# Patient Record
Sex: FEMALE | Race: WHITE | NOT HISPANIC OR LATINO | Employment: FULL TIME | URBAN - METROPOLITAN AREA
[De-identification: names, ages, dates, MRNs, and addresses within clinical notes are randomized per-mention and may not be internally consistent; named-entity substitution may affect disease eponyms.]

---

## 2017-01-30 DIAGNOSIS — F98.8 ATTENTION DEFICIT DISORDER OF ADULT: Primary | ICD-10-CM

## 2017-01-30 RX ORDER — DEXTROAMPHETAMINE SACCHARATE, AMPHETAMINE ASPARTATE, DEXTROAMPHETAMINE SULFATE AND AMPHETAMINE SULFATE 5; 5; 5; 5 MG/1; MG/1; MG/1; MG/1
20 TABLET ORAL 2 TIMES DAILY
Qty: 60 TABLET | Refills: 0 | Status: CANCELLED | OUTPATIENT
Start: 2017-01-30

## 2017-01-31 NOTE — TELEPHONE ENCOUNTER
Pt has not been seen for a year and has no showed 2 follow up visits.  I cannot refill without a visit.  Please let her know she needs to see Hemalatha to get her medication refilled.  Anabel Heller

## 2017-01-31 NOTE — TELEPHONE ENCOUNTER
Adderall      Last Written Prescription Date: 12-  Last Fill Quantity: 60,  # refills: 0   Last Office Visit with [facility], [facility] or [facility] prescribing provider:  1-   Had 2 appointments scheduled. (June & July 2016)  No Show'ed on both.

## 2017-02-03 ENCOUNTER — MYC REFILL (OUTPATIENT)
Dept: FAMILY MEDICINE | Facility: CLINIC | Age: 52
End: 2017-02-03

## 2017-02-03 ENCOUNTER — MYC MEDICAL ADVICE (OUTPATIENT)
Dept: FAMILY MEDICINE | Facility: CLINIC | Age: 52
End: 2017-02-03

## 2017-02-03 DIAGNOSIS — I10 ESSENTIAL HYPERTENSION WITH GOAL BLOOD PRESSURE LESS THAN 140/90: ICD-10-CM

## 2017-02-03 DIAGNOSIS — F98.8 ATTENTION DEFICIT DISORDER OF ADULT: ICD-10-CM

## 2017-02-03 RX ORDER — DEXTROAMPHETAMINE SACCHARATE, AMPHETAMINE ASPARTATE, DEXTROAMPHETAMINE SULFATE AND AMPHETAMINE SULFATE 5; 5; 5; 5 MG/1; MG/1; MG/1; MG/1
20 TABLET ORAL 2 TIMES DAILY
Qty: 60 TABLET | Refills: 0 | Status: SHIPPED | OUTPATIENT
Start: 2017-02-03 | End: 2017-02-16

## 2017-02-03 RX ORDER — LISINOPRIL 40 MG/1
40 TABLET ORAL DAILY
Qty: 30 TABLET | Refills: 0 | Status: SHIPPED | OUTPATIENT
Start: 2017-02-03 | End: 2017-02-16

## 2017-02-03 NOTE — TELEPHONE ENCOUNTER
Hemalatha Juarez,  Routing refill request to provider for review/approval because:  Labs not current:  Last BMP 1-25-16  Patient needs to be seen because:  Last office visit 1-25-16.   Please review and advise refills.    Thanks,  Lynne

## 2017-02-03 NOTE — TELEPHONE ENCOUNTER
Scheduled patient for an appointment on Monday 2/6/17 with Hemalatha.  Routing to provider for approval or denial of refills prior to appointment.  Dottie Alex RN

## 2017-02-03 NOTE — TELEPHONE ENCOUNTER
Message from Lilibetht:  Original authorizing provider: HANNA CONTE NP, SULY CNP    Libertad Russell would like a refill of the following medications:  lisinopril (PRINIVIL/ZESTRIL) 40 MG tablet [HANNA CONTE NP, SULY CNP]  amphetamine-dextroamphetamine (ADDERALL) 20 MG per tablet [HANNA CONTE NP, SULY CNP]    Preferred pharmacy: [facility] [address] [facility]    Comment:

## 2017-02-03 NOTE — TELEPHONE ENCOUNTER
Spoke with patient and scheduled her for an appointment Monday 2/6/17 with Hemalatha.  Patient is requesting refills of her Adderall and lisinpril today. Did let patient know that she was advised to be seen prior to refills.   Routing to provider to see if patient can have her refills today or if she needs to wait until her appointment on Monday.  Dottie Alex RN

## 2017-02-06 NOTE — TELEPHONE ENCOUNTER
Review of chart pt Pt NO SHOWED for her sched appt  After picking up refill 2/6/2017  Larry aware of   DAVID Osman  RN/Manohar Payton

## 2017-02-16 ENCOUNTER — OFFICE VISIT (OUTPATIENT)
Dept: FAMILY MEDICINE | Facility: CLINIC | Age: 52
End: 2017-02-16
Payer: COMMERCIAL

## 2017-02-16 VITALS
DIASTOLIC BLOOD PRESSURE: 68 MMHG | TEMPERATURE: 97.6 F | HEART RATE: 80 BPM | BODY MASS INDEX: 20.86 KG/M2 | WEIGHT: 125.2 LBS | HEIGHT: 65 IN | SYSTOLIC BLOOD PRESSURE: 124 MMHG

## 2017-02-16 DIAGNOSIS — I10 ESSENTIAL HYPERTENSION WITH GOAL BLOOD PRESSURE LESS THAN 140/90: ICD-10-CM

## 2017-02-16 DIAGNOSIS — F98.8 ATTENTION DEFICIT DISORDER OF ADULT: ICD-10-CM

## 2017-02-16 DIAGNOSIS — F40.243 FEAR OF FLYING: ICD-10-CM

## 2017-02-16 DIAGNOSIS — Z12.31 ENCOUNTER FOR SCREENING MAMMOGRAM FOR BREAST CANCER: Primary | ICD-10-CM

## 2017-02-16 LAB
ANION GAP SERPL CALCULATED.3IONS-SCNC: 6 MMOL/L (ref 3–14)
BUN SERPL-MCNC: 11 MG/DL (ref 7–30)
CALCIUM SERPL-MCNC: 8.8 MG/DL (ref 8.5–10.1)
CHLORIDE SERPL-SCNC: 101 MMOL/L (ref 94–109)
CO2 SERPL-SCNC: 29 MMOL/L (ref 20–32)
CREAT SERPL-MCNC: 0.63 MG/DL (ref 0.52–1.04)
CREAT UR-MCNC: 98 MG/DL
GFR SERPL CREATININE-BSD FRML MDRD: NORMAL ML/MIN/1.7M2
GLUCOSE SERPL-MCNC: 98 MG/DL (ref 70–99)
MICROALBUMIN UR-MCNC: 10 MG/L
MICROALBUMIN/CREAT UR: 10.76 MG/G CR (ref 0–25)
POTASSIUM SERPL-SCNC: 3.8 MMOL/L (ref 3.4–5.3)
SODIUM SERPL-SCNC: 136 MMOL/L (ref 133–144)

## 2017-02-16 PROCEDURE — 80048 BASIC METABOLIC PNL TOTAL CA: CPT | Performed by: NURSE PRACTITIONER

## 2017-02-16 PROCEDURE — 99214 OFFICE O/P EST MOD 30 MIN: CPT | Performed by: NURSE PRACTITIONER

## 2017-02-16 PROCEDURE — 36415 COLL VENOUS BLD VENIPUNCTURE: CPT | Performed by: NURSE PRACTITIONER

## 2017-02-16 PROCEDURE — 82043 UR ALBUMIN QUANTITATIVE: CPT | Performed by: NURSE PRACTITIONER

## 2017-02-16 RX ORDER — ALPRAZOLAM 0.25 MG
TABLET ORAL
Qty: 4 TABLET | Refills: 0 | Status: SHIPPED | OUTPATIENT
Start: 2017-02-16 | End: 2017-05-19

## 2017-02-16 RX ORDER — DEXTROAMPHETAMINE SACCHARATE, AMPHETAMINE ASPARTATE, DEXTROAMPHETAMINE SULFATE AND AMPHETAMINE SULFATE 5; 5; 5; 5 MG/1; MG/1; MG/1; MG/1
20 TABLET ORAL 2 TIMES DAILY
Qty: 60 TABLET | Refills: 0 | Status: SHIPPED | OUTPATIENT
Start: 2017-02-16 | End: 2017-05-01

## 2017-02-16 RX ORDER — DEXTROAMPHETAMINE SACCHARATE, AMPHETAMINE ASPARTATE, DEXTROAMPHETAMINE SULFATE AND AMPHETAMINE SULFATE 5; 5; 5; 5 MG/1; MG/1; MG/1; MG/1
20 TABLET ORAL 2 TIMES DAILY
Qty: 60 TABLET | Refills: 0 | Status: SHIPPED | OUTPATIENT
Start: 2017-03-17 | End: 2017-05-01

## 2017-02-16 RX ORDER — LISINOPRIL 40 MG/1
40 TABLET ORAL DAILY
Qty: 90 TABLET | Refills: 3 | Status: SHIPPED | OUTPATIENT
Start: 2017-02-16 | End: 2017-06-30

## 2017-02-16 RX ORDER — DEXTROAMPHETAMINE SACCHARATE, AMPHETAMINE ASPARTATE, DEXTROAMPHETAMINE SULFATE AND AMPHETAMINE SULFATE 5; 5; 5; 5 MG/1; MG/1; MG/1; MG/1
20 TABLET ORAL 2 TIMES DAILY
Qty: 60 TABLET | Refills: 0 | Status: SHIPPED | OUTPATIENT
Start: 2017-04-14 | End: 2017-05-01

## 2017-02-16 NOTE — MR AVS SNAPSHOT
After Visit Summary   2/16/2017    Libertad Russell    MRN: [id number]           Patient Information     Date Of Birth          1965        Visit Information        Provider Department      2/16/2017 2:00 PM Hemalatha Juarez APRN [facility]        Today's Diagnoses     Essential hypertension with goal blood pressure less than 140/90        Attention deficit disorder of adult        Fear of flying          Care Instructions    Continue with Adderall     .for the headache make sure that you do stay hydrated,  Stay well hydrated - urine should be clear.         Smoke stopping.   You need to set the goal of smoke stopping.  Write your goal on a piece of paper,   I am GOING to quit smoking.  And then  At least 3 positive reasons why you are going to quit smoking.    Place this on the mirror in your bathroom and read it the first thing in the AM and the last thing before bed.  Have your goal posted in the kitchen or where ever you need.  Now read it, say it to yourself at least 8 times per day for  8 weeks.    After a few weeks your brain will start to believe this and it will force you to make the decision to smoke or not to smoke.   Your brain can't remember both  I am going to smoke and I am NOT going to smoke... You choose which one you want to do.     Now, on the 2nd piece of paper write down your rewards for no smoking for 1,2,3,4 weeks,  2,3,4,5,6,8,10 12 months.  Pick someone to give you your rewards so you are accountable to someone and they can also be your cheerleader, ( not a nagger).      Doing this should give you 80 % chance of obtaining your goal !!!     Good luck,                 Follow-ups after your visit        Who to contact     Normal or non-critical lab and imaging results will be communicated to you by MyChart, letter or phone within 4 business days after the clinic has received the results. If you do not hear from us within 7 days, please contact  "the clinic through [employer] or phone. If you have a critical or abnormal lab result, we will notify you by phone as soon as possible.  Submit refill requests through [employer] or call your pharmacy and they will forward the refill request to us. Please allow 3 business days for your refill to be completed.          If you need to speak with a  for additional information , please call: [phone]           Additional Information About Your Visit        [employer] Information     [employer] gives you secure access to your electronic health record. If you see a primary care provider, you can also send messages to your care team and make appointments. If you have questions, please call your primary care clinic.  If you do not have a primary care provider, please call [phone] and they will assist you.        Care EveryWhere ID     This is your Care EveryWhere ID. This could be used by other organizations to access your [facility] medical records  [id number]        Your Vitals Were     Pulse Temperature Height Last Period Breastfeeding? BMI (Body Mass Index)    80 97.6  F (36.4  C) (Tympanic) 5' 5\" (1.651 m) 01/28/2017 (Within Days) No 20.83 kg/m2       Blood Pressure from Last 3 Encounters:   02/16/17 124/68   07/20/16 140/90   01/25/16 160/90    Weight from Last 3 Encounters:   02/16/17 125 lb 3.2 oz (56.8 kg)   07/20/16 122 lb (55.3 kg)   01/25/16 124 lb 6.4 oz (56.4 kg)              We Performed the Following     Albumin Random Urine Quantitative     Basic metabolic panel  (Ca, Cl, CO2, Creat, Gluc, K, Na, BUN)          Today's Medication Changes          These changes are accurate as of: 2/16/17  2:23 PM.  If you have any questions, ask your nurse or doctor.               Start taking these medicines.        Dose/Directions    * amphetamine-dextroamphetamine 20 MG per tablet   Commonly known as:  ADDERALL   Used for:  Attention deficit disorder of adult   Started by:  Hemalatha Juarez, APRN CNP "        Dose:  20 mg   Take 1 tablet (20 mg) by mouth 2 times daily Needs to be seen before next refill 2/3/17   Quantity:  60 tablet   Refills:  0       * amphetamine-dextroamphetamine 20 MG per tablet   Commonly known as:  ADDERALL   Used for:  Attention deficit disorder of adult   Started by:  Hemalatha Juarez APRN CNP        Dose:  20 mg   Start taking on:  3/17/2017   Take 1 tablet (20 mg) by mouth 2 times daily Needs to be seen before next refill 2/3/17   Quantity:  60 tablet   Refills:  0       * amphetamine-dextroamphetamine 20 MG per tablet   Commonly known as:  ADDERALL   Used for:  Attention deficit disorder of adult   Started by:  Hemalatha Juarez APRN CNP        Dose:  20 mg   Start taking on:  4/14/2017   Take 1 tablet (20 mg) by mouth 2 times daily Needs to be seen before next refill 2/3/17   Quantity:  60 tablet   Refills:  0       * Notice:  This list has 3 medication(s) that are the same as other medications prescribed for you. Read the directions carefully, and ask your doctor or other care provider to review them with you.      These medicines have changed or have updated prescriptions.        Dose/Directions    lisinopril 40 MG tablet   Commonly known as:  PRINIVIL/ZESTRIL   This may have changed:  additional instructions   Used for:  Essential hypertension with goal blood pressure less than 140/90   Changed by:  Hemalatha Juarez APRN CNP        Dose:  40 mg   Take 1 tablet (40 mg) by mouth daily   Quantity:  90 tablet   Refills:  3            Where to get your medicines      These medications were sent to [facility] [address] [facility]  [address] [facility] [address]     Phone:  [phone]     lisinopril 40 MG tablet         Some of these will need a paper prescription and others can be bought over the counter.  Ask your nurse if you have questions.     Bring a paper prescription for each of these medications     ALPRAZolam  0.25 MG tablet    amphetamine-dextroamphetamine 20 MG per tablet    amphetamine-dextroamphetamine 20 MG per tablet    amphetamine-dextroamphetamine 20 MG per tablet                Primary Care Provider Office Phone # Fax #    Hemalatha Ramonacharlotte Juarez APRCHERYL [facility] [phone]       [facility] [address] [place] [address]        Thank you!     Thank you for choosing [facility]  for your care. Our goal is always to provide you with excellent care. Hearing back from our patients is one way we can continue to improve our services. Please take a few minutes to complete the written survey that you may receive in the mail after your visit with us. Thank you!             Your Updated Medication List - Protect others around you: Learn how to safely use, store and throw away your medicines at www.disposemymeds.org.          This list is accurate as of: 2/16/17  2:23 PM.  Always use your most recent med list.                   Brand Name Dispense Instructions for use    ALPRAZolam 0.25 MG tablet    XANAX    4 tablet    Take 1 tablet 1 hour before flying and repeat if needed       * amphetamine-dextroamphetamine 20 MG per tablet    ADDERALL    60 tablet    Take 1 tablet (20 mg) by mouth 2 times daily Needs to be seen before next refill 2/3/17       * amphetamine-dextroamphetamine 20 MG per tablet   Start taking on:  3/17/2017    ADDERALL    60 tablet    Take 1 tablet (20 mg) by mouth 2 times daily Needs to be seen before next refill 2/3/17       * amphetamine-dextroamphetamine 20 MG per tablet   Start taking on:  4/14/2017    ADDERALL    60 tablet    Take 1 tablet (20 mg) by mouth 2 times daily Needs to be seen before next refill 2/3/17       aspirin 81 MG tablet     100 tablet    Take 1 tablet (81 mg) by mouth daily Medication is being filled for 1 time refill only due to:  Patient needs to be seen because it has been more than one year since last visit.       lisinopril 40 MG  tablet    PRINIVIL/ZESTRIL    90 tablet    Take 1 tablet (40 mg) by mouth daily       metroNIDAZOLE 0.75 % topical gel    METROGEL    45 g    Apply topically 2 times daily       * Notice:  This list has 3 medication(s) that are the same as other medications prescribed for you. Read the directions carefully, and ask your doctor or other care provider to review them with you.

## 2017-02-16 NOTE — NURSING NOTE
"Chief Complaint   Patient presents with     Recheck Medication     Adderall     Hypertension       Initial /68 (BP Location: Left arm, Patient Position: Chair, Cuff Size: Adult Regular)  Pulse 80  Temp 97.6  F (36.4  C) (Tympanic)  Ht 5' 5\" (1.651 m)  Wt 125 lb 3.2 oz (56.8 kg)  LMP 01/28/2017 (Within Days)  Breastfeeding? No  BMI 20.83 kg/m2 Estimated body mass index is 20.83 kg/(m^2) as calculated from the following:    Height as of this encounter: 5' 5\" (1.651 m).    Weight as of this encounter: 125 lb 3.2 oz (56.8 kg).  Medication Reconciliation: complete     Celeste Wright CMA (AAMA)      "

## 2017-02-16 NOTE — PROGRESS NOTES
SUBJECTIVE:                                                    Libertad Russell is a 51 year old female who presents to clinic today for the following health issues:      Medication Followup of Adderall    Taking Medication as prescribed: yes    Side Effects:  None    Medication Helping Symptoms:  yes                                                                                                     Some-                                                                        Never   Rarely      times       Often  Very Often  1. How often do you have trouble wrapping up the final details of a project,  once the challenging parts have been done?   x     2. How often do you have difficulty getting things in order when you have to do a task that requires organization?   x     3. How often do you have problems remembering appointments or obligations?   x     4. When you have a task that requires a lot of thought, how often do you avoid or delay getting started?   x     5. How often do you fidget or squirm with your hands or feet when you have to sit down for a long time?  x      6. How often do you feel overly active and compelled to do things, like you were driven by a motor?   x       Part A                                                        7. How often do you make careless mistakes when you have to work on a boring or difficult project?   x     8. How often do you have difficulty keeping your attention when you are doing boring or repetitive work?   x     9. How often do you have difficulty concentrating on what people say to you, even when they are speaking to you directly?  x      10. How often do you misplace or have difficulty finding things at home or at work?  x      11. How often are you distracted by activity or noise around you?  x      12. How often do you leave your seat in meetings or other situations in which you are expected to remain seated?    x      13. How often do you feel restless or  fidgety?     x     14. How often do you have difficulty unwinding and relaxing when you have time to yourself?  x      15. How often do you find yourself talking too much when you are in social situations?  x      16. When you're in a conversation, how often do you find yourself finishing the sentences of the people you are talking to, before they can finish them themselves?  x      17. How often do you have difficulty waiting your turn in situations when turn-taking is required?  x      18. How often do you interrupt others when they are busy?  x        Hypertension Follow-up      Outpatient blood pressures are being checked at work.  Results are under 140/90.    Low Salt Diet: low salt     Amount of exercise or physical activity: 2-3 days/week for an average of 15-30 minutes    Problems taking medications regularly: Headaches possibly from lisinopril     Medication side effects: none  Diet: regular (no restrictions)       Is doing well.       Problem list and histories reviewed & adjusted, as indicated.  Additional history: as documented    Patient Active Problem List   Diagnosis     Tobacco use disorder     Seasonal allergic rhinitis     Attention deficit disorder of adult     CARDIOVASCULAR SCREENING; LDL GOAL LESS THAN 160     Rosacea     Raynaud phenomenon     General medical exam     Essential hypertension with goal blood pressure less than 140/90     Past Surgical History   Procedure Laterality Date     Tubal ligation  2003     Surgical history of -   2000, 1998     breast biopsies     Lymph node biopsy  2001     told to be benign     Breast surgery       Cosmetic surgery       breast implants     Eye surgery  2015     cherry       Social History   Substance Use Topics     Smoking status: Current Every Day Smoker     Packs/day: 1.00     Years: 15.00     Types: Cigarettes     Smokeless tobacco: Never Used     Alcohol use Yes      Comment: 1 drink per week     Family History   Problem Relation Age of Onset      Hypertension Mother      CANCER Mother      lung cancer     Other Cancer Mother      Lung     C.A.D. Father      52 at first MI     Hypertension Other      DIABETES No family hx of      CEREBROVASCULAR DISEASE No family hx of      Breast Cancer No family hx of      Cancer - colorectal No family hx of          Current Outpatient Prescriptions   Medication Sig Dispense Refill     lisinopril (PRINIVIL/ZESTRIL) 40 MG tablet Take 1 tablet (40 mg) by mouth daily Dur for appointment and lab work 30 tablet 0     amphetamine-dextroamphetamine (ADDERALL) 20 MG per tablet Take 1 tablet (20 mg) by mouth 2 times daily Needs to be seen before next refill 2/3/17 60 tablet 0     metroNIDAZOLE (METROGEL) 0.75 % topical gel Apply topically 2 times daily 45 g 0     aspirin 81 MG tablet Take 1 tablet (81 mg) by mouth daily Medication is being filled for 1 time refill only due to:  Patient needs to be seen because it has been more than one year since last visit. 100 tablet 0     ALPRAZolam (XANAX) 0.25 MG tablet Take 1 tablet 1 hour before flying and repeat if needed (Patient not taking: Reported on 2/16/2017) 4 tablet 0     No Known Allergies  BP Readings from Last 3 Encounters:   02/16/17 124/68   07/20/16 140/90   01/25/16 160/90    Wt Readings from Last 3 Encounters:   02/16/17 125 lb 3.2 oz (56.8 kg)   07/20/16 122 lb (55.3 kg)   01/25/16 124 lb 6.4 oz (56.4 kg)                    ROS:  C: NEGATIVE for fever, chills, change in weight  E/M: NEGATIVE for ear, mouth and throat problems  R: NEGATIVE for significant cough or SOB, +  smoker  CV: NEGATIVE for chest pain, palpitations or peripheral edema  GI: NEGATIVE for nausea, abdominal pain, heartburn, or change in bowel habits  PSYCHIATRIC: NEGATIVE for changes in mood or affect, no problems with Adderall.   The medication is keeping her focused,    Is on the computer most of the day and with the medication she is jasen to stay on track     OBJECTIVE:                                     "                /68 (BP Location: Left arm, Patient Position: Chair, Cuff Size: Adult Regular)  Pulse 80  Temp 97.6  F (36.4  C) (Tympanic)  Ht 5' 5\" (1.651 m)  Wt 125 lb 3.2 oz (56.8 kg)  LMP 01/28/2017 (Within Days)  Breastfeeding? No  BMI 20.83 kg/m2  Body mass index is 20.83 kg/(m^2).  GENERAL: healthy, alert and no distress  HENT: ear canals and TM's normal, nose and mouth without ulcers or lesions  NECK: no adenopathy, no asymmetry, masses, or scars and thyroid normal to palpation  RESP: lungs clear to auscultation - no rales, rhonchi or wheezes  CV: regular rate and rhythm, normal S1 S2, no S3 or S4, no murmur, click or rub, no peripheral edema and peripheral pulses strong  ABDOMEN: soft, nontender, no hepatosplenomegaly, no masses and bowel sounds normal  PSYCH: mentation appears normal, affect normal/bright, judgement and insight intact and appearance well groomed.   Self assessment for  ADD is very good.    Diagnostic Test Results:  none      ASSESSMENT/PLAN:                                                      ASSESSMENT/PLAN:      ICD-10-CM    1. Encounter for screening mammogram for breast cancer Z12.31 *MA Screening Digital Bilateral   2. Essential hypertension with goal blood pressure less than 140/90 I10 lisinopril (PRINIVIL/ZESTRIL) 40 MG tablet     Basic metabolic panel  (Ca, Cl, CO2, Creat, Gluc, K, Na, BUN)     Albumin Random Urine Quantitative   3. Attention deficit disorder of adult F98.8 amphetamine-dextroamphetamine (ADDERALL) 20 MG per tablet     amphetamine-dextroamphetamine (ADDERALL) 20 MG per tablet     amphetamine-dextroamphetamine (ADDERALL) 20 MG per tablet   4. 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         Smoke stopping.   You need to set the goal of smoke stopping.  Write your goal on a piece of paper, "   I am GOING to quit smoking.  And then  At least 3 positive reasons why you are going to quit smoking.    Place this on the mirror in your bathroom and read it the first thing in the AM and the last thing before bed.  Have your goal posted in the kitchen or where ever you need.  Now read it, say it to yourself at least 8 times per day for  8 weeks.    After a few weeks your brain will start to believe this and it will force you to make the decision to smoke or not to smoke.   Your brain can't remember both  I am going to smoke and I am NOT going to smoke... You choose which one you want to do.     Now, on the 2nd piece of paper write down your rewards for no smoking for 1,2,3,4 weeks,  2,3,4,5,6,8,10 12 months.  Pick someone to give you your rewards so you are accountable to someone and they can also be your cheerleader, ( not a nagger).      Doing this should give you 80 % chance of obtaining your goal !!!     Good luck,                      Tobacco Cessation:   reports that she has been smoking Cigarettes.  She has a 15.00 pack-year smoking history. She has never used smokeless tobacco.  Tobacco Cessation Action Plan: Phone counseling: Place order for QuitPlan (Tobacco Cessation QIC Referral [id number])      MEDICATIONS:  Continue current medications without change  Regular exercise  See Patient Instructions    HANNA CONTE NP, APRN [facility]

## 2017-02-16 NOTE — PATIENT INSTRUCTIONS
Continue with Adderall     .for the headache make sure that you do stay hydrated,  Stay well hydrated - urine should be clear.         Smoke stopping.   You need to set the goal of smoke stopping.  Write your goal on a piece of paper,   I am GOING to quit smoking.  And then  At least 3 positive reasons why you are going to quit smoking.    Place this on the mirror in your bathroom and read it the first thing in the AM and the last thing before bed.  Have your goal posted in the kitchen or where ever you need.  Now read it, say it to yourself at least 8 times per day for  8 weeks.    After a few weeks your brain will start to believe this and it will force you to make the decision to smoke or not to smoke.   Your brain can't remember both  I am going to smoke and I am NOT going to smoke... You choose which one you want to do.     Now, on the 2nd piece of paper write down your rewards for no smoking for 1,2,3,4 weeks,  2,3,4,5,6,8,10 12 months.  Pick someone to give you your rewards so you are accountable to someone and they can also be your cheerleader, ( not a nagger).      Doing this should give you 80 % chance of obtaining your goal !!!     Good luck,

## 2017-03-09 ENCOUNTER — TELEPHONE (OUTPATIENT)
Dept: FAMILY MEDICINE | Facility: CLINIC | Age: 52
End: 2017-03-09

## 2017-03-09 NOTE — TELEPHONE ENCOUNTER
Panel Management Review      Patient has the following on her problem list:     Hypertension   Last three blood pressure readings:  BP Readings from Last 3 Encounters:   02/16/17 124/68   07/20/16 140/90   01/25/16 160/90     Blood pressure: Passed    HTN Guidelines:  Age 18-59 BP range:  Less than 140/90  Age 60-85 with Diabetes:  Less than 140/90  Age 60-85 without Diabetes:  less than 150/90      Composite cancer screening  Chart review shows that this patient is due/due soon for the following Pap Smear  Summary:    Patient is due/failing the following:   PAP    Action needed:   Patient needs office visit for PAP.    Type of outreach:    Sent [employer] message.    Questions for provider review:    None                                                                                                                                    Oriana Samuel       Chart routed to none .

## 2017-05-01 ENCOUNTER — MYC REFILL (OUTPATIENT)
Dept: FAMILY MEDICINE | Facility: CLINIC | Age: 52
End: 2017-05-01

## 2017-05-01 DIAGNOSIS — F98.8 ATTENTION DEFICIT DISORDER OF ADULT: ICD-10-CM

## 2017-05-01 DIAGNOSIS — I10 ESSENTIAL HYPERTENSION WITH GOAL BLOOD PRESSURE LESS THAN 140/90: ICD-10-CM

## 2017-05-01 NOTE — TELEPHONE ENCOUNTER
Message from Lilibetht:  Original authorizing provider: HANNA CONTE NP, APRN CNP    Libertad Russell would like a refill of the following medications:  lisinopril (PRINIVIL/ZESTRIL) 40 MG tablet [HANNA CONTE NP, APRN CNP]  amphetamine-dextroamphetamine (ADDERALL) 20 MG per tablet [HANNA CONTE NP, APRN CNP]  amphetamine-dextroamphetamine (ADDERALL) 20 MG per tablet [HANNA CONTE NP, APRN CNP]  amphetamine-dextroamphetamine (ADDERALL) 20 MG per tablet [HANNA CONTE NP, APRN CNP]    Preferred pharmacy: [facility] [address]    Comment:

## 2017-05-02 RX ORDER — DEXTROAMPHETAMINE SACCHARATE, AMPHETAMINE ASPARTATE, DEXTROAMPHETAMINE SULFATE AND AMPHETAMINE SULFATE 5; 5; 5; 5 MG/1; MG/1; MG/1; MG/1
20 TABLET ORAL 2 TIMES DAILY
Qty: 60 TABLET | Refills: 0 | Status: SHIPPED | OUTPATIENT
Start: 2017-05-12 | End: 2017-06-05

## 2017-05-02 RX ORDER — DEXTROAMPHETAMINE SACCHARATE, AMPHETAMINE ASPARTATE, DEXTROAMPHETAMINE SULFATE AND AMPHETAMINE SULFATE 5; 5; 5; 5 MG/1; MG/1; MG/1; MG/1
20 TABLET ORAL 2 TIMES DAILY
Qty: 60 TABLET | Refills: 0 | Status: SHIPPED | OUTPATIENT
Start: 2017-06-12 | End: 2017-06-05

## 2017-05-02 RX ORDER — LISINOPRIL 40 MG/1
40 TABLET ORAL DAILY
Qty: 90 TABLET | Refills: 3 | OUTPATIENT
Start: 2017-05-02

## 2017-05-02 NOTE — TELEPHONE ENCOUNTER
Scripts walk over to the [facility].    Fadumo Preston, [facility]

## 2017-05-19 ENCOUNTER — MYC REFILL (OUTPATIENT)
Dept: FAMILY MEDICINE | Facility: CLINIC | Age: 52
End: 2017-05-19

## 2017-05-19 DIAGNOSIS — F98.8 ATTENTION DEFICIT DISORDER OF ADULT: ICD-10-CM

## 2017-05-19 DIAGNOSIS — F40.243 FEAR OF FLYING: ICD-10-CM

## 2017-05-19 RX ORDER — DEXTROAMPHETAMINE SACCHARATE, AMPHETAMINE ASPARTATE, DEXTROAMPHETAMINE SULFATE AND AMPHETAMINE SULFATE 5; 5; 5; 5 MG/1; MG/1; MG/1; MG/1
20 TABLET ORAL 2 TIMES DAILY
Qty: 60 TABLET | Refills: 0 | Status: CANCELLED | OUTPATIENT
Start: 2017-05-19

## 2017-05-22 NOTE — TELEPHONE ENCOUNTER
Message from Lilibetht:  Original authorizing provider: HANNA CONTE NP, APRN CNP    Libertad Russell would like a refill of the following medications:  ALPRAZolam (XANAX) 0.25 MG tablet [HANNA CONTE NP, APRN CNP]  amphetamine-dextroamphetamine (ADDERALL) 20 MG per tablet [HANNA CONTE NP, APRN CNP]  amphetamine-dextroamphetamine (ADDERALL) 20 MG per tablet [HANNA CONTE NP, APRN CNP]    Preferred pharmacy: [facility] [address] [facility]    Comment:

## 2017-05-23 RX ORDER — ALPRAZOLAM 0.25 MG
TABLET ORAL
Qty: 4 TABLET | Refills: 0 | Status: SHIPPED | OUTPATIENT
Start: 2017-05-23 | End: 2019-07-08

## 2017-06-05 ENCOUNTER — TELEPHONE (OUTPATIENT)
Dept: FAMILY MEDICINE | Facility: CLINIC | Age: 52
End: 2017-06-05

## 2017-06-05 DIAGNOSIS — F98.8 ATTENTION DEFICIT DISORDER OF ADULT: ICD-10-CM

## 2017-06-05 RX ORDER — DEXTROAMPHETAMINE SACCHARATE, AMPHETAMINE ASPARTATE, DEXTROAMPHETAMINE SULFATE AND AMPHETAMINE SULFATE 5; 5; 5; 5 MG/1; MG/1; MG/1; MG/1
20 TABLET ORAL 2 TIMES DAILY
Qty: 60 TABLET | Refills: 0 | Status: SHIPPED | OUTPATIENT
Start: 2017-07-05 | End: 2017-06-05

## 2017-06-05 RX ORDER — DEXTROAMPHETAMINE SACCHARATE, AMPHETAMINE ASPARTATE, DEXTROAMPHETAMINE SULFATE AND AMPHETAMINE SULFATE 5; 5; 5; 5 MG/1; MG/1; MG/1; MG/1
20 TABLET ORAL 2 TIMES DAILY
Qty: 60 TABLET | Refills: 0 | Status: SHIPPED | OUTPATIENT
Start: 2017-08-04 | End: 2017-06-05

## 2017-06-05 RX ORDER — DEXTROAMPHETAMINE SACCHARATE, AMPHETAMINE ASPARTATE, DEXTROAMPHETAMINE SULFATE AND AMPHETAMINE SULFATE 5; 5; 5; 5 MG/1; MG/1; MG/1; MG/1
20 TABLET ORAL 2 TIMES DAILY
Qty: 60 TABLET | Refills: 0 | Status: SHIPPED | OUTPATIENT
Start: 2017-06-05 | End: 2017-06-30

## 2017-06-30 ENCOUNTER — MYC REFILL (OUTPATIENT)
Dept: FAMILY MEDICINE | Facility: CLINIC | Age: 52
End: 2017-06-30

## 2017-06-30 DIAGNOSIS — F98.8 ATTENTION DEFICIT DISORDER OF ADULT: ICD-10-CM

## 2017-06-30 DIAGNOSIS — L71.9 ROSACEA: ICD-10-CM

## 2017-06-30 DIAGNOSIS — I10 ESSENTIAL HYPERTENSION WITH GOAL BLOOD PRESSURE LESS THAN 140/90: ICD-10-CM

## 2017-07-03 RX ORDER — DEXTROAMPHETAMINE SACCHARATE, AMPHETAMINE ASPARTATE, DEXTROAMPHETAMINE SULFATE AND AMPHETAMINE SULFATE 5; 5; 5; 5 MG/1; MG/1; MG/1; MG/1
20 TABLET ORAL 2 TIMES DAILY
Qty: 60 TABLET | Refills: 0 | Status: SHIPPED | OUTPATIENT
Start: 2017-07-03 | End: 2017-07-20

## 2017-07-03 RX ORDER — LISINOPRIL 40 MG/1
40 TABLET ORAL DAILY
Qty: 90 TABLET | Refills: 3 | Status: SHIPPED | OUTPATIENT
Start: 2017-07-03 | End: 2017-08-25

## 2017-07-03 RX ORDER — METRONIDAZOLE 7.5 MG/G
GEL TOPICAL 2 TIMES DAILY
Qty: 45 G | Refills: 0 | Status: SHIPPED | OUTPATIENT
Start: 2017-07-03 | End: 2017-07-20

## 2017-07-03 NOTE — TELEPHONE ENCOUNTER
Message from Lilibetht:  Original authorizing provider: HANNA CONTE NP, APRN CNP    Libertad Russell would like a refill of the following medications:  metroNIDAZOLE (METROGEL) 0.75 % topical gel [HANNA CONTE NP, APRN CNP]  lisinopril (PRINIVIL/ZESTRIL) 40 MG tablet [HANNA CONTE NP, APRN CNP]  amphetamine-dextroamphetamine (ADDERALL) 20 MG per tablet [HANNA CONTE NP, APRN CNP]    Preferred pharmacy: [facility] [address] [facility]    Comment:

## 2017-07-03 NOTE — TELEPHONE ENCOUNTER
Health Maintenance Due   Topic Date Due     PAP SCREENING Q3 YR (SYSTEM ASSIGNED)  06/02/2012     Lab Results   Component Value Date    LDL 81 09/29/2014      Lab Results   Component Value Date    CR 0.63 02/16/2017     Lab Results   Component Value Date    POTASSIUM 3.8 02/16/2017     BP Readings from Last 1 Encounters:   02/16/17 124/68     No results found for: A1C   Lab Results   Component Value Date    TSH 1.49 09/24/2012      PHQ-9 SCORE 10/18/2011 9/24/2012 11/25/2013   Total Score 0 6 7     AMANDA-7 SCORE 11/25/2013   Total Score 2

## 2017-07-05 NOTE — TELEPHONE ENCOUNTER
Script for adderall walked over tot [facility].      Fadumo Preston, [place]

## 2017-07-20 ENCOUNTER — MYC REFILL (OUTPATIENT)
Dept: FAMILY MEDICINE | Facility: CLINIC | Age: 52
End: 2017-07-20

## 2017-07-20 DIAGNOSIS — F98.8 ATTENTION DEFICIT DISORDER OF ADULT: ICD-10-CM

## 2017-07-20 DIAGNOSIS — L71.9 ROSACEA: ICD-10-CM

## 2017-07-20 RX ORDER — METRONIDAZOLE 7.5 MG/G
GEL TOPICAL 2 TIMES DAILY
Qty: 45 G | Refills: 0 | Status: SHIPPED | OUTPATIENT
Start: 2017-07-20 | End: 2017-09-19

## 2017-07-20 RX ORDER — DEXTROAMPHETAMINE SACCHARATE, AMPHETAMINE ASPARTATE, DEXTROAMPHETAMINE SULFATE AND AMPHETAMINE SULFATE 5; 5; 5; 5 MG/1; MG/1; MG/1; MG/1
20 TABLET ORAL 2 TIMES DAILY
Qty: 60 TABLET | Refills: 0 | Status: SHIPPED | OUTPATIENT
Start: 2017-08-02 | End: 2017-08-25

## 2017-07-20 NOTE — TELEPHONE ENCOUNTER
Message from MyChart:  Original authorizing provider: Troy Munguia MD    Libertad ANDERSONZoie Yvonne would like a refill of the following medications:  metroNIDAZOLE (METROGEL) 0.75 % topical gel [Troy Munguia MD]  amphetamine-dextroamphetamine (ADDERALL) 20 MG per tablet [Troy Munguia MD]    Preferred pharmacy: [facility] [address] [facility]    Comment:

## 2017-08-25 ENCOUNTER — MYC REFILL (OUTPATIENT)
Dept: FAMILY MEDICINE | Facility: CLINIC | Age: 52
End: 2017-08-25

## 2017-08-25 DIAGNOSIS — F98.8 ATTENTION DEFICIT DISORDER OF ADULT: ICD-10-CM

## 2017-08-25 RX ORDER — DEXTROAMPHETAMINE SACCHARATE, AMPHETAMINE ASPARTATE, DEXTROAMPHETAMINE SULFATE AND AMPHETAMINE SULFATE 5; 5; 5; 5 MG/1; MG/1; MG/1; MG/1
20 TABLET ORAL 2 TIMES DAILY
Qty: 60 TABLET | Refills: 0 | Status: SHIPPED | OUTPATIENT
Start: 2017-08-25 | End: 2017-08-25

## 2017-08-25 RX ORDER — DEXTROAMPHETAMINE SACCHARATE, AMPHETAMINE ASPARTATE, DEXTROAMPHETAMINE SULFATE AND AMPHETAMINE SULFATE 5; 5; 5; 5 MG/1; MG/1; MG/1; MG/1
20 TABLET ORAL 2 TIMES DAILY
Qty: 60 TABLET | Refills: 0 | Status: SHIPPED | OUTPATIENT
Start: 2017-08-31 | End: 2017-09-21

## 2017-08-25 RX ORDER — DEXTROAMPHETAMINE SACCHARATE, AMPHETAMINE ASPARTATE, DEXTROAMPHETAMINE SULFATE AND AMPHETAMINE SULFATE 5; 5; 5; 5 MG/1; MG/1; MG/1; MG/1
20 TABLET ORAL 2 TIMES DAILY
Qty: 60 TABLET | Refills: 0 | Status: SHIPPED | OUTPATIENT
Start: 2017-11-01 | End: 2017-09-21

## 2017-08-25 RX ORDER — DEXTROAMPHETAMINE SACCHARATE, AMPHETAMINE ASPARTATE, DEXTROAMPHETAMINE SULFATE AND AMPHETAMINE SULFATE 5; 5; 5; 5 MG/1; MG/1; MG/1; MG/1
20 TABLET ORAL 2 TIMES DAILY
Qty: 60 TABLET | Refills: 0 | Status: SHIPPED | OUTPATIENT
Start: 2017-10-02 | End: 2017-09-21

## 2017-08-25 NOTE — TELEPHONE ENCOUNTER
Message from Berry White:  Original authorizing provider: HANNA CONTE NP, APRN CNP    Libertad Russell would like a refill of the following medications:  amphetamine-dextroamphetamine (ADDERALL) 20 MG per tablet [HANNA CONTE NP, APRN CNP]    Preferred pharmacy: [facility] [address] [facility]    Comment:      Medication renewals requested in this message routed to other providers:  lisinopril (PRINIVIL/ZESTRIL) 40 MG tablet [Troy Munguia MD]

## 2017-08-27 ENCOUNTER — HEALTH MAINTENANCE LETTER (OUTPATIENT)
Age: 52
End: 2017-08-27

## 2017-09-19 ENCOUNTER — MYC REFILL (OUTPATIENT)
Dept: FAMILY MEDICINE | Facility: CLINIC | Age: 52
End: 2017-09-19

## 2017-09-19 DIAGNOSIS — I10 ESSENTIAL HYPERTENSION WITH GOAL BLOOD PRESSURE LESS THAN 140/90: ICD-10-CM

## 2017-09-19 DIAGNOSIS — F98.8 ATTENTION DEFICIT DISORDER OF ADULT: ICD-10-CM

## 2017-09-19 DIAGNOSIS — L71.9 ROSACEA: ICD-10-CM

## 2017-09-19 RX ORDER — DEXTROAMPHETAMINE SACCHARATE, AMPHETAMINE ASPARTATE, DEXTROAMPHETAMINE SULFATE AND AMPHETAMINE SULFATE 5; 5; 5; 5 MG/1; MG/1; MG/1; MG/1
20 TABLET ORAL 2 TIMES DAILY
Qty: 60 TABLET | Refills: 0 | Status: CANCELLED | OUTPATIENT
Start: 2017-09-19

## 2017-09-19 RX ORDER — LISINOPRIL 40 MG/1
40 TABLET ORAL DAILY
Qty: 90 TABLET | Refills: 1 | Status: CANCELLED | OUTPATIENT
Start: 2017-09-19

## 2017-09-20 RX ORDER — METRONIDAZOLE 7.5 MG/G
GEL TOPICAL 2 TIMES DAILY
Qty: 45 G | Refills: 0 | Status: SHIPPED | OUTPATIENT
Start: 2017-09-20 | End: 2018-05-16

## 2017-09-20 NOTE — TELEPHONE ENCOUNTER
Message from Lilibetht:  Original authorizing provider: HANNA CONTE NP, APRN CNP    Libertad Russell would like a refill of the following medications:  metroNIDAZOLE (METROGEL) 0.75 % topical gel [HANNA CONTE NP, APRN CNP]  lisinopril (PRINIVIL/ZESTRIL) 40 MG tablet [HANNA CONTE NP, APRN CNP]  amphetamine-dextroamphetamine (ADDERALL) 20 MG per tablet [HANNA CONTE NP, APRN CNP]    Preferred pharmacy: [facility] [address] [facility]    Comment:

## 2017-09-21 ENCOUNTER — OFFICE VISIT (OUTPATIENT)
Dept: FAMILY MEDICINE | Facility: CLINIC | Age: 52
End: 2017-09-21
Payer: COMMERCIAL

## 2017-09-21 VITALS
SYSTOLIC BLOOD PRESSURE: 114 MMHG | HEIGHT: 65 IN | DIASTOLIC BLOOD PRESSURE: 62 MMHG | BODY MASS INDEX: 20.83 KG/M2 | TEMPERATURE: 97.4 F | WEIGHT: 125 LBS | HEART RATE: 80 BPM

## 2017-09-21 DIAGNOSIS — I10 ESSENTIAL HYPERTENSION WITH GOAL BLOOD PRESSURE LESS THAN 140/90: ICD-10-CM

## 2017-09-21 DIAGNOSIS — F98.8 ATTENTION DEFICIT DISORDER OF ADULT: Primary | ICD-10-CM

## 2017-09-21 PROCEDURE — 99214 OFFICE O/P EST MOD 30 MIN: CPT | Performed by: NURSE PRACTITIONER

## 2017-09-21 RX ORDER — DEXTROAMPHETAMINE SACCHARATE, AMPHETAMINE ASPARTATE, DEXTROAMPHETAMINE SULFATE AND AMPHETAMINE SULFATE 5; 5; 5; 5 MG/1; MG/1; MG/1; MG/1
20 TABLET ORAL DAILY
Qty: 30 TABLET | Refills: 0 | Status: SHIPPED | OUTPATIENT
Start: 2017-09-21 | End: 2017-12-11

## 2017-09-21 RX ORDER — DEXTROAMPHETAMINE SACCHARATE, AMPHETAMINE ASPARTATE MONOHYDRATE, DEXTROAMPHETAMINE SULFATE AND AMPHETAMINE SULFATE 5; 5; 5; 5 MG/1; MG/1; MG/1; MG/1
20 CAPSULE, EXTENDED RELEASE ORAL DAILY
Qty: 30 CAPSULE | Refills: 0 | Status: SHIPPED | OUTPATIENT
Start: 2017-09-21 | End: 2017-12-11

## 2017-09-21 RX ORDER — DEXTROAMPHETAMINE SACCHARATE, AMPHETAMINE ASPARTATE, DEXTROAMPHETAMINE SULFATE AND AMPHETAMINE SULFATE 5; 5; 5; 5 MG/1; MG/1; MG/1; MG/1
20 TABLET ORAL DAILY
Qty: 30 TABLET | Refills: 0 | Status: SHIPPED | OUTPATIENT
Start: 2017-10-20 | End: 2017-12-11

## 2017-09-21 RX ORDER — DEXTROAMPHETAMINE SACCHARATE, AMPHETAMINE ASPARTATE MONOHYDRATE, DEXTROAMPHETAMINE SULFATE AND AMPHETAMINE SULFATE 5; 5; 5; 5 MG/1; MG/1; MG/1; MG/1
20 CAPSULE, EXTENDED RELEASE ORAL DAILY
Qty: 30 CAPSULE | Refills: 0 | Status: SHIPPED | OUTPATIENT
Start: 2017-11-20 | End: 2017-12-07

## 2017-09-21 RX ORDER — DEXTROAMPHETAMINE SACCHARATE, AMPHETAMINE ASPARTATE MONOHYDRATE, DEXTROAMPHETAMINE SULFATE AND AMPHETAMINE SULFATE 5; 5; 5; 5 MG/1; MG/1; MG/1; MG/1
20 CAPSULE, EXTENDED RELEASE ORAL DAILY
Qty: 30 CAPSULE | Refills: 0 | Status: SHIPPED | OUTPATIENT
Start: 2017-10-20 | End: 2017-12-11

## 2017-09-21 RX ORDER — DEXTROAMPHETAMINE SACCHARATE, AMPHETAMINE ASPARTATE, DEXTROAMPHETAMINE SULFATE AND AMPHETAMINE SULFATE 5; 5; 5; 5 MG/1; MG/1; MG/1; MG/1
20 TABLET ORAL DAILY
Qty: 30 TABLET | Refills: 0 | Status: SHIPPED | OUTPATIENT
Start: 2017-11-20 | End: 2017-12-07

## 2017-09-21 NOTE — MR AVS SNAPSHOT
After Visit Summary   9/21/2017    Libertad Russell    MRN: [id number]           Patient Information     Date Of Birth          1965        Visit Information        Provider Department      9/21/2017 10:20 AM Hemalatha Juarez APRN [facility]        Today's Diagnoses     Essential hypertension with goal blood pressure less than 140/90    -  1    Attention deficit disorder of adult          Care Instructions    While you are working the 5 days of 12 hour shift.  Try taking the extended release in the  AM and then take the immediate release around 1-2 pm .     Once you are working your 8 hours you can then consider going back to the  Twice per day dosing you are now taking or use just the 1 extended pill.     Stay well hydrated - urine should be clear.    You should have to urinate  Every  3 hours or so    You are OVERDUE for pap smear.   Please get it done before the end of the year                Follow-ups after your visit        Who to contact     Normal or non-critical lab and imaging results will be communicated to you by [employer]hart, letter or phone within 4 business days after the clinic has received the results. If you do not hear from us within 7 days, please contact the clinic through [employer] or phone. If you have a critical or abnormal lab result, we will notify you by phone as soon as possible.  Submit refill requests through [employer] or call your pharmacy and they will forward the refill request to us. Please allow 3 business days for your refill to be completed.          If you need to speak with a  for additional information , please call: [phone]           Additional Information About Your Visit        [employer] Information     [employer] gives you secure access to your electronic health record. If you see a primary care provider, you can also send messages to your care team and make appointments. If you have questions, please call your  "primary care clinic.  If you do not have a primary care provider, please call [phone] and they will assist you.        Care EveryWhere ID     This is your Care EveryWhere ID. This could be used by other organizations to access your [facility] medical records  [id number]        Your Vitals Were     Pulse Temperature Height Last Period BMI (Body Mass Index)       80 97.4  F (36.3  C) (Tympanic) 5' 5\" (1.651 m) 09/07/2017 (Exact Date) 20.8 kg/m2        Blood Pressure from Last 3 Encounters:   09/21/17 114/62   02/16/17 124/68   07/20/16 140/90    Weight from Last 3 Encounters:   09/21/17 125 lb (56.7 kg)   02/16/17 125 lb 3.2 oz (56.8 kg)   07/20/16 122 lb (55.3 kg)              Today, you had the following     No orders found for display         Today's Medication Changes          These changes are accurate as of: 9/21/17 10:54 AM.  If you have any questions, ask your nurse or doctor.               These medicines have changed or have updated prescriptions.        Dose/Directions    * amphetamine-dextroamphetamine 20 MG per tablet   Commonly known as:  ADDERALL   This may have changed:    - when to take this  - additional instructions   Used for:  Attention deficit disorder of adult   Changed by:  Hemalatha Juarez APRN CNP        Dose:  20 mg   Take 1 tablet (20 mg) by mouth daily Take in the early afternoon   Quantity:  30 tablet   Refills:  0       * amphetamine-dextroamphetamine 20 MG per 24 hr capsule   Commonly known as:  ADDERALL XR   This may have changed:  You were already taking a medication with the same name, and this prescription was added. Make sure you understand how and when to take each.   Used for:  Attention deficit disorder of adult   Changed by:  Hemalatha Juarez APRN CNP        Dose:  20 mg   Take 1 capsule (20 mg) by mouth daily   Quantity:  30 capsule   Refills:  0       * amphetamine-dextroamphetamine 20 MG per tablet   Commonly known as:  ADDERALL   This may have changed:  "   - when to take this  - additional instructions   Used for:  Attention deficit disorder of adult   Changed by:  Hemalatha Juarez APRN CNP        Dose:  20 mg   Start taking on:  10/20/2017   Take 1 tablet (20 mg) by mouth daily Take in the early afternoon   Quantity:  30 tablet   Refills:  0       * amphetamine-dextroamphetamine 20 MG per 24 hr capsule   Commonly known as:  ADDERALL XR   This may have changed:  You were already taking a medication with the same name, and this prescription was added. Make sure you understand how and when to take each.   Used for:  Attention deficit disorder of adult   Changed by:  Hemalatha Juarez APRN CNP        Dose:  20 mg   Start taking on:  10/20/2017   Take 1 capsule (20 mg) by mouth daily   Quantity:  30 capsule   Refills:  0       * amphetamine-dextroamphetamine 20 MG per tablet   Commonly known as:  ADDERALL   This may have changed:    - when to take this  - additional instructions   Used for:  Attention deficit disorder of adult   Changed by:  Hemalatha Juarez APRN CNP        Dose:  20 mg   Start taking on:  11/20/2017   Take 1 tablet (20 mg) by mouth daily Take in the early afternoon   Quantity:  30 tablet   Refills:  0       * amphetamine-dextroamphetamine 20 MG per 24 hr capsule   Commonly known as:  ADDERALL XR   This may have changed:  You were already taking a medication with the same name, and this prescription was added. Make sure you understand how and when to take each.   Used for:  Attention deficit disorder of adult   Changed by:  Hemalatha Juarez APRN CNP        Dose:  20 mg   Start taking on:  11/20/2017   Take 1 capsule (20 mg) by mouth daily   Quantity:  30 capsule   Refills:  0       * Notice:  This list has 6 medication(s) that are the same as other medications prescribed for you. Read the directions carefully, and ask your doctor or other care provider to review them with you.         Where to get your medicines      Some  of these will need a paper prescription and others can be bought over the counter.  Ask your nurse if you have questions.     Bring a paper prescription for each of these medications     amphetamine-dextroamphetamine 20 MG per 24 hr capsule    amphetamine-dextroamphetamine 20 MG per 24 hr capsule    amphetamine-dextroamphetamine 20 MG per 24 hr capsule    amphetamine-dextroamphetamine 20 MG per tablet    amphetamine-dextroamphetamine 20 MG per tablet    amphetamine-dextroamphetamine 20 MG per tablet                Primary Care Provider Office Phone # Fax #    Hemalathamana Cardosocharlotte Juarez, APRN [facility] [phone] [address] [place] [address]        Equal Access to Services     [facility]: Hadii aad ku hadasho Soomaali, waaxda luqadaha, qaybta kaalmada adeegyada, melanie meza hayjocy michael . So [facility] [phone].    ATENCIÓN: Si habla español, tiene a fontaine disposición servicios gratuitos de asistencia lingüística. [facility] [phone].    We comply with applicable federal civil rights laws and [place] laws. We do not discriminate on the basis of race, color, national origin, age, disability sex, sexual orientation or gender identity.            Thank you!     Thank you for choosing [facility]  for your care. Our goal is always to provide you with excellent care. Hearing back from our patients is one way we can continue to improve our services. Please take a few minutes to complete the written survey that you may receive in the mail after your visit with us. Thank you!             Your Updated Medication List - Protect others around you: Learn how to safely use, store and throw away your medicines at www.disposemymeds.org.          This list is accurate as of: 9/21/17 10:54 AM.  Always use your most recent med list.                   Brand Name Dispense Instructions for use Diagnosis    ALPRAZolam 0.25 MG tablet    XANAX    4 tablet    Take 1 tablet 1 hour before  flying and repeat if needed    Fear of flying       * amphetamine-dextroamphetamine 20 MG per tablet    ADDERALL    30 tablet    Take 1 tablet (20 mg) by mouth daily Take in the early afternoon    Attention deficit disorder of adult       * amphetamine-dextroamphetamine 20 MG per 24 hr capsule    ADDERALL XR    30 capsule    Take 1 capsule (20 mg) by mouth daily    Attention deficit disorder of adult       * amphetamine-dextroamphetamine 20 MG per tablet   Start taking on:  10/20/2017    ADDERALL    30 tablet    Take 1 tablet (20 mg) by mouth daily Take in the early afternoon    Attention deficit disorder of adult       * amphetamine-dextroamphetamine 20 MG per 24 hr capsule   Start taking on:  10/20/2017    ADDERALL XR    30 capsule    Take 1 capsule (20 mg) by mouth daily    Attention deficit disorder of adult       * amphetamine-dextroamphetamine 20 MG per tablet   Start taking on:  11/20/2017    ADDERALL    30 tablet    Take 1 tablet (20 mg) by mouth daily Take in the early afternoon    Attention deficit disorder of adult       * amphetamine-dextroamphetamine 20 MG per 24 hr capsule   Start taking on:  11/20/2017    ADDERALL XR    30 capsule    Take 1 capsule (20 mg) by mouth daily    Attention deficit disorder of adult       aspirin 81 MG tablet     100 tablet    Take 1 tablet (81 mg) by mouth daily Medication is being filled for 1 time refill only due to:  Patient needs to be seen because it has been more than one year since last visit.    Hypertension goal BP (blood pressure) < 140/90       lisinopril 40 MG tablet    PRINIVIL/ZESTRIL    90 tablet    Take 1 tablet (40 mg) by mouth daily    Essential hypertension with goal blood pressure less than 140/90       metroNIDAZOLE 0.75 % topical gel    METROGEL    45 g    Apply topically 2 times daily    Rosacea       * Notice:  This list has 6 medication(s) that are the same as other medications prescribed for you. Read the directions carefully, and ask your doctor  or other care provider to review them with you.

## 2017-09-21 NOTE — PATIENT INSTRUCTIONS
While you are working the 5 days of 12 hour shift.  Try taking the extended release in the  AM and then take the immediate release around 1-2 pm .     Once you are working your 8 hours you can then consider going back to the  Twice per day dosing you are now taking or use just the 1 extended pill.     Stay well hydrated - urine should be clear.    You should have to urinate  Every  3 hours or so    You are OVERDUE for pap smear.   Please get it done before the end of the year

## 2017-09-21 NOTE — NURSING NOTE
"Chief Complaint   Patient presents with     Recheck Medication     Hypertension       Initial /62 (BP Location: Left arm, Patient Position: Sitting, Cuff Size: Adult Regular)  Pulse 80  Temp 97.4  F (36.3  C) (Tympanic)  Ht 5' 5\" (1.651 m)  Wt 125 lb (56.7 kg)  LMP 09/07/2017 (Exact Date)  BMI 20.8 kg/m2 Estimated body mass index is 20.8 kg/(m^2) as calculated from the following:    Height as of this encounter: 5' 5\" (1.651 m).    Weight as of this encounter: 125 lb (56.7 kg).  Medication Reconciliation: complete     SMA Neena      "

## 2017-12-07 DIAGNOSIS — F98.8 ATTENTION DEFICIT DISORDER OF ADULT: ICD-10-CM

## 2017-12-07 NOTE — TELEPHONE ENCOUNTER
adderall XR and IR      Last Written Prescription Date: 11- (both)  Last Fill Quantity: 30 (both)  # refills: 0   Last Office Visit with [facility], [facility] or [facility] prescribing provider: 09-

## 2017-12-11 DIAGNOSIS — F98.8 ATTENTION DEFICIT DISORDER OF ADULT: ICD-10-CM

## 2017-12-11 RX ORDER — DEXTROAMPHETAMINE SACCHARATE, AMPHETAMINE ASPARTATE, DEXTROAMPHETAMINE SULFATE AND AMPHETAMINE SULFATE 5; 5; 5; 5 MG/1; MG/1; MG/1; MG/1
20 TABLET ORAL DAILY
Qty: 30 TABLET | Refills: 0 | Status: SHIPPED | OUTPATIENT
Start: 2018-02-19 | End: 2018-03-22

## 2017-12-11 RX ORDER — DEXTROAMPHETAMINE SACCHARATE, AMPHETAMINE ASPARTATE MONOHYDRATE, DEXTROAMPHETAMINE SULFATE AND AMPHETAMINE SULFATE 5; 5; 5; 5 MG/1; MG/1; MG/1; MG/1
20 CAPSULE, EXTENDED RELEASE ORAL DAILY
Qty: 30 CAPSULE | Refills: 0 | Status: SHIPPED | OUTPATIENT
Start: 2018-01-19 | End: 2018-03-22

## 2017-12-11 RX ORDER — DEXTROAMPHETAMINE SACCHARATE, AMPHETAMINE ASPARTATE, DEXTROAMPHETAMINE SULFATE AND AMPHETAMINE SULFATE 5; 5; 5; 5 MG/1; MG/1; MG/1; MG/1
20 TABLET ORAL DAILY
Qty: 30 TABLET | Refills: 0 | Status: SHIPPED | OUTPATIENT
Start: 2017-12-20 | End: 2018-03-22

## 2017-12-11 RX ORDER — DEXTROAMPHETAMINE SACCHARATE, AMPHETAMINE ASPARTATE MONOHYDRATE, DEXTROAMPHETAMINE SULFATE AND AMPHETAMINE SULFATE 5; 5; 5; 5 MG/1; MG/1; MG/1; MG/1
20 CAPSULE, EXTENDED RELEASE ORAL DAILY
Qty: 30 CAPSULE | Refills: 0 | Status: SHIPPED | OUTPATIENT
Start: 2017-12-20 | End: 2018-03-21

## 2017-12-11 RX ORDER — DEXTROAMPHETAMINE SACCHARATE, AMPHETAMINE ASPARTATE MONOHYDRATE, DEXTROAMPHETAMINE SULFATE AND AMPHETAMINE SULFATE 5; 5; 5; 5 MG/1; MG/1; MG/1; MG/1
20 CAPSULE, EXTENDED RELEASE ORAL DAILY
Qty: 30 CAPSULE | Refills: 0 | Status: CANCELLED | OUTPATIENT
Start: 2017-12-20

## 2017-12-11 RX ORDER — DEXTROAMPHETAMINE SACCHARATE, AMPHETAMINE ASPARTATE, DEXTROAMPHETAMINE SULFATE AND AMPHETAMINE SULFATE 5; 5; 5; 5 MG/1; MG/1; MG/1; MG/1
20 TABLET ORAL DAILY
Qty: 30 TABLET | Refills: 0 | Status: SHIPPED | OUTPATIENT
Start: 2018-01-19 | End: 2018-03-21

## 2017-12-11 RX ORDER — DEXTROAMPHETAMINE SACCHARATE, AMPHETAMINE ASPARTATE MONOHYDRATE, DEXTROAMPHETAMINE SULFATE AND AMPHETAMINE SULFATE 5; 5; 5; 5 MG/1; MG/1; MG/1; MG/1
20 CAPSULE, EXTENDED RELEASE ORAL DAILY
Qty: 30 CAPSULE | Refills: 0 | Status: SHIPPED | OUTPATIENT
Start: 2018-02-19 | End: 2018-03-22

## 2017-12-11 NOTE — TELEPHONE ENCOUNTER
Last filled 12-   Last qty 30  Last office visit 09-      [facility]   Certified Pharmacy Technician

## 2017-12-12 NOTE — TELEPHONE ENCOUNTER
Routing refill request to provider for review/approval because:  Drug not on the [facility], [facility] or [facility] refill protocol or controlled substance  Jenniffer Dupont RN

## 2018-03-21 DIAGNOSIS — F98.8 ATTENTION DEFICIT DISORDER OF ADULT: ICD-10-CM

## 2018-03-21 NOTE — TELEPHONE ENCOUNTER
adderall XR and IR      Last Written Prescription Date: both 02-  Last Fill Quantity: both 30,   # refills: 0  Last Office Visit: 09-  Future Office visit:       Routing refill request to provider for review/approval because:  Drug not on the [facility], [facility] or [facility] refill protocol or controlled substance

## 2018-03-22 RX ORDER — DEXTROAMPHETAMINE SACCHARATE, AMPHETAMINE ASPARTATE MONOHYDRATE, DEXTROAMPHETAMINE SULFATE AND AMPHETAMINE SULFATE 5; 5; 5; 5 MG/1; MG/1; MG/1; MG/1
20 CAPSULE, EXTENDED RELEASE ORAL DAILY
Qty: 30 CAPSULE | Refills: 0 | Status: SHIPPED | OUTPATIENT
Start: 2018-04-20 | End: 2018-05-17

## 2018-03-22 RX ORDER — DEXTROAMPHETAMINE SACCHARATE, AMPHETAMINE ASPARTATE MONOHYDRATE, DEXTROAMPHETAMINE SULFATE AND AMPHETAMINE SULFATE 5; 5; 5; 5 MG/1; MG/1; MG/1; MG/1
20 CAPSULE, EXTENDED RELEASE ORAL DAILY
Qty: 30 CAPSULE | Refills: 0 | Status: SHIPPED | OUTPATIENT
Start: 2018-03-22 | End: 2018-05-17

## 2018-03-22 RX ORDER — DEXTROAMPHETAMINE SACCHARATE, AMPHETAMINE ASPARTATE MONOHYDRATE, DEXTROAMPHETAMINE SULFATE AND AMPHETAMINE SULFATE 5; 5; 5; 5 MG/1; MG/1; MG/1; MG/1
20 CAPSULE, EXTENDED RELEASE ORAL DAILY
Qty: 30 CAPSULE | Refills: 0 | Status: SHIPPED | OUTPATIENT
Start: 2018-05-22 | End: 2018-05-17

## 2018-03-22 RX ORDER — DEXTROAMPHETAMINE SACCHARATE, AMPHETAMINE ASPARTATE, DEXTROAMPHETAMINE SULFATE AND AMPHETAMINE SULFATE 5; 5; 5; 5 MG/1; MG/1; MG/1; MG/1
20 TABLET ORAL DAILY
Qty: 30 TABLET | Refills: 0 | Status: SHIPPED | OUTPATIENT
Start: 2018-04-20 | End: 2018-05-17

## 2018-03-22 RX ORDER — DEXTROAMPHETAMINE SACCHARATE, AMPHETAMINE ASPARTATE, DEXTROAMPHETAMINE SULFATE AND AMPHETAMINE SULFATE 5; 5; 5; 5 MG/1; MG/1; MG/1; MG/1
20 TABLET ORAL DAILY
Qty: 30 TABLET | Refills: 0 | Status: SHIPPED | OUTPATIENT
Start: 2018-03-22 | End: 2018-05-17

## 2018-03-22 RX ORDER — DEXTROAMPHETAMINE SACCHARATE, AMPHETAMINE ASPARTATE, DEXTROAMPHETAMINE SULFATE AND AMPHETAMINE SULFATE 5; 5; 5; 5 MG/1; MG/1; MG/1; MG/1
20 TABLET ORAL DAILY
Qty: 30 TABLET | Refills: 0 | Status: SHIPPED | OUTPATIENT
Start: 2018-05-22 | End: 2018-05-17

## 2018-03-22 NOTE — TELEPHONE ENCOUNTER
Scripts walked over to the [facility].     Fadumo Preston, [facility]

## 2018-05-14 ENCOUNTER — TELEPHONE (OUTPATIENT)
Dept: FAMILY MEDICINE | Facility: CLINIC | Age: 53
End: 2018-05-14

## 2018-05-14 ENCOUNTER — MYC REFILL (OUTPATIENT)
Dept: FAMILY MEDICINE | Facility: CLINIC | Age: 53
End: 2018-05-14

## 2018-05-14 DIAGNOSIS — L71.9 ROSACEA: ICD-10-CM

## 2018-05-14 DIAGNOSIS — F98.8 ATTENTION DEFICIT DISORDER OF ADULT: ICD-10-CM

## 2018-05-14 RX ORDER — DEXTROAMPHETAMINE SACCHARATE, AMPHETAMINE ASPARTATE, DEXTROAMPHETAMINE SULFATE AND AMPHETAMINE SULFATE 5; 5; 5; 5 MG/1; MG/1; MG/1; MG/1
20 TABLET ORAL DAILY
Qty: 30 TABLET | Refills: 0 | Status: CANCELLED | OUTPATIENT
Start: 2018-05-14

## 2018-05-14 RX ORDER — METRONIDAZOLE 7.5 MG/G
GEL TOPICAL 2 TIMES DAILY
Qty: 45 G | Refills: 0 | Status: CANCELLED | OUTPATIENT
Start: 2018-05-14

## 2018-05-14 RX ORDER — DEXTROAMPHETAMINE SACCHARATE, AMPHETAMINE ASPARTATE MONOHYDRATE, DEXTROAMPHETAMINE SULFATE AND AMPHETAMINE SULFATE 5; 5; 5; 5 MG/1; MG/1; MG/1; MG/1
20 CAPSULE, EXTENDED RELEASE ORAL DAILY
Qty: 30 CAPSULE | Refills: 0 | Status: CANCELLED | OUTPATIENT
Start: 2018-05-14

## 2018-05-14 NOTE — TELEPHONE ENCOUNTER
Panel Management Review      Patient has the following on her problem list:     Hypertension   Last three blood pressure readings:  BP Readings from Last 3 Encounters:   09/21/17 114/62   02/16/17 124/68   07/20/16 140/90     Blood pressure: Passed    HTN Guidelines:  Age 18-59 BP range:  Less than 140/90  Age 60-85 with Diabetes:  Less than 140/90  Age 60-85 without Diabetes:  less than 150/90      Composite cancer screening  Chart review shows that this patient is due/due soon for the following Pap Smear  Summary:    Patient is due/failing the following:   PAP    Action needed:   Patient needs office visit for physical with PAP.    Type of outreach:    Sent DFine message.    Questions for provider review:    None                                                                                                                                    Celeste Figueroa CMA (AAMA)       Chart routed to None .

## 2018-05-14 NOTE — TELEPHONE ENCOUNTER
Review chart pt has refill at [facility] , advised to call them  DAVID Osman  RN/Manohar Payton

## 2018-05-14 NOTE — TELEPHONE ENCOUNTER
Message from MyChart:  Original authorizing provider: HANNA CONTE NP, APRN CNP    Libertad Russell would like a refill of the following medications:  metroNIDAZOLE (METROGEL) 0.75 % topical gel [HANNA CONTE NP, APRN CNP]  amphetamine-dextroamphetamine (ADDERALL) 20 MG per tablet [HANNA CONTE NP, APRN CNP]  amphetamine-dextroamphetamine (ADDERALL XR) 20 MG per 24 hr capsule [HANNA CONTE NP, APRN CNP]  amphetamine-dextroamphetamine (ADDERALL) 20 MG per tablet [HANNA CONTE NP, APRN CNP]  amphetamine-dextroamphetamine (ADDERALL XR) 20 MG per 24 hr capsule [HANNA CONTE NP, APRN CNP]    Preferred pharmacy: [facility] [address] [facility]    Comment:

## 2018-05-16 ENCOUNTER — TELEPHONE (OUTPATIENT)
Dept: FAMILY MEDICINE | Facility: CLINIC | Age: 53
End: 2018-05-16

## 2018-05-16 DIAGNOSIS — L71.9 ROSACEA: ICD-10-CM

## 2018-05-16 DIAGNOSIS — F98.8 ATTENTION DEFICIT DISORDER OF ADULT: ICD-10-CM

## 2018-05-17 RX ORDER — DEXTROAMPHETAMINE SACCHARATE, AMPHETAMINE ASPARTATE MONOHYDRATE, DEXTROAMPHETAMINE SULFATE AND AMPHETAMINE SULFATE 5; 5; 5; 5 MG/1; MG/1; MG/1; MG/1
20 CAPSULE, EXTENDED RELEASE ORAL DAILY
Qty: 30 CAPSULE | Refills: 0 | Status: SHIPPED | OUTPATIENT
Start: 2018-08-21 | End: 2018-06-18

## 2018-05-17 RX ORDER — DEXTROAMPHETAMINE SACCHARATE, AMPHETAMINE ASPARTATE MONOHYDRATE, DEXTROAMPHETAMINE SULFATE AND AMPHETAMINE SULFATE 5; 5; 5; 5 MG/1; MG/1; MG/1; MG/1
20 CAPSULE, EXTENDED RELEASE ORAL DAILY
Qty: 30 CAPSULE | Refills: 0 | Status: SHIPPED | OUTPATIENT
Start: 2018-07-20 | End: 2018-06-18

## 2018-05-17 RX ORDER — DEXTROAMPHETAMINE SACCHARATE, AMPHETAMINE ASPARTATE, DEXTROAMPHETAMINE SULFATE AND AMPHETAMINE SULFATE 5; 5; 5; 5 MG/1; MG/1; MG/1; MG/1
20 TABLET ORAL DAILY
Qty: 30 TABLET | Refills: 0 | Status: SHIPPED | OUTPATIENT
Start: 2018-07-20 | End: 2018-06-18

## 2018-05-17 RX ORDER — DEXTROAMPHETAMINE SACCHARATE, AMPHETAMINE ASPARTATE, DEXTROAMPHETAMINE SULFATE AND AMPHETAMINE SULFATE 5; 5; 5; 5 MG/1; MG/1; MG/1; MG/1
20 TABLET ORAL DAILY
Qty: 30 TABLET | Refills: 0 | Status: SHIPPED | OUTPATIENT
Start: 2018-06-21 | End: 2018-06-18

## 2018-05-17 RX ORDER — DEXTROAMPHETAMINE SACCHARATE, AMPHETAMINE ASPARTATE MONOHYDRATE, DEXTROAMPHETAMINE SULFATE AND AMPHETAMINE SULFATE 5; 5; 5; 5 MG/1; MG/1; MG/1; MG/1
20 CAPSULE, EXTENDED RELEASE ORAL DAILY
Qty: 30 CAPSULE | Refills: 0 | Status: SHIPPED | OUTPATIENT
Start: 2018-06-21 | End: 2018-06-18

## 2018-05-17 RX ORDER — METRONIDAZOLE 7.5 MG/G
GEL TOPICAL
Qty: 45 G | Refills: 0 | Status: SHIPPED | OUTPATIENT
Start: 2018-05-17 | End: 2018-11-05

## 2018-05-17 RX ORDER — DEXTROAMPHETAMINE SACCHARATE, AMPHETAMINE ASPARTATE, DEXTROAMPHETAMINE SULFATE AND AMPHETAMINE SULFATE 5; 5; 5; 5 MG/1; MG/1; MG/1; MG/1
20 TABLET ORAL DAILY
Qty: 30 TABLET | Refills: 0 | Status: SHIPPED | OUTPATIENT
Start: 2018-08-21 | End: 2018-06-18

## 2018-06-18 ENCOUNTER — TELEPHONE (OUTPATIENT)
Dept: FAMILY MEDICINE | Facility: CLINIC | Age: 53
End: 2018-06-18

## 2018-06-18 DIAGNOSIS — F98.8 ATTENTION DEFICIT DISORDER OF ADULT: ICD-10-CM

## 2018-06-18 RX ORDER — DEXTROAMPHETAMINE SACCHARATE, AMPHETAMINE ASPARTATE, DEXTROAMPHETAMINE SULFATE AND AMPHETAMINE SULFATE 5; 5; 5; 5 MG/1; MG/1; MG/1; MG/1
20 TABLET ORAL DAILY
Qty: 30 TABLET | Refills: 0 | Status: SHIPPED | OUTPATIENT
Start: 2018-08-13 | End: 2018-09-13

## 2018-06-18 RX ORDER — DEXTROAMPHETAMINE SACCHARATE, AMPHETAMINE ASPARTATE MONOHYDRATE, DEXTROAMPHETAMINE SULFATE AND AMPHETAMINE SULFATE 5; 5; 5; 5 MG/1; MG/1; MG/1; MG/1
20 CAPSULE, EXTENDED RELEASE ORAL DAILY
Qty: 30 CAPSULE | Refills: 0 | Status: SHIPPED | OUTPATIENT
Start: 2018-07-16 | End: 2018-10-15

## 2018-06-18 RX ORDER — DEXTROAMPHETAMINE SACCHARATE, AMPHETAMINE ASPARTATE, DEXTROAMPHETAMINE SULFATE AND AMPHETAMINE SULFATE 5; 5; 5; 5 MG/1; MG/1; MG/1; MG/1
20 TABLET ORAL DAILY
Qty: 30 TABLET | Refills: 0 | Status: SHIPPED | OUTPATIENT
Start: 2018-06-18 | End: 2018-10-16

## 2018-06-18 RX ORDER — DEXTROAMPHETAMINE SACCHARATE, AMPHETAMINE ASPARTATE MONOHYDRATE, DEXTROAMPHETAMINE SULFATE AND AMPHETAMINE SULFATE 5; 5; 5; 5 MG/1; MG/1; MG/1; MG/1
20 CAPSULE, EXTENDED RELEASE ORAL DAILY
Qty: 30 CAPSULE | Refills: 0 | Status: SHIPPED | OUTPATIENT
Start: 2018-08-13 | End: 2018-09-13

## 2018-06-18 RX ORDER — DEXTROAMPHETAMINE SACCHARATE, AMPHETAMINE ASPARTATE, DEXTROAMPHETAMINE SULFATE AND AMPHETAMINE SULFATE 5; 5; 5; 5 MG/1; MG/1; MG/1; MG/1
20 TABLET ORAL DAILY
Qty: 30 TABLET | Refills: 0 | Status: SHIPPED | OUTPATIENT
Start: 2018-07-16 | End: 2018-10-16

## 2018-06-18 RX ORDER — DEXTROAMPHETAMINE SACCHARATE, AMPHETAMINE ASPARTATE MONOHYDRATE, DEXTROAMPHETAMINE SULFATE AND AMPHETAMINE SULFATE 5; 5; 5; 5 MG/1; MG/1; MG/1; MG/1
20 CAPSULE, EXTENDED RELEASE ORAL DAILY
Qty: 30 CAPSULE | Refills: 0 | Status: SHIPPED | OUTPATIENT
Start: 2018-06-18 | End: 2018-10-15

## 2018-08-29 DIAGNOSIS — I10 ESSENTIAL HYPERTENSION WITH GOAL BLOOD PRESSURE LESS THAN 140/90: ICD-10-CM

## 2018-08-29 RX ORDER — LISINOPRIL 40 MG/1
TABLET ORAL
Qty: 30 TABLET | Refills: 0 | Status: SHIPPED | OUTPATIENT
Start: 2018-08-29 | End: 2018-10-03 | Stop reason: SINTOL

## 2018-08-29 NOTE — TELEPHONE ENCOUNTER
"Requested Prescriptions   Pending Prescriptions Disp Refills     lisinopril (PRINIVIL/ZESTRIL) 40 MG tablet [Pharmacy Med Name: LISINOPRIL 40MG TABS] 90 tablet 1    Last Written Prescription Date:  8/25/17  Last Fill Quantity: 90,  # refills: 1   Last office visit: 9/21/2017 with prescribing provider:  9/21/17 ubaldo   Future Office Visit:     Sig: TAKE ONE TABLET BY MOUTH EVERY DAY    ACE Inhibitors (Including Combos) Protocol Failed    8/29/2018 11:39 AM       Failed - Normal serum creatinine on file in past 12 months    Recent Labs   Lab Test  02/16/17   1427   CR  0.63            Failed - Normal serum potassium on file in past 12 months    Recent Labs   Lab Test  02/16/17   1427   POTASSIUM  3.8            Passed - Blood pressure under 140/90 in past 12 months    BP Readings from Last 3 Encounters:   09/21/17 114/62   02/16/17 124/68   07/20/16 140/90                Passed - Recent (12 mo) or future (30 days) visit within the authorizing provider's specialty    Patient had office visit in the last 12 months or has a visit in the next 30 days with authorizing provider or within the authorizing provider's specialty.  See \"Patient Info\" tab in inbasket, or \"Choose Columns\" in Meds & Orders section of the refill encounter.           Passed - Patient is age 18 or older       Passed - No active pregnancy on record       Passed - No positive pregnancy test in past 12 months          "

## 2018-08-29 NOTE — TELEPHONE ENCOUNTER
Medication is being filled for 1 time refill only due to:   Over due for office visit and/or labs   DAVID Osman  RN/Manohar Payton

## 2018-09-04 ENCOUNTER — OFFICE VISIT (OUTPATIENT)
Dept: FAMILY MEDICINE | Facility: CLINIC | Age: 53
End: 2018-09-04
Payer: COMMERCIAL

## 2018-09-04 VITALS
WEIGHT: 126.7 LBS | HEART RATE: 79 BPM | HEIGHT: 65 IN | BODY MASS INDEX: 21.11 KG/M2 | DIASTOLIC BLOOD PRESSURE: 95 MMHG | TEMPERATURE: 96.1 F | SYSTOLIC BLOOD PRESSURE: 145 MMHG

## 2018-09-04 DIAGNOSIS — Z23 NEED FOR VACCINATION: ICD-10-CM

## 2018-09-04 DIAGNOSIS — S91.331A PUNCTURE WOUND OF RIGHT FOOT, INITIAL ENCOUNTER: Primary | ICD-10-CM

## 2018-09-04 PROCEDURE — 90714 TD VACC NO PRESV 7 YRS+ IM: CPT | Performed by: PHYSICIAN ASSISTANT

## 2018-09-04 PROCEDURE — 99213 OFFICE O/P EST LOW 20 MIN: CPT | Mod: 25 | Performed by: PHYSICIAN ASSISTANT

## 2018-09-04 PROCEDURE — 90471 IMMUNIZATION ADMIN: CPT | Performed by: PHYSICIAN ASSISTANT

## 2018-09-04 NOTE — MR AVS SNAPSHOT
"              After Visit Summary   9/4/2018    Libertad Russell    MRN: [id number]           Patient Information     Date Of Birth          1965        Visit Information        Provider Department      9/4/2018 2:20 PM Keira Riley PA-C [facility]        Today's Diagnoses     Puncture wound of right foot, initial encounter    -  1    Need for vaccination           Follow-ups after your visit        Who to contact     Normal or non-critical lab and imaging results will be communicated to you by xPeerienthart, letter or phone within 4 business days after the clinic has received the results. If you do not hear from us within 7 days, please contact the clinic through xPeerienthart or phone. If you have a critical or abnormal lab result, we will notify you by phone as soon as possible.  Submit refill requests through [employer] or call your pharmacy and they will forward the refill request to us. Please allow 3 business days for your refill to be completed.          If you need to speak with a  for additional information , please call: [phone]             Additional Information About Your Visit        [employer] Information     [employer] gives you secure access to your electronic health record. If you see a primary care provider, you can also send messages to your care team and make appointments. If you have questions, please call your primary care clinic.  If you do not have a primary care provider, please call [phone] and they will assist you.        Care EveryWhere ID     This is your Care EveryWhere ID. This could be used by other organizations to access your [facility] medical records  [id number]        Your Vitals Were     Pulse Temperature Height BMI (Body Mass Index)          79 96.1  F (35.6  C) (Tympanic) 5' 5\" (1.651 m) 21.08 kg/m2         Blood Pressure from Last 3 Encounters:   09/04/18 (!) 145/95   09/21/17 114/62   02/16/17 124/68    Weight from Last 3 Encounters: "   09/04/18 126 lb 11.2 oz (57.5 kg)   09/21/17 125 lb (56.7 kg)   02/16/17 125 lb 3.2 oz (56.8 kg)              We Performed the Following     ADMIN 1st VACCINE     TD PRESERV FREE, IM (7+ YRS)        Primary Care Provider Office Phone # Fax #    Hemalatha Juarez, APRN [facility] [phone] [address] [place] [address]        Equal Access to Services     [facility] STELLA : Hadii aad ku hadasho Soomaali, waaxda luqadaha, qaybta kaalmada adeegyada, waxay idiin hayaan adeeg kharash laprabhakar . So [facility] [phone].    ATENCIÓN: Si habla español, tiene a fontaine disposición servicios gratuitos de asistencia lingüística. [facility] [phone].    We comply with applicable federal civil rights laws and [place] laws. We do not discriminate on the basis of race, color, national origin, age, disability, sex, sexual orientation, or gender identity.            Thank you!     Thank you for choosing [facility]  for your care. Our goal is always to provide you with excellent care. Hearing back from our patients is one way we can continue to improve our services. Please take a few minutes to complete the written survey that you may receive in the mail after your visit with us. Thank you!             Your Updated Medication List - Protect others around you: Learn how to safely use, store and throw away your medicines at www.disposemymeds.org.          This list is accurate as of 9/4/18  2:51 PM.  Always use your most recent med list.                   Brand Name Dispense Instructions for use Diagnosis    ALPRAZolam 0.25 MG tablet    XANAX    4 tablet    Take 1 tablet 1 hour before flying and repeat if needed    Fear of flying       * amphetamine-dextroamphetamine 20 MG per 24 hr capsule    ADDERALL XR    30 capsule    Take 1 capsule (20 mg) by mouth daily    Attention deficit disorder of adult       * amphetamine-dextroamphetamine 20 MG per tablet    ADDERALL    30 tablet    Take 1 tablet (20 mg) by  mouth daily Take in the early afternoon    Attention deficit disorder of adult       * amphetamine-dextroamphetamine 20 MG per 24 hr capsule    ADDERALL XR    30 capsule    Take 1 capsule (20 mg) by mouth daily    Attention deficit disorder of adult       * amphetamine-dextroamphetamine 20 MG per tablet    ADDERALL    30 tablet    Take 1 tablet (20 mg) by mouth daily Take in the early afternoon    Attention deficit disorder of adult       * amphetamine-dextroamphetamine 20 MG per 24 hr capsule    ADDERALL XR    30 capsule    Take 1 capsule (20 mg) by mouth daily    Attention deficit disorder of adult       * amphetamine-dextroamphetamine 20 MG per tablet    ADDERALL    30 tablet    Take 1 tablet (20 mg) by mouth daily Take in the early afternoon    Attention deficit disorder of adult       aspirin 81 MG tablet     100 tablet    Take 1 tablet (81 mg) by mouth daily Medication is being filled for 1 time refill only due to:  Patient needs to be seen because it has been more than one year since last visit.    Hypertension goal BP (blood pressure) < 140/90       lisinopril 40 MG tablet    PRINIVIL/ZESTRIL    30 tablet    TAKE ONE TABLET BY MOUTH EVERY DAY    Essential hypertension with goal blood pressure less than 140/90       metroNIDAZOLE 0.75 % topical gel    METROGEL    45 g    APPLY TO AFFECTED AREA(S) TWO TIMES A DAY    Rosacea       * Notice:  This list has 6 medication(s) that are the same as other medications prescribed for you. Read the directions carefully, and ask your doctor or other care provider to review them with you.

## 2018-09-04 NOTE — PROGRESS NOTES
"  SUBJECTIVE:   Libertad Russell is a 52 year old female who presents to clinic today for the following health issues:      Patient said she was cleaning up and throwing stuff away when she stepped on a long screw that entered into right foot. Patient said she was able to get the screw out. Patient said the right foot is very painful still.     Problem list and histories reviewed & adjusted, as indicated.  Additional history: Patient notes it was a new unused screw. No drainage from site.     ROS:  Constitutional, HEENT, cardiovascular, pulmonary, gi and gu systems are negative, except as otherwise noted.    OBJECTIVE:     BP (!) 145/95 (BP Location: Right arm, Patient Position: Sitting, Cuff Size: Adult Regular)  Pulse 79  Temp 96.1  F (35.6  C) (Tympanic)  Ht 5' 5\" (1.651 m)  Wt 126 lb 11.2 oz (57.5 kg)  BMI 21.08 kg/m2  Body mass index is 21.08 kg/(m^2).  GENERAL: healthy, alert and no distress  MS: normal muscle tone, normal range of motion, tenderness to palpation lateral arch R foot and antalgic gait.  SKIN: Puncture site noted on dorsum of R foot.  No erythema or suppuration noted.  Site redressed with telfa and coban.         ASSESSMENT/PLAN:   1. Puncture wound of right foot, initial encounter    2. Need for vaccination  - TD PRESERV FREE, IM (7+ YRS)  - ADMIN 1st VACCINE    Wash. with soap and water, dab with hydrogen peroxide and apply Vaseline daily.    Follow up if symptoms should persist, change or worsen.  Patient amenable to this follow up plan.     Keira Riley PA-C  [facility]    "

## 2018-09-04 NOTE — NURSING NOTE
Screening Questionnaire for Adult Immunization    Are you sick today?   No   Do you have allergies to medications, food, a vaccine component or latex?   No   Have you ever had a serious reaction after receiving a vaccination?   No   Do you have a long-term health problem with heart disease, lung disease, asthma, kidney disease, metabolic disease (e.g. diabetes), anemia, or other blood disorder?   No   Do you have cancer, leukemia, HIV/AIDS, or any other immune system problem?   No   In the past 3 months, have you taken medications that affect  your immune system, such as prednisone, other steroids, or anticancer drugs; drugs for the treatment of rheumatoid arthritis, Crohn s disease, or psoriasis; or have you had radiation treatments?   No   Have you had a seizure, or a brain or other nervous system problem?   No   During the past year, have you received a transfusion of blood or blood     products, or been given immune (gamma) globulin or antiviral drug?   No   For women: Are you pregnant or is there a chance you could become        pregnant during the next month?   No   Have you received any vaccinations in the past 4 weeks?   No     Immunization questionnaire answers were all negative.        Per orders of Keira Riley PA-C, injection of TD given by Jud COLEMAN LPN. Patient instructed to remain in clinic for 15 minutes afterwards, and to report any adverse reaction to me immediately.       Screening performed by Jud Hayes on 9/4/2018 at 2:43 PM.

## 2018-09-13 DIAGNOSIS — F98.8 ATTENTION DEFICIT DISORDER OF ADULT: ICD-10-CM

## 2018-09-13 NOTE — TELEPHONE ENCOUNTER
Adderall XR 20mg      Last Written Prescription Date:  08/13/2018 #30 x 0  Last filled - not provided    Adderall 20mg      Last Written Prescription Date:  08/13/2018 #30 x 0  Last filled - not provided    Last Office Visit: 09/21/2017 SHANDA Juarez  Future Office visit:   None    Routing refill request to provider for review/approval because:  Drug not on the [facility], [facility] or [facility] refill protocol or controlled substance

## 2018-09-14 ENCOUNTER — MYC MEDICAL ADVICE (OUTPATIENT)
Dept: FAMILY MEDICINE | Facility: CLINIC | Age: 53
End: 2018-09-14

## 2018-09-14 RX ORDER — DEXTROAMPHETAMINE SACCHARATE, AMPHETAMINE ASPARTATE, DEXTROAMPHETAMINE SULFATE AND AMPHETAMINE SULFATE 5; 5; 5; 5 MG/1; MG/1; MG/1; MG/1
20 TABLET ORAL DAILY
Qty: 30 TABLET | Refills: 0 | Status: SHIPPED | OUTPATIENT
Start: 2018-09-14 | End: 2018-10-16

## 2018-09-14 RX ORDER — DEXTROAMPHETAMINE SACCHARATE, AMPHETAMINE ASPARTATE MONOHYDRATE, DEXTROAMPHETAMINE SULFATE AND AMPHETAMINE SULFATE 5; 5; 5; 5 MG/1; MG/1; MG/1; MG/1
20 CAPSULE, EXTENDED RELEASE ORAL DAILY
Qty: 30 CAPSULE | Refills: 0 | Status: SHIPPED | OUTPATIENT
Start: 2018-09-14 | End: 2018-10-15

## 2018-09-17 NOTE — TELEPHONE ENCOUNTER
Scripts walked over to the [facility].      Fadumo Preston, [facility]

## 2018-10-03 ENCOUNTER — TELEPHONE (OUTPATIENT)
Dept: FAMILY MEDICINE | Facility: CLINIC | Age: 53
End: 2018-10-03

## 2018-10-03 DIAGNOSIS — I10 ESSENTIAL HYPERTENSION WITH GOAL BLOOD PRESSURE LESS THAN 140/90: Primary | ICD-10-CM

## 2018-10-03 RX ORDER — LOSARTAN POTASSIUM 50 MG/1
50 TABLET ORAL DAILY
Qty: 30 TABLET | Refills: 1 | Status: SHIPPED | OUTPATIENT
Start: 2018-10-03 | End: 2018-11-13

## 2018-10-03 NOTE — TELEPHONE ENCOUNTER
Patient states that lisinopril makes her drowsy and is not taking. Recommend trying Losartan.    Tutu Brown, Pharmacist  [facility]

## 2018-10-15 ENCOUNTER — MYC MEDICAL ADVICE (OUTPATIENT)
Dept: FAMILY MEDICINE | Facility: CLINIC | Age: 53
End: 2018-10-15

## 2018-10-15 DIAGNOSIS — F98.8 ATTENTION DEFICIT DISORDER OF ADULT: ICD-10-CM

## 2018-10-15 RX ORDER — DEXTROAMPHETAMINE SACCHARATE, AMPHETAMINE ASPARTATE MONOHYDRATE, DEXTROAMPHETAMINE SULFATE AND AMPHETAMINE SULFATE 5; 5; 5; 5 MG/1; MG/1; MG/1; MG/1
20 CAPSULE, EXTENDED RELEASE ORAL DAILY
Qty: 30 CAPSULE | Refills: 0 | Status: SHIPPED | OUTPATIENT
Start: 2018-11-12 | End: 2018-11-13

## 2018-10-15 RX ORDER — DEXTROAMPHETAMINE SACCHARATE, AMPHETAMINE ASPARTATE MONOHYDRATE, DEXTROAMPHETAMINE SULFATE AND AMPHETAMINE SULFATE 5; 5; 5; 5 MG/1; MG/1; MG/1; MG/1
20 CAPSULE, EXTENDED RELEASE ORAL DAILY
Qty: 30 CAPSULE | Refills: 0 | Status: SHIPPED | OUTPATIENT
Start: 2018-12-10 | End: 2018-11-13

## 2018-10-15 RX ORDER — DEXTROAMPHETAMINE SACCHARATE, AMPHETAMINE ASPARTATE MONOHYDRATE, DEXTROAMPHETAMINE SULFATE AND AMPHETAMINE SULFATE 5; 5; 5; 5 MG/1; MG/1; MG/1; MG/1
20 CAPSULE, EXTENDED RELEASE ORAL DAILY
Qty: 30 CAPSULE | Refills: 0 | Status: SHIPPED | OUTPATIENT
Start: 2018-10-15 | End: 2018-11-13

## 2018-10-16 DIAGNOSIS — F98.8 ATTENTION DEFICIT DISORDER OF ADULT: ICD-10-CM

## 2018-10-16 RX ORDER — DEXTROAMPHETAMINE SACCHARATE, AMPHETAMINE ASPARTATE, DEXTROAMPHETAMINE SULFATE AND AMPHETAMINE SULFATE 5; 5; 5; 5 MG/1; MG/1; MG/1; MG/1
20 TABLET ORAL DAILY
Qty: 30 TABLET | Refills: 0 | Status: SHIPPED | OUTPATIENT
Start: 2018-10-16 | End: 2018-11-13

## 2018-10-16 RX ORDER — DEXTROAMPHETAMINE SACCHARATE, AMPHETAMINE ASPARTATE, DEXTROAMPHETAMINE SULFATE AND AMPHETAMINE SULFATE 5; 5; 5; 5 MG/1; MG/1; MG/1; MG/1
20 TABLET ORAL DAILY
Qty: 30 TABLET | Refills: 0 | Status: SHIPPED | OUTPATIENT
Start: 2018-12-11 | End: 2018-11-13

## 2018-10-16 RX ORDER — DEXTROAMPHETAMINE SACCHARATE, AMPHETAMINE ASPARTATE, DEXTROAMPHETAMINE SULFATE AND AMPHETAMINE SULFATE 5; 5; 5; 5 MG/1; MG/1; MG/1; MG/1
20 TABLET ORAL DAILY
Qty: 30 TABLET | Refills: 0 | Status: SHIPPED | OUTPATIENT
Start: 2018-11-13 | End: 2019-03-07

## 2018-10-16 NOTE — TELEPHONE ENCOUNTER
Scripts walked over to the [facility].    Fadumo Preston, [facility]

## 2018-11-05 ENCOUNTER — MYC REFILL (OUTPATIENT)
Dept: FAMILY MEDICINE | Facility: CLINIC | Age: 53
End: 2018-11-05

## 2018-11-05 DIAGNOSIS — I10 ESSENTIAL HYPERTENSION WITH GOAL BLOOD PRESSURE LESS THAN 140/90: ICD-10-CM

## 2018-11-05 DIAGNOSIS — L71.9 ROSACEA: ICD-10-CM

## 2018-11-05 DIAGNOSIS — F98.8 ATTENTION DEFICIT DISORDER OF ADULT: ICD-10-CM

## 2018-11-06 RX ORDER — DEXTROAMPHETAMINE SACCHARATE, AMPHETAMINE ASPARTATE MONOHYDRATE, DEXTROAMPHETAMINE SULFATE AND AMPHETAMINE SULFATE 5; 5; 5; 5 MG/1; MG/1; MG/1; MG/1
20 CAPSULE, EXTENDED RELEASE ORAL DAILY
Qty: 30 CAPSULE | Refills: 0 | OUTPATIENT
Start: 2018-11-06

## 2018-11-06 RX ORDER — DEXTROAMPHETAMINE SACCHARATE, AMPHETAMINE ASPARTATE, DEXTROAMPHETAMINE SULFATE AND AMPHETAMINE SULFATE 5; 5; 5; 5 MG/1; MG/1; MG/1; MG/1
20 TABLET ORAL DAILY
Qty: 30 TABLET | Refills: 0 | OUTPATIENT
Start: 2018-11-06

## 2018-11-06 RX ORDER — LOSARTAN POTASSIUM 50 MG/1
50 TABLET ORAL DAILY
Qty: 30 TABLET | Refills: 1 | OUTPATIENT
Start: 2018-11-06

## 2018-11-06 RX ORDER — METRONIDAZOLE 7.5 MG/G
1 GEL TOPICAL 2 TIMES DAILY
Qty: 45 G | Refills: 0 | Status: SHIPPED | OUTPATIENT
Start: 2018-11-06 | End: 2019-03-06

## 2018-11-06 NOTE — TELEPHONE ENCOUNTER
Message from [place]:  Original authorizing provider: HANNA CONTE NP, APRN CNP    Libertad Russell would like a refill of the following medications:  metroNIDAZOLE (METROGEL) 0.75 % topical gel [HANNA CONTE NP, APRN CNP]  losartan (COZAAR) 50 MG tablet [HANNA CONTE NP, APRN CNP]  amphetamine-dextroamphetamine (ADDERALL XR) 20 MG per 24 hr capsule [HANNA CONTE NP, APRN CNP]  amphetamine-dextroamphetamine (ADDERALL) 20 MG per tablet [HANNA CONTE NP, APRN CNP]    Preferred pharmacy: [facility] [address]    Comment:

## 2018-11-06 NOTE — TELEPHONE ENCOUNTER
She already has the adderall rx's throuogh the end of the year. She is OVER DUE for an appointment      Cozaar was approved. 10/3/for 30 days with 1 refill.   So she should be good.    She needs to be seen

## 2018-11-07 NOTE — TELEPHONE ENCOUNTER
Attempted to reach pt by phone, pt voicemail box is full.     Will leave a MyChart message.    Lalitha Parekh, ROWDY.

## 2018-11-15 ENCOUNTER — OFFICE VISIT (OUTPATIENT)
Dept: FAMILY MEDICINE | Facility: CLINIC | Age: 53
End: 2018-11-15
Payer: COMMERCIAL

## 2018-11-15 VITALS
OXYGEN SATURATION: 100 % | BODY MASS INDEX: 22.71 KG/M2 | SYSTOLIC BLOOD PRESSURE: 174 MMHG | DIASTOLIC BLOOD PRESSURE: 94 MMHG | TEMPERATURE: 98.3 F | HEIGHT: 64 IN | HEART RATE: 78 BPM | WEIGHT: 133 LBS

## 2018-11-15 DIAGNOSIS — F98.8 ATTENTION DEFICIT DISORDER OF ADULT: Primary | ICD-10-CM

## 2018-11-15 DIAGNOSIS — Z12.11 SPECIAL SCREENING FOR MALIGNANT NEOPLASMS, COLON: ICD-10-CM

## 2018-11-15 DIAGNOSIS — D17.1 BENIGN LIPOMATOUS NEOPLASM OF SKIN AND SUBCUTANEOUS TISSUE OF TRUNK: ICD-10-CM

## 2018-11-15 DIAGNOSIS — Z12.31 ENCOUNTER FOR SCREENING MAMMOGRAM FOR MALIGNANT NEOPLASM OF BREAST: ICD-10-CM

## 2018-11-15 DIAGNOSIS — I10 ESSENTIAL HYPERTENSION WITH GOAL BLOOD PRESSURE LESS THAN 140/90: ICD-10-CM

## 2018-11-15 LAB
CREAT UR-MCNC: 106 MG/DL
MICROALBUMIN UR-MCNC: 15 MG/L
MICROALBUMIN/CREAT UR: 13.77 MG/G CR (ref 0–25)

## 2018-11-15 PROCEDURE — 82043 UR ALBUMIN QUANTITATIVE: CPT | Performed by: NURSE PRACTITIONER

## 2018-11-15 PROCEDURE — 99214 OFFICE O/P EST MOD 30 MIN: CPT | Performed by: NURSE PRACTITIONER

## 2018-11-15 RX ORDER — DEXTROAMPHETAMINE SACCHARATE, AMPHETAMINE ASPARTATE MONOHYDRATE, DEXTROAMPHETAMINE SULFATE AND AMPHETAMINE SULFATE 5; 5; 5; 5 MG/1; MG/1; MG/1; MG/1
20 CAPSULE, EXTENDED RELEASE ORAL DAILY
Qty: 30 CAPSULE | Refills: 0 | Status: SHIPPED | OUTPATIENT
Start: 2019-02-05 | End: 2019-03-07

## 2018-11-15 RX ORDER — DEXTROAMPHETAMINE SACCHARATE, AMPHETAMINE ASPARTATE MONOHYDRATE, DEXTROAMPHETAMINE SULFATE AND AMPHETAMINE SULFATE 5; 5; 5; 5 MG/1; MG/1; MG/1; MG/1
20 CAPSULE, EXTENDED RELEASE ORAL DAILY
Qty: 30 CAPSULE | Refills: 0 | Status: SHIPPED | OUTPATIENT
Start: 2018-01-08 | End: 2019-03-07

## 2018-11-15 RX ORDER — DEXTROAMPHETAMINE SACCHARATE, AMPHETAMINE ASPARTATE, DEXTROAMPHETAMINE SULFATE AND AMPHETAMINE SULFATE 5; 5; 5; 5 MG/1; MG/1; MG/1; MG/1
20 TABLET ORAL DAILY
Qty: 30 TABLET | Refills: 0 | Status: SHIPPED | OUTPATIENT
Start: 2019-02-05 | End: 2019-03-07

## 2018-11-15 RX ORDER — LOSARTAN POTASSIUM 100 MG/1
100 TABLET ORAL DAILY
Qty: 90 TABLET | Refills: 1 | Status: SHIPPED | OUTPATIENT
Start: 2018-11-15 | End: 2019-03-06

## 2018-11-15 RX ORDER — DEXTROAMPHETAMINE SACCHARATE, AMPHETAMINE ASPARTATE, DEXTROAMPHETAMINE SULFATE AND AMPHETAMINE SULFATE 5; 5; 5; 5 MG/1; MG/1; MG/1; MG/1
20 TABLET ORAL DAILY
Qty: 30 TABLET | Refills: 0 | Status: SHIPPED | OUTPATIENT
Start: 2019-01-08 | End: 2019-05-08

## 2018-11-15 RX ORDER — LOSARTAN POTASSIUM 50 MG/1
50 TABLET ORAL DAILY
Qty: 30 TABLET | Refills: 1 | Status: SHIPPED | OUTPATIENT
Start: 2018-11-15 | End: 2018-11-15 | Stop reason: DRUGHIGH

## 2018-11-15 NOTE — PATIENT INSTRUCTIONS
You do need to slow down.   Write the things down that you are thinking about and try to figure out a plan   For the anxiety   -- figure out the  Triggers for your anxiety and then figure out coping mechanisms       Continue with the Adderall at the same dose     Exercise outside of work  - this will help to decrease stress.        Increased the dose of  Losartan to 100 mg       You are due for a physical , pap smear.    Get your mammogram done- referral  and please do the FIT test,     For the Lipoma in the right arm pit  See a general surgeon to have it removed    The cyst on the back of the head could be lanced here or by the surgeon

## 2018-11-15 NOTE — MR AVS SNAPSHOT
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   Write the things down that you are thinking about and try to figure out a plan   For the anxiety   -- figure out the  Triggers for your anxiety and then figure out coping mechanisms       Continue with the Adderall at the same dose     Exercise outside of work  - this will help to decrease stress.        Increased the dose of  Losartan to 100 mg       You are due for a physical , pap smear.    Get your mammogram done- referral  and please do the FIT test,     For the Lipoma in the right arm pit  See a general surgeon to have it removed    The cyst on the back of the head could be lanced here or by the surgeon             Follow-ups after your visit        Additional Services     GASTROENTEROLOGY ADULT REF PROCEDURE ONLY [facility] [phone]       Last Lab Result: Creatinine (mg/dL)       Date                     Value                 02/16/2017               0.63             ----------  Body mass index is 22.83 kg/(m^2).     Needed:  No  Language:  English    Patient will be contacted to schedule procedure.     Please be aware that coverage of these services is subject to the terms and limitations of your health insurance plan.  Call member services at your health plan with any benefit or coverage questions.  Any  procedures must be performed at a [facility] facility OR coordinated by your clinic's referral office.    Please bring the following with you to your appointment:    (1) Any X-Rays, CTs or MRIs which have been performed.  Contact the facility where they were done to arrange for  prior to your scheduled appointment.    (2) List of current medications   (3) This referral request   (4) Any documents/labs given to you for this referral            GENERAL SURG ADULT REFERRAL       Your provider has referred you to: [facility]: [facility] [phone]   [email or web address]    Please be aware that coverage of these services is subject to the terms and limitations of your health insurance plan.  Call member services at your health plan with any benefit or coverage questions.      Please bring the following with you to your appointment:    (1) Any X-Rays, CTs or MRIs which have been performed.  Contact the facility where they were done to arrange for  prior to your scheduled appointment.   (2) List of current medications   (3) This referral request   (4) Any documents/labs given to you for this referral                  Future tests that were ordered for you today     Open Future Orders        Priority Expected Expires Ordered    Fecal colorectal cancer screen (FIT) Routine 12/6/2018 2/7/2019 11/15/2018    MA Screening Digital Bilateral Routine  11/14/2019 11/15/2018            Who to contact     Normal or non-critical lab and imaging results will be communicated to you by MyChart, letter or phone within 4 business days after the clinic has received the results. If you do not hear from us within 7 days, please contact the clinic through MyChart or phone. If you have a critical or abnormal lab result, we will notify you by phone as soon as possible.  Submit refill requests through [employer] or call your pharmacy and they will forward the refill request to us. Please allow 3  "business days for your refill to be completed.          If you need to speak with a  for additional information , please call: [phone]           Additional Information About Your Visit        Tealeaf[employer] Information     [employer] gives you secure access to your electronic health record. If you see a primary care provider, you can also send messages to your care team and make appointments. If you have questions, please call your primary care clinic.  If you do not have a primary care provider, please call [phone] and they will assist you.        Care EveryWhere ID     This is your Care EveryWhere ID. This could be used by other organizations to access your [place] medical records  [id number]        Your Vitals Were     Pulse Temperature Height Last Period Pulse Oximetry Breastfeeding?    78 98.3  F (36.8  C) (Tympanic) 5' 4\" (1.626 m) 11/01/2018 (Approximate) 100% No    BMI (Body Mass Index)                   22.83 kg/m2            Blood Pressure from Last 3 Encounters:   11/15/18 (!) 174/94   09/04/18 (!) 145/95   09/21/17 114/62    Weight from Last 3 Encounters:   11/15/18 133 lb (60.3 kg)   09/04/18 126 lb 11.2 oz (57.5 kg)   09/21/17 125 lb (56.7 kg)              We Performed the Following     Albumin Random Urine Quantitative with Creat Ratio     GASTROENTEROLOGY ADULT REF PROCEDURE ONLY [facility] [phone]     GENERAL SURG ADULT REFERRAL          Today's Medication Changes          These changes are accurate as of 11/15/18  4:06 PM.  If you have any questions, ask your nurse or doctor.               These medicines have changed or have updated prescriptions.        Dose/Directions    * amphetamine-dextroamphetamine 20 MG per 24 hr capsule   Commonly known as:  ADDERALL XR   This may have changed:    - Another medication with the same name was changed. Make sure you understand how and when to take each.  - Another medication with the same name was removed. Continue taking this " medication, and follow the directions you see here.   Used for:  Attention deficit disorder of adult   Changed by:  Hemalatha Juarez APRN CNP        Dose:  20 mg   Take 1 capsule (20 mg) by mouth daily   Quantity:  30 capsule   Refills:  0       * amphetamine-dextroamphetamine 20 MG per tablet   Commonly known as:  ADDERALL   This may have changed:    - Another medication with the same name was changed. Make sure you understand how and when to take each.  - Another medication with the same name was removed. Continue taking this medication, and follow the directions you see here.   Used for:  Attention deficit disorder of adult   Changed by:  Hemalatha Juarez APRN CNP        Dose:  20 mg   Take 1 tablet (20 mg) by mouth daily Take in the early afternoon   Quantity:  30 tablet   Refills:  0       * amphetamine-dextroamphetamine 20 MG per tablet   Commonly known as:  ADDERALL   This may have changed:    - additional instructions  - These instructions start on 1/8/2019. If you are unsure what to do until then, ask your doctor or other care provider.  - Another medication with the same name was removed. Continue taking this medication, and follow the directions you see here.   Used for:  Attention deficit disorder of adult   Changed by:  Hemalatha Juarez APRN CNP        Dose:  20 mg   Start taking on:  1/8/2019   Take 1 tablet (20 mg) by mouth daily Take in the early afternoon   Quantity:  30 tablet   Refills:  0       * amphetamine-dextroamphetamine 20 MG per 24 hr capsule   Commonly known as:  ADDERALL XR   This may have changed:    - These instructions start on 2/5/2019. If you are unsure what to do until then, ask your doctor or other care provider.  - Another medication with the same name was removed. Continue taking this medication, and follow the directions you see here.   Used for:  Attention deficit disorder of adult   Changed by:  Hemalatha Juarez APRN CNP        Dose:  20 mg    Start taking on:  2/5/2019   Take 1 capsule (20 mg) by mouth daily   Quantity:  30 capsule   Refills:  0       * amphetamine-dextroamphetamine 20 MG per tablet   Commonly known as:  ADDERALL   This may have changed:    - These instructions start on 2/5/2019. If you are unsure what to do until then, ask your doctor or other care provider.  - Another medication with the same name was removed. Continue taking this medication, and follow the directions you see here.   Used for:  Attention deficit disorder of adult   Changed by:  Hemalatha Juarez APRN CNP        Dose:  20 mg   Start taking on:  2/5/2019   Take 1 tablet (20 mg) by mouth daily Take in the early afternoon   Quantity:  30 tablet   Refills:  0       losartan 100 MG tablet   Commonly known as:  COZAAR   This may have changed:    - medication strength  - how much to take   Used for:  Essential hypertension with goal blood pressure less than 140/90   Changed by:  Hemalatha Juarez APRN CNP        Dose:  100 mg   Take 1 tablet (100 mg) by mouth daily   Quantity:  90 tablet   Refills:  1       * Notice:  This list has 5 medication(s) that are the same as other medications prescribed for you. Read the directions carefully, and ask your doctor or other care provider to review them with you.         Where to get your medicines      These medications were sent to [facility] [address]  [address] [address]     Phone:  [phone]     losartan 100 MG tablet         Some of these will need a paper prescription and others can be bought over the counter.  Ask your nurse if you have questions.     Bring a paper prescription for each of these medications     amphetamine-dextroamphetamine 20 MG per 24 hr capsule    amphetamine-dextroamphetamine 20 MG per 24 hr capsule    amphetamine-dextroamphetamine 20 MG per tablet    amphetamine-dextroamphetamine 20 MG per tablet                Primary Care  Provider Office Phone # Fax #    Hemalatha Ramonacharlotte Orta Juarez, APRCHERYL [facility] [phone] [address] [place] [address]        Equal Access to Services     NICA DOUGLAS : Hadii aad ku mandio Sodianelysali, waaxda luqadaha, qaybta kaalmada adeegyada, waxshade diamondn yaritza nam laValoriejocy mir. So [facility] [phone].    ATENCIÓN: Si habla español, tiene a fontaine disposición servicios gratuitos de asistencia lingüística. Llame al [phone].    We comply with applicable federal civil rights laws and [place] laws. We do not discriminate on the basis of race, color, national origin, age, disability, sex, sexual orientation, or gender identity.            Thank you!     Thank you for choosing [facility] [place]  for your care. Our goal is always to provide you with excellent care. Hearing back from our patients is one way we can continue to improve our services. Please take a few minutes to complete the written survey that you may receive in the mail after your visit with us. Thank you!             Your Updated Medication List - Protect others around you: Learn how to safely use, store and throw away your medicines at www.disposemymeds.org.          This list is accurate as of 11/15/18  4:06 PM.  Always use your most recent med list.                   Brand Name Dispense Instructions for use Diagnosis    ALPRAZolam 0.25 MG tablet    XANAX    4 tablet    Take 1 tablet 1 hour before flying and repeat if needed    Fear of flying       * amphetamine-dextroamphetamine 20 MG per 24 hr capsule    ADDERALL XR    30 capsule    Take 1 capsule (20 mg) by mouth daily    Attention deficit disorder of adult       * amphetamine-dextroamphetamine 20 MG per tablet    ADDERALL    30 tablet    Take 1 tablet (20 mg) by mouth daily Take in the early afternoon    Attention deficit disorder of adult       * amphetamine-dextroamphetamine 20 MG per tablet   Start taking on:  1/8/2019    ADDERALL    30 tablet    Take 1 tablet (20  mg) by mouth daily Take in the early afternoon    Attention deficit disorder of adult       * amphetamine-dextroamphetamine 20 MG per 24 hr capsule   Start taking on:  2/5/2019    ADDERALL XR    30 capsule    Take 1 capsule (20 mg) by mouth daily    Attention deficit disorder of adult       * amphetamine-dextroamphetamine 20 MG per tablet   Start taking on:  2/5/2019    ADDERALL    30 tablet    Take 1 tablet (20 mg) by mouth daily Take in the early afternoon    Attention deficit disorder of adult       aspirin 81 MG tablet     100 tablet    Take 1 tablet (81 mg) by mouth daily Medication is being filled for 1 time refill only due to:  Patient needs to be seen because it has been more than one year since last visit.    Hypertension goal BP (blood pressure) < 140/90       losartan 100 MG tablet    COZAAR    90 tablet    Take 1 tablet (100 mg) by mouth daily    Essential hypertension with goal blood pressure less than 140/90       metroNIDAZOLE 0.75 % topical gel    METROGEL    45 g    Apply 1 g topically 2 times daily    Rosacea       * Notice:  This list has 5 medication(s) that are the same as other medications prescribed for you. Read the directions carefully, and ask your doctor or other care provider to review them with you.

## 2019-03-05 DIAGNOSIS — F98.8 ATTENTION DEFICIT DISORDER OF ADULT: ICD-10-CM

## 2019-03-05 NOTE — TELEPHONE ENCOUNTER
Adderall  20mg      Last Written Prescription Date:  02/05/2019  #30 x 0  Last filled - not provided    Adderall XR 20mg      Last Written Prescription Date:  02/05/2019 #30 x 0  Last filled - not provided    Last Office Visit: 11/15/2018 SHANDA Juarez  Future Office visit:   None    Routing refill request to provider for review/approval because:  Drug not on the [facility], P or [facility] refill protocol or controlled substance

## 2019-03-05 NOTE — TELEPHONE ENCOUNTER
Last filled 02-   Last qty 30  Last office visit 11-      Mary Hemphill [facility]   Certified Pharmacy Technician

## 2019-03-06 ENCOUNTER — MYC MEDICAL ADVICE (OUTPATIENT)
Dept: FAMILY MEDICINE | Facility: CLINIC | Age: 54
End: 2019-03-06

## 2019-03-06 ENCOUNTER — MYC REFILL (OUTPATIENT)
Dept: FAMILY MEDICINE | Facility: CLINIC | Age: 54
End: 2019-03-06

## 2019-03-06 DIAGNOSIS — F98.8 ATTENTION DEFICIT DISORDER OF ADULT: ICD-10-CM

## 2019-03-06 DIAGNOSIS — L71.9 ROSACEA: ICD-10-CM

## 2019-03-06 DIAGNOSIS — I10 ESSENTIAL HYPERTENSION WITH GOAL BLOOD PRESSURE LESS THAN 140/90: ICD-10-CM

## 2019-03-07 ENCOUNTER — MYC MEDICAL ADVICE (OUTPATIENT)
Dept: FAMILY MEDICINE | Facility: CLINIC | Age: 54
End: 2019-03-07

## 2019-03-07 DIAGNOSIS — F98.8 ATTENTION DEFICIT DISORDER OF ADULT: ICD-10-CM

## 2019-03-07 RX ORDER — DEXTROAMPHETAMINE SACCHARATE, AMPHETAMINE ASPARTATE MONOHYDRATE, DEXTROAMPHETAMINE SULFATE AND AMPHETAMINE SULFATE 5; 5; 5; 5 MG/1; MG/1; MG/1; MG/1
20 CAPSULE, EXTENDED RELEASE ORAL DAILY
Qty: 30 CAPSULE | Refills: 0 | Status: SHIPPED | OUTPATIENT
Start: 2019-04-04 | End: 2019-06-04

## 2019-03-07 RX ORDER — DEXTROAMPHETAMINE SACCHARATE, AMPHETAMINE ASPARTATE MONOHYDRATE, DEXTROAMPHETAMINE SULFATE AND AMPHETAMINE SULFATE 5; 5; 5; 5 MG/1; MG/1; MG/1; MG/1
20 CAPSULE, EXTENDED RELEASE ORAL DAILY
Qty: 30 CAPSULE | Refills: 0 | Status: SHIPPED | OUTPATIENT
Start: 2019-03-07 | End: 2019-04-29

## 2019-03-07 RX ORDER — DEXTROAMPHETAMINE SACCHARATE, AMPHETAMINE ASPARTATE, DEXTROAMPHETAMINE SULFATE AND AMPHETAMINE SULFATE 5; 5; 5; 5 MG/1; MG/1; MG/1; MG/1
20 TABLET ORAL DAILY
Qty: 30 TABLET | Refills: 0 | Status: SHIPPED | OUTPATIENT
Start: 2019-04-04 | End: 2019-09-18

## 2019-03-07 RX ORDER — DEXTROAMPHETAMINE SACCHARATE, AMPHETAMINE ASPARTATE, DEXTROAMPHETAMINE SULFATE AND AMPHETAMINE SULFATE 5; 5; 5; 5 MG/1; MG/1; MG/1; MG/1
20 TABLET ORAL DAILY
Qty: 30 TABLET | Refills: 0 | Status: SHIPPED | OUTPATIENT
Start: 2019-03-07 | End: 2019-06-04

## 2019-03-12 RX ORDER — LOSARTAN POTASSIUM 100 MG/1
100 TABLET ORAL DAILY
Qty: 90 TABLET | Refills: 1 | Status: SHIPPED | OUTPATIENT
Start: 2019-03-12 | End: 2019-05-31

## 2019-03-12 RX ORDER — METRONIDAZOLE 7.5 MG/G
1 GEL TOPICAL 2 TIMES DAILY
Qty: 45 G | Refills: 0 | Status: SHIPPED | OUTPATIENT
Start: 2019-03-12 | End: 2019-04-29

## 2019-03-12 RX ORDER — DEXTROAMPHETAMINE SACCHARATE, AMPHETAMINE ASPARTATE, DEXTROAMPHETAMINE SULFATE AND AMPHETAMINE SULFATE 5; 5; 5; 5 MG/1; MG/1; MG/1; MG/1
20 TABLET ORAL DAILY
Qty: 30 TABLET | Refills: 0 | OUTPATIENT
Start: 2019-03-12

## 2019-03-12 RX ORDER — DEXTROAMPHETAMINE SACCHARATE, AMPHETAMINE ASPARTATE MONOHYDRATE, DEXTROAMPHETAMINE SULFATE AND AMPHETAMINE SULFATE 5; 5; 5; 5 MG/1; MG/1; MG/1; MG/1
20 CAPSULE, EXTENDED RELEASE ORAL DAILY
Qty: 30 CAPSULE | Refills: 0 | OUTPATIENT
Start: 2019-03-12

## 2019-03-12 NOTE — TELEPHONE ENCOUNTER
"Adderall scripts for the regular and 24 hour tablets have been completed by PCP on 3-7-19 and 4-4-19, removed these using reason:duplicate.      Routing refill request to provider for review/approval because:  Patient seen on 11-15-18, do not see that the Metronidazole was reviewed.  B/P not at goal, patient says she will have her B/P checked at work per my chart on 3-7-19. Labs not current. Have BMP pended.      Requested Prescriptions   Pending Prescriptions Disp Refills     metroNIDAZOLE (METROGEL) 0.75 % external gel 45 g 0     Sig: Apply 1 g topically 2 times daily    Topical Acne Medications Protocol Passed - 3/6/2019  3:10 PM       Passed - Patient is 12 years of age or older       Passed - Recent (12 mo) or future (30 days) visit within the authorizing provider's specialty    Patient had office visit in the last 12 months or has a visit in the next 30 days with authorizing provider or within the authorizing provider's specialty.  See \"Patient Info\" tab in inbasket, or \"Choose Columns\" in Meds & Orders section of the refill encounter.             Passed - Medication is active on med list        losartan (COZAAR) 100 MG tablet 90 tablet 1     Sig: Take 1 tablet (100 mg) by mouth daily    Angiotensin-II Receptors Failed - 3/6/2019  3:10 PM       Failed - Blood pressure under 140/90 in past 12 months    BP Readings from Last 3 Encounters:   11/15/18 (!) 174/94   09/04/18 (!) 145/95   09/21/17 114/62                Failed - Normal serum creatinine on file in past 12 months    Recent Labs   Lab Test 02/16/17  1427   CR 0.63            Failed - Normal serum potassium on file in past 12 months    Recent Labs   Lab Test 02/16/17  1427   POTASSIUM 3.8                   Passed - Recent (12 mo) or future (30 days) visit within the authorizing provider's specialty          "

## 2019-04-26 ENCOUNTER — TELEPHONE (OUTPATIENT)
Dept: FAMILY MEDICINE | Facility: CLINIC | Age: 54
End: 2019-04-26

## 2019-04-26 NOTE — LETTER
April 26, 2019      Libertad S Yvonne  [address] [address]        Dear Libertad,     As part of [place]'s commitment to health and wellness we have reviewed your chart and it indicates that you are due for one or more of the following:    -- Pap smear. The last pap that we have on file for you was from 06/02/2009. These are recommended every 3 years. Please call our clinic to schedule your pap smear / physical appointment. Please plan to be fasting for this appointment (nothing to eat or drink except water and medications).     -- Mammogram. The last mammogram that we have on file for you was from 08/09/2010.   Please call one of the following numbers to schedule:  ** [facility] [phone] (at [facility] once a month)  [facility] [phone]  [facility] [phone]  [facility] [phone]  [facility] [phone]  [facility] [phone]    -- Colon screen. Colonoscopy or FIT test (take home test). One of these tests is recommended at age 50 to screen for colon cancer.   Please call one of the following numbers to schedule a colonoscopy:  [facility] [phone]  [facility] [phone]  [facility] [phone]  [facility] [phone] (multiple sites, call for locations)  OR....  If you prefer to do a screening that is LESS INVASIVE AND LESS EXPENSIVE there is an test for you! It is called the FIT test. It is a screening test that is done yearly and can be DONE AT HOME! Do the test at home and mail it in (you don't even have to pay for postage). If you are willing to do this test, we can order the kit for you to  at our clinic. Please call us at [phone] if you need an order for a colonoscopy or FIT testing.    Please try to schedule and/or complete the tests above within the next 2-4 weeks.   The number to call to schedule an appointment at [facility] is [phone].    While we work  hard to maintain accurate records, it is always possible that this notice does not accurately reflect tests that you may have had. To ensure that we do not send you unnecessary notices please verify that we have accurate dates of your tests (even if these were done many years ago) or if you are seeking care at another clinic.      Sincerely,     CARIDAD Black/ dwayne

## 2019-04-26 NOTE — TELEPHONE ENCOUNTER
Panel Management Review      Patient has the following on her problem list:     Hypertension   Last three blood pressure readings:  BP Readings from Last 3 Encounters:   11/15/18 (!) 174/94   09/04/18 (!) 145/95   09/21/17 114/62     Blood pressure: FAILED    HTN Guidelines:  Less than 140/90      Composite cancer screening  Chart review shows that this patient is due/due soon for the following Pap Smear, Mammogram and Colonoscopy  Summary:    Patient is due/failing the following:   COLONOSCOPY, PE, PAP, MAMMOGRAM, BP CHECK    Action needed:   Patient needs office visit for PE with PAP, BP CHECK and FASTING LABS and Patient needs referral/order: COLONOSCOPY and MAMMOGRAM    Type of outreach:    Sent letter.    Questions for provider review:    None                                                                                                                                    Celeste Figueroa CMA (AAMA)       Chart routed to None .

## 2019-04-29 ENCOUNTER — MYC REFILL (OUTPATIENT)
Dept: FAMILY MEDICINE | Facility: CLINIC | Age: 54
End: 2019-04-29

## 2019-04-29 DIAGNOSIS — L71.9 ROSACEA: ICD-10-CM

## 2019-04-29 DIAGNOSIS — I10 ESSENTIAL HYPERTENSION WITH GOAL BLOOD PRESSURE LESS THAN 140/90: ICD-10-CM

## 2019-04-29 RX ORDER — LOSARTAN POTASSIUM 100 MG/1
100 TABLET ORAL DAILY
Qty: 90 TABLET | Refills: 1 | Status: CANCELLED | OUTPATIENT
Start: 2019-04-29

## 2019-04-30 RX ORDER — METRONIDAZOLE 7.5 MG/G
1 GEL TOPICAL 2 TIMES DAILY
Qty: 45 G | Refills: 0 | Status: SHIPPED | OUTPATIENT
Start: 2019-04-30 | End: 2019-06-28

## 2019-05-03 DIAGNOSIS — F98.8 ATTENTION DEFICIT DISORDER OF ADULT: ICD-10-CM

## 2019-05-03 NOTE — TELEPHONE ENCOUNTER
Adderall 20mg      Last Written Prescription Date:  04/04/2019 #30 x 0  Last filled - not provided  Last Office Visit: 11/15/2018 SHANDA Juarez  Future Office visit:   None    Routing refill request to provider for review/approval because:  Drug not on the [facility], [facility] or [facility] refill protocol or controlled substance

## 2019-05-03 NOTE — TELEPHONE ENCOUNTER
Last filled 04-   Last qty 30  Last office visit 11-      Mary Hemphill [facility]   Certified Pharmacy Technician

## 2019-05-03 NOTE — TELEPHONE ENCOUNTER
Routing refill request to provider for review/approval because:  Drug not on the [facility] refill protocol  Esther Ortiz RN

## 2019-05-08 RX ORDER — DEXTROAMPHETAMINE SACCHARATE, AMPHETAMINE ASPARTATE, DEXTROAMPHETAMINE SULFATE AND AMPHETAMINE SULFATE 5; 5; 5; 5 MG/1; MG/1; MG/1; MG/1
20 TABLET ORAL DAILY
Qty: 30 TABLET | Refills: 0 | Status: SHIPPED | OUTPATIENT
Start: 2019-05-08 | End: 2019-09-18

## 2019-05-31 ENCOUNTER — MYC REFILL (OUTPATIENT)
Dept: FAMILY MEDICINE | Facility: CLINIC | Age: 54
End: 2019-05-31

## 2019-05-31 ENCOUNTER — MYC MEDICAL ADVICE (OUTPATIENT)
Dept: FAMILY MEDICINE | Facility: CLINIC | Age: 54
End: 2019-05-31

## 2019-05-31 DIAGNOSIS — F98.8 ATTENTION DEFICIT DISORDER OF ADULT: ICD-10-CM

## 2019-05-31 DIAGNOSIS — I10 ESSENTIAL HYPERTENSION WITH GOAL BLOOD PRESSURE LESS THAN 140/90: ICD-10-CM

## 2019-05-31 DIAGNOSIS — F40.243 FEAR OF FLYING: ICD-10-CM

## 2019-05-31 RX ORDER — DEXTROAMPHETAMINE SACCHARATE, AMPHETAMINE ASPARTATE, DEXTROAMPHETAMINE SULFATE AND AMPHETAMINE SULFATE 5; 5; 5; 5 MG/1; MG/1; MG/1; MG/1
20 TABLET ORAL DAILY
Qty: 30 TABLET | Refills: 0 | Status: CANCELLED | OUTPATIENT
Start: 2019-05-31

## 2019-05-31 NOTE — TELEPHONE ENCOUNTER
Routing refill request to provider for review/approval because:  Drug not on the [facility] refill protocol or controlled substance  Fadumo Hudson RN

## 2019-05-31 NOTE — TELEPHONE ENCOUNTER
Adderall 20mg      Last Written Prescription Date:  05/08/2019 #30 x 0  Last filled - not provided    Adderall XR 20mg      Last Written Prescription Date:  05/01/2019 #30 x 0  Last filled - not provided    Last Office Visit: 11/15/2018 SHANDA Juarez  Future Office visit:   None  Routing refill request to provider for review/approval because:  Drug not on the [facility], P or [facility] refill protocol or controlled substance

## 2019-05-31 NOTE — TELEPHONE ENCOUNTER
Last filled 05- & 05-   Last qty 30  Last office visit 11-    Mary Hemphill [facility]   Certified Pharmacy Technician

## 2019-05-31 NOTE — TELEPHONE ENCOUNTER
Cozaar  Routing refill request to provider for review/approval because:  Angiotensin-II Receptors Failed5/31 12:04 PM   Blood pressure under 140/90 in past 12 months    Normal serum creatinine on file in past 12 months    Normal serum potassium on file in past 12 months     Adderall  Routing refill request to provider for review/approval because:  Drug not on the [facility] refill protocol     Fadumo Hudson RN

## 2019-06-04 RX ORDER — DEXTROAMPHETAMINE SACCHARATE, AMPHETAMINE ASPARTATE MONOHYDRATE, DEXTROAMPHETAMINE SULFATE AND AMPHETAMINE SULFATE 5; 5; 5; 5 MG/1; MG/1; MG/1; MG/1
20 CAPSULE, EXTENDED RELEASE ORAL DAILY
Qty: 30 CAPSULE | Refills: 0 | Status: SHIPPED | OUTPATIENT
Start: 2019-06-04 | End: 2019-08-01

## 2019-06-04 RX ORDER — DEXTROAMPHETAMINE SACCHARATE, AMPHETAMINE ASPARTATE, DEXTROAMPHETAMINE SULFATE AND AMPHETAMINE SULFATE 5; 5; 5; 5 MG/1; MG/1; MG/1; MG/1
20 TABLET ORAL DAILY
Qty: 30 TABLET | Refills: 0 | Status: SHIPPED | OUTPATIENT
Start: 2019-06-04 | End: 2019-09-18

## 2019-06-04 NOTE — TELEPHONE ENCOUNTER
Patient was last seen on 11/5/18 by PCP.  I will refill rx but patient will need an evisit or office visit for future refills.  [facility](s) [facility]

## 2019-06-04 NOTE — TELEPHONE ENCOUNTER
Routing refill request to provider for review/approval because:  Drug not on the [facility] refill protocol     Dottie Alex RN

## 2019-06-06 RX ORDER — DEXTROAMPHETAMINE SACCHARATE, AMPHETAMINE ASPARTATE, DEXTROAMPHETAMINE SULFATE AND AMPHETAMINE SULFATE 5; 5; 5; 5 MG/1; MG/1; MG/1; MG/1
20 TABLET ORAL DAILY
Qty: 30 TABLET | Refills: 0 | Status: SHIPPED | OUTPATIENT
Start: 2019-07-05 | End: 2019-08-01

## 2019-06-06 RX ORDER — LOSARTAN POTASSIUM 100 MG/1
100 TABLET ORAL DAILY
Qty: 90 TABLET | Refills: 0 | Status: SHIPPED | OUTPATIENT
Start: 2019-06-06 | End: 2019-08-27

## 2019-06-06 RX ORDER — DEXTROAMPHETAMINE SACCHARATE, AMPHETAMINE ASPARTATE MONOHYDRATE, DEXTROAMPHETAMINE SULFATE AND AMPHETAMINE SULFATE 5; 5; 5; 5 MG/1; MG/1; MG/1; MG/1
20 CAPSULE, EXTENDED RELEASE ORAL DAILY
Qty: 30 CAPSULE | Refills: 0 | OUTPATIENT
Start: 2019-06-06

## 2019-06-27 ENCOUNTER — MYC REFILL (OUTPATIENT)
Dept: FAMILY MEDICINE | Facility: CLINIC | Age: 54
End: 2019-06-27

## 2019-06-27 DIAGNOSIS — F98.8 ATTENTION DEFICIT DISORDER OF ADULT: ICD-10-CM

## 2019-06-27 DIAGNOSIS — L71.9 ROSACEA: ICD-10-CM

## 2019-06-27 RX ORDER — DEXTROAMPHETAMINE SACCHARATE, AMPHETAMINE ASPARTATE, DEXTROAMPHETAMINE SULFATE AND AMPHETAMINE SULFATE 5; 5; 5; 5 MG/1; MG/1; MG/1; MG/1
20 TABLET ORAL DAILY
Qty: 30 TABLET | Refills: 0 | Status: CANCELLED | OUTPATIENT
Start: 2019-06-27

## 2019-06-28 RX ORDER — DEXTROAMPHETAMINE SACCHARATE, AMPHETAMINE ASPARTATE MONOHYDRATE, DEXTROAMPHETAMINE SULFATE AND AMPHETAMINE SULFATE 5; 5; 5; 5 MG/1; MG/1; MG/1; MG/1
20 CAPSULE, EXTENDED RELEASE ORAL DAILY
Qty: 30 CAPSULE | Refills: 0 | Status: SHIPPED | OUTPATIENT
Start: 2019-07-05 | End: 2019-08-27

## 2019-06-28 RX ORDER — METRONIDAZOLE 7.5 MG/G
1 GEL TOPICAL 2 TIMES DAILY
Qty: 45 G | Refills: 0 | Status: SHIPPED | OUTPATIENT
Start: 2019-06-28 | End: 2022-04-07

## 2019-06-28 RX ORDER — DEXTROAMPHETAMINE SACCHARATE, AMPHETAMINE ASPARTATE MONOHYDRATE, DEXTROAMPHETAMINE SULFATE AND AMPHETAMINE SULFATE 5; 5; 5; 5 MG/1; MG/1; MG/1; MG/1
20 CAPSULE, EXTENDED RELEASE ORAL DAILY
Qty: 30 CAPSULE | Refills: 0
Start: 2019-06-28

## 2019-06-28 NOTE — TELEPHONE ENCOUNTER
Pt has Adderall 20 mg in the Pharmacy.  Notified pt she needs appt for next months refill.  KPavelRN

## 2019-07-01 NOTE — TELEPHONE ENCOUNTER
Prescription filled pt called and left message, and walked over to [facility].  [place]  [facility]

## 2019-07-03 ENCOUNTER — MYC MEDICAL ADVICE (OUTPATIENT)
Dept: FAMILY MEDICINE | Facility: CLINIC | Age: 54
End: 2019-07-03

## 2019-07-08 RX ORDER — ALPRAZOLAM 0.25 MG
TABLET ORAL
Qty: 4 TABLET | Refills: 0 | Status: SHIPPED | OUTPATIENT
Start: 2019-07-08 | End: 2019-12-16

## 2019-07-09 NOTE — TELEPHONE ENCOUNTER
Script walked over to the [facility].    Fadumo Preston, [facility]

## 2019-07-15 RX ORDER — DEXTROAMPHETAMINE SACCHARATE, AMPHETAMINE ASPARTATE, DEXTROAMPHETAMINE SULFATE AND AMPHETAMINE SULFATE 5; 5; 5; 5 MG/1; MG/1; MG/1; MG/1
20 TABLET ORAL DAILY
Qty: 30 TABLET | Refills: 0 | OUTPATIENT
Start: 2019-08-02

## 2019-08-01 ENCOUNTER — MYC REFILL (OUTPATIENT)
Dept: FAMILY MEDICINE | Facility: CLINIC | Age: 54
End: 2019-08-01

## 2019-08-01 DIAGNOSIS — F98.8 ATTENTION DEFICIT DISORDER OF ADULT: ICD-10-CM

## 2019-08-02 ENCOUNTER — MYC REFILL (OUTPATIENT)
Dept: FAMILY MEDICINE | Facility: CLINIC | Age: 54
End: 2019-08-02

## 2019-08-02 DIAGNOSIS — F98.8 ATTENTION DEFICIT DISORDER OF ADULT: ICD-10-CM

## 2019-08-02 RX ORDER — DEXTROAMPHETAMINE SACCHARATE, AMPHETAMINE ASPARTATE, DEXTROAMPHETAMINE SULFATE AND AMPHETAMINE SULFATE 5; 5; 5; 5 MG/1; MG/1; MG/1; MG/1
20 TABLET ORAL DAILY
Qty: 30 TABLET | Refills: 0 | OUTPATIENT
Start: 2019-08-02

## 2019-08-02 RX ORDER — DEXTROAMPHETAMINE SACCHARATE, AMPHETAMINE ASPARTATE MONOHYDRATE, DEXTROAMPHETAMINE SULFATE AND AMPHETAMINE SULFATE 5; 5; 5; 5 MG/1; MG/1; MG/1; MG/1
20 CAPSULE, EXTENDED RELEASE ORAL DAILY
Qty: 30 CAPSULE | Refills: 0 | Status: SHIPPED | OUTPATIENT
Start: 2019-08-02 | End: 2019-09-18

## 2019-08-02 RX ORDER — DEXTROAMPHETAMINE SACCHARATE, AMPHETAMINE ASPARTATE, DEXTROAMPHETAMINE SULFATE AND AMPHETAMINE SULFATE 5; 5; 5; 5 MG/1; MG/1; MG/1; MG/1
20 TABLET ORAL DAILY
Qty: 30 TABLET | Refills: 0 | Status: SHIPPED | OUTPATIENT
Start: 2019-08-02 | End: 2019-08-27

## 2019-08-02 NOTE — TELEPHONE ENCOUNTER
Patient notified via MyChart and script walked to  pharmacy.     Zoya Nieves, [facility]

## 2019-08-02 NOTE — TELEPHONE ENCOUNTER
Routing refill request to provider for review/approval because:  Drug not on the [facility] refill protocol     Fadumo Hudson RN

## 2019-09-16 ENCOUNTER — MYC REFILL (OUTPATIENT)
Dept: FAMILY MEDICINE | Facility: CLINIC | Age: 54
End: 2019-09-16

## 2019-09-16 DIAGNOSIS — F98.8 ATTENTION DEFICIT DISORDER OF ADULT: ICD-10-CM

## 2019-09-16 RX ORDER — DEXTROAMPHETAMINE SACCHARATE, AMPHETAMINE ASPARTATE MONOHYDRATE, DEXTROAMPHETAMINE SULFATE AND AMPHETAMINE SULFATE 5; 5; 5; 5 MG/1; MG/1; MG/1; MG/1
20 CAPSULE, EXTENDED RELEASE ORAL DAILY
Qty: 30 CAPSULE | Refills: 0 | Status: SHIPPED | OUTPATIENT
Start: 2019-09-16 | End: 2019-09-22

## 2019-09-16 RX ORDER — DEXTROAMPHETAMINE SACCHARATE, AMPHETAMINE ASPARTATE, DEXTROAMPHETAMINE SULFATE AND AMPHETAMINE SULFATE 5; 5; 5; 5 MG/1; MG/1; MG/1; MG/1
20 TABLET ORAL DAILY
Qty: 30 TABLET | Refills: 0 | Status: SHIPPED | OUTPATIENT
Start: 2019-09-16 | End: 2019-09-22

## 2019-09-16 RX ORDER — DEXTROAMPHETAMINE SACCHARATE, AMPHETAMINE ASPARTATE MONOHYDRATE, DEXTROAMPHETAMINE SULFATE AND AMPHETAMINE SULFATE 5; 5; 5; 5 MG/1; MG/1; MG/1; MG/1
20 CAPSULE, EXTENDED RELEASE ORAL DAILY
Qty: 30 CAPSULE | Refills: 0 | OUTPATIENT
Start: 2019-09-16

## 2019-09-16 NOTE — TELEPHONE ENCOUNTER
Routing refill request to provider for review/approval because:  Drug not on the [facility] refill protocol   Andreea Mena RN

## 2019-09-18 ENCOUNTER — OFFICE VISIT (OUTPATIENT)
Dept: FAMILY MEDICINE | Facility: CLINIC | Age: 54
End: 2019-09-18
Payer: COMMERCIAL

## 2019-09-18 VITALS
WEIGHT: 129.38 LBS | HEART RATE: 68 BPM | DIASTOLIC BLOOD PRESSURE: 90 MMHG | SYSTOLIC BLOOD PRESSURE: 144 MMHG | HEIGHT: 64 IN | TEMPERATURE: 98 F | RESPIRATION RATE: 14 BRPM | BODY MASS INDEX: 22.09 KG/M2

## 2019-09-18 DIAGNOSIS — D17.0 LIPOMA OF HEAD: Primary | ICD-10-CM

## 2019-09-18 DIAGNOSIS — D17.21 LIPOMA OF RIGHT AXILLA: ICD-10-CM

## 2019-09-18 DIAGNOSIS — F98.8 ATTENTION DEFICIT DISORDER OF ADULT: ICD-10-CM

## 2019-09-18 PROCEDURE — 99214 OFFICE O/P EST MOD 30 MIN: CPT | Performed by: FAMILY MEDICINE

## 2019-09-18 ASSESSMENT — MIFFLIN-ST. JEOR: SCORE: 1186.84

## 2019-09-18 ASSESSMENT — PAIN SCALES - GENERAL: PAINLEVEL: NO PAIN (0)

## 2019-09-18 NOTE — PROGRESS NOTES
"Subjective     Libertad Russell is a 53 year old female who presents to clinic today for the following health issues:    HPI     - mass on back of head near base of neck. No fevers, pain or drainage.  -Mass in the right axilla, duration years.  -Follow-up attention deficit disorder.  She feels she benefits from stimulant therapy, denies any side effects.        Reviewed and updated as needed this visit by Provider         Review of Systems   ROS COMP: Constitutional, HEENT, cardiovascular, pulmonary, gi and gu systems are negative, except as otherwise noted.      Objective    BP (!) 144/90   Pulse 68   Temp 98  F (36.7  C) (Tympanic)   Resp 14   Ht 1.626 m (5' 4\")   Wt 58.7 kg (129 lb 6 oz)   BMI 22.21 kg/m    Body mass index is 22.21 kg/m .  Physical Exam   GENERAL: Healthy, alert and no distress  Heent: TM's clear, no facial pain, oral mucosa moist, posterior pharynx without erythema or exudate.  There is a large 4 cm soft mass superior occipital area.  Appears to be within the scalp.  Skin intact over the lesion.  EYES: Eyes grossly normal to inspection, conjunctivae and sclerae normal  RESP: Lungs clear to auscultation - no rales, rhonchi or wheezes  CV: Regular rate and rhythm, normal S1 S2, no murmur  MS: Fullness noted right axilla.  NEURO: Normal strength and tone, mentation intact and speech normal  PSYCH: Mentation appears normal, affect normal/bright     Diagnostic Test Results:  Labs reviewed in Epic  none         Assessment & Plan     (D17.0) Lipoma of head  (primary encounter diagnosis)  Comment: My clinical assessment is lipoma, although etiology is unclear.  It is quite large.  Will refer for general surgery consultation.  Discussed they may defer to ENT.  Plan: GENERAL SURG ADULT REFERRAL      (D17.21) Lipoma of right axilla  Comment: Referral placed.  Plan: GENERAL SURG ADULT REFERRAL            (F98.8) Attention deficit disorder of adult  Comment: Patient appears to benefit from stimulant " therapy.  Plan: We will continue.     Tobacco Cessation:   reports that she has been smoking cigarettes.  She has a 15.00 pack-year smoking history. She has never used smokeless tobacco.  Tobacco Cessation Action Plan: Information offered: Patient not interested at this time      There are no Patient Instructions on file for this visit.        No follow-ups on file.    Xuan Batista MD  [facility]

## 2019-10-11 NOTE — PROGRESS NOTES
Subjective     Libertad Russell is a 53 year old female who presents to clinic today for the following health issues:    HPI   ED/UC Followup:    Facility:  [facility]  Date of visit: 9/27/19  Reason for visit: bilateral foot pain, left wrist pain  Current Status: still having pain and swelling in feet and wrists     Patient Active Problem List   Diagnosis     Tobacco use disorder     Seasonal allergic rhinitis     Attention deficit disorder of adult     CARDIOVASCULAR SCREENING; LDL GOAL LESS THAN 160     Rosacea     Raynaud phenomenon     General medical exam     Essential hypertension with goal blood pressure less than 140/90     Past Surgical History:   Procedure Laterality Date     BREAST SURGERY       COSMETIC SURGERY      breast implants     EYE SURGERY  2015    [facility]     LYMPH NODE BIOPSY  2001    told to be benign     SURGICAL HISTORY OF -   2000, 1998    breast biopsies     TUBAL LIGATION  2003       Social History     Tobacco Use     Smoking status: Current Every Day Smoker     Packs/day: 1.00     Years: 15.00     Pack years: 15.00     Types: Cigarettes     Smokeless tobacco: Never Used   Substance Use Topics     Alcohol use: Yes     Comment: occasional     Family History   Problem Relation Age of Onset     Hypertension Mother      Cancer Mother         lung cancer     Other Cancer Mother         Lung     C.A.D. Father         52 at first MI     Hypertension Other      Diabetes No family hx of      Cerebrovascular Disease No family hx of      Breast Cancer No family hx of      Cancer - colorectal No family hx of          Current Outpatient Medications   Medication Sig Dispense Refill     [START ON 10/16/2019] amphetamine-dextroamphetamine (ADDERALL XR) 20 MG 24 hr capsule Take 1 capsule (20 mg) by mouth daily 30 capsule 0     losartan (COZAAR) 100 MG tablet Take 1 tablet (100 mg) by mouth daily Needs appointment for further refills. 30 tablet 0     metroNIDAZOLE (METROGEL) 0.75 % external gel Apply 1  g topically 2 times daily 45 g 0     ALPRAZolam (XANAX) 0.25 MG tablet Take 1 tablet 1 hour before flying and repeat if needed (Patient not taking: Reported on 10/14/2019) 4 tablet 0     [START ON 10/16/2019] amphetamine-dextroamphetamine (ADDERALL) 20 MG tablet Take 1 tablet (20 mg) by mouth daily Take in the early afternoon. (Patient not taking: Reported on 10/14/2019) 30 tablet 0     aspirin 81 MG tablet Take 1 tablet (81 mg) by mouth daily Medication is being filled for 1 time refill only due to:  Patient needs to be seen because it has been more than one year since last visit. (Patient not taking: Reported on 10/14/2019) 100 tablet 0     No Known Allergies    1. Left wrist pain: Started couple weeks ago.  Involving both feet.  It involves the bottom of feet (balls of feet).  Gets better with ibuprofen.  She did go to urgent care, labs for MOY and Lyme was negative.  Sed rate was elevated.  Patient is 400 mg every 6 hours for the pain.  Mother with a history of possible RA.  No trauma.  Since 2 weeks ago, symptoms improved/bearable after taking ibuprofen.  Decrease strength involving left wrist.  States of decreased strength in bilateral feet.  States of swelling involving bilateral wrists and foot.  History of raynauds syndrome.  Patient is right hand dominant.  Patient works on the computer all day.     2. Hypertension: Currently on losartan.  Patient sometimes skips her medication.  Patient continues to smoke.  Per patient, she has not been compliant with her medications.     3. Tobacco abuse abuse:  She smokes approximately 1 pack per day.  She is interested in the patches.     Review of Systems   Constitutional: Negative for chills and fever.   HENT: Negative for congestion, ear pain, hearing loss and sore throat.    Eyes: Negative for pain and visual disturbance.   Respiratory: Negative for cough and shortness of breath.    Cardiovascular: Negative for chest pain, palpitations and peripheral edema.  "  Gastrointestinal: Negative for abdominal pain, constipation, diarrhea, heartburn, hematochezia and nausea.   Breasts:  Negative for tenderness, breast mass and discharge.   Genitourinary: Negative for dysuria, frequency, genital sores, hematuria, pelvic pain, urgency, vaginal bleeding and vaginal discharge.   Musculoskeletal: Positive for arthralgias (bilateral wrist and feet) and joint swelling (L>R wrist). Negative for myalgias.   Skin: Negative for rash.   Neurological: Negative for dizziness, weakness, headaches and paresthesias.   Psychiatric/Behavioral: Negative for mood changes. The patient is not nervous/anxious.             Objective    BP (!) 168/80 (BP Location: Left arm, Cuff Size: Adult Regular)   Pulse 88   Temp 97.6  F (36.4  C) (Tympanic)   Ht 1.638 m (5' 4.5\")   Wt 59.4 kg (131 lb)   SpO2 99%   BMI 22.14 kg/m    Body mass index is 22.14 kg/m .  Physical Exam  Constitutional:       General: She is not in acute distress.     Appearance: She is well-developed.   HENT:      Head: Normocephalic and atraumatic.      Nose: Nose normal.   Eyes:      Conjunctiva/sclera: Conjunctivae normal.   Neck:      Musculoskeletal: Normal range of motion.      Trachea: No tracheal deviation.   Cardiovascular:      Rate and Rhythm: Normal rate and regular rhythm.      Heart sounds: Normal heart sounds.   Pulmonary:      Effort: Pulmonary effort is normal. No respiratory distress.      Breath sounds: Normal breath sounds.   Musculoskeletal: Normal range of motion.      Comments: Left wrist swelling, pain with wrist flexion and extension against resistance, otherwise, good active ROM in regards to wrist flexion, extension, pronation and supination   Skin:     General: Skin is warm.   Neurological:      Mental Status: She is alert and oriented to person, place, and time.   Psychiatric:         Behavior: Behavior normal.          Assessment & Plan     1. Multiple joint pain  Would like to evaluate for RA versus other " inflammatory disease.  May continue with ibuprofen as needed for pain/swelling.   - Cyclic Citrullinated Peptide Antibody IgG  - CRP inflammation  - Rheumatoid factor  - RHEUMATOLOGY REFERRAL  - TSH with free T4 reflex    2. Essential hypertension  Poorly controlled.  Most likely due to medication compliance, tobacco abuse, and ibuprofen usage.  Stressed the importance of medication compliance.  Close f/u in 6 weeks.     3. Screen for colon cancer  - Fecal colorectal cancer screen (FIT); Future    4. Tobacco abuse  - nicotine (NICODERM CQ) 21 MG/24HR 24 hr patch; Place 1 patch onto the skin every 24 hours  Dispense: 30 patch; Refill: 1    5. Encounter for screening mammogram for breast cancer  - MA SCREENING DIGITAL BILAT - Future  (s+30); Future     Tobacco Cessation:   reports that she has been smoking cigarettes. She has a 15.00 pack-year smoking history. She has never used smokeless tobacco.  Tobacco Cessation Action Plan: Pharmacotherapies : Nicotine patch      See Patient Instructions    No follow-ups on file.    Frederick Sood DO  [facility]

## 2019-10-14 ENCOUNTER — OFFICE VISIT (OUTPATIENT)
Dept: FAMILY MEDICINE | Facility: CLINIC | Age: 54
End: 2019-10-14
Payer: COMMERCIAL

## 2019-10-14 VITALS
BODY MASS INDEX: 21.83 KG/M2 | OXYGEN SATURATION: 99 % | TEMPERATURE: 97.6 F | HEIGHT: 65 IN | DIASTOLIC BLOOD PRESSURE: 80 MMHG | HEART RATE: 88 BPM | SYSTOLIC BLOOD PRESSURE: 160 MMHG | WEIGHT: 131 LBS

## 2019-10-14 DIAGNOSIS — I10 ESSENTIAL HYPERTENSION: ICD-10-CM

## 2019-10-14 DIAGNOSIS — Z12.11 SCREEN FOR COLON CANCER: ICD-10-CM

## 2019-10-14 DIAGNOSIS — Z72.0 TOBACCO ABUSE: ICD-10-CM

## 2019-10-14 DIAGNOSIS — M25.50 MULTIPLE JOINT PAIN: Primary | ICD-10-CM

## 2019-10-14 DIAGNOSIS — Z12.31 ENCOUNTER FOR SCREENING MAMMOGRAM FOR BREAST CANCER: ICD-10-CM

## 2019-10-14 LAB
CRP SERPL-MCNC: 7.3 MG/L (ref 0–8)
TSH SERPL DL<=0.005 MIU/L-ACNC: 1.8 MU/L (ref 0.4–4)

## 2019-10-14 PROCEDURE — 86140 C-REACTIVE PROTEIN: CPT | Performed by: FAMILY MEDICINE

## 2019-10-14 PROCEDURE — 84443 ASSAY THYROID STIM HORMONE: CPT | Performed by: FAMILY MEDICINE

## 2019-10-14 PROCEDURE — 99214 OFFICE O/P EST MOD 30 MIN: CPT | Performed by: FAMILY MEDICINE

## 2019-10-14 PROCEDURE — 36415 COLL VENOUS BLD VENIPUNCTURE: CPT | Performed by: FAMILY MEDICINE

## 2019-10-14 PROCEDURE — 86200 CCP ANTIBODY: CPT | Performed by: FAMILY MEDICINE

## 2019-10-14 PROCEDURE — 86431 RHEUMATOID FACTOR QUANT: CPT | Performed by: FAMILY MEDICINE

## 2019-10-14 RX ORDER — NICOTINE 21 MG/24HR
1 PATCH, TRANSDERMAL 24 HOURS TRANSDERMAL EVERY 24 HOURS
Qty: 30 PATCH | Refills: 1 | Status: SHIPPED | OUTPATIENT
Start: 2019-10-14 | End: 2021-03-04

## 2019-10-14 ASSESSMENT — ENCOUNTER SYMPTOMS
PARESTHESIAS: 0
SHORTNESS OF BREATH: 0
NAUSEA: 0
FREQUENCY: 0
MYALGIAS: 0
CHILLS: 0
JOINT SWELLING: 1
HEADACHES: 0
WEAKNESS: 0
SORE THROAT: 0
DYSURIA: 0
ABDOMINAL PAIN: 0
NERVOUS/ANXIOUS: 0
CONSTIPATION: 0
COUGH: 0
EYE PAIN: 0
HEMATOCHEZIA: 0
HEMATURIA: 0
FEVER: 0
PALPITATIONS: 0
HEARTBURN: 0
DIZZINESS: 0
DIARRHEA: 0
BREAST MASS: 0
ARTHRALGIAS: 1

## 2019-10-14 ASSESSMENT — MIFFLIN-ST. JEOR: SCORE: 1202.15

## 2019-10-14 NOTE — NURSING NOTE
"Initial BP (!) 168/80 (BP Location: Left arm, Cuff Size: Adult Regular)   Pulse 88   Temp 97.6  F (36.4  C) (Tympanic)   Ht 1.638 m (5' 4.5\")   Wt 59.4 kg (131 lb)   SpO2 99%   BMI 22.14 kg/m   Estimated body mass index is 22.14 kg/m  as calculated from the following:    Height as of this encounter: 1.638 m (5' 4.5\").    Weight as of this encounter: 59.4 kg (131 lb). .    "

## 2019-10-14 NOTE — PATIENT INSTRUCTIONS
Carisa Russell,    Thank you for allowing [facility] to manage your care.    May continue with ibuprofen as needed for now.     I ordered some blood work and FIT test, please go to the laboratory to get your laboratory studies.    I sent your prescriptions to your pharmacy.    I ordered a mammogram, please call diagnostic imaging [phone] to schedule your test.    I made a rheumatology referral, they will be in 1-2 weeks to set up your appointment.  If you do not hear from them, please call the specialty number on your after visit.     Please allow 1-2 business days for our office to call you in regards to your laboratory/radiological studies.  If not done so, I encourage you to login into [email or web address] [email or web address] to review your results as well.     If you have any questions or concerns, please feel free to call us at [phone].    Sincerely,    Dr. Sood    Did you know?  You can schedule an e-Visit for certain simple non-emergent issue for your convenience.  To learn more about or start an eVisit, simply login to [employer], click  Visits  on top banner, click  Start a Virtual Visit  drop down, and click  Symptom-Specific E-Visit

## 2019-10-15 LAB
CCP AB SER IA-ACNC: >340 U/ML
RHEUMATOID FACT SER NEPH-ACNC: 70 IU/ML (ref 0–20)

## 2019-11-07 ENCOUNTER — MYC REFILL (OUTPATIENT)
Dept: FAMILY MEDICINE | Facility: CLINIC | Age: 54
End: 2019-11-07

## 2019-11-07 ENCOUNTER — HEALTH MAINTENANCE LETTER (OUTPATIENT)
Age: 54
End: 2019-11-07

## 2019-11-07 DIAGNOSIS — I10 ESSENTIAL HYPERTENSION WITH GOAL BLOOD PRESSURE LESS THAN 140/90: ICD-10-CM

## 2019-11-07 DIAGNOSIS — F98.8 ATTENTION DEFICIT DISORDER OF ADULT: ICD-10-CM

## 2019-11-08 NOTE — TELEPHONE ENCOUNTER
Adderall XR  Routing refill request to provider for review/approval because:  Drug not on the [facility] refill protocol   Losartan  Routing refill request to provider for review/approval because:  Labs out of range:    Angiotensin-II Receptors [id number] 3:10 PM   Last blood pressure under 140/90 in past 12 months    Normal serum creatinine on file in past 12 months    Normal serum potassium on file in past 12 months     Fadumo Hudson RN

## 2019-11-10 RX ORDER — DEXTROAMPHETAMINE SACCHARATE, AMPHETAMINE ASPARTATE, DEXTROAMPHETAMINE SULFATE AND AMPHETAMINE SULFATE 5; 5; 5; 5 MG/1; MG/1; MG/1; MG/1
20 TABLET ORAL DAILY
Qty: 30 TABLET | Refills: 0 | Status: SHIPPED | OUTPATIENT
Start: 2019-11-13 | End: 2019-12-19

## 2019-11-10 RX ORDER — DEXTROAMPHETAMINE SACCHARATE, AMPHETAMINE ASPARTATE MONOHYDRATE, DEXTROAMPHETAMINE SULFATE AND AMPHETAMINE SULFATE 5; 5; 5; 5 MG/1; MG/1; MG/1; MG/1
20 CAPSULE, EXTENDED RELEASE ORAL DAILY
Qty: 30 CAPSULE | Refills: 0 | Status: SHIPPED | OUTPATIENT
Start: 2019-11-13 | End: 2019-12-19

## 2019-11-10 RX ORDER — LOSARTAN POTASSIUM 100 MG/1
100 TABLET ORAL DAILY
Qty: 90 TABLET | Refills: 0 | Status: SHIPPED | OUTPATIENT
Start: 2019-11-10 | End: 2019-12-19

## 2019-11-26 ENCOUNTER — TELEPHONE (OUTPATIENT)
Dept: FAMILY MEDICINE | Facility: CLINIC | Age: 54
End: 2019-11-26

## 2019-11-26 NOTE — LETTER
November 26, 2019      Libertad Russell  [address] [address]        Dear Libertad,     As part of [place]'s commitment to health and wellness we have reviewed your chart and it indicates that you are due for one or more of the following:    -annual physical/labs  -mammogram  -colonoscopy      Please try to schedule and/or complete the tests above within the next 2-4 weeks.   The number to call to schedule an appointment at [facility] is [phone].    While we work hard to maintain accurate records, it is always possible that this notice does not accurately reflect tests that you may have had. To ensure that we do not send you unnecessary notices please verify that we have accurate dates of your tests (even if these were done many years ago) or if you are seeking care at another clinic.      Sincerely,     [facility]

## 2019-11-26 NOTE — TELEPHONE ENCOUNTER
Panel Management Review      Patient has the following on her problem list:     Hypertension   Last three blood pressure readings:  BP Readings from Last 3 Encounters:   10/14/19 (!) 160/80   09/18/19 (!) 144/90   11/15/18 (!) 174/94     Blood pressure: FAILED    HTN Guidelines:  Less than 140/90      Composite cancer screening  Chart review shows that this patient is due/due soon for the following Pap Smear, Mammogram and Colonoscopy  Summary:    Patient is due/failing the following:   LABS, COLONOSCOPY, MAMMOGRAM, PAP and PHYSICAL    Action needed:   Patient needs office visit for physical, bp check, labs.    Type of outreach:    Sent letter.    Questions for provider review:    None                                                                                                                                    Oriana Samuel CMA on 11/26/2019 at 11:16 AM       Chart routed to none .

## 2019-12-16 ENCOUNTER — MYC REFILL (OUTPATIENT)
Dept: FAMILY MEDICINE | Facility: CLINIC | Age: 54
End: 2019-12-16

## 2019-12-16 DIAGNOSIS — F40.243 FEAR OF FLYING: ICD-10-CM

## 2019-12-16 DIAGNOSIS — I10 ESSENTIAL HYPERTENSION WITH GOAL BLOOD PRESSURE LESS THAN 140/90: ICD-10-CM

## 2019-12-16 DIAGNOSIS — F98.8 ATTENTION DEFICIT DISORDER OF ADULT: ICD-10-CM

## 2019-12-16 RX ORDER — LOSARTAN POTASSIUM 100 MG/1
100 TABLET ORAL DAILY
Qty: 90 TABLET | Refills: 0 | Status: CANCELLED | OUTPATIENT
Start: 2019-12-16

## 2019-12-17 DIAGNOSIS — F98.8 ATTENTION DEFICIT DISORDER OF ADULT: ICD-10-CM

## 2019-12-18 RX ORDER — DEXTROAMPHETAMINE SACCHARATE, AMPHETAMINE ASPARTATE MONOHYDRATE, DEXTROAMPHETAMINE SULFATE AND AMPHETAMINE SULFATE 5; 5; 5; 5 MG/1; MG/1; MG/1; MG/1
CAPSULE, EXTENDED RELEASE ORAL
Qty: 30 CAPSULE | Refills: 0 | OUTPATIENT
Start: 2019-12-18

## 2019-12-18 RX ORDER — DEXTROAMPHETAMINE SACCHARATE, AMPHETAMINE ASPARTATE, DEXTROAMPHETAMINE SULFATE AND AMPHETAMINE SULFATE 5; 5; 5; 5 MG/1; MG/1; MG/1; MG/1
TABLET ORAL
Qty: 30 TABLET | Refills: 0 | OUTPATIENT
Start: 2019-12-18

## 2019-12-18 NOTE — TELEPHONE ENCOUNTER
Routing refill request to provider for review/approval because:  Drug not on the [facility] refill protocol or controlled substance  PZoie Chavez  Clinic  RN/[facility]

## 2019-12-19 ENCOUNTER — MYC REFILL (OUTPATIENT)
Dept: FAMILY MEDICINE | Facility: CLINIC | Age: 54
End: 2019-12-19

## 2019-12-19 DIAGNOSIS — F98.8 ATTENTION DEFICIT DISORDER OF ADULT: ICD-10-CM

## 2019-12-19 DIAGNOSIS — I10 ESSENTIAL HYPERTENSION WITH GOAL BLOOD PRESSURE LESS THAN 140/90: ICD-10-CM

## 2019-12-19 RX ORDER — DEXTROAMPHETAMINE SACCHARATE, AMPHETAMINE ASPARTATE MONOHYDRATE, DEXTROAMPHETAMINE SULFATE AND AMPHETAMINE SULFATE 5; 5; 5; 5 MG/1; MG/1; MG/1; MG/1
20 CAPSULE, EXTENDED RELEASE ORAL DAILY
Qty: 30 CAPSULE | Refills: 0 | Status: SHIPPED | OUTPATIENT
Start: 2019-12-19 | End: 2020-01-15

## 2019-12-19 RX ORDER — DEXTROAMPHETAMINE SACCHARATE, AMPHETAMINE ASPARTATE, DEXTROAMPHETAMINE SULFATE AND AMPHETAMINE SULFATE 5; 5; 5; 5 MG/1; MG/1; MG/1; MG/1
20 TABLET ORAL DAILY
Qty: 30 TABLET | Refills: 0 | Status: CANCELLED | OUTPATIENT
Start: 2019-12-19

## 2019-12-19 RX ORDER — LOSARTAN POTASSIUM 100 MG/1
100 TABLET ORAL DAILY
Qty: 90 TABLET | Refills: 0 | Status: SHIPPED | OUTPATIENT
Start: 2019-12-19 | End: 2020-06-09

## 2019-12-19 RX ORDER — DEXTROAMPHETAMINE SACCHARATE, AMPHETAMINE ASPARTATE MONOHYDRATE, DEXTROAMPHETAMINE SULFATE AND AMPHETAMINE SULFATE 5; 5; 5; 5 MG/1; MG/1; MG/1; MG/1
20 CAPSULE, EXTENDED RELEASE ORAL DAILY
Qty: 30 CAPSULE | Refills: 0 | Status: CANCELLED | OUTPATIENT
Start: 2019-12-19

## 2019-12-19 RX ORDER — DEXTROAMPHETAMINE SACCHARATE, AMPHETAMINE ASPARTATE, DEXTROAMPHETAMINE SULFATE AND AMPHETAMINE SULFATE 5; 5; 5; 5 MG/1; MG/1; MG/1; MG/1
20 TABLET ORAL DAILY
Qty: 30 TABLET | Refills: 0 | Status: SHIPPED | OUTPATIENT
Start: 2019-12-04 | End: 2019-12-24

## 2019-12-20 RX ORDER — ALPRAZOLAM 0.25 MG
TABLET ORAL
Qty: 4 TABLET | Refills: 0 | Status: SHIPPED | OUTPATIENT
Start: 2019-12-20 | End: 2021-06-23

## 2019-12-20 RX ORDER — DEXTROAMPHETAMINE SACCHARATE, AMPHETAMINE ASPARTATE, DEXTROAMPHETAMINE SULFATE AND AMPHETAMINE SULFATE 5; 5; 5; 5 MG/1; MG/1; MG/1; MG/1
20 TABLET ORAL DAILY
Qty: 30 TABLET | Refills: 0 | Status: SHIPPED | OUTPATIENT
Start: 2020-01-01 | End: 2020-01-15

## 2019-12-20 RX ORDER — DEXTROAMPHETAMINE SACCHARATE, AMPHETAMINE ASPARTATE MONOHYDRATE, DEXTROAMPHETAMINE SULFATE AND AMPHETAMINE SULFATE 5; 5; 5; 5 MG/1; MG/1; MG/1; MG/1
20 CAPSULE, EXTENDED RELEASE ORAL DAILY
Qty: 30 CAPSULE | Refills: 0 | Status: SHIPPED | OUTPATIENT
Start: 2020-01-16 | End: 2021-05-26

## 2019-12-23 ENCOUNTER — MYC MEDICAL ADVICE (OUTPATIENT)
Dept: FAMILY MEDICINE | Facility: CLINIC | Age: 54
End: 2019-12-23

## 2019-12-23 DIAGNOSIS — F98.8 ATTENTION DEFICIT DISORDER OF ADULT: ICD-10-CM

## 2019-12-24 RX ORDER — DEXTROAMPHETAMINE SACCHARATE, AMPHETAMINE ASPARTATE, DEXTROAMPHETAMINE SULFATE AND AMPHETAMINE SULFATE 5; 5; 5; 5 MG/1; MG/1; MG/1; MG/1
20 TABLET ORAL DAILY
Qty: 30 TABLET | Refills: 0 | Status: SHIPPED | OUTPATIENT
Start: 2019-12-24 | End: 2020-03-10

## 2019-12-24 NOTE — TELEPHONE ENCOUNTER
Spoke with Jana at the pharmacy, Jana confirmed that they did not receive patients Rx dated 12/4/19.  RX says it was local print, not e-scribe.     Please e-scribe Rx to [facility] pharmacy.    Fadumo Hudson, RN

## 2020-01-15 ENCOUNTER — MYC REFILL (OUTPATIENT)
Dept: FAMILY MEDICINE | Facility: CLINIC | Age: 55
End: 2020-01-15

## 2020-01-15 DIAGNOSIS — F98.8 ATTENTION DEFICIT DISORDER OF ADULT: ICD-10-CM

## 2020-01-16 RX ORDER — DEXTROAMPHETAMINE SACCHARATE, AMPHETAMINE ASPARTATE, DEXTROAMPHETAMINE SULFATE AND AMPHETAMINE SULFATE 5; 5; 5; 5 MG/1; MG/1; MG/1; MG/1
20 TABLET ORAL DAILY
Qty: 30 TABLET | Refills: 0 | Status: SHIPPED | OUTPATIENT
Start: 2020-01-16 | End: 2020-04-10

## 2020-01-16 RX ORDER — DEXTROAMPHETAMINE SACCHARATE, AMPHETAMINE ASPARTATE MONOHYDRATE, DEXTROAMPHETAMINE SULFATE AND AMPHETAMINE SULFATE 5; 5; 5; 5 MG/1; MG/1; MG/1; MG/1
20 CAPSULE, EXTENDED RELEASE ORAL DAILY
Qty: 30 CAPSULE | Refills: 0 | Status: SHIPPED | OUTPATIENT
Start: 2020-02-13 | End: 2020-03-13

## 2020-01-16 RX ORDER — DEXTROAMPHETAMINE SACCHARATE, AMPHETAMINE ASPARTATE, DEXTROAMPHETAMINE SULFATE AND AMPHETAMINE SULFATE 5; 5; 5; 5 MG/1; MG/1; MG/1; MG/1
20 TABLET ORAL DAILY
Qty: 30 TABLET | Refills: 0 | OUTPATIENT
Start: 2020-01-16

## 2020-01-16 RX ORDER — DEXTROAMPHETAMINE SACCHARATE, AMPHETAMINE ASPARTATE MONOHYDRATE, DEXTROAMPHETAMINE SULFATE AND AMPHETAMINE SULFATE 5; 5; 5; 5 MG/1; MG/1; MG/1; MG/1
20 CAPSULE, EXTENDED RELEASE ORAL DAILY
Qty: 30 CAPSULE | Refills: 0 | OUTPATIENT
Start: 2020-01-16

## 2020-01-16 NOTE — TELEPHONE ENCOUNTER
Routing refill request to provider for review/approval because:  Drug not on the [facility] refill protocol       Patients Adderall XR 20mg refilled today  Please advise on Adderall 20mg    Fadumo Hudson RN

## 2020-02-26 ENCOUNTER — MYC MEDICAL ADVICE (OUTPATIENT)
Dept: FAMILY MEDICINE | Facility: CLINIC | Age: 55
End: 2020-02-26

## 2020-02-26 ENCOUNTER — TELEPHONE (OUTPATIENT)
Dept: FAMILY MEDICINE | Facility: CLINIC | Age: 55
End: 2020-02-26

## 2020-02-26 NOTE — TELEPHONE ENCOUNTER
Panel Management Review      Patient has the following on her problem list:     Hypertension   Last three blood pressure readings:  BP Readings from Last 3 Encounters:   10/14/19 (!) 160/80   09/18/19 (!) 144/90   11/15/18 (!) 174/94     Blood pressure: FAILED    HTN Guidelines:  Less than 140/90      Composite cancer screening  Chart review shows that this patient is due/due soon for the following Pap Smear, Mammogram and Colonoscopy  Summary:    Patient is due/failing the following:   LIPIDS, BP CHECK, COLONOSCOPY, MAMMOGRAM, PAP and PHYSICAL    Action needed:   Patient needs office visit for physical, blood pressure check.    Type of outreach:    Sent [employer] message.    Questions for provider review:    None                                                                                                                                    Oriana Samuel CMA on 2/26/2020 at 4:38 PM       Chart routed to none .

## 2020-03-10 ENCOUNTER — MYC REFILL (OUTPATIENT)
Dept: FAMILY MEDICINE | Facility: CLINIC | Age: 55
End: 2020-03-10

## 2020-03-10 DIAGNOSIS — F98.8 ATTENTION DEFICIT DISORDER OF ADULT: ICD-10-CM

## 2020-03-10 RX ORDER — DEXTROAMPHETAMINE SACCHARATE, AMPHETAMINE ASPARTATE MONOHYDRATE, DEXTROAMPHETAMINE SULFATE AND AMPHETAMINE SULFATE 5; 5; 5; 5 MG/1; MG/1; MG/1; MG/1
20 CAPSULE, EXTENDED RELEASE ORAL DAILY
Qty: 30 CAPSULE | Refills: 0 | Status: CANCELLED | OUTPATIENT
Start: 2020-03-10

## 2020-03-10 RX ORDER — DEXTROAMPHETAMINE SACCHARATE, AMPHETAMINE ASPARTATE, DEXTROAMPHETAMINE SULFATE AND AMPHETAMINE SULFATE 5; 5; 5; 5 MG/1; MG/1; MG/1; MG/1
20 TABLET ORAL DAILY
Qty: 30 TABLET | Refills: 0 | Status: CANCELLED | OUTPATIENT
Start: 2020-03-10

## 2020-03-11 DIAGNOSIS — F98.8 ATTENTION DEFICIT DISORDER OF ADULT: ICD-10-CM

## 2020-03-13 ENCOUNTER — MYC REFILL (OUTPATIENT)
Dept: FAMILY MEDICINE | Facility: CLINIC | Age: 55
End: 2020-03-13

## 2020-03-13 ENCOUNTER — MYC MEDICAL ADVICE (OUTPATIENT)
Dept: FAMILY MEDICINE | Facility: CLINIC | Age: 55
End: 2020-03-13

## 2020-03-13 DIAGNOSIS — F98.8 ATTENTION DEFICIT DISORDER OF ADULT: ICD-10-CM

## 2020-03-13 RX ORDER — DEXTROAMPHETAMINE SACCHARATE, AMPHETAMINE ASPARTATE MONOHYDRATE, DEXTROAMPHETAMINE SULFATE AND AMPHETAMINE SULFATE 5; 5; 5; 5 MG/1; MG/1; MG/1; MG/1
CAPSULE, EXTENDED RELEASE ORAL
Qty: 30 CAPSULE | Refills: 0 | OUTPATIENT
Start: 2020-03-13

## 2020-03-13 RX ORDER — DEXTROAMPHETAMINE SACCHARATE, AMPHETAMINE ASPARTATE MONOHYDRATE, DEXTROAMPHETAMINE SULFATE AND AMPHETAMINE SULFATE 5; 5; 5; 5 MG/1; MG/1; MG/1; MG/1
20 CAPSULE, EXTENDED RELEASE ORAL DAILY
Qty: 30 CAPSULE | Refills: 0 | Status: SHIPPED | OUTPATIENT
Start: 2020-03-13 | End: 2020-04-08

## 2020-03-13 RX ORDER — DEXTROAMPHETAMINE SACCHARATE, AMPHETAMINE ASPARTATE, DEXTROAMPHETAMINE SULFATE AND AMPHETAMINE SULFATE 5; 5; 5; 5 MG/1; MG/1; MG/1; MG/1
TABLET ORAL
Qty: 30 TABLET | Refills: 0 | OUTPATIENT
Start: 2020-03-13

## 2020-03-13 RX ORDER — DEXTROAMPHETAMINE SACCHARATE, AMPHETAMINE ASPARTATE, DEXTROAMPHETAMINE SULFATE AND AMPHETAMINE SULFATE 5; 5; 5; 5 MG/1; MG/1; MG/1; MG/1
20 TABLET ORAL DAILY
Qty: 30 TABLET | Refills: 0 | Status: SHIPPED | OUTPATIENT
Start: 2020-03-13 | End: 2020-04-08

## 2020-03-13 NOTE — TELEPHONE ENCOUNTER
Good Afternoon,    Patient is looking to get this today, we did receive patients IR but she is not due for that yet, she is due for her XR today.    Thank You,  Nataliia Tillman PhT  [facility]

## 2020-03-13 NOTE — TELEPHONE ENCOUNTER
Adderall XR 20mg      Last Written Prescription Date:  02/13/2020 #30 x 0  Last filled 12/20/2020 #30 x 0  Last Office Visit: 10/14/2019 SHANDA Sood  Future Office visit:   None    Routing refill request to provider for review/approval because:  Drug not on the [facility], [facility] or [facility] refill protocol or controlled substance

## 2020-04-10 ENCOUNTER — MYC MEDICAL ADVICE (OUTPATIENT)
Dept: FAMILY MEDICINE | Facility: CLINIC | Age: 55
End: 2020-04-10

## 2020-05-12 ENCOUNTER — MYC REFILL (OUTPATIENT)
Dept: FAMILY MEDICINE | Facility: CLINIC | Age: 55
End: 2020-05-12

## 2020-05-12 DIAGNOSIS — F98.8 ATTENTION DEFICIT DISORDER OF ADULT: ICD-10-CM

## 2020-05-12 RX ORDER — DEXTROAMPHETAMINE SACCHARATE, AMPHETAMINE ASPARTATE, DEXTROAMPHETAMINE SULFATE AND AMPHETAMINE SULFATE 5; 5; 5; 5 MG/1; MG/1; MG/1; MG/1
20 TABLET ORAL DAILY
Qty: 30 TABLET | Refills: 0 | Status: CANCELLED | OUTPATIENT
Start: 2020-05-12

## 2020-05-13 RX ORDER — DEXTROAMPHETAMINE SACCHARATE, AMPHETAMINE ASPARTATE MONOHYDRATE, DEXTROAMPHETAMINE SULFATE AND AMPHETAMINE SULFATE 5; 5; 5; 5 MG/1; MG/1; MG/1; MG/1
20 CAPSULE, EXTENDED RELEASE ORAL EVERY MORNING
Qty: 30 CAPSULE | Refills: 0 | Status: SHIPPED | OUTPATIENT
Start: 2020-05-13 | End: 2020-06-09

## 2020-05-13 RX ORDER — DEXTROAMPHETAMINE SACCHARATE, AMPHETAMINE ASPARTATE, DEXTROAMPHETAMINE SULFATE AND AMPHETAMINE SULFATE 5; 5; 5; 5 MG/1; MG/1; MG/1; MG/1
20 TABLET ORAL DAILY
Qty: 30 TABLET | Refills: 0 | Status: SHIPPED | OUTPATIENT
Start: 2020-05-13 | End: 2020-06-09

## 2020-05-13 NOTE — TELEPHONE ENCOUNTER
Last office visit was 9/18/2019 for ADHD.  Please call patient and schedule a video visit with PCP for a recheck.  Vanessa Muller PA-C

## 2020-06-09 ENCOUNTER — E-VISIT (OUTPATIENT)
Dept: FAMILY MEDICINE | Facility: CLINIC | Age: 55
End: 2020-06-09
Payer: COMMERCIAL

## 2020-06-09 ENCOUNTER — MYC REFILL (OUTPATIENT)
Dept: FAMILY MEDICINE | Facility: CLINIC | Age: 55
End: 2020-06-09

## 2020-06-09 DIAGNOSIS — F98.8 ATTENTION DEFICIT DISORDER OF ADULT: ICD-10-CM

## 2020-06-09 DIAGNOSIS — I10 ESSENTIAL HYPERTENSION WITH GOAL BLOOD PRESSURE LESS THAN 140/90: ICD-10-CM

## 2020-06-09 PROCEDURE — 99421 OL DIG E/M SVC 5-10 MIN: CPT | Performed by: NURSE PRACTITIONER

## 2020-06-09 RX ORDER — DEXTROAMPHETAMINE SACCHARATE, AMPHETAMINE ASPARTATE MONOHYDRATE, DEXTROAMPHETAMINE SULFATE AND AMPHETAMINE SULFATE 5; 5; 5; 5 MG/1; MG/1; MG/1; MG/1
20 CAPSULE, EXTENDED RELEASE ORAL EVERY MORNING
Qty: 30 CAPSULE | Refills: 0 | Status: SHIPPED | OUTPATIENT
Start: 2020-06-10 | End: 2020-06-24

## 2020-06-09 RX ORDER — DEXTROAMPHETAMINE SACCHARATE, AMPHETAMINE ASPARTATE, DEXTROAMPHETAMINE SULFATE AND AMPHETAMINE SULFATE 5; 5; 5; 5 MG/1; MG/1; MG/1; MG/1
20 TABLET ORAL DAILY
Qty: 30 TABLET | Refills: 0 | Status: SHIPPED | OUTPATIENT
Start: 2020-06-10 | End: 2021-03-05

## 2020-06-10 RX ORDER — DEXTROAMPHETAMINE SACCHARATE, AMPHETAMINE ASPARTATE, DEXTROAMPHETAMINE SULFATE AND AMPHETAMINE SULFATE 5; 5; 5; 5 MG/1; MG/1; MG/1; MG/1
20 TABLET ORAL DAILY
Qty: 30 TABLET | Refills: 0 | OUTPATIENT
Start: 2020-06-10

## 2020-06-10 RX ORDER — DEXTROAMPHETAMINE SACCHARATE, AMPHETAMINE ASPARTATE MONOHYDRATE, DEXTROAMPHETAMINE SULFATE AND AMPHETAMINE SULFATE 5; 5; 5; 5 MG/1; MG/1; MG/1; MG/1
20 CAPSULE, EXTENDED RELEASE ORAL EVERY MORNING
Qty: 30 CAPSULE | Refills: 0 | OUTPATIENT
Start: 2020-06-10

## 2020-06-10 RX ORDER — LOSARTAN POTASSIUM 100 MG/1
100 TABLET ORAL DAILY
Qty: 90 TABLET | Refills: 0 | Status: SHIPPED | OUTPATIENT
Start: 2020-06-10 | End: 2021-03-03

## 2020-06-10 NOTE — TELEPHONE ENCOUNTER
Prescription approved per G Refill Protocol or patient Primary care provider (PCP) Chart and last OV reviewed   DAVID Osman RN/[facility]

## 2020-06-24 ENCOUNTER — MYC REFILL (OUTPATIENT)
Dept: FAMILY MEDICINE | Facility: CLINIC | Age: 55
End: 2020-06-24

## 2020-06-24 DIAGNOSIS — F98.8 ATTENTION DEFICIT DISORDER OF ADULT: ICD-10-CM

## 2020-06-24 RX ORDER — DEXTROAMPHETAMINE SACCHARATE, AMPHETAMINE ASPARTATE, DEXTROAMPHETAMINE SULFATE AND AMPHETAMINE SULFATE 5; 5; 5; 5 MG/1; MG/1; MG/1; MG/1
20 TABLET ORAL DAILY
Qty: 30 TABLET | Refills: 0 | Status: CANCELLED | OUTPATIENT
Start: 2020-06-24

## 2020-06-25 NOTE — TELEPHONE ENCOUNTER
Routing refill request to provider for review/approval because:ADHD med refills  Drug not on the [facility] refill protocol or controlled substance  DAVID Osman  RN/[facility]

## 2020-06-29 RX ORDER — DEXTROAMPHETAMINE SACCHARATE, AMPHETAMINE ASPARTATE MONOHYDRATE, DEXTROAMPHETAMINE SULFATE AND AMPHETAMINE SULFATE 5; 5; 5; 5 MG/1; MG/1; MG/1; MG/1
20 CAPSULE, EXTENDED RELEASE ORAL EVERY MORNING
Qty: 30 CAPSULE | Refills: 0 | Status: SHIPPED | OUTPATIENT
Start: 2020-07-07 | End: 2021-05-26

## 2020-06-29 RX ORDER — DEXTROAMPHETAMINE SACCHARATE, AMPHETAMINE ASPARTATE, DEXTROAMPHETAMINE SULFATE AND AMPHETAMINE SULFATE 5; 5; 5; 5 MG/1; MG/1; MG/1; MG/1
20 TABLET ORAL DAILY
Qty: 30 TABLET | Refills: 0 | Status: SHIPPED | OUTPATIENT
Start: 2020-07-14 | End: 2020-08-06

## 2020-08-07 ENCOUNTER — MYC MEDICAL ADVICE (OUTPATIENT)
Dept: FAMILY MEDICINE | Facility: CLINIC | Age: 55
End: 2020-08-07

## 2020-08-07 DIAGNOSIS — F98.8 ATTENTION DEFICIT DISORDER OF ADULT: Primary | ICD-10-CM

## 2020-08-07 RX ORDER — DEXTROAMPHETAMINE SACCHARATE, AMPHETAMINE ASPARTATE MONOHYDRATE, DEXTROAMPHETAMINE SULFATE AND AMPHETAMINE SULFATE 5; 5; 5; 5 MG/1; MG/1; MG/1; MG/1
20 CAPSULE, EXTENDED RELEASE ORAL DAILY
Qty: 30 CAPSULE | Refills: 0 | Status: SHIPPED | OUTPATIENT
Start: 2020-08-07 | End: 2020-09-01

## 2020-09-30 ENCOUNTER — MYC REFILL (OUTPATIENT)
Dept: FAMILY MEDICINE | Facility: CLINIC | Age: 55
End: 2020-09-30

## 2020-09-30 DIAGNOSIS — F98.8 ATTENTION DEFICIT DISORDER OF ADULT: ICD-10-CM

## 2020-09-30 RX ORDER — DEXTROAMPHETAMINE SACCHARATE, AMPHETAMINE ASPARTATE, DEXTROAMPHETAMINE SULFATE AND AMPHETAMINE SULFATE 5; 5; 5; 5 MG/1; MG/1; MG/1; MG/1
20 TABLET ORAL DAILY
Qty: 30 TABLET | Refills: 0 | Status: SHIPPED | OUTPATIENT
Start: 2020-09-30 | End: 2020-10-29

## 2020-09-30 RX ORDER — DEXTROAMPHETAMINE SACCHARATE, AMPHETAMINE ASPARTATE MONOHYDRATE, DEXTROAMPHETAMINE SULFATE AND AMPHETAMINE SULFATE 5; 5; 5; 5 MG/1; MG/1; MG/1; MG/1
20 CAPSULE, EXTENDED RELEASE ORAL DAILY
Qty: 30 CAPSULE | Refills: 0 | Status: SHIPPED | OUTPATIENT
Start: 2020-09-30 | End: 2020-10-29

## 2020-11-29 ENCOUNTER — HEALTH MAINTENANCE LETTER (OUTPATIENT)
Age: 55
End: 2020-11-29

## 2020-12-02 ENCOUNTER — MYC REFILL (OUTPATIENT)
Dept: FAMILY MEDICINE | Facility: CLINIC | Age: 55
End: 2020-12-02

## 2020-12-02 DIAGNOSIS — F98.8 ATTENTION DEFICIT DISORDER OF ADULT: ICD-10-CM

## 2020-12-02 RX ORDER — DEXTROAMPHETAMINE SACCHARATE, AMPHETAMINE ASPARTATE MONOHYDRATE, DEXTROAMPHETAMINE SULFATE AND AMPHETAMINE SULFATE 5; 5; 5; 5 MG/1; MG/1; MG/1; MG/1
20 CAPSULE, EXTENDED RELEASE ORAL DAILY
Qty: 30 CAPSULE | Refills: 0 | Status: SHIPPED | OUTPATIENT
Start: 2020-12-02 | End: 2021-01-04

## 2020-12-02 RX ORDER — DEXTROAMPHETAMINE SACCHARATE, AMPHETAMINE ASPARTATE, DEXTROAMPHETAMINE SULFATE AND AMPHETAMINE SULFATE 5; 5; 5; 5 MG/1; MG/1; MG/1; MG/1
20 TABLET ORAL DAILY
Qty: 30 TABLET | Refills: 0 | Status: CANCELLED | OUTPATIENT
Start: 2020-12-02

## 2020-12-02 RX ORDER — DEXTROAMPHETAMINE SACCHARATE, AMPHETAMINE ASPARTATE, DEXTROAMPHETAMINE SULFATE AND AMPHETAMINE SULFATE 5; 5; 5; 5 MG/1; MG/1; MG/1; MG/1
20 TABLET ORAL DAILY
Qty: 30 TABLET | Refills: 0 | Status: SHIPPED | OUTPATIENT
Start: 2020-12-02 | End: 2021-01-04

## 2020-12-02 RX ORDER — DEXTROAMPHETAMINE SACCHARATE, AMPHETAMINE ASPARTATE MONOHYDRATE, DEXTROAMPHETAMINE SULFATE AND AMPHETAMINE SULFATE 5; 5; 5; 5 MG/1; MG/1; MG/1; MG/1
20 CAPSULE, EXTENDED RELEASE ORAL DAILY
Qty: 30 CAPSULE | Refills: 0 | Status: CANCELLED | OUTPATIENT
Start: 2020-12-02

## 2020-12-02 NOTE — TELEPHONE ENCOUNTER
Routing refill request to provider for review/approval because:  Drug not on the [facility] refill protocol     E-visit on 6/9/2020    Fadumo Hudson RN

## 2021-01-04 ENCOUNTER — MYC REFILL (OUTPATIENT)
Dept: FAMILY MEDICINE | Facility: CLINIC | Age: 56
End: 2021-01-04

## 2021-01-04 DIAGNOSIS — F98.8 ATTENTION DEFICIT DISORDER OF ADULT: ICD-10-CM

## 2021-01-04 NOTE — TELEPHONE ENCOUNTER
Will send to Dr Batista per patient request.  Looks like patient was told to be seen by end of year.  Please advise.      Hemalatha Juarez, APRN CNP  to Leydi Campbell          6/9/20 8:41 PM  Hello  Happy to see that things are going well.    I can renew your medication .  We will be opening up for physicals  Please make an appointment before the end of the year   See you then  Hemalatha     Last read by Leydi Campbell at 12:27 PM on 8/7/2020.            Leydi Campbell  to Xuan Batista MD          1/4/21 11:24 AM  I believe Hemalatha has retired?  could you please approve my medication requests ?        Thank you,  LEYDI CAMPBELL

## 2021-01-05 RX ORDER — DEXTROAMPHETAMINE SACCHARATE, AMPHETAMINE ASPARTATE MONOHYDRATE, DEXTROAMPHETAMINE SULFATE AND AMPHETAMINE SULFATE 5; 5; 5; 5 MG/1; MG/1; MG/1; MG/1
20 CAPSULE, EXTENDED RELEASE ORAL DAILY
Qty: 30 CAPSULE | Refills: 0 | Status: SHIPPED | OUTPATIENT
Start: 2021-01-05 | End: 2021-02-02

## 2021-01-05 RX ORDER — DEXTROAMPHETAMINE SACCHARATE, AMPHETAMINE ASPARTATE, DEXTROAMPHETAMINE SULFATE AND AMPHETAMINE SULFATE 5; 5; 5; 5 MG/1; MG/1; MG/1; MG/1
20 TABLET ORAL DAILY
Qty: 30 TABLET | Refills: 0 | Status: SHIPPED | OUTPATIENT
Start: 2021-01-05 | End: 2021-02-02

## 2021-02-02 ENCOUNTER — MYC REFILL (OUTPATIENT)
Dept: FAMILY MEDICINE | Facility: CLINIC | Age: 56
End: 2021-02-02

## 2021-02-02 DIAGNOSIS — F98.8 ATTENTION DEFICIT DISORDER OF ADULT: ICD-10-CM

## 2021-02-04 ENCOUNTER — MYC REFILL (OUTPATIENT)
Dept: FAMILY MEDICINE | Facility: CLINIC | Age: 56
End: 2021-02-04

## 2021-02-04 DIAGNOSIS — F98.8 ATTENTION DEFICIT DISORDER OF ADULT: ICD-10-CM

## 2021-02-04 RX ORDER — DEXTROAMPHETAMINE SACCHARATE, AMPHETAMINE ASPARTATE MONOHYDRATE, DEXTROAMPHETAMINE SULFATE AND AMPHETAMINE SULFATE 5; 5; 5; 5 MG/1; MG/1; MG/1; MG/1
20 CAPSULE, EXTENDED RELEASE ORAL DAILY
Qty: 30 CAPSULE | Refills: 0 | OUTPATIENT
Start: 2021-02-04

## 2021-02-04 RX ORDER — DEXTROAMPHETAMINE SACCHARATE, AMPHETAMINE ASPARTATE, DEXTROAMPHETAMINE SULFATE AND AMPHETAMINE SULFATE 5; 5; 5; 5 MG/1; MG/1; MG/1; MG/1
20 TABLET ORAL DAILY
Qty: 30 TABLET | Refills: 0 | OUTPATIENT
Start: 2021-02-04

## 2021-02-04 RX ORDER — DEXTROAMPHETAMINE SACCHARATE, AMPHETAMINE ASPARTATE, DEXTROAMPHETAMINE SULFATE AND AMPHETAMINE SULFATE 5; 5; 5; 5 MG/1; MG/1; MG/1; MG/1
20 TABLET ORAL DAILY
Qty: 30 TABLET | Refills: 0 | Status: SHIPPED | OUTPATIENT
Start: 2021-02-04 | End: 2021-05-26

## 2021-02-04 RX ORDER — DEXTROAMPHETAMINE SACCHARATE, AMPHETAMINE ASPARTATE MONOHYDRATE, DEXTROAMPHETAMINE SULFATE AND AMPHETAMINE SULFATE 5; 5; 5; 5 MG/1; MG/1; MG/1; MG/1
20 CAPSULE, EXTENDED RELEASE ORAL DAILY
Qty: 30 CAPSULE | Refills: 0 | Status: SHIPPED | OUTPATIENT
Start: 2021-02-04 | End: 2021-05-26

## 2021-02-14 ENCOUNTER — HEALTH MAINTENANCE LETTER (OUTPATIENT)
Age: 56
End: 2021-02-14

## 2021-03-01 ENCOUNTER — TRANSFERRED RECORDS (OUTPATIENT)
Dept: HEALTH INFORMATION MANAGEMENT | Facility: CLINIC | Age: 56
End: 2021-03-01

## 2021-03-03 ENCOUNTER — VIRTUAL VISIT (OUTPATIENT)
Dept: FAMILY MEDICINE | Facility: CLINIC | Age: 56
End: 2021-03-03
Payer: COMMERCIAL

## 2021-03-03 DIAGNOSIS — F98.8 ATTENTION DEFICIT DISORDER OF ADULT: Primary | ICD-10-CM

## 2021-03-03 DIAGNOSIS — I10 ESSENTIAL HYPERTENSION WITH GOAL BLOOD PRESSURE LESS THAN 140/90: ICD-10-CM

## 2021-03-03 PROCEDURE — 99214 OFFICE O/P EST MOD 30 MIN: CPT | Performed by: FAMILY MEDICINE

## 2021-03-03 RX ORDER — LOSARTAN POTASSIUM 100 MG/1
100 TABLET ORAL DAILY
Qty: 90 TABLET | Refills: 0 | Status: SHIPPED | OUTPATIENT
Start: 2021-03-03 | End: 2021-06-01

## 2021-03-03 RX ORDER — DEXTROAMPHETAMINE SACCHARATE, AMPHETAMINE ASPARTATE MONOHYDRATE, DEXTROAMPHETAMINE SULFATE AND AMPHETAMINE SULFATE 5; 5; 5; 5 MG/1; MG/1; MG/1; MG/1
20 CAPSULE, EXTENDED RELEASE ORAL DAILY
Qty: 30 CAPSULE | Refills: 0 | Status: SHIPPED | OUTPATIENT
Start: 2021-05-04 | End: 2021-06-23

## 2021-03-03 RX ORDER — DEXTROAMPHETAMINE SACCHARATE, AMPHETAMINE ASPARTATE MONOHYDRATE, DEXTROAMPHETAMINE SULFATE AND AMPHETAMINE SULFATE 5; 5; 5; 5 MG/1; MG/1; MG/1; MG/1
20 CAPSULE, EXTENDED RELEASE ORAL DAILY
Qty: 30 CAPSULE | Refills: 0 | Status: SHIPPED | OUTPATIENT
Start: 2021-03-03 | End: 2021-05-26

## 2021-03-03 RX ORDER — DEXTROAMPHETAMINE SACCHARATE, AMPHETAMINE ASPARTATE MONOHYDRATE, DEXTROAMPHETAMINE SULFATE AND AMPHETAMINE SULFATE 5; 5; 5; 5 MG/1; MG/1; MG/1; MG/1
20 CAPSULE, EXTENDED RELEASE ORAL DAILY
Qty: 30 CAPSULE | Refills: 0 | Status: SHIPPED | OUTPATIENT
Start: 2021-04-03 | End: 2021-05-26

## 2021-03-03 NOTE — PROGRESS NOTES
Libertad is a 55 year old who is being evaluated via a billable video visit.      How would you like to obtain your AVS? MyChart  If the video visit is dropped, the invitation should be resent by:             Assessment & Plan     (F98.8) Attention deficit disorder of adult  (primary encounter diagnosis)  Comment: appears to be doing well on stimulant therapy without significant side effects.   Plan: amphetamine-dextroamphetamine (ADDERALL XR) 20         MG 24 hr capsule, amphetamine-dextroamphetamine        (ADDERALL XR) 20 MG 24 hr capsule,         amphetamine-dextroamphetamine (ADDERALL XR) 20         MG 24 hr capsule        3 months worth or Rx's sent in. Follow up in 3 months.     (I10) Essential hypertension with goal blood pressure less than 140/90  Comment: advised the patient to check her blood pressure readings at work.   Plan: losartan (COZAAR) 100 MG tablet        Continue. Follow up in 3 months.     Patient Instructions   Refills were sent to your pharmacy for 3 months     Please send me you blood readings from work.     Please set up a face to face clinic visit in 3 months.      Call or My Chart with any questions or concerns.      Tobacco Cessation:   reports that she has been smoking cigarettes. She has a 15.00 pack-year smoking history. She has never used smokeless tobacco.      Return in about 3 months (around 6/3/2021) for BP Recheck, ADD check.    Xuan Batista MD  [facility] KARLIE Maurer is a 55 year old who presents for the following health issues     HPI     ADHD    Description:   Easily distracted: no  Short attention span: no  Trouble following directions: no   Impulsive behavior: no   Trouble completing tasks: no    Accompanying Signs & Symptoms:        Change in sleep pattern: no  Irritability at certain times of the day: no  Socially withdrawn: no  Depression symptoms: no  Anxiety symptoms: no    Therapies tried: Adderall (concerta)                    Hypertension Follow-up      Do you check your blood pressure regularly outside of the clinic? No     Are you following a low salt diet? Yes    Are your blood pressures ever more than 140 on the top number (systolic) OR more   than 90 on the bottom number (diastolic), for example 140/90?      How many servings of fruits and vegetables do you eat daily?  2-3    On average, how many sweetened beverages do you drink each day (Examples: soda, juice, sweet tea, etc.  Do NOT count diet or artificially sweetened beverages)?         Review of Systems   CONSTITUTIONAL: NEGATIVE for fever, chills, change in weight  ENT/MOUTH: NEGATIVE for ear, mouth and throat problems  RESP: NEGATIVE for significant cough or SOB  CV: NEGATIVE for chest pain, palpitations or peripheral edema      Objective           Vitals:  No vitals were obtained today due to virtual visit.    Physical Exam   GENERAL: Healthy, alert and no distress  RESP: No audible wheeze, cough, or visible cyanosis.   PSYCH: Mentation appears normal, affect normal/bright, judgement and insight intact, normal speech            Video-Visit Details    Type of service:  Video Visit, unable to connect with patient. Converted to phone call.     Duration of phone visit: 8 minutes    Xuan Batista MD

## 2021-03-03 NOTE — PATIENT INSTRUCTIONS
Refills were sent to your pharmacy for 3 months     Please send me you blood readings from work.     Please set up a face to face clinic visit in 3 months.      Call or My Chart with any questions or concerns.

## 2021-03-04 ENCOUNTER — MYC REFILL (OUTPATIENT)
Dept: FAMILY MEDICINE | Facility: CLINIC | Age: 56
End: 2021-03-04

## 2021-03-04 DIAGNOSIS — F98.8 ATTENTION DEFICIT DISORDER OF ADULT: ICD-10-CM

## 2021-03-04 RX ORDER — DEXTROAMPHETAMINE SACCHARATE, AMPHETAMINE ASPARTATE, DEXTROAMPHETAMINE SULFATE AND AMPHETAMINE SULFATE 5; 5; 5; 5 MG/1; MG/1; MG/1; MG/1
20 TABLET ORAL DAILY
Qty: 30 TABLET | Refills: 0 | OUTPATIENT
Start: 2021-03-04

## 2021-03-04 NOTE — TELEPHONE ENCOUNTER
Duplicate request. Three month supply sent to pharmacy on 3/3/21.  [employer] message sent to patient.    Mary Maynard RN, BSN  [facility]

## 2021-04-27 ENCOUNTER — MYC REFILL (OUTPATIENT)
Dept: FAMILY MEDICINE | Facility: CLINIC | Age: 56
End: 2021-04-27

## 2021-04-27 DIAGNOSIS — F98.8 ATTENTION DEFICIT DISORDER OF ADULT: ICD-10-CM

## 2021-04-27 RX ORDER — DEXTROAMPHETAMINE SACCHARATE, AMPHETAMINE ASPARTATE MONOHYDRATE, DEXTROAMPHETAMINE SULFATE AND AMPHETAMINE SULFATE 5; 5; 5; 5 MG/1; MG/1; MG/1; MG/1
20 CAPSULE, EXTENDED RELEASE ORAL DAILY
Qty: 30 CAPSULE | Refills: 0 | Status: CANCELLED | OUTPATIENT
Start: 2021-04-27

## 2021-04-27 RX ORDER — DEXTROAMPHETAMINE SACCHARATE, AMPHETAMINE ASPARTATE, DEXTROAMPHETAMINE SULFATE AND AMPHETAMINE SULFATE 5; 5; 5; 5 MG/1; MG/1; MG/1; MG/1
20 TABLET ORAL DAILY
Qty: 30 TABLET | Refills: 0 | Status: CANCELLED | OUTPATIENT
Start: 2021-04-27

## 2021-05-03 ENCOUNTER — OFFICE VISIT (OUTPATIENT)
Dept: SURGERY | Facility: CLINIC | Age: 56
End: 2021-05-03
Payer: COMMERCIAL

## 2021-05-03 ENCOUNTER — TELEPHONE (OUTPATIENT)
Dept: SURGERY | Facility: CLINIC | Age: 56
End: 2021-05-03

## 2021-05-03 VITALS
HEART RATE: 80 BPM | DIASTOLIC BLOOD PRESSURE: 80 MMHG | WEIGHT: 137 LBS | SYSTOLIC BLOOD PRESSURE: 169 MMHG | BODY MASS INDEX: 23.15 KG/M2

## 2021-05-03 DIAGNOSIS — D17.0 LIPOMA OF NECK: ICD-10-CM

## 2021-05-03 DIAGNOSIS — D17.21 LIPOMA OF RIGHT AXILLA: Primary | ICD-10-CM

## 2021-05-03 PROCEDURE — 99203 OFFICE O/P NEW LOW 30 MIN: CPT | Performed by: SURGERY

## 2021-05-03 NOTE — PROGRESS NOTES
Patient seen in consultation for lumps by Xuan Batista    HPI:  Patient is a 55 year old female  with complaints of lump on back of head. Also has one in right armpit  The patient noticed the symptoms about 1-2 years ago.  One on head seems to be getting larger. Armpit has not been there quite as long, thinks could be same thing  Nothing similar removed prior  nothing makes the episode better. No previous infection of drainage  Patient has not family history of similar problems    Review Of Systems    Skin: as above  Ears/Nose/Throat: negative  Respiratory: No shortness of breath, dyspnea on exertion, cough, or hemoptysis  Cardiovascular: negative  Gastrointestinal: negative  Genitourinary: negative  Musculoskeletal: joint pain  Neurologic: negative  Hematologic/Lymphatic/Immunologic: negative  Endocrine: negative      Past Medical History:   Diagnosis Date     Arthritis     mother     Cancer (H) 2012    mother     Depressive disorder      History of blood transfusion     sister no iron     Hypertension 2010    mother     Thyroid disease     mother and 2 sisters   HTN  ADHD  getting checked for possible rheumatoid arthritis    Past Surgical History:   Procedure Laterality Date     BREAST SURGERY       COSMETIC SURGERY      breast implants     EYE SURGERY  2015    [facility]     LYMPH NODE BIOPSY  2001    told to be benign     SURGICAL HISTORY OF -   2000, 1998    breast biopsies     TUBAL LIGATION  2003       Social History     Socioeconomic History     Marital status: Single     Spouse name: Not on file     Number of children: Not on file     Years of education: Not on file     Highest education level: Not on file   Occupational History     Not on file   Social Needs     Financial resource strain: Not on file     Food insecurity     Worry: Not on file     Inability: Not on file     Transportation needs     Medical: Not on file     Non-medical: Not on file   Tobacco Use     Smoking status: Current Every Day Smoker      Packs/day: 1.00     Years: 15.00     Pack years: 15.00     Types: Cigarettes     Smokeless tobacco: Never Used   Substance and Sexual Activity     Alcohol use: Yes     Comment: occasional     Drug use: No     Sexual activity: Yes     Partners: Male     Birth control/protection: Female Surgical     Comment: tubal   Lifestyle     Physical activity     Days per week: Not on file     Minutes per session: Not on file     Stress: Not on file   Relationships     Social connections     Talks on phone: Not on file     Gets together: Not on file     Attends [employer] service: Not on file     Active member of club or organization: Not on file     Attends meetings of clubs or organizations: Not on file     Relationship status: Not on file     Intimate partner violence     Fear of current or ex partner: Not on file     Emotionally abused: Not on file     Physically abused: Not on file     Forced sexual activity: Not on file   Other Topics Concern     Parent/sibling w/ CABG, MI or angioplasty before 65F 55M? No   Social History Narrative     Not on file       Current Outpatient Medications   Medication Sig Dispense Refill     ALPRAZolam (XANAX) 0.25 MG tablet Take 1 tablet 1 hour before flying and repeat if needed 4 tablet 0     amphetamine-dextroamphetamine (ADDERALL XR) 20 MG 24 hr capsule Take 1 capsule (20 mg) by mouth daily 30 capsule 0     [START ON 5/4/2021] amphetamine-dextroamphetamine (ADDERALL XR) 20 MG 24 hr capsule Take 1 capsule (20 mg) by mouth daily 30 capsule 0     amphetamine-dextroamphetamine (ADDERALL XR) 20 MG 24 hr capsule Take 1 capsule (20 mg) by mouth daily Due for 6 month follow up 30 capsule 0     amphetamine-dextroamphetamine (ADDERALL XR) 20 MG 24 hr capsule Take 1 capsule (20 mg) by mouth every morning OVERDUE FOR APPOINTMENT - LAST REFILL UNTIL PHONE OR IN PERSON  VISIT 30 capsule 0     amphetamine-dextroamphetamine (ADDERALL XR) 20 MG 24 hr capsule Take 1 capsule (20 mg) by mouth daily Due for  appointment 30 capsule 0     amphetamine-dextroamphetamine (ADDERALL) 20 MG tablet Take 1 tablet (20 mg) by mouth daily Take in the early afternoon. 30 tablet 0     amphetamine-dextroamphetamine (ADDERALL) 20 MG tablet Take 1 tablet (20 mg) by mouth daily Due for your 6 month follow up 30 tablet 0     losartan (COZAAR) 100 MG tablet Take 1 tablet (100 mg) by mouth daily 90 tablet 0     metroNIDAZOLE (METROGEL) 0.75 % external gel Apply 1 g topically 2 times daily 45 g 0     amphetamine-dextroamphetamine (ADDERALL XR) 20 MG 24 hr capsule Take 1 capsule (20 mg) by mouth daily 30 capsule 0       Medications and history reviewed    Physical exam:  Vitals: BP (!) 169/80   Pulse 80   Wt 62.1 kg (137 lb)   BMI 23.15 kg/m    BMI= Body mass index is 23.15 kg/m .    Constitutional: healthy, alert and no distress  Head: positive findings: Back of head/top of neck just to left of midline with large subcutaneous soft tissue mass, soft, mobile consistent with lipoma approximately  8 x 5 cm  Cardiovascular: negative, PMI normal. No lifts, heaves, or thrills. RRR. No murmurs, clicks gallops or rub  Respiratory: negative, Percussion normal. Good diaphragmatic excursion. Lungs clear  Gastrointestinal: Abdomen soft, non-tender. BS normal. No masses, organomegaly  : Deferred  Musculoskeletal: extremities normal- no gross deformities noted, gait normal and normal muscle tone  Skin: as head above plus right axilla with a 4 to 5 cm similar soft tissue mass posteriorly in axilla/ over the edge of the latissimus dorsi. Left axilla normal  Psychiatric: mentation appears normal and affect normal/bright  Hematologic/Lymphatic/Immunologic: Normal cervical and axillary lymph nodes  Patient able to get up on table without difficulty.      Assessment:     ICD-10-CM    1. Lipoma of right axilla  D17.21 Case Request: EXCISION, soft tissue mass costa of head and right axilla   2. Lipoma of neck  D17.0 Case Request: EXCISION, soft tissue mass costa  of head and right axilla     Plan: 2 good size masses likely lipomas at back of head and right axilla.  They are enlarging with time and I recommend removal which patient also desires.  With the 2 locations and size of the masses I would perform this in the operating room with MAC.  Discussed risks of bleeding, infection, damage to nearby structures.  She is current smoker but is working on quitting currently.  We will work on scheduling.  She did have Covid a month or so ago and so depending on timing of surgery may fall within the 90-day window where repeat testing is not needed.  Otherwise she does get twice weekly testing related to work so should be able to use the that result if we can get at and it is needed.  Libertad to follow up with Primary Care provider regarding elevated blood pressure.      Artis Domínguez MD

## 2021-05-03 NOTE — LETTER
5/3/2021         RE: Libertad Russell  [address] [facility] [address]        Dear Colleague,    Thank you for referring your patient, Libertad Russell, to the [facility]. Please see a copy of my visit note below.    Patient seen in consultation for lumps by Xuan Batista    HPI:  Patient is a 55 year old female  with complaints of lump on back of head. Also has one in right armpit  The patient noticed the symptoms about 1-2 years ago.  One on head seems to be getting larger. Armpit has not been there quite as long, thinks could be same thing  Nothing similar removed prior  nothing makes the episode better. No previous infection of drainage  Patient has not family history of similar problems    Review Of Systems    Skin: as above  Ears/Nose/Throat: negative  Respiratory: No shortness of breath, dyspnea on exertion, cough, or hemoptysis  Cardiovascular: negative  Gastrointestinal: negative  Genitourinary: negative  Musculoskeletal: joint pain  Neurologic: negative  Hematologic/Lymphatic/Immunologic: negative  Endocrine: negative      Past Medical History:   Diagnosis Date     Arthritis     mother     Cancer (H) 2012    mother     Depressive disorder      History of blood transfusion     sister no iron     Hypertension 2010    mother     Thyroid disease     mother and 2 sisters   HTN  ADHD  getting checked for possible rheumatoid arthritis    Past Surgical History:   Procedure Laterality Date     BREAST SURGERY       COSMETIC SURGERY      breast implants     EYE SURGERY  2015    [facility]     LYMPH NODE BIOPSY  2001    told to be benign     SURGICAL HISTORY OF -   2000, 1998    breast biopsies     TUBAL LIGATION  2003       Social History     Socioeconomic History     Marital status: Single     Spouse name: Not on file     Number of children: Not on file     Years of education: Not on file     Highest education level: Not on file   Occupational History     Not on file   Social Needs      Financial resource strain: Not on file     Food insecurity     Worry: Not on file     Inability: Not on file     Transportation needs     Medical: Not on file     Non-medical: Not on file   Tobacco Use     Smoking status: Current Every Day Smoker     Packs/day: 1.00     Years: 15.00     Pack years: 15.00     Types: Cigarettes     Smokeless tobacco: Never Used   Substance and Sexual Activity     Alcohol use: Yes     Comment: occasional     Drug use: No     Sexual activity: Yes     Partners: Male     Birth control/protection: Female Surgical     Comment: tubal   Lifestyle     Physical activity     Days per week: Not on file     Minutes per session: Not on file     Stress: Not on file   Relationships     Social connections     Talks on phone: Not on file     Gets together: Not on file     Attends Yarsanism service: Not on file     Active member of club or organization: Not on file     Attends meetings of clubs or organizations: Not on file     Relationship status: Not on file     Intimate partner violence     Fear of current or ex partner: Not on file     Emotionally abused: Not on file     Physically abused: Not on file     Forced sexual activity: Not on file   Other Topics Concern     Parent/sibling w/ CABG, MI or angioplasty before 65F 55M? No   Social History Narrative     Not on file       Current Outpatient Medications   Medication Sig Dispense Refill     ALPRAZolam (XANAX) 0.25 MG tablet Take 1 tablet 1 hour before flying and repeat if needed 4 tablet 0     amphetamine-dextroamphetamine (ADDERALL XR) 20 MG 24 hr capsule Take 1 capsule (20 mg) by mouth daily 30 capsule 0     [START ON 5/4/2021] amphetamine-dextroamphetamine (ADDERALL XR) 20 MG 24 hr capsule Take 1 capsule (20 mg) by mouth daily 30 capsule 0     amphetamine-dextroamphetamine (ADDERALL XR) 20 MG 24 hr capsule Take 1 capsule (20 mg) by mouth daily Due for 6 month follow up 30 capsule 0     amphetamine-dextroamphetamine (ADDERALL XR) 20 MG 24 hr  capsule Take 1 capsule (20 mg) by mouth every morning OVERDUE FOR APPOINTMENT - LAST REFILL UNTIL PHONE OR IN PERSON  VISIT 30 capsule 0     amphetamine-dextroamphetamine (ADDERALL XR) 20 MG 24 hr capsule Take 1 capsule (20 mg) by mouth daily Due for appointment 30 capsule 0     amphetamine-dextroamphetamine (ADDERALL) 20 MG tablet Take 1 tablet (20 mg) by mouth daily Take in the early afternoon. 30 tablet 0     amphetamine-dextroamphetamine (ADDERALL) 20 MG tablet Take 1 tablet (20 mg) by mouth daily Due for your 6 month follow up 30 tablet 0     losartan (COZAAR) 100 MG tablet Take 1 tablet (100 mg) by mouth daily 90 tablet 0     metroNIDAZOLE (METROGEL) 0.75 % external gel Apply 1 g topically 2 times daily 45 g 0     amphetamine-dextroamphetamine (ADDERALL XR) 20 MG 24 hr capsule Take 1 capsule (20 mg) by mouth daily 30 capsule 0       Medications and history reviewed    Physical exam:  Vitals: BP (!) 169/80   Pulse 80   Wt 62.1 kg (137 lb)   BMI 23.15 kg/m    BMI= Body mass index is 23.15 kg/m .    Constitutional: healthy, alert and no distress  Head: positive findings: Back of head/top of neck just to left of midline with large subcutaneous soft tissue mass, soft, mobile consistent with lipoma approximately  8 x 5 cm  Cardiovascular: negative, PMI normal. No lifts, heaves, or thrills. RRR. No murmurs, clicks gallops or rub  Respiratory: negative, Percussion normal. Good diaphragmatic excursion. Lungs clear  Gastrointestinal: Abdomen soft, non-tender. BS normal. No masses, organomegaly  : Deferred  Musculoskeletal: extremities normal- no gross deformities noted, gait normal and normal muscle tone  Skin: as head above plus right axilla with a 4 to 5 cm similar soft tissue mass posteriorly in axilla/ over the edge of the latissimus dorsi. Left axilla normal  Psychiatric: mentation appears normal and affect normal/bright  Hematologic/Lymphatic/Immunologic: Normal cervical and axillary lymph nodes  Patient able  to get up on table without difficulty.      Assessment:     ICD-10-CM    1. Lipoma of right axilla  D17.21 Case Request: EXCISION, soft tissue mass costa of head and right axilla   2. Lipoma of neck  D17.0 Case Request: EXCISION, soft tissue mass costa of head and right axilla     Plan: 2 good size masses likely lipomas at back of head and right axilla.  They are enlarging with time and I recommend removal which patient also desires.  With the 2 locations and size of the masses I would perform this in the operating room with MAC.  Discussed risks of bleeding, infection, damage to nearby structures.  She is current smoker but is working on quitting currently.  We will work on scheduling.  She did have Covid a month or so ago and so depending on timing of surgery may fall within the 90-day window where repeat testing is not needed.  Otherwise she does get twice weekly testing related to work so should be able to use the that result if we can get at and it is needed.  Libertad to follow up with Primary Care provider regarding elevated blood pressure.      Artis Domínguez MD        Again, thank you for allowing me to participate in the care of your patient.        Sincerely,        Artis Domínguez MD

## 2021-05-06 ENCOUNTER — MYC REFILL (OUTPATIENT)
Dept: FAMILY MEDICINE | Facility: CLINIC | Age: 56
End: 2021-05-06

## 2021-05-06 DIAGNOSIS — F98.8 ATTENTION DEFICIT DISORDER OF ADULT: ICD-10-CM

## 2021-05-06 RX ORDER — DEXTROAMPHETAMINE SACCHARATE, AMPHETAMINE ASPARTATE, DEXTROAMPHETAMINE SULFATE AND AMPHETAMINE SULFATE 5; 5; 5; 5 MG/1; MG/1; MG/1; MG/1
20 TABLET ORAL DAILY
Qty: 30 TABLET | Refills: 0 | Status: CANCELLED | OUTPATIENT
Start: 2021-05-06

## 2021-05-12 PROBLEM — D17.0 LIPOMA OF NECK: Status: ACTIVE | Noted: 2021-05-12

## 2021-05-12 PROBLEM — D17.21 LIPOMA OF RIGHT AXILLA: Status: ACTIVE | Noted: 2021-05-12

## 2021-05-12 NOTE — TELEPHONE ENCOUNTER
Type of surgery: excision, soft tissue  mass back of head and right axilla  CPT 89668, 68729   Lipoma of right axilla D17.21     Lipoma of neck D17.0    Location of surgery: [facility]  Date and time of surgery: 6-8-21  TBD  Surgeon: Dr Domínguez  Pre-Op Appt Date: 5-26-21  Post-Op Appt Date: 6-28-21   Packet sent out: Yes  Pre-cert/Authorization completed: Per [facility] website: Notification or Prior Authorization is not required for the requested services.  Decision ID #:[id number]     Date: 5-12-21    Randa Dumont  Prior Authorization Dept  [phone]

## 2021-05-21 DIAGNOSIS — Z11.59 ENCOUNTER FOR SCREENING FOR OTHER VIRAL DISEASES: ICD-10-CM

## 2021-05-26 ENCOUNTER — OFFICE VISIT (OUTPATIENT)
Dept: FAMILY MEDICINE | Facility: CLINIC | Age: 56
End: 2021-05-26
Payer: COMMERCIAL

## 2021-05-26 ENCOUNTER — MYC REFILL (OUTPATIENT)
Dept: FAMILY MEDICINE | Facility: CLINIC | Age: 56
End: 2021-05-26

## 2021-05-26 VITALS
DIASTOLIC BLOOD PRESSURE: 82 MMHG | RESPIRATION RATE: 16 BRPM | HEIGHT: 64 IN | BODY MASS INDEX: 23.05 KG/M2 | TEMPERATURE: 98.1 F | SYSTOLIC BLOOD PRESSURE: 142 MMHG | WEIGHT: 135 LBS | HEART RATE: 84 BPM

## 2021-05-26 DIAGNOSIS — D17.21 LIPOMA OF RIGHT AXILLA: ICD-10-CM

## 2021-05-26 DIAGNOSIS — Z01.818 PREOP GENERAL PHYSICAL EXAM: Primary | ICD-10-CM

## 2021-05-26 DIAGNOSIS — S99.922A FOOT INJURY, LEFT, INITIAL ENCOUNTER: ICD-10-CM

## 2021-05-26 DIAGNOSIS — I10 ESSENTIAL HYPERTENSION WITH GOAL BLOOD PRESSURE LESS THAN 140/90: ICD-10-CM

## 2021-05-26 DIAGNOSIS — D17.0 LIPOMA OF NECK: ICD-10-CM

## 2021-05-26 DIAGNOSIS — F98.8 ATTENTION DEFICIT DISORDER OF ADULT: ICD-10-CM

## 2021-05-26 LAB
ANION GAP SERPL CALCULATED.3IONS-SCNC: 3 MMOL/L (ref 3–14)
BASOPHILS # BLD AUTO: 0.1 10E9/L (ref 0–0.2)
BASOPHILS NFR BLD AUTO: 1.1 %
BUN SERPL-MCNC: 12 MG/DL (ref 7–30)
CALCIUM SERPL-MCNC: 9 MG/DL (ref 8.5–10.1)
CHLORIDE SERPL-SCNC: 104 MMOL/L (ref 94–109)
CO2 SERPL-SCNC: 30 MMOL/L (ref 20–32)
CREAT SERPL-MCNC: 0.67 MG/DL (ref 0.52–1.04)
DIFFERENTIAL METHOD BLD: NORMAL
EOSINOPHIL # BLD AUTO: 0.3 10E9/L (ref 0–0.7)
EOSINOPHIL NFR BLD AUTO: 5.1 %
ERYTHROCYTE [DISTWIDTH] IN BLOOD BY AUTOMATED COUNT: 14.1 % (ref 10–15)
GFR SERPL CREATININE-BSD FRML MDRD: >90 ML/MIN/{1.73_M2}
GLUCOSE SERPL-MCNC: 102 MG/DL (ref 70–99)
HCT VFR BLD AUTO: 37.1 % (ref 35–47)
HGB BLD-MCNC: 12.2 G/DL (ref 11.7–15.7)
LYMPHOCYTES # BLD AUTO: 1.4 10E9/L (ref 0.8–5.3)
LYMPHOCYTES NFR BLD AUTO: 22.9 %
MCH RBC QN AUTO: 27.9 PG (ref 26.5–33)
MCHC RBC AUTO-ENTMCNC: 32.9 G/DL (ref 31.5–36.5)
MCV RBC AUTO: 85 FL (ref 78–100)
MONOCYTES # BLD AUTO: 0.5 10E9/L (ref 0–1.3)
MONOCYTES NFR BLD AUTO: 8.7 %
NEUTROPHILS # BLD AUTO: 3.9 10E9/L (ref 1.6–8.3)
NEUTROPHILS NFR BLD AUTO: 62.2 %
PLATELET # BLD AUTO: 341 10E9/L (ref 150–450)
POTASSIUM SERPL-SCNC: 4.1 MMOL/L (ref 3.4–5.3)
RBC # BLD AUTO: 4.38 10E12/L (ref 3.8–5.2)
SODIUM SERPL-SCNC: 137 MMOL/L (ref 133–144)
WBC # BLD AUTO: 6.2 10E9/L (ref 4–11)

## 2021-05-26 PROCEDURE — 99214 OFFICE O/P EST MOD 30 MIN: CPT | Performed by: NURSE PRACTITIONER

## 2021-05-26 PROCEDURE — 36415 COLL VENOUS BLD VENIPUNCTURE: CPT | Performed by: NURSE PRACTITIONER

## 2021-05-26 PROCEDURE — 80048 BASIC METABOLIC PNL TOTAL CA: CPT | Performed by: NURSE PRACTITIONER

## 2021-05-26 PROCEDURE — 85025 COMPLETE CBC W/AUTO DIFF WBC: CPT | Performed by: NURSE PRACTITIONER

## 2021-05-26 RX ORDER — DEXTROAMPHETAMINE SACCHARATE, AMPHETAMINE ASPARTATE MONOHYDRATE, DEXTROAMPHETAMINE SULFATE AND AMPHETAMINE SULFATE 5; 5; 5; 5 MG/1; MG/1; MG/1; MG/1
20 CAPSULE, EXTENDED RELEASE ORAL DAILY
Qty: 30 CAPSULE | Refills: 0 | Status: CANCELLED | OUTPATIENT
Start: 2021-05-26

## 2021-05-26 RX ORDER — DEXTROAMPHETAMINE SACCHARATE, AMPHETAMINE ASPARTATE, DEXTROAMPHETAMINE SULFATE AND AMPHETAMINE SULFATE 5; 5; 5; 5 MG/1; MG/1; MG/1; MG/1
20 TABLET ORAL DAILY
Qty: 30 TABLET | Refills: 0 | Status: CANCELLED | OUTPATIENT
Start: 2021-05-26

## 2021-05-26 ASSESSMENT — ENCOUNTER SYMPTOMS
ABDOMINAL PAIN: 0
WHEEZING: 0
DIZZINESS: 0
CHEST TIGHTNESS: 0
DIARRHEA: 0
NUMBNESS: 0
DIFFICULTY URINATING: 0
SORE THROAT: 0
NAUSEA: 0
SLEEP DISTURBANCE: 0
VOMITING: 0
LIGHT-HEADEDNESS: 0
SHORTNESS OF BREATH: 0
DYSPHORIC MOOD: 0
HEADACHES: 0
ARTHRALGIAS: 1
POLYDIPSIA: 0
COUGH: 0
PALPITATIONS: 0
RHINORRHEA: 0
ACTIVITY CHANGE: 0
ABDOMINAL DISTENTION: 0
FREQUENCY: 0
NERVOUS/ANXIOUS: 0
POLYPHAGIA: 0
FATIGUE: 0
CONSTIPATION: 0

## 2021-05-26 ASSESSMENT — PAIN SCALES - GENERAL: PAINLEVEL: NO PAIN (0)

## 2021-05-26 ASSESSMENT — MIFFLIN-ST. JEOR: SCORE: 1206.33

## 2021-05-26 NOTE — H&P (VIEW-ONLY)
[facility]  [address] [address] [facility] [address] [address]  [place] [address]  Phone: [phone]  Primary Provider: Hemalatha Juarez (Inactive)  Pre-op Performing Provider: JULIETA KULKARNI    PREOPERATIVE EVALUATION:  Today's date: 5/26/2021      Libertad Russell is a 55 year old female who presents for a preoperative evaluation.    Surgical Information:  Surgery/Procedure:EXCISION, soft tissue mass beck of head and right axilla   Surgery Location: [facility] OR  Surgeon: Dr. Domínguez  Surgery Date: 6/8/21  Time of Surgery: 1:05 pm  Where patient plans to recover: At home with family  Fax number for surgical facility: Note does not need to be faxed, will be available electronically in Epic.    Type of Anesthesia Anticipated: Local with MAC    Assessment & Plan     The proposed surgical procedure is considered INTERMEDIATE risk.    Preop general physical exam  Okay for surgery pending outcome of labs    Essential hypertension with goal blood pressure less than 140/90  Stable-blood pressure controlled today in clinic    Lipoma of right axilla  We will check basic labs here today  - Basic metabolic panel  - CBC with platelets differential  Previous labs reviewed.    Lipoma of neck  We will check basic labs here today  - Basic metabolic panel  - CBC with platelets differential    Foot injury, left, initial encounter  Continue to monitor.  Tylenol as needed for discomfort.    Risks and Recommendations:  The patient has the following additional risks and recommendations for perioperative complications:  Infection:     Medication Instructions:   - Psychostimulants: Hold the day of surgery    RECOMMENDATION:APPROVAL GIVEN to proceed with proposed procedure, without further diagnostic evaluation.    Review of external notes as documented above   Review of the result(s) of each unique test - TSH, urine microalbumin    40 minutes spent on the date of the encounter doing chart review, history and exam,  documentation and further activities per the note      Subjective     HPI related to upcoming procedure: has lump to back of head and right axilla that is going to be having removed.  They are gradually getting bigger and more painful.    Had COVID March. Returned to work in April 1st. Has not had COVID vaccine. Plans to get it at work.     Dropped a piece of wood on left foot. Was not able to walk on foot yesterday. Took some advil and that did help to decrease the pain.  Today, woke up and foot has been fine.  Did take some advil today.  Able to walk on foot without difficulty.  Continues to be some swollen.      Preop Questions 5/26/2021   1. Have you ever had a heart attack or stroke? No   2. Have you ever had surgery on your heart or blood vessels, such as a stent placement, a coronary artery bypass, or surgery on an artery in your head, neck, heart, or legs? No   3. Do you have chest pain with activity? No   4. Do you have a history of  heart failure? No   5. Do you currently have a cold, bronchitis or symptoms of other infection? No   6. Do you have a cough, shortness of breath, or wheezing? No   7. Do you or anyone in your family have previous history of blood clots? No   8. Do you or does anyone in your family have a serious bleeding problem such as prolonged bleeding following surgeries or cuts? No   9. Have you ever had problems with anemia or been told to take iron pills? No   10. Have you had any abnormal blood loss such as black, tarry or bloody stools, or abnormal vaginal bleeding? No   11. Have you ever had a blood transfusion? No   12. Are you willing to have a blood transfusion if it is medically needed before, during, or after your surgery? Yes   13. Have you or any of your relatives ever had problems with anesthesia? No   14. Do you have sleep apnea, excessive snoring or daytime drowsiness? No   15. Do you have any artifical heart valves or other implanted medical devices like a pacemaker,  defibrillator, or continuous glucose monitor? No   16. Do you have artificial joints? No   17. Are you allergic to latex? No   18. Is there any chance that you may be pregnant? No     Health Care Directive:  Patient does not have a Health Care Directive or Living Will: Discussed advance care planning with patient; information given to patient to review.    Preoperative Review of :   reviewed - controlled substances reflected in medication list.    Status of Chronic Conditions:  HYPERTENSION - Patient has longstanding history of HTN , currently denies any symptoms referable to elevated blood pressure. Specifically denies chest pain, palpitations, dyspnea, orthopnea, PND or peripheral edema. Blood pressure readings have not been in normal range. Current medication regimen is as listed below. Patient denies any side effects of medication.       Review of Systems   Constitutional: Negative for activity change and fatigue.   HENT: Negative for ear pain, rhinorrhea and sore throat.    Respiratory: Negative for cough, chest tightness, shortness of breath and wheezing.    Cardiovascular: Negative for chest pain, palpitations and leg swelling.   Gastrointestinal: Negative for abdominal distention, abdominal pain, constipation, diarrhea, nausea and vomiting.   Endocrine: Negative for cold intolerance, heat intolerance, polydipsia, polyphagia and polyuria.   Genitourinary: Negative for difficulty urinating, enuresis, frequency and urgency.   Musculoskeletal: Positive for arthralgias (left foot. ).   Skin: Negative for rash.        Lump back of head and right armpit   Neurological: Negative for dizziness, light-headedness, numbness and headaches.   Psychiatric/Behavioral: Negative for dysphoric mood and sleep disturbance. The patient is not nervous/anxious.        Patient Active Problem List    Diagnosis Date Noted     Lipoma of right axilla 05/12/2021     Priority: Medium     Added automatically from request for surgery  [id number]       Lipoma of neck 05/12/2021     Priority: Medium     Added automatically from request for surgery [id number]       Essential hypertension with goal blood pressure less than 140/90 07/26/2016     Priority: Medium     General medical exam 10/01/2012     Priority: Medium     EMERGENCY CARE PLAN  Presenting Problem Signs and Symptoms Treatment Plan    Questions or conerns during clinic hours    I will call the clinic directly     Questions or conerns outside clinic hours    I will call the 24 hour nurse line at [phone]    Patient needs to schedule an appointment    I will call the 24 hour scheduling team at [phone] or clinic directly    Same day treatment     I will call the clinic first, nurse line if after hours, urgent care and express care if needed                            Problem list name updated by automated process. Provider to review       Raynaud phenomenon 09/24/2012     Priority: Medium     Rosacea 10/18/2011     Priority: Medium     June 22, 2013 improves with metrogel. One year refill.        CARDIOVASCULAR SCREENING; LDL GOAL LESS THAN 160 10/31/2010     Priority: Medium     Attention deficit disorder of adult 09/09/2010     Priority: Medium     June 22, 2013 appears be doing well, benefits from the medication with minimal side effects.  July 2, 2013 patient requesting increased dosage. At her next prescription will try Adderall XR 20 mg am, Adderall, short acting 10 mg, in the afternoon. Available July 10, 2013. Update in one month.   January 16, 2014 Needs appointment for further refills.    September 22, 2019 patient was seen September 18, 2019.  ADD addressed, okay with refills for 6 months.       Seasonal allergic rhinitis 07/11/2008     Priority: Medium     Tobacco use disorder 04/11/2007     Priority: Medium     June 22, 2013 will try Chantix. Reviewed side effects including increased depressive symptoms or anxiety, nausea. Reviewed smoking cessation.        Past Medical  "History:   Diagnosis Date     Arthritis     mother     Cancer (H) 2012    mother     Depressive disorder      History of blood transfusion     sister no iron     Hypertension 2010    mother     Thyroid disease     mother and 2 sisters     Past Surgical History:   Procedure Laterality Date     BREAST SURGERY       COSMETIC SURGERY      breast implants     EYE SURGERY  2015    [facility]     LYMPH NODE BIOPSY  2001    told to be benign     SURGICAL HISTORY OF -   2000, 1998    breast biopsies     TUBAL LIGATION  2003     Current Outpatient Medications   Medication Sig Dispense Refill     ALPRAZolam (XANAX) 0.25 MG tablet Take 1 tablet 1 hour before flying and repeat if needed 4 tablet 0     amphetamine-dextroamphetamine (ADDERALL XR) 20 MG 24 hr capsule Take 1 capsule (20 mg) by mouth daily 30 capsule 0     amphetamine-dextroamphetamine (ADDERALL) 20 MG tablet Take 1 tablet (20 mg) by mouth daily Take in the early afternoon. 30 tablet 0     losartan (COZAAR) 100 MG tablet Take 1 tablet (100 mg) by mouth daily 90 tablet 0     metroNIDAZOLE (METROGEL) 0.75 % external gel Apply 1 g topically 2 times daily 45 g 0       No Known Allergies     Social History     Tobacco Use     Smoking status: Current Every Day Smoker     Packs/day: 1.00     Years: 15.00     Pack years: 15.00     Types: Cigarettes     Smokeless tobacco: Never Used   Substance Use Topics     Alcohol use: Yes     Comment: occasional     History   Drug Use No         Objective     BP (!) 142/82   Pulse 84   Temp 98.1  F (36.7  C) (Tympanic)   Resp 16   Ht 1.632 m (5' 4.25\")   Wt 61.2 kg (135 lb)   BMI 22.99 kg/m      Physical Exam  Constitutional:       Appearance: Normal appearance. She is well-developed.   HENT:      Head: Normocephalic and atraumatic.      Right Ear: Tympanic membrane and external ear normal. No middle ear effusion.      Left Ear: Tympanic membrane and external ear normal.  No middle ear effusion.      Nose: No mucosal edema.      " Mouth/Throat:      Pharynx: Uvula midline.   Eyes:      Pupils: Pupils are equal, round, and reactive to light.   Neck:      Musculoskeletal: Normal range of motion.      Thyroid: No thyromegaly.      Vascular: No carotid bruit.   Cardiovascular:      Rate and Rhythm: Normal rate and regular rhythm.      Pulses:           Femoral pulses are 2+ on the right side and 2+ on the left side.     Heart sounds: Normal heart sounds.   Pulmonary:      Effort: Pulmonary effort is normal.      Breath sounds: Normal breath sounds.   Abdominal:      General: Bowel sounds are normal.      Palpations: Abdomen is soft.      Tenderness: There is no abdominal tenderness.   Musculoskeletal: Normal range of motion.      Left foot: Normal range of motion. Swelling (slight) present. No tenderness, bony tenderness, deformity or laceration.   Skin:     General: Skin is warm and dry.      Findings: No rash.   Neurological:      Mental Status: She is alert.   Psychiatric:         Behavior: Behavior normal.       Lab Results   Component Value Date    WBC 6.2 05/26/2021     Lab Results   Component Value Date    RBC 4.38 05/26/2021     Lab Results   Component Value Date    HGB 12.2 05/26/2021     Lab Results   Component Value Date    HCT 37.1 05/26/2021     No components found for: MCT  Lab Results   Component Value Date    MCV 85 05/26/2021     Lab Results   Component Value Date    MCH 27.9 05/26/2021     Lab Results   Component Value Date    MCHC 32.9 05/26/2021     Lab Results   Component Value Date    RDW 14.1 05/26/2021     Lab Results   Component Value Date     05/26/2021     Recent Labs   Lab Test 05/26/21  1640 02/16/17  1427    136   POTASSIUM 4.1 3.8   CHLORIDE 104 101   CO2 30 29   ANIONGAP 3 6   * 98   BUN 12 11   CR 0.67 0.63   LAKESHIA 9.0 8.8         Diagnostics:  Labs pending at this time.  Results will be reviewed when available.   No EKG required, no history of coronary heart disease, significant arrhythmia,  peripheral arterial disease or other structural heart disease.    Revised Cardiac Risk Index (RCRI):  The patient has the following serious cardiovascular risks for perioperative complications:   - No serious cardiac risks = 0 points     RCRI Interpretation: 0 points: Class I (very low risk - 0.4% complication rate)           Signed Electronically by: SULY Melvin CNP  Copy of this evaluation report is provided to requesting physician.

## 2021-05-26 NOTE — PATIENT INSTRUCTIONS
Hold your Adderall the day of surgery.  You may take all of your other scheduled medications that morning with a small sip of water.      [facility] [place] [facility] - [phone]       Preparing for Your Surgery  Getting started  A nurse will call you to review your health history and instructions. They will give you an arrival time based on your scheduled surgery time.  Please be ready to share the following:    Your doctor's clinic name and phone number    Your medical, surgical and anesthesia history    A list of allergies and sensitivities    A list of medicines, including herbal treatments and over-the-counter drugs    Whether the patient has a legal guardian (ask how to send us the papers in advance)  If you have a child who's having surgery, please ask for a copy of Preparing for Your Child's Surgery.    Preparing for surgery    Within 30 days of surgery: Have a pre-op exam (sometimes called an H&P, or History and Physical). This can be done at a clinic or pre-operative center.  ? If you're having a , you may not need this exam. Talk to your care team    At your pre-op exam, talk to your care team about all medicines you take. If you need to stop any medicines before surgery, ask when to start taking them again.  ? We do this for your safety. Many medicines can make you bleed too much during surgery. Some change how well surgery (anesthesia) drugs work.    Call your insurance company to let them know you're having surgery. (If you don't have insurance, call [phone].)    Call your clinic if there's any change in your health. This includes signs of a cold or flu (sore throat, runny nose, cough, rash, fever). It also includes a scrape or scratch near the surgery site.    If you have questions on the day of surgery, call your hospital or surgery center.  Eating and drinking guidelines  For your safety: Unless your surgeon tells you otherwise, follow the guidelines below.    Eat and drink as usual  until 8 hours before surgery. After that, no food or milk.    Drink clear liquids until 2 hours before surgery. These are liquids you can see through, like water, Gatorade and Propel Water. You may also have black coffee and tea (no cream or milk).    Nothing by mouth within 2 hours of surgery. This includes gum, candy and breath mints.    If you drink, stop drinking alcohol the night before surgery.    If your care team tells you to take medicine on the morning of surgery, it's okay to take it with a sip of water.  Preventing infection    Shower or bathe the night before and morning of your surgery. Follow the instructions your clinic gave you. (If no instructions, use regular soap.)    Don't shave or clip hair near your surgery site. We'll remove the hair if needed.    Don't smoke or vape the morning of surgery. You may chew nicotine gum up to 2 hours before surgery. A nicotine patch is okay.  ? Note: Some surgeries require you to completely quit smoking and nicotine. Check with your surgeon.    Your care team will make every effort to keep you safe from infection. We will:  ? Clean our hands often with soap and water (or an alcohol-based hand rub).  ? Clean the skin at your surgery site with a special soap that kills germs.  ? Give you a special gown to keep you warm. (Cold raises the risk of infection.)  ? Wear special hair covers, masks, gowns and gloves during surgery.  ? Give antibiotic medicine, if prescribed. Not all surgeries need antibiotics.  What to bring on the day of surgery    Photo ID and insurance card    Copy of your health care directive, if you have one    Glasses and hearing aides (bring cases)  ? You can't wear contacts during surgery    Inhaler and eye drops, if you use them (tell us about these when you arrive)    CPAP machine or breathing device, if you use them    A few personal items, if spending the night    If you have . . .  ? A pacemaker or ICD (cardiac defibrillator): Bring the ID  card.  ? An implanted stimulator: Bring the remote control.  ? A legal guardian: Bring a copy of the certified (court-stamped) guardianship papers.  Please remove any jewelry, including body piercings. Leave jewelry and other valuables at home.  If you're going home the day of surgery  Important: If you don't follow the rules below, we must cancel your surgery.     Arrange for someone to drive you home after surgery. You may not drive, take a taxi or take public transportation by yourself (unless you'll have local anesthesia only).    Arrange for a responsible adult to stay with you overnight. If you don't, we may keep you in the hospital overnight, and you may need to pay the costs yourself.  Questions?   If you have any questions for your care team, list them here: _________________________________________________________________________________________________________________________________________________________________________________________________________________________________________________________________________________________________________________________  For informational purposes only. Not to replace the advice of your health care provider. Copyright   2003, 2019 [place]. All rights reserved. Clinically reviewed by Davina Westfall MD. Tink 535805 - REV 4/20.

## 2021-05-26 NOTE — Clinical Note
Please abstract the following data from this visit with this patient into the appropriate field in Epic:    Tests that can be patient reported without a hard copy:    Mammogram done on this date: 3/1/21 (approximately), by this group: employer, results were normal.     Other Tests found in the patient's chart through Chart Review/Care Everywhere:    {Abstract Quality List (Optional):593886}    Note to Abstraction: If this section is blank, no results were found via Chart Review/Care Everywhere.

## 2021-05-26 NOTE — PROGRESS NOTES
[facility]  [address]  [place] [address]  Phone: [phone]  Primary Provider: Hemalatha Juarez (Inactive)  Pre-op Performing Provider: JULIETA KULKARNI    PREOPERATIVE EVALUATION:  Today's date: 5/26/2021      Libertad Russell is a 55 year old female who presents for a preoperative evaluation.    Surgical Information:  Surgery/Procedure:EXCISION, soft tissue mass beck of head and right axilla   Surgery Location: [facility] OR  Surgeon: Dr. Domínguez  Surgery Date: 6/8/21  Time of Surgery: 1:05 pm  Where patient plans to recover: At home with family  Fax number for surgical facility: Note does not need to be faxed, will be available electronically in Epic.    Type of Anesthesia Anticipated: Local with MAC    Assessment & Plan     The proposed surgical procedure is considered INTERMEDIATE risk.    Preop general physical exam  Okay for surgery pending outcome of labs    Essential hypertension with goal blood pressure less than 140/90  Stable-blood pressure controlled today in clinic    Lipoma of right axilla  We will check basic labs here today  - Basic metabolic panel  - CBC with platelets differential  Previous labs reviewed.    Lipoma of neck  We will check basic labs here today  - Basic metabolic panel  - CBC with platelets differential    Foot injury, left, initial encounter  Continue to monitor.  Tylenol as needed for discomfort.    Risks and Recommendations:  The patient has the following additional risks and recommendations for perioperative complications:  Infection:     Medication Instructions:   - Psychostimulants: Hold the day of surgery    RECOMMENDATION:APPROVAL GIVEN to proceed with proposed procedure, without further diagnostic evaluation.    Review of external notes as documented above   Review of the result(s) of each unique test - TSH, urine microalbumin    40 minutes spent on the date of the encounter doing chart review, history and exam,  documentation and further activities per the note      Subjective     HPI related to upcoming procedure: has lump to back of head and right axilla that is going to be having removed.  They are gradually getting bigger and more painful.    Had COVID March. Returned to work in April 1st. Has not had COVID vaccine. Plans to get it at work.     Dropped a piece of wood on left foot. Was not able to walk on foot yesterday. Took some advil and that did help to decrease the pain.  Today, woke up and foot has been fine.  Did take some advil today.  Able to walk on foot without difficulty.  Continues to be some swollen.      Preop Questions 5/26/2021   1. Have you ever had a heart attack or stroke? No   2. Have you ever had surgery on your heart or blood vessels, such as a stent placement, a coronary artery bypass, or surgery on an artery in your head, neck, heart, or legs? No   3. Do you have chest pain with activity? No   4. Do you have a history of  heart failure? No   5. Do you currently have a cold, bronchitis or symptoms of other infection? No   6. Do you have a cough, shortness of breath, or wheezing? No   7. Do you or anyone in your family have previous history of blood clots? No   8. Do you or does anyone in your family have a serious bleeding problem such as prolonged bleeding following surgeries or cuts? No   9. Have you ever had problems with anemia or been told to take iron pills? No   10. Have you had any abnormal blood loss such as black, tarry or bloody stools, or abnormal vaginal bleeding? No   11. Have you ever had a blood transfusion? No   12. Are you willing to have a blood transfusion if it is medically needed before, during, or after your surgery? Yes   13. Have you or any of your relatives ever had problems with anesthesia? No   14. Do you have sleep apnea, excessive snoring or daytime drowsiness? No   15. Do you have any artifical heart valves or other implanted medical devices like a pacemaker,  defibrillator, or continuous glucose monitor? No   16. Do you have artificial joints? No   17. Are you allergic to latex? No   18. Is there any chance that you may be pregnant? No     Health Care Directive:  Patient does not have a Health Care Directive or Living Will: Discussed advance care planning with patient; information given to patient to review.    Preoperative Review of :   reviewed - controlled substances reflected in medication list.    Status of Chronic Conditions:  HYPERTENSION - Patient has longstanding history of HTN , currently denies any symptoms referable to elevated blood pressure. Specifically denies chest pain, palpitations, dyspnea, orthopnea, PND or peripheral edema. Blood pressure readings have not been in normal range. Current medication regimen is as listed below. Patient denies any side effects of medication.       Review of Systems   Constitutional: Negative for activity change and fatigue.   HENT: Negative for ear pain, rhinorrhea and sore throat.    Respiratory: Negative for cough, chest tightness, shortness of breath and wheezing.    Cardiovascular: Negative for chest pain, palpitations and leg swelling.   Gastrointestinal: Negative for abdominal distention, abdominal pain, constipation, diarrhea, nausea and vomiting.   Endocrine: Negative for cold intolerance, heat intolerance, polydipsia, polyphagia and polyuria.   Genitourinary: Negative for difficulty urinating, enuresis, frequency and urgency.   Musculoskeletal: Positive for arthralgias (left foot. ).   Skin: Negative for rash.        Lump back of head and right armpit   Neurological: Negative for dizziness, light-headedness, numbness and headaches.   Psychiatric/Behavioral: Negative for dysphoric mood and sleep disturbance. The patient is not nervous/anxious.        Patient Active Problem List    Diagnosis Date Noted     Lipoma of right axilla 05/12/2021     Priority: Medium     Added automatically from request for surgery  [id number]       Lipoma of neck 05/12/2021     Priority: Medium     Added automatically from request for surgery [id number]       Essential hypertension with goal blood pressure less than 140/90 07/26/2016     Priority: Medium     General medical exam 10/01/2012     Priority: Medium     EMERGENCY CARE PLAN  Presenting Problem Signs and Symptoms Treatment Plan    Questions or conerns during clinic hours    I will call the clinic directly     Questions or conerns outside clinic hours    I will call the 24 hour nurse line at [phone]    Patient needs to schedule an appointment    I will call the 24 hour scheduling team at [phone] or clinic directly    Same day treatment     I will call the clinic first, nurse line if after hours, urgent care and express care if needed                            Problem list name updated by automated process. Provider to review       Raynaud phenomenon 09/24/2012     Priority: Medium     Rosacea 10/18/2011     Priority: Medium     June 22, 2013 improves with metrogel. One year refill.        CARDIOVASCULAR SCREENING; LDL GOAL LESS THAN 160 10/31/2010     Priority: Medium     Attention deficit disorder of adult 09/09/2010     Priority: Medium     June 22, 2013 appears be doing well, benefits from the medication with minimal side effects.  July 2, 2013 patient requesting increased dosage. At her next prescription will try Adderall XR 20 mg am, Adderall, short acting 10 mg, in the afternoon. Available July 10, 2013. Update in one month.   January 16, 2014 Needs appointment for further refills.    September 22, 2019 patient was seen September 18, 2019.  ADD addressed, okay with refills for 6 months.       Seasonal allergic rhinitis 07/11/2008     Priority: Medium     Tobacco use disorder 04/11/2007     Priority: Medium     June 22, 2013 will try Chantix. Reviewed side effects including increased depressive symptoms or anxiety, nausea. Reviewed smoking cessation.        Past Medical  "History:   Diagnosis Date     Arthritis     mother     Cancer (H) 2012    mother     Depressive disorder      History of blood transfusion     sister no iron     Hypertension 2010    mother     Thyroid disease     mother and 2 sisters     Past Surgical History:   Procedure Laterality Date     BREAST SURGERY       COSMETIC SURGERY      breast implants     EYE SURGERY  2015    [facility]     LYMPH NODE BIOPSY  2001    told to be benign     SURGICAL HISTORY OF -   2000, 1998    breast biopsies     TUBAL LIGATION  2003     Current Outpatient Medications   Medication Sig Dispense Refill     ALPRAZolam (XANAX) 0.25 MG tablet Take 1 tablet 1 hour before flying and repeat if needed 4 tablet 0     amphetamine-dextroamphetamine (ADDERALL XR) 20 MG 24 hr capsule Take 1 capsule (20 mg) by mouth daily 30 capsule 0     amphetamine-dextroamphetamine (ADDERALL) 20 MG tablet Take 1 tablet (20 mg) by mouth daily Take in the early afternoon. 30 tablet 0     losartan (COZAAR) 100 MG tablet Take 1 tablet (100 mg) by mouth daily 90 tablet 0     metroNIDAZOLE (METROGEL) 0.75 % external gel Apply 1 g topically 2 times daily 45 g 0       No Known Allergies     Social History     Tobacco Use     Smoking status: Current Every Day Smoker     Packs/day: 1.00     Years: 15.00     Pack years: 15.00     Types: Cigarettes     Smokeless tobacco: Never Used   Substance Use Topics     Alcohol use: Yes     Comment: occasional     History   Drug Use No         Objective     BP (!) 142/82   Pulse 84   Temp 98.1  F (36.7  C) (Tympanic)   Resp 16   Ht 1.632 m (5' 4.25\")   Wt 61.2 kg (135 lb)   BMI 22.99 kg/m      Physical Exam  Constitutional:       Appearance: Normal appearance. She is well-developed.   HENT:      Head: Normocephalic and atraumatic.      Right Ear: Tympanic membrane and external ear normal. No middle ear effusion.      Left Ear: Tympanic membrane and external ear normal.  No middle ear effusion.      Nose: No mucosal edema.      " Mouth/Throat:      Pharynx: Uvula midline.   Eyes:      Pupils: Pupils are equal, round, and reactive to light.   Neck:      Musculoskeletal: Normal range of motion.      Thyroid: No thyromegaly.      Vascular: No carotid bruit.   Cardiovascular:      Rate and Rhythm: Normal rate and regular rhythm.      Pulses:           Femoral pulses are 2+ on the right side and 2+ on the left side.     Heart sounds: Normal heart sounds.   Pulmonary:      Effort: Pulmonary effort is normal.      Breath sounds: Normal breath sounds.   Abdominal:      General: Bowel sounds are normal.      Palpations: Abdomen is soft.      Tenderness: There is no abdominal tenderness.   Musculoskeletal: Normal range of motion.      Left foot: Normal range of motion. Swelling (slight) present. No tenderness, bony tenderness, deformity or laceration.   Skin:     General: Skin is warm and dry.      Findings: No rash.   Neurological:      Mental Status: She is alert.   Psychiatric:         Behavior: Behavior normal.       Lab Results   Component Value Date    WBC 6.2 05/26/2021     Lab Results   Component Value Date    RBC 4.38 05/26/2021     Lab Results   Component Value Date    HGB 12.2 05/26/2021     Lab Results   Component Value Date    HCT 37.1 05/26/2021     No components found for: MCT  Lab Results   Component Value Date    MCV 85 05/26/2021     Lab Results   Component Value Date    MCH 27.9 05/26/2021     Lab Results   Component Value Date    MCHC 32.9 05/26/2021     Lab Results   Component Value Date    RDW 14.1 05/26/2021     Lab Results   Component Value Date     05/26/2021     Recent Labs   Lab Test 05/26/21  1640 02/16/17  1427    136   POTASSIUM 4.1 3.8   CHLORIDE 104 101   CO2 30 29   ANIONGAP 3 6   * 98   BUN 12 11   CR 0.67 0.63   LAKESHIA 9.0 8.8         Diagnostics:  Labs pending at this time.  Results will be reviewed when available.   No EKG required, no history of coronary heart disease, significant arrhythmia,  peripheral arterial disease or other structural heart disease.    Revised Cardiac Risk Index (RCRI):  The patient has the following serious cardiovascular risks for perioperative complications:   - No serious cardiac risks = 0 points     RCRI Interpretation: 0 points: Class I (very low risk - 0.4% complication rate)           Signed Electronically by: SULY Melvin CNP  Copy of this evaluation report is provided to requesting physician.

## 2021-06-07 ENCOUNTER — ANESTHESIA EVENT (OUTPATIENT)
Dept: SURGERY | Facility: AMBULATORY SURGERY CENTER | Age: 56
End: 2021-06-07
Payer: COMMERCIAL

## 2021-06-08 ENCOUNTER — HOSPITAL ENCOUNTER (OUTPATIENT)
Facility: AMBULATORY SURGERY CENTER | Age: 56
End: 2021-06-08
Attending: SURGERY | Admitting: SURGERY
Payer: COMMERCIAL

## 2021-06-08 ENCOUNTER — ANESTHESIA (OUTPATIENT)
Dept: SURGERY | Facility: AMBULATORY SURGERY CENTER | Age: 56
End: 2021-06-08
Payer: COMMERCIAL

## 2021-06-08 VITALS
SYSTOLIC BLOOD PRESSURE: 159 MMHG | DIASTOLIC BLOOD PRESSURE: 72 MMHG | TEMPERATURE: 96.6 F | RESPIRATION RATE: 16 BRPM | OXYGEN SATURATION: 98 %

## 2021-06-08 DIAGNOSIS — D17.21 LIPOMA OF RIGHT AXILLA: ICD-10-CM

## 2021-06-08 DIAGNOSIS — D17.0 LIPOMA OF NECK: ICD-10-CM

## 2021-06-08 LAB — HCG UR QL: NEGATIVE

## 2021-06-08 PROCEDURE — G8916 PT W IV AB GIVEN ON TIME: HCPCS

## 2021-06-08 PROCEDURE — 81025 URINE PREGNANCY TEST: CPT | Performed by: ANESTHESIOLOGY

## 2021-06-08 PROCEDURE — 21931 EXC BACK LES SC 3 CM/>: CPT | Mod: 51 | Performed by: SURGERY

## 2021-06-08 PROCEDURE — 88304 TISSUE EXAM BY PATHOLOGIST: CPT | Performed by: PATHOLOGY

## 2021-06-08 PROCEDURE — 21012 EXC FACE LES SBQ 2 CM/>: CPT | Performed by: SURGERY

## 2021-06-08 PROCEDURE — 21012 EXC FACE LES SBQ 2 CM/>: CPT

## 2021-06-08 PROCEDURE — G8907 PT DOC NO EVENTS ON DISCHARG: HCPCS

## 2021-06-08 PROCEDURE — 88307 TISSUE EXAM BY PATHOLOGIST: CPT | Performed by: PATHOLOGY

## 2021-06-08 PROCEDURE — 23071 EXC SHOULDER LES SC 3 CM/>: CPT | Mod: RT

## 2021-06-08 RX ORDER — LIDOCAINE 40 MG/G
CREAM TOPICAL
Status: DISCONTINUED | OUTPATIENT
Start: 2021-06-08 | End: 2021-06-09 | Stop reason: HOSPADM

## 2021-06-08 RX ORDER — LABETALOL HYDROCHLORIDE 5 MG/ML
10 INJECTION, SOLUTION INTRAVENOUS
Status: DISCONTINUED | OUTPATIENT
Start: 2021-06-08 | End: 2021-06-09 | Stop reason: HOSPADM

## 2021-06-08 RX ORDER — MEPERIDINE HYDROCHLORIDE 25 MG/ML
12.5 INJECTION INTRAMUSCULAR; INTRAVENOUS; SUBCUTANEOUS
Status: DISCONTINUED | OUTPATIENT
Start: 2021-06-08 | End: 2021-06-09 | Stop reason: HOSPADM

## 2021-06-08 RX ORDER — CEFAZOLIN SODIUM 2 G/100ML
2 INJECTION, SOLUTION INTRAVENOUS SEE ADMIN INSTRUCTIONS
Status: DISCONTINUED | OUTPATIENT
Start: 2021-06-08 | End: 2021-06-09 | Stop reason: HOSPADM

## 2021-06-08 RX ORDER — PROPOFOL 10 MG/ML
INJECTION, EMULSION INTRAVENOUS CONTINUOUS PRN
Status: DISCONTINUED | OUTPATIENT
Start: 2021-06-08 | End: 2021-06-08

## 2021-06-08 RX ORDER — ONDANSETRON 4 MG/1
4 TABLET, ORALLY DISINTEGRATING ORAL EVERY 30 MIN PRN
Status: DISCONTINUED | OUTPATIENT
Start: 2021-06-08 | End: 2021-06-09 | Stop reason: HOSPADM

## 2021-06-08 RX ORDER — ONDANSETRON 2 MG/ML
INJECTION INTRAMUSCULAR; INTRAVENOUS PRN
Status: DISCONTINUED | OUTPATIENT
Start: 2021-06-08 | End: 2021-06-08

## 2021-06-08 RX ORDER — ALBUTEROL SULFATE 0.83 MG/ML
2.5 SOLUTION RESPIRATORY (INHALATION) EVERY 4 HOURS PRN
Status: DISCONTINUED | OUTPATIENT
Start: 2021-06-08 | End: 2021-06-09 | Stop reason: HOSPADM

## 2021-06-08 RX ORDER — CEFAZOLIN SODIUM 2 G/100ML
2 INJECTION, SOLUTION INTRAVENOUS
Status: DISCONTINUED | OUTPATIENT
Start: 2021-06-08 | End: 2021-06-09 | Stop reason: HOSPADM

## 2021-06-08 RX ORDER — FENTANYL CITRATE 50 UG/ML
25-50 INJECTION, SOLUTION INTRAMUSCULAR; INTRAVENOUS
Status: DISCONTINUED | OUTPATIENT
Start: 2021-06-08 | End: 2021-06-09 | Stop reason: HOSPADM

## 2021-06-08 RX ORDER — BUPIVACAINE HYDROCHLORIDE AND EPINEPHRINE 5; 5 MG/ML; UG/ML
INJECTION, SOLUTION PERINEURAL PRN
Status: DISCONTINUED | OUTPATIENT
Start: 2021-06-08 | End: 2021-06-08 | Stop reason: HOSPADM

## 2021-06-08 RX ORDER — KETOROLAC TROMETHAMINE 30 MG/ML
INJECTION, SOLUTION INTRAMUSCULAR; INTRAVENOUS PRN
Status: DISCONTINUED | OUTPATIENT
Start: 2021-06-08 | End: 2021-06-08

## 2021-06-08 RX ORDER — SODIUM CHLORIDE, SODIUM LACTATE, POTASSIUM CHLORIDE, CALCIUM CHLORIDE 600; 310; 30; 20 MG/100ML; MG/100ML; MG/100ML; MG/100ML
INJECTION, SOLUTION INTRAVENOUS CONTINUOUS
Status: DISCONTINUED | OUTPATIENT
Start: 2021-06-08 | End: 2021-06-09 | Stop reason: HOSPADM

## 2021-06-08 RX ORDER — OXYCODONE HYDROCHLORIDE 5 MG/1
5-10 TABLET ORAL EVERY 4 HOURS PRN
Status: DISCONTINUED | OUTPATIENT
Start: 2021-06-08 | End: 2021-06-09 | Stop reason: HOSPADM

## 2021-06-08 RX ORDER — DEXAMETHASONE SODIUM PHOSPHATE 4 MG/ML
4 INJECTION, SOLUTION INTRA-ARTICULAR; INTRALESIONAL; INTRAMUSCULAR; INTRAVENOUS; SOFT TISSUE EVERY 10 MIN PRN
Status: DISCONTINUED | OUTPATIENT
Start: 2021-06-08 | End: 2021-06-09 | Stop reason: HOSPADM

## 2021-06-08 RX ORDER — DEXAMETHASONE SODIUM PHOSPHATE 4 MG/ML
INJECTION, SOLUTION INTRA-ARTICULAR; INTRALESIONAL; INTRAMUSCULAR; INTRAVENOUS; SOFT TISSUE PRN
Status: DISCONTINUED | OUTPATIENT
Start: 2021-06-08 | End: 2021-06-08

## 2021-06-08 RX ORDER — FENTANYL CITRATE 50 UG/ML
INJECTION, SOLUTION INTRAMUSCULAR; INTRAVENOUS PRN
Status: DISCONTINUED | OUTPATIENT
Start: 2021-06-08 | End: 2021-06-08

## 2021-06-08 RX ORDER — ACETAMINOPHEN 325 MG/1
975 TABLET ORAL ONCE
Status: COMPLETED | OUTPATIENT
Start: 2021-06-08 | End: 2021-06-08

## 2021-06-08 RX ORDER — NALOXONE HYDROCHLORIDE 0.4 MG/ML
0.4 INJECTION, SOLUTION INTRAMUSCULAR; INTRAVENOUS; SUBCUTANEOUS
Status: DISCONTINUED | OUTPATIENT
Start: 2021-06-08 | End: 2021-06-09 | Stop reason: HOSPADM

## 2021-06-08 RX ORDER — ONDANSETRON 2 MG/ML
4 INJECTION INTRAMUSCULAR; INTRAVENOUS EVERY 30 MIN PRN
Status: DISCONTINUED | OUTPATIENT
Start: 2021-06-08 | End: 2021-06-09 | Stop reason: HOSPADM

## 2021-06-08 RX ORDER — LIDOCAINE HYDROCHLORIDE 10 MG/ML
INJECTION, SOLUTION INFILTRATION; PERINEURAL PRN
Status: DISCONTINUED | OUTPATIENT
Start: 2021-06-08 | End: 2021-06-08

## 2021-06-08 RX ORDER — KETOROLAC TROMETHAMINE 30 MG/ML
30 INJECTION, SOLUTION INTRAMUSCULAR; INTRAVENOUS EVERY 6 HOURS PRN
Status: DISCONTINUED | OUTPATIENT
Start: 2021-06-08 | End: 2021-06-09 | Stop reason: HOSPADM

## 2021-06-08 RX ORDER — NALOXONE HYDROCHLORIDE 0.4 MG/ML
0.2 INJECTION, SOLUTION INTRAMUSCULAR; INTRAVENOUS; SUBCUTANEOUS
Status: DISCONTINUED | OUTPATIENT
Start: 2021-06-08 | End: 2021-06-09 | Stop reason: HOSPADM

## 2021-06-08 RX ADMIN — SODIUM CHLORIDE, SODIUM LACTATE, POTASSIUM CHLORIDE, CALCIUM CHLORIDE: 600; 310; 30; 20 INJECTION, SOLUTION INTRAVENOUS at 12:41

## 2021-06-08 RX ADMIN — FENTANYL CITRATE 25 MCG: 50 INJECTION, SOLUTION INTRAMUSCULAR; INTRAVENOUS at 12:54

## 2021-06-08 RX ADMIN — CEFAZOLIN SODIUM 2 G: 2 INJECTION, SOLUTION INTRAVENOUS at 13:00

## 2021-06-08 RX ADMIN — PROPOFOL 200 MCG/KG/MIN: 10 INJECTION, EMULSION INTRAVENOUS at 12:58

## 2021-06-08 RX ADMIN — ACETAMINOPHEN 975 MG: 325 TABLET ORAL at 12:39

## 2021-06-08 RX ADMIN — FENTANYL CITRATE 25 MCG: 50 INJECTION, SOLUTION INTRAMUSCULAR; INTRAVENOUS at 13:52

## 2021-06-08 RX ADMIN — DEXAMETHASONE SODIUM PHOSPHATE 4 MG: 4 INJECTION, SOLUTION INTRA-ARTICULAR; INTRALESIONAL; INTRAMUSCULAR; INTRAVENOUS; SOFT TISSUE at 13:03

## 2021-06-08 RX ADMIN — KETOROLAC TROMETHAMINE 30 MG: 30 INJECTION, SOLUTION INTRAMUSCULAR; INTRAVENOUS at 14:06

## 2021-06-08 RX ADMIN — ONDANSETRON 4 MG: 2 INJECTION INTRAMUSCULAR; INTRAVENOUS at 13:03

## 2021-06-08 RX ADMIN — LIDOCAINE HYDROCHLORIDE 20 MG: 10 INJECTION, SOLUTION INFILTRATION; PERINEURAL at 12:58

## 2021-06-08 ASSESSMENT — LIFESTYLE VARIABLES: TOBACCO_USE: 1

## 2021-06-08 NOTE — DISCHARGE INSTRUCTIONS
[facility]   Adult Discharge Orders & Instructions     For 24 hours after surgery    1. Get plenty of rest.  A responsible adult must stay with you for at least 24 hours after you leave the hospital.   2. Do not drive or use heavy equipment.  If you have weakness or tingling, don't drive or use heavy equipment until this feeling goes away.  3. Do not drink alcohol.  4. Avoid strenuous or risky activities.  Ask for help when climbing stairs.   5. You may feel lightheaded.  IF so, sit for a few minutes before standing.  Have someone help you get up.   6. If you have nausea (feel sick to your stomach): Drink only clear liquids such as apple juice, ginger ale, broth or 7-Up.  Rest may also help.  Be sure to drink enough fluids.  Move to a regular diet as you feel able.  7. You may have a slight fever. Call the doctor if your fever is over 100 F (37.7 C) (taken under the tongue) or lasts longer than 24 hours.  8. You may have a dry mouth, a sore throat, muscle aches or trouble sleeping.  These should go away after 24 hours.  9. Do not make important or legal decisions.     Call your doctor for any of the followin.  Signs of infection (fever, growing tenderness at the surgery site, a large amount of drainage or bleeding, severe pain, foul-smelling drainage, redness, swelling).    2. It has been over 8 to 10 hours since surgery and you are still not able to urinate (pass water).    3.  Headache for over 24 hours.    4. Signs of Covid-19 infection (temperature over 100 degrees, shortness of breath, cough, loss of taste/smell, generalized body aches, persistent headache,                  chills, sore throat, nausea/vomiting/diarrhea).    To contact Dr Domínguez call [phone].  _____________________________________      Tylenol 975 mgm at 12:45 PM  No ibuprofen before 10 PM

## 2021-06-08 NOTE — ANESTHESIA CARE TRANSFER NOTE
Patient: Libertad Russell    Procedure(s):  EXCISION, soft tissue mass back of head  Excise Mass Axilla    Diagnosis: Lipoma of right axilla [D17.21]  Lipoma of neck [D17.0]  Diagnosis Additional Information: No value filed.    Anesthesia Type:   MAC     Note:    Oropharynx: oropharynx clear of all foreign objects  Level of Consciousness: drowsy  Oxygen Supplementation: room air    Independent Airway: airway patency satisfactory and stable  Dentition: dentition unchanged  Vital Signs Stable: post-procedure vital signs reviewed and stable  Report to RN Given: handoff report given  Patient transferred to: Phase II    Handoff Report: Identifed the Patient, Identified the Reponsible Provider, Reviewed the pertinent medical history, Discussed the surgical course, Reviewed Intra-OP anesthesia mangement and issues during anesthesia, Set expectations for post-procedure period and Allowed opportunity for questions and acknowledgement of understanding      Vitals: (Last set prior to Anesthesia Care Transfer)  CRNA VITALS  6/8/2021 1339 - 6/8/2021 1413      6/8/2021             Pulse:  73    Temp:  --    SpO2:  100 %        Electronically Signed By: SULY Jones CRNA  June 8, 2021  2:13 PM

## 2021-06-08 NOTE — OP NOTE
Date of Service: 6/8/2021     STAFF SURGEON:  Artis Domínguez MD     ASSISTANT:  None.     PREOPERATIVE DIAGNOSIS:   Soft tissue mass left occiput and right axilla     POSTOPERATIVE DIAGNOSIS:  Same     NAME OF PROCEDURE(S):   1: Excision soft tissue mass back of head and 2: Excision soft tissue mass right axilla     INDICATIONS FOR PROCEDURE:  The patient is a 54 yo female with enlarging masses back of head and right armpit over the past few years.  On clinical exam these seem to likely to be lipomas.  I offered excision due to enlarging and size.  Risks, benefits, alternatives discussed preoperatively and consent obtained.     EBL: 5 cc    ANESTHESIA: MAC plus local    COMPLICATIONS: None     DRAINS:  None.     SPECIMENS:   Soft tissue mass back of head, soft tissue mass right axilla     IMPLANTS: None     OPERATIVE FINDINGS:   The soft tissue mass in the area of the left occiput appeared as a lipoma and measured 6.5 x 5.5 cm.  Mass from the right axilla also appeared as lipoma and measured 5 x 4 cm     PROCEDURE DETAIL:  Following consent, the patient was brought from the preoperative holding area to the operating suite and laid in right lateral decubitus.  The hair over the mass was shaved, then prepped and draped in usual fashion.  She had MAC sedation without problems.  I began with injecting local in the area of the mass with half percent Marcaine with epinephrine 1-200,000.  I then made a 4 cm incision with #15 blade over the mass into subcutaneous tissue.  I encountered the capsule over the mass which was incised. I was then able to use blunt finger dissection initially to free the mass from the lower half and deliver through the wound.  I then needed sharp scissor dissection to detach this from the deep attachments or the skull.  I then was able to work way around to completely freed this from the superior half and delivered through the wound.  Mass was passed off for pathology.  Wound was irrigated and  hemostasis obtained with cautery.  Wound was then closed with interrupted 3-0 Vicryls for the deep subcutaneous layer followed by a running subcuticular 4-0 Monocryl for skin.  Incision was then covered with skin glue.  Once the glue was dry the patient was repositioned supine slightly bumped to her left to raise the axilla from the table.  Arm was padded across the body.  The area of the mass was then prepped and draped in usual fashion.  Again I started with injection of local in the area of the mass.  I then proceeded with making a 4 cm incision with #15 blade over the palpable mass.  I dissected through subcutaneous tissues with cautery until I get a capsule over the mass was encountered and incised.  I used blunt finger dissection to free the mass which was more mobile than the one on the back of the head.  This was able to be delivered through the wound and passed off for pathology.  This wound was then closed with 3-0 Vicryl for the deeper subcutaneous tissue followed by a running 3-0 Monocryl for the skin.  Wound was then covered with skin glue.  I injected further local in the area.  Patient taught procedure well.  Final counts complete.  She was transported from the operating room to recovery in a stable condition with plans to discharge home.           Artis Domínguez MD

## 2021-06-08 NOTE — ANESTHESIA PREPROCEDURE EVALUATION
Anesthesia Pre-Procedure Evaluation    Patient: Libertad Russell   MRN: [id number] : 1965        Preoperative Diagnosis: Lipoma of right axilla [D17.21]  Lipoma of neck [D17.0]   Procedure : Procedure(s):  EXCISION, soft tissue mass costa of head and right axilla     Past Medical History:   Diagnosis Date     Arthritis     mother     Cancer (H) 2012    mother     Depressive disorder      History of blood transfusion     sister no iron     Hypertension 2010    mother     Thyroid disease     mother and 2 sisters      Past Surgical History:   Procedure Laterality Date     BREAST SURGERY       COSMETIC SURGERY      breast implants     EYE SURGERY  2015    [facility]     LYMPH NODE BIOPSY      told to be benign     SURGICAL HISTORY OF -   ,     breast biopsies     TUBAL LIGATION        No Known Allergies   Social History     Tobacco Use     Smoking status: Current Every Day Smoker     Packs/day: 1.00     Years: 15.00     Pack years: 15.00     Types: Cigarettes     Smokeless tobacco: Never Used   Substance Use Topics     Alcohol use: Yes     Comment: occasional      Wt Readings from Last 1 Encounters:   21 61.2 kg (135 lb)        Anesthesia Evaluation            ROS/MED HX  ENT/Pulmonary:  - neg pulmonary ROS   (+) allergic rhinitis, tobacco use, Current use,     Neurologic:  - neg neurologic ROS     Cardiovascular:  - neg cardiovascular ROS   (+) hypertension-----    METS/Exercise Tolerance:     Hematologic:  - neg hematologic  ROS     Musculoskeletal:  - neg musculoskeletal ROS (+) arthritis,     GI/Hepatic:  - neg GI/hepatic ROS     Renal/Genitourinary:  - neg Renal ROS     Endo:  - neg endo ROS   (+) thyroid problem,     Psychiatric/Substance Use:  - neg psychiatric ROS     Infectious Disease:  - neg infectious disease ROS     Malignancy:  - neg malignancy ROS     Other:  - neg other ROS          Physical Exam    Airway  airway exam normal      Mallampati: II   TM distance: > 3 FB   Neck ROM:  full   Mouth opening: > 3 cm    Respiratory Devices and Support         Dental  no notable dental history         Cardiovascular          Rhythm and rate: regular and normal     Pulmonary   pulmonary exam normal        breath sounds clear to auscultation           OUTSIDE LABS:  CBC:   Lab Results   Component Value Date    WBC 6.2 05/26/2021    WBC 6.4 09/24/2012    HGB 12.2 05/26/2021    HGB 13.2 09/24/2012    HCT 37.1 05/26/2021    HCT 38.1 09/24/2012     05/26/2021     09/24/2012     BMP:   Lab Results   Component Value Date     05/26/2021     02/16/2017    POTASSIUM 4.1 05/26/2021    POTASSIUM 3.8 02/16/2017    CHLORIDE 104 05/26/2021    CHLORIDE 101 02/16/2017    CO2 30 05/26/2021    CO2 29 02/16/2017    BUN 12 05/26/2021    BUN 11 02/16/2017    CR 0.67 05/26/2021    CR 0.63 02/16/2017     (H) 05/26/2021    GLC 98 02/16/2017     COAGS: No results found for: PTT, INR, FIBR  POC: No results found for: BGM, HCG, HCGS  HEPATIC:   Lab Results   Component Value Date    ALBUMIN 4.6 11/29/2005    PROTTOTAL 7.8 11/29/2005    ALT 23 11/29/2005    AST 27 11/29/2005    ALKPHOS 56 11/29/2005    BILITOTAL 0.5 11/29/2005     OTHER:   Lab Results   Component Value Date    LAKESHIA 9.0 05/26/2021    LIPASE 56 11/29/2005    TSH 1.80 10/14/2019    CRP 7.3 10/14/2019    SED 11 07/30/2010       Anesthesia Plan    ASA Status:  2   NPO Status:  NPO Appropriate    Anesthesia Type: MAC.     - Reason for MAC: immobility needed, straight local not clinically adequate   Induction: Intravenous.   Maintenance: TIVA.        Consents    Anesthesia Plan(s) and associated risks, benefits, and realistic alternatives discussed. Questions answered and patient/representative(s) expressed understanding.     - Discussed with:  Patient      - Extended Intubation/Ventilatory Support Discussed: No.      - Patient is DNR/DNI Status: No    Use of blood products discussed: No .     Postoperative Care    Pain management: IV  analgesics, Oral pain medications, Multi-modal analgesia.   PONV prophylaxis: Ondansetron (or other 5HT-3), Dexamethasone or Solumedrol, Background Propofol Infusion     Comments:                Elijah Agrawal MD

## 2021-06-08 NOTE — ANESTHESIA POSTPROCEDURE EVALUATION
Patient: Libertad Russell    Procedure(s):  EXCISION, soft tissue mass back of head  Excise Mass Axilla    Diagnosis:Lipoma of right axilla [D17.21]  Lipoma of neck [D17.0]  Diagnosis Additional Information: No value filed.    Anesthesia Type:  MAC    Note:  Disposition: Outpatient   Postop Pain Control: Uneventful            Sign Out: Well controlled pain   PONV: No   Neuro/Psych: Uneventful            Sign Out: Acceptable/Baseline neuro status   Airway/Respiratory: Uneventful            Sign Out: Acceptable/Baseline resp. status   CV/Hemodynamics: Uneventful            Sign Out: Acceptable CV status   Other NRE: NONE   DID A NON-ROUTINE EVENT OCCUR? No           Last vitals:  Vitals:    06/08/21 1410 06/08/21 1424 06/08/21 1442   BP: 137/69 (!) 142/74 (!) 159/72   Resp: 18 16 16   Temp: 35.9  C (96.6  F)     SpO2: 97% 97% 98%       Last vitals prior to Anesthesia Care Transfer:  CRNA VITALS  6/8/2021 1339 - 6/8/2021 1439      6/8/2021             Pulse:  73    Temp:  --    SpO2:  100 %          Electronically Signed By: Elijah Agrawal MD  June 8, 2021  3:13 PM

## 2021-06-10 LAB — COPATH REPORT: NORMAL

## 2021-06-23 ENCOUNTER — TELEPHONE (OUTPATIENT)
Dept: FAMILY MEDICINE | Facility: CLINIC | Age: 56
End: 2021-06-23

## 2021-06-23 ENCOUNTER — OFFICE VISIT (OUTPATIENT)
Dept: FAMILY MEDICINE | Facility: CLINIC | Age: 56
End: 2021-06-23
Payer: COMMERCIAL

## 2021-06-23 ENCOUNTER — ANCILLARY PROCEDURE (OUTPATIENT)
Dept: GENERAL RADIOLOGY | Facility: CLINIC | Age: 56
End: 2021-06-23
Attending: NURSE PRACTITIONER
Payer: COMMERCIAL

## 2021-06-23 ENCOUNTER — TELEPHONE (OUTPATIENT)
Dept: OTHER | Facility: CLINIC | Age: 56
End: 2021-06-23

## 2021-06-23 VITALS
DIASTOLIC BLOOD PRESSURE: 81 MMHG | HEIGHT: 65 IN | SYSTOLIC BLOOD PRESSURE: 159 MMHG | RESPIRATION RATE: 16 BRPM | TEMPERATURE: 96.8 F | WEIGHT: 134.8 LBS | HEART RATE: 82 BPM | OXYGEN SATURATION: 99 % | BODY MASS INDEX: 22.46 KG/M2

## 2021-06-23 DIAGNOSIS — Z00.00 ROUTINE GENERAL MEDICAL EXAMINATION AT A HEALTH CARE FACILITY: Primary | ICD-10-CM

## 2021-06-23 DIAGNOSIS — M25.532 LEFT WRIST PAIN: ICD-10-CM

## 2021-06-23 DIAGNOSIS — Z13.220 SCREENING FOR HYPERLIPIDEMIA: ICD-10-CM

## 2021-06-23 DIAGNOSIS — Z11.3 ROUTINE SCREENING FOR STI (SEXUALLY TRANSMITTED INFECTION): ICD-10-CM

## 2021-06-23 DIAGNOSIS — Z12.11 SCREEN FOR COLON CANCER: ICD-10-CM

## 2021-06-23 DIAGNOSIS — B96.89 BV (BACTERIAL VAGINOSIS): Primary | ICD-10-CM

## 2021-06-23 DIAGNOSIS — R23.8 CHANGE OF SKIN COLOR: ICD-10-CM

## 2021-06-23 DIAGNOSIS — N76.0 BV (BACTERIAL VAGINOSIS): Primary | ICD-10-CM

## 2021-06-23 DIAGNOSIS — Z12.4 SCREENING FOR MALIGNANT NEOPLASM OF CERVIX: ICD-10-CM

## 2021-06-23 DIAGNOSIS — Z11.4 SCREENING FOR HIV (HUMAN IMMUNODEFICIENCY VIRUS): ICD-10-CM

## 2021-06-23 DIAGNOSIS — M19.90 INFLAMMATORY ARTHRITIS: Primary | ICD-10-CM

## 2021-06-23 DIAGNOSIS — R53.83 FATIGUE, UNSPECIFIED TYPE: ICD-10-CM

## 2021-06-23 DIAGNOSIS — Z71.89 ADVANCE CARE PLANNING: ICD-10-CM

## 2021-06-23 DIAGNOSIS — Z72.0 NICOTINE ABUSE: ICD-10-CM

## 2021-06-23 DIAGNOSIS — F98.8 ATTENTION DEFICIT DISORDER OF ADULT: ICD-10-CM

## 2021-06-23 DIAGNOSIS — I10 ESSENTIAL HYPERTENSION WITH GOAL BLOOD PRESSURE LESS THAN 140/90: ICD-10-CM

## 2021-06-23 DIAGNOSIS — N92.6 IRREGULAR PERIODS/MENSTRUAL CYCLES: ICD-10-CM

## 2021-06-23 DIAGNOSIS — Z11.59 NEED FOR HEPATITIS C SCREENING TEST: ICD-10-CM

## 2021-06-23 DIAGNOSIS — R20.2 PARESTHESIA: ICD-10-CM

## 2021-06-23 LAB
RADIOLOGIST FLAGS: ABNORMAL
SPECIMEN SOURCE: ABNORMAL
WET PREP SPEC: ABNORMAL

## 2021-06-23 PROCEDURE — 87491 CHLMYD TRACH DNA AMP PROBE: CPT | Performed by: NURSE PRACTITIONER

## 2021-06-23 PROCEDURE — 90471 IMMUNIZATION ADMIN: CPT | Performed by: NURSE PRACTITIONER

## 2021-06-23 PROCEDURE — 99396 PREV VISIT EST AGE 40-64: CPT | Mod: 25 | Performed by: NURSE PRACTITIONER

## 2021-06-23 PROCEDURE — 90732 PPSV23 VACC 2 YRS+ SUBQ/IM: CPT | Performed by: NURSE PRACTITIONER

## 2021-06-23 PROCEDURE — 87624 HPV HI-RISK TYP POOLED RSLT: CPT | Performed by: NURSE PRACTITIONER

## 2021-06-23 PROCEDURE — G0145 SCR C/V CYTO,THINLAYER,RESCR: HCPCS | Performed by: NURSE PRACTITIONER

## 2021-06-23 PROCEDURE — 73110 X-RAY EXAM OF WRIST: CPT | Mod: LT | Performed by: RADIOLOGY

## 2021-06-23 PROCEDURE — 99214 OFFICE O/P EST MOD 30 MIN: CPT | Mod: 25 | Performed by: NURSE PRACTITIONER

## 2021-06-23 PROCEDURE — 87591 N.GONORRHOEAE DNA AMP PROB: CPT | Performed by: NURSE PRACTITIONER

## 2021-06-23 PROCEDURE — 87210 SMEAR WET MOUNT SALINE/INK: CPT | Performed by: NURSE PRACTITIONER

## 2021-06-23 RX ORDER — AMLODIPINE BESYLATE 5 MG/1
5 TABLET ORAL DAILY
Qty: 30 TABLET | Refills: 1 | Status: SHIPPED | OUTPATIENT
Start: 2021-06-23 | End: 2021-08-24

## 2021-06-23 RX ORDER — METRONIDAZOLE 500 MG/1
500 TABLET ORAL 2 TIMES DAILY
Qty: 14 TABLET | Refills: 0 | Status: SHIPPED | OUTPATIENT
Start: 2021-06-23 | End: 2021-06-30

## 2021-06-23 RX ORDER — LOSARTAN POTASSIUM 100 MG/1
100 TABLET ORAL DAILY
Qty: 90 TABLET | Refills: 3 | Status: SHIPPED | OUTPATIENT
Start: 2021-06-23 | End: 2022-06-13

## 2021-06-23 RX ORDER — DEXTROAMPHETAMINE SACCHARATE, AMPHETAMINE ASPARTATE MONOHYDRATE, DEXTROAMPHETAMINE SULFATE AND AMPHETAMINE SULFATE 5; 5; 5; 5 MG/1; MG/1; MG/1; MG/1
20 CAPSULE, EXTENDED RELEASE ORAL DAILY
Qty: 30 CAPSULE | Refills: 0 | Status: SHIPPED | OUTPATIENT
Start: 2021-06-23 | End: 2021-07-22

## 2021-06-23 RX ORDER — DIAZEPAM 5 MG
TABLET ORAL
Qty: 2 TABLET | Refills: 0 | Status: SHIPPED | OUTPATIENT
Start: 2021-06-23 | End: 2021-12-01

## 2021-06-23 RX ORDER — VARENICLINE TARTRATE 1 MG/1
1 TABLET, FILM COATED ORAL 2 TIMES DAILY
Qty: 60 TABLET | Refills: 1 | Status: ON HOLD | OUTPATIENT
Start: 2021-06-23 | End: 2021-10-22

## 2021-06-23 RX ORDER — DEXTROAMPHETAMINE SACCHARATE, AMPHETAMINE ASPARTATE, DEXTROAMPHETAMINE SULFATE AND AMPHETAMINE SULFATE 5; 5; 5; 5 MG/1; MG/1; MG/1; MG/1
20 TABLET ORAL DAILY
Qty: 30 TABLET | Refills: 0 | Status: SHIPPED | OUTPATIENT
Start: 2021-06-23 | End: 2021-07-22

## 2021-06-23 ASSESSMENT — ENCOUNTER SYMPTOMS
FEVER: 0
JOINT SWELLING: 1
ABDOMINAL DISTENTION: 0
SHORTNESS OF BREATH: 0
DIZZINESS: 1
HEADACHES: 0
DIARRHEA: 0
NERVOUS/ANXIOUS: 0
ACTIVITY CHANGE: 0
SORE THROAT: 0
PARESTHESIAS: 0
FREQUENCY: 0
HEARTBURN: 0
HEMATURIA: 0
FATIGUE: 1
DYSURIA: 0
DYSPHORIC MOOD: 0
MYALGIAS: 0
COUGH: 0
COLOR CHANGE: 1
VOMITING: 0
RHINORRHEA: 0
ABDOMINAL PAIN: 0
PALPITATIONS: 0
CHEST TIGHTNESS: 0
DECREASED CONCENTRATION: 1
POLYDIPSIA: 0
BREAST MASS: 0
LIGHT-HEADEDNESS: 0
POLYPHAGIA: 0
CONSTIPATION: 0
NUMBNESS: 0
SLEEP DISTURBANCE: 0
NAUSEA: 0
WEAKNESS: 1
ARTHRALGIAS: 1
DIFFICULTY URINATING: 0
DIZZINESS: 0
HEMATOCHEZIA: 0
EYE PAIN: 0
CHILLS: 0
WHEEZING: 0

## 2021-06-23 ASSESSMENT — MIFFLIN-ST. JEOR: SCORE: 1210.45

## 2021-06-23 NOTE — PATIENT INSTRUCTIONS
Go to pharmacy for your shingles vaccine     If you develop any inability to move your arms or your legs, inability to walk, talk eat or drink please be evaluated immediately.    Preventive Health Recommendations  Female Ages 50 - 64    Yearly exam: See your health care provider every year in order to  o Review health changes.   o Discuss preventive care.    o Review your medicines if your doctor has prescribed any.      Get a Pap test every three years (unless you have an abnormal result and your provider advises testing more often).    If you get Pap tests with HPV test, you only need to test every 5 years, unless you have an abnormal result.     You do not need a Pap test if your uterus was removed (hysterectomy) and you have not had cancer.    You should be tested each year for STDs (sexually transmitted diseases) if you're at risk.     Have a mammogram every 1 to 2 years.    Have a colonoscopy at age 50, or have a yearly FIT test (stool test). These exams screen for colon cancer.      Have a cholesterol test every 5 years, or more often if advised.    Have a diabetes test (fasting glucose) every three years. If you are at risk for diabetes, you should have this test more often.     If you are at risk for osteoporosis (brittle bone disease), think about having a bone density scan (DEXA).    Shots: Get a flu shot each year. Get a tetanus shot every 10 years.    Nutrition:     Eat at least 5 servings of fruits and vegetables each day.    Eat whole-grain bread, whole-wheat pasta and brown rice instead of white grains and rice.    Get adequate Calcium and Vitamin D.     Lifestyle    Exercise at least 150 minutes a week (30 minutes a day, 5 days a week). This will help you control your weight and prevent disease.    Limit alcohol to one drink per day.    No smoking.     Wear sunscreen to prevent skin cancer.     See your dentist every six months for an exam and cleaning.    See your eye doctor every 1 to 2 years.

## 2021-06-23 NOTE — TELEPHONE ENCOUNTER
LM for patient to call us back to schedule her Consult Appointment. If patient wants to be seen only in [place] we are booked out until 08/25/21.

## 2021-06-23 NOTE — TELEPHONE ENCOUNTER
Urgent finding on left wrist Xray, see full report    Radiologist flags Rad-Urgent Abnormal (Urgent)   Possible radiocarpal inflammatory or septic arthritis      Francisca Glass RN  [facility]

## 2021-06-23 NOTE — TELEPHONE ENCOUNTER
Patient returned call, offered [place] appt in late august and earlier appt in [place], patient declined. She stated she will call back.       Cortney CORLEY

## 2021-06-23 NOTE — TELEPHONE ENCOUNTER
Referred to [facility] by SULY Olivas CNP for change of skin color (toes)    Per office visit 06/23/21 with above provider: Toes are really red, will turn purple at times. When toes get cold during the winter will get numb and tingling and will hurt.     Pt needs to be scheduled for new patient consult with Vascular Medicine.  Will route to scheduling to coordinate an appointment at next available.    ZACARIAS LeyN, RN  [facility] [place]

## 2021-06-23 NOTE — PROGRESS NOTES
SUBJECTIVE:   CC: Libertad Russell is an 55 year old woman who presents for preventive health visit.       Patient has been advised of split billing requirements and indicates understanding: Yes   Patient would still like to discuss the following concern(s):  1. adderall presciption  2. Swelling on left wrist  3. Vitamin levels        Healthy Habits:     Getting at least 3 servings of Calcium per day:  NO    Bi-annual eye exam:  Yes    Dental care twice a year:  Yes    Sleep apnea or symptoms of sleep apnea:  Daytime drowsiness    Diet:  Regular (no restrictions)    Frequency of exercise:  1 day/week    Duration of exercise:  30-45 minutes    Medication side effects:  None    PHQ-2 Total Score: 2    Additional concerns today:  Yes          Today's PHQ-2 Score:   PHQ-2 ( 1999 Pfizer) 6/23/2021   Q1: Little interest or pleasure in doing things 1   Q2: Feeling down, depressed or hopeless 1   PHQ-2 Score 2   Q1: Little interest or pleasure in doing things Several days   Q2: Feeling down, depressed or hopeless Several days   PHQ-2 Score 2       Abuse: Current or Past (Physical, Sexual or Emotional) - No  Do you feel safe in your environment? Yes    Have you ever done Advance Care Planning? (For example, a Health Directive, POLST, or a discussion with a medical provider or your loved ones about your wishes): No, advance care planning information given to patient to review.  Patient declined advance care planning discussion at this time.    Social History     Tobacco Use     Smoking status: Current Every Day Smoker     Packs/day: 1.00     Years: 10.00     Pack years: 10.00     Types: Cigarettes     Smokeless tobacco: Never Used   Substance Use Topics     Alcohol use: Yes     Comment: occasional         Alcohol Use 6/23/2021   Prescreen: >3 drinks/day or >7 drinks/week? No       Reviewed orders with patient.  Reviewed health maintenance and updated orders accordingly - Yes  Current Outpatient Medications   Medication  Sig Dispense Refill     amLODIPine (NORVASC) 5 MG tablet Take 1 tablet (5 mg) by mouth daily 30 tablet 1     amphetamine-dextroamphetamine (ADDERALL XR) 20 MG 24 hr capsule Take 1 capsule (20 mg) by mouth daily Needs appointment for further refills. 30 capsule 0     amphetamine-dextroamphetamine (ADDERALL) 20 MG tablet Take 1 tablet (20 mg) by mouth daily Take in the early afternoon. Needs appointment for further refills. 30 tablet 0     aspirin (ASA) 81 MG EC tablet Take 1 tablet (81 mg) by mouth daily 90 tablet 3     diazepam (VALIUM) 5 MG tablet Take 1 tablet 30 to 40 minutes prior to MRI.  May repeat after 30 minutes if needed.  You will need a . 2 tablet 0     losartan (COZAAR) 100 MG tablet Take 1 tablet (100 mg) by mouth daily 90 tablet 3     metroNIDAZOLE (METROGEL) 0.75 % external gel Apply 1 g topically 2 times daily 45 g 0     varenicline (CHANTIX CONTINUING MONTH NANCY) 1 MG tablet Take 1 tablet (1 mg) by mouth 2 times daily 60 tablet 1     varenicline (CHANTIX STARTING MONTH PAK) 0.5 MG X 11 & 1 MG X 42 tablet Take 0.5 mg tab daily for 3 days, THEN 0.5 mg tab twice daily for 4 days, THEN 1 mg twice daily. 53 tablet 0     No Known Allergies    Breast Cancer Screening:    Breast CA Risk Assessment (FHS-7) 6/23/2021   Do you have a family history of breast, colon, or ovarian cancer? No / Unknown       click delete button to remove this line now  Mammogram Screening: Recommended annual mammography  Pertinent mammograms are reviewed under the imaging tab.    History of abnormal Pap smear: NO - age 30-65 PAP every 5 years with negative HPV co-testing recommended  PAP / HPV 6/2/2009 6/25/2008 12/15/2005   PAP WARNING! - Addended NIL NIL     Reviewed and updated as needed this visit by clinical staff  Tobacco  Allergies  Meds   Med Hx  Surg Hx  Fam Hx  Soc Hx        Reviewed and updated as needed this visit by Provider                   Here today for physical.  Is not fasting today for labs.  Needs  to have refills of routine medications.  Does check blood pressure at work and reports top number is usually 140-150 and bottom numbers in the 90s.  Has always been this way.  Does not feel like blood pressure has ever been under control.  No chest pain, palpitations or shortness of breath.  Blood pressure 146/92 on recheck    Periods have been more irregular. Will skip 2-3 months then it will come again.  Is currently sexually active.  Has had tubes tied.  No vaginal dryness, no change in moods, no hot flashes.     Is very tired and fatigued.  Feels tired and fatigued all the time.  Has had Covid and feels like fatigue got worse after that.  Father with lung cancer. He decided he is not going to do any treatment. Mother  from lung cancer.  He continues to smoke.  Would like to quit.  Has been on Chantix in the past and felt like that worked.  Feels like quit taking it too soon.  Does not recall having any negative side effects of Chantix such as depression or vivid dreams.  Has smoked since age 17, 1 package per day.  Is doing quite assistance program through work.    Swelling to left wrist. Has been tested for RA in the past and has been negative.  Mother had rheumatoid arthritis, reports all of her testing was normal but she was treated for it.. Will get swelling to certain joints. Has had it in feet in the past. Left wrist pain and swelling for 6 months. Is right handed. Toes are really red, will turn purple at times. When toes get cold during the winter will get numb and tingling and will hurt.     A couple of weeks ago was not able to move left arm or leg. Lasted for about 20 minutes. Was not able to lift arm up. Took some aspirin. Then a month later was out in the yard and kept tripping over left leg.  These 2 incidents did not happen together.  They occurred 1 month apart.  Had difficulty walking . was able to talk. For the leg, lasted about 20 minutes. Did have numbness to arm and leg when this  happened. Did get a bit dizzy when this happened to leg. No chest pain, palpitations or shortness of breath.  Was able to eat, drink, walk and speak.  No slurred speech.    Would like to change adderall to caps instead of the tabs. Feels like the tabs help her focus better.  Does not feel like the capsules help.    Last Pap: Unsure. Never an abnormal.   Last mammogram: No family history of breast cancer. Had one at work 11/20-showed dense tissue.   Last BMD: N/A  Last Colonoscopy:  Never, no family history of colon cancer.   Last eye exam: Plans to make appt  Last dental exam: Every 6 mo  Last tetanus vaccine: 2018  Last influenza vaccine: Declines   Last shingles vaccine: Plans to get at the [facility].   Last pneumonia vaccine: Agreeable to getting today  Last COVID vaccine: Declines wanting.   Hep C screen: We will check with routine labs  HIV screen: We will check with routine labs  AAA screen (age 65-78 with smoking hx): Not applicable  IVD (HTN, Hyperlipid, Smoking): Prescription sent for aspirin.  Lung CA screening (55-80, 30 pk smoking hx): Not applicable    Review of Systems   Constitutional: Positive for fatigue. Negative for activity change, chills and fever.   HENT: Negative for congestion, ear pain, hearing loss, rhinorrhea and sore throat.    Eyes: Positive for visual disturbance. Negative for pain.   Respiratory: Negative for cough, chest tightness, shortness of breath and wheezing.    Cardiovascular: Negative for chest pain, palpitations and peripheral edema.   Gastrointestinal: Negative for abdominal distention, abdominal pain, constipation, diarrhea, heartburn, hematochezia, nausea and vomiting.   Endocrine: Negative for cold intolerance, heat intolerance, polydipsia, polyphagia and polyuria.   Breasts:  Negative for tenderness, breast mass and discharge.   Genitourinary: Positive for menstrual problem (Irregular). Negative for difficulty urinating, dysuria, enuresis, frequency, genital sores,  "hematuria, pelvic pain, urgency, vaginal bleeding and vaginal discharge.   Musculoskeletal: Positive for arthralgias (swelling to left wrist) and joint swelling. Negative for myalgias.   Skin: Positive for color change. Negative for rash.   Neurological: Positive for dizziness and weakness (Left arm and then 1 month later left leg). Negative for light-headedness, numbness, headaches and paresthesias.   Psychiatric/Behavioral: Positive for decreased concentration. Negative for dysphoric mood, mood changes and sleep disturbance. The patient is not nervous/anxious.         OBJECTIVE:   BP (!) 159/81   Pulse 82   Temp 96.8  F (36  C) (Tympanic)   Resp 16   Ht 1.64 m (5' 4.57\")   Wt 61.1 kg (134 lb 12.8 oz)   LMP 05/20/2021 (Approximate)   SpO2 99%   Breastfeeding No   BMI 22.73 kg/m    Physical Exam  Constitutional:       Appearance: Normal appearance. She is well-developed.   HENT:      Head: Normocephalic and atraumatic.      Right Ear: Tympanic membrane and external ear normal. No middle ear effusion.      Left Ear: Tympanic membrane and external ear normal.  No middle ear effusion.      Nose: No mucosal edema.      Mouth/Throat:      Pharynx: Uvula midline.   Eyes:      Extraocular Movements: Extraocular movements intact.      Right eye: Normal extraocular motion.      Left eye: Normal extraocular motion.      Pupils: Pupils are equal, round, and reactive to light.   Neck:      Musculoskeletal: Normal range of motion.      Thyroid: No thyromegaly.      Vascular: No carotid bruit.   Cardiovascular:      Rate and Rhythm: Normal rate and regular rhythm.      Pulses:           Femoral pulses are 2+ on the right side and 2+ on the left side.       Dorsalis pedis pulses are 2+ on the right side and 2+ on the left side.        Posterior tibial pulses are 2+ on the right side and 2+ on the left side.      Heart sounds: Normal heart sounds.   Pulmonary:      Effort: Pulmonary effort is normal.      Breath sounds: " Normal breath sounds.   Chest:      Breasts: Breasts are symmetrical.         Right: No inverted nipple, mass, nipple discharge, skin change or tenderness.         Left: No inverted nipple, mass, nipple discharge, skin change or tenderness.   Abdominal:      General: Bowel sounds are normal.      Palpations: Abdomen is soft.      Tenderness: There is no abdominal tenderness.   Genitourinary:     Labia:         Right: No lesion.         Left: No lesion.       Vagina: Normal. No vaginal discharge or erythema.      Cervix: No cervical motion tenderness or discharge.      Uterus: Not enlarged.       Adnexa:         Right: No mass or tenderness.          Left: No mass or tenderness.     Musculoskeletal:      Left wrist: She exhibits decreased range of motion, tenderness, bony tenderness and swelling (slight). She exhibits no effusion, no crepitus and no deformity.   Skin:     General: Skin is warm and dry.      Capillary Refill: Capillary refill takes 2 to 3 seconds.      Findings: No rash.      Comments: Bilateral toes slightly erythemic   Neurological:      Mental Status: She is alert and oriented to person, place, and time.      Motor: No weakness.      Coordination: Romberg sign negative. Coordination normal.   Psychiatric:         Behavior: Behavior normal.         ASSESSMENT/PLAN:   Routine general medical examination at a health care facility  Screening guidelines reviewed.   - REVIEW OF HEALTH MAINTENANCE PROTOCOL ORDERS  - Lipid panel reflex to direct LDL Fasting; Future    Screen for colon cancer  - GASTROENTEROLOGY ADULT REF PROCEDURE ONLY; Future    Need for hepatitis C screening test  - Hepatitis C Screen Reflex to HCV RNA Quant and Genotype; Future    Screening for malignant neoplasm of cervix  - Pap imaged thin layer screen with HPV - recommended age 30 - 65 years (select HPV order below)    Screening for hyperlipidemia  Return for fasting labs    Advance care planning  Declined at this  time    Paresthesia  We will set up for MRI of the brain and carotids.  Much education given.  Encouraged to go directly to emergency department if symptoms return.  We will plan to start on baby aspirin daily.  - MR Brain w/o Contrast; Future  - diazepam (VALIUM) 5 MG tablet; Take 1 tablet 30 to 40 minutes prior to MRI.  May repeat after 30 minutes if needed.  You will need a .  - MRA Neck (Carotids) wo & w Contrast; Future  - aspirin (ASA) 81 MG EC tablet; Take 1 tablet (81 mg) by mouth daily    Essential hypertension with goal blood pressure less than 140/90  Blood pressure continues to be elevated.  We will add amlodipine.  Educated on use.  We will set up for fasting lab appointment.  We will plan for [facility] only appointment for recheck of blood pressure  - Albumin Random Urine Quantitative with Creat Ratio; Future  - amLODIPine (NORVASC) 5 MG tablet; Take 1 tablet (5 mg) by mouth daily  - losartan (COZAAR) 100 MG tablet; Take 1 tablet (100 mg) by mouth daily  - aspirin (ASA) 81 MG EC tablet; Take 1 tablet (81 mg) by mouth daily    Fatigue, unspecified type  We will have return for fasting labs.  If unremarkable consider sleep study in the future  - CBC with platelets differential; Future  - Comprehensive metabolic panel; Future  - TSH with free T4 reflex; Future  - Vitamin D Deficiency; Future    Nicotine abuse  Enc to have plan in place when decides to quit. Tips given  Choose quit date and stick with it  Smoking cessation meds discussed   - varenicline (CHANTIX CONTINUING MONTH NANCY) 1 MG tablet; Take 1 tablet (1 mg) by mouth 2 times daily  - varenicline (CHANTIX STARTING MONTH NANCY) 0.5 MG X 11 & 1 MG X 42 tablet; Take 0.5 mg tab daily for 3 days, THEN 0.5 mg tab twice daily for 4 days, THEN 1 mg twice daily.  - aspirin (ASA) 81 MG EC tablet; Take 1 tablet (81 mg) by mouth daily    Screening for HIV (human immunodeficiency virus)  - HIV Antigen Antibody Combo; Future    Left wrist pain  We will obtain  "imaging and labs here today.  Full plan will depend on outcome.  - CRP inflammation; Future  - Erythrocyte sedimentation rate auto; Future  - Anti Nuclear Kelsey IgG by IFA with Reflex; Future  - Rheumatoid factor; Future  - Cyclic Citrullinated Peptide Antibody IgG; Future  - Uric acid; Future  - XR Wrist Left G/E 3 Views; Future    Change of skin color  - VASCULAR MEDICINE REFERRAL; Future    Attention deficit disorder of adult  We will plan to continue current medications and dosages for now.  Once blood pressure controlled consider medication adjustment  - amphetamine-dextroamphetamine (ADDERALL XR) 20 MG 24 hr capsule; Take 1 capsule (20 mg) by mouth daily Needs appointment for further refills.  - amphetamine-dextroamphetamine (ADDERALL) 20 MG tablet; Take 1 tablet (20 mg) by mouth daily Take in the early afternoon. Needs appointment for further refills.    Irregular periods/menstrual cycles  Likely perimenopausal.  Information given    Routine screening for STI (sexually transmitted infection)  - Chlamydia trachomatis PCR  - Neisseria gonorrhoeae PCR  - Wet prep    Patient has been advised of split billing requirements and indicates understanding: Yes  COUNSELING:  Reviewed preventive health counseling, as reflected in patient instructions       Regular exercise       Healthy diet/nutrition       Immunizations    Vaccinated for: Pneumococcal             Aspirin prophylaxis       Colon cancer screening       Consider Hep C screening for all patients one time for ages 18-79 years       HIV screeninx in teen years, 1x in adult years, and at intervals if high risk       Consider lung cancer screening for ages 55-80 years and 30 pack-year smoking history        Advance Care Planning    Estimated body mass index is 22.73 kg/m  as calculated from the following:    Height as of this encounter: 1.64 m (5' 4.57\").    Weight as of this encounter: 61.1 kg (134 lb 12.8 oz).        She reports that she has been smoking " cigarettes. She has a 10.00 pack-year smoking history. She has never used smokeless tobacco.  Tobacco Cessation Action Plan:   Pharmacotherapies : Chantix      Counseling Resources:  ATP IV Guidelines  Pooled Cohorts Equation Calculator  Breast Cancer Risk Calculator  BRCA-Related Cancer Risk Assessment: FHS-7 Tool  FRAX Risk Assessment  ICSI Preventive Guidelines  Dietary Guidelines for Americans, 2010  USDA's MyPlate  ASA Prophylaxis  Lung CA Screening    SULY Melvin CNP  [facility] [place]

## 2021-06-23 NOTE — RESULT ENCOUNTER NOTE
Libertad,    You are positive for bacterial vaginosis. This is not a sexually transmitted disease. I have sent a prescription to the pharmacy for flagyl 500 mg orally daily for the next 7 days. Do not drink alcohol with this med as it can make you very ill. Please follow up if your symptoms do not improve.     Symone Acosta NP

## 2021-06-24 ENCOUNTER — MYC REFILL (OUTPATIENT)
Dept: FAMILY MEDICINE | Facility: CLINIC | Age: 56
End: 2021-06-24

## 2021-06-24 DIAGNOSIS — F98.8 ATTENTION DEFICIT DISORDER OF ADULT: ICD-10-CM

## 2021-06-24 LAB
C TRACH DNA SPEC QL NAA+PROBE: NEGATIVE
N GONORRHOEA DNA SPEC QL NAA+PROBE: NEGATIVE
SPECIMEN SOURCE: NORMAL
SPECIMEN SOURCE: NORMAL

## 2021-06-24 RX ORDER — DEXTROAMPHETAMINE SACCHARATE, AMPHETAMINE ASPARTATE, DEXTROAMPHETAMINE SULFATE AND AMPHETAMINE SULFATE 5; 5; 5; 5 MG/1; MG/1; MG/1; MG/1
20 TABLET ORAL DAILY
Qty: 30 TABLET | Refills: 0 | Status: CANCELLED | OUTPATIENT
Start: 2021-06-24

## 2021-06-24 RX ORDER — DEXTROAMPHETAMINE SACCHARATE, AMPHETAMINE ASPARTATE MONOHYDRATE, DEXTROAMPHETAMINE SULFATE AND AMPHETAMINE SULFATE 5; 5; 5; 5 MG/1; MG/1; MG/1; MG/1
20 CAPSULE, EXTENDED RELEASE ORAL DAILY
Qty: 30 CAPSULE | Refills: 0 | Status: CANCELLED | OUTPATIENT
Start: 2021-06-24

## 2021-06-28 ENCOUNTER — OFFICE VISIT (OUTPATIENT)
Dept: SURGERY | Facility: CLINIC | Age: 56
End: 2021-06-28
Payer: COMMERCIAL

## 2021-06-28 VITALS
DIASTOLIC BLOOD PRESSURE: 83 MMHG | BODY MASS INDEX: 22.77 KG/M2 | HEART RATE: 83 BPM | SYSTOLIC BLOOD PRESSURE: 163 MMHG | WEIGHT: 135 LBS

## 2021-06-28 DIAGNOSIS — D17.21 LIPOMA OF RIGHT AXILLA: ICD-10-CM

## 2021-06-28 DIAGNOSIS — D17.9 SPINDLE CELL LIPOMA: Primary | ICD-10-CM

## 2021-06-28 LAB
COPATH REPORT: NORMAL
PAP: NORMAL

## 2021-06-28 PROCEDURE — 99024 POSTOP FOLLOW-UP VISIT: CPT | Performed by: SURGERY

## 2021-06-28 NOTE — PROGRESS NOTES
General Surgery Post Op    Pt returns for follow up visit s/p excision of lipoma right axilla and left occipital area on 6/8/2021.    Patient has been doing well, no wound healing concerns.  No pain problems.    Physical exam: Vitals: BP (!) 163/83   Pulse 83   Wt 61.2 kg (135 lb)   BMI 22.77 kg/m    BMI= Body mass index is 22.77 kg/m .    Exam:  Constitutional: healthy, alert and no distress  Skin: Left occipital and right axillary incisions are healing well no signs of infection.  No abnormal swellings or mass.  Some expected firmness right under the incisions    Path:  FINAL DIAGNOSIS:   - A. Soft tissue mass, left occiput:   - Spindle cell lipoma   - Negative for malignancy     B. 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 Lipoma of right axilla  D17.21      Plan: Patient recovering well.  Went over pathology and simple excision of the lipomas is sufficient for both the regular lipoma as well as the spindle cell type.  Follow-up can be as needed    Artis Domínguez MD

## 2021-06-29 LAB
FINAL DIAGNOSIS: NORMAL
HPV HR 12 DNA CVX QL NAA+PROBE: NEGATIVE
HPV16 DNA SPEC QL NAA+PROBE: NEGATIVE
HPV18 DNA SPEC QL NAA+PROBE: NEGATIVE
SPECIMEN DESCRIPTION: NORMAL
SPECIMEN SOURCE CVX/VAG CYTO: NORMAL

## 2021-06-30 DIAGNOSIS — R53.83 FATIGUE, UNSPECIFIED TYPE: ICD-10-CM

## 2021-06-30 DIAGNOSIS — M25.532 LEFT WRIST PAIN: ICD-10-CM

## 2021-06-30 DIAGNOSIS — I10 ESSENTIAL HYPERTENSION WITH GOAL BLOOD PRESSURE LESS THAN 140/90: ICD-10-CM

## 2021-06-30 DIAGNOSIS — Z00.00 ROUTINE GENERAL MEDICAL EXAMINATION AT A HEALTH CARE FACILITY: ICD-10-CM

## 2021-06-30 DIAGNOSIS — Z11.4 SCREENING FOR HIV (HUMAN IMMUNODEFICIENCY VIRUS): ICD-10-CM

## 2021-06-30 DIAGNOSIS — Z11.59 NEED FOR HEPATITIS C SCREENING TEST: ICD-10-CM

## 2021-06-30 LAB
ALBUMIN SERPL-MCNC: 3.6 G/DL (ref 3.4–5)
ALP SERPL-CCNC: 76 U/L (ref 40–150)
ALT SERPL W P-5'-P-CCNC: 21 U/L (ref 0–50)
ANION GAP SERPL CALCULATED.3IONS-SCNC: 1 MMOL/L (ref 3–14)
AST SERPL W P-5'-P-CCNC: 13 U/L (ref 0–45)
BILIRUB SERPL-MCNC: 0.3 MG/DL (ref 0.2–1.3)
BUN SERPL-MCNC: 12 MG/DL (ref 7–30)
CALCIUM SERPL-MCNC: 9 MG/DL (ref 8.5–10.1)
CHLORIDE SERPL-SCNC: 107 MMOL/L (ref 94–109)
CHOLEST SERPL-MCNC: 174 MG/DL
CO2 SERPL-SCNC: 30 MMOL/L (ref 20–32)
CREAT SERPL-MCNC: 0.58 MG/DL (ref 0.52–1.04)
CREAT UR-MCNC: 149 MG/DL
CRP SERPL-MCNC: 4.2 MG/L (ref 0–8)
ERYTHROCYTE [SEDIMENTATION RATE] IN BLOOD BY WESTERGREN METHOD: 19 MM/H (ref 0–30)
GFR SERPL CREATININE-BSD FRML MDRD: >90 ML/MIN/{1.73_M2}
GLUCOSE SERPL-MCNC: 102 MG/DL (ref 70–99)
HCV AB SERPL QL IA: NONREACTIVE
HDLC SERPL-MCNC: 54 MG/DL
LDLC SERPL CALC-MCNC: 104 MG/DL
MICROALBUMIN UR-MCNC: 10 MG/L
MICROALBUMIN/CREAT UR: 6.78 MG/G CR (ref 0–25)
NONHDLC SERPL-MCNC: 120 MG/DL
POTASSIUM SERPL-SCNC: 4 MMOL/L (ref 3.4–5.3)
PROT SERPL-MCNC: 7.4 G/DL (ref 6.8–8.8)
SODIUM SERPL-SCNC: 138 MMOL/L (ref 133–144)
TRIGL SERPL-MCNC: 81 MG/DL
TSH SERPL DL<=0.005 MIU/L-ACNC: 1.11 MU/L (ref 0.4–4)
URATE SERPL-MCNC: 3.5 MG/DL (ref 2.6–6)

## 2021-06-30 PROCEDURE — 80061 LIPID PANEL: CPT | Performed by: NURSE PRACTITIONER

## 2021-06-30 PROCEDURE — 86701 HIV-1ANTIBODY: CPT | Performed by: NURSE PRACTITIONER

## 2021-06-30 PROCEDURE — 86803 HEPATITIS C AB TEST: CPT | Performed by: NURSE PRACTITIONER

## 2021-06-30 PROCEDURE — 86038 ANTINUCLEAR ANTIBODIES: CPT | Performed by: NURSE PRACTITIONER

## 2021-06-30 PROCEDURE — 85652 RBC SED RATE AUTOMATED: CPT | Performed by: NURSE PRACTITIONER

## 2021-06-30 PROCEDURE — 36415 COLL VENOUS BLD VENIPUNCTURE: CPT | Performed by: NURSE PRACTITIONER

## 2021-06-30 PROCEDURE — 86140 C-REACTIVE PROTEIN: CPT | Performed by: NURSE PRACTITIONER

## 2021-06-30 PROCEDURE — 87389 HIV-1 AG W/HIV-1&-2 AB AG IA: CPT | Performed by: NURSE PRACTITIONER

## 2021-06-30 PROCEDURE — 82043 UR ALBUMIN QUANTITATIVE: CPT | Performed by: NURSE PRACTITIONER

## 2021-06-30 PROCEDURE — 86431 RHEUMATOID FACTOR QUANT: CPT | Performed by: NURSE PRACTITIONER

## 2021-06-30 PROCEDURE — 84443 ASSAY THYROID STIM HORMONE: CPT | Performed by: NURSE PRACTITIONER

## 2021-06-30 PROCEDURE — 82306 VITAMIN D 25 HYDROXY: CPT | Performed by: NURSE PRACTITIONER

## 2021-06-30 PROCEDURE — 80053 COMPREHEN METABOLIC PANEL: CPT | Performed by: NURSE PRACTITIONER

## 2021-06-30 PROCEDURE — 86200 CCP ANTIBODY: CPT | Performed by: NURSE PRACTITIONER

## 2021-06-30 PROCEDURE — 86702 HIV-2 ANTIBODY: CPT | Performed by: NURSE PRACTITIONER

## 2021-06-30 PROCEDURE — 84550 ASSAY OF BLOOD/URIC ACID: CPT | Performed by: NURSE PRACTITIONER

## 2021-07-01 ENCOUNTER — TELEPHONE (OUTPATIENT)
Dept: FAMILY MEDICINE | Facility: CLINIC | Age: 56
End: 2021-07-01

## 2021-07-01 DIAGNOSIS — Z21 HIV ANTIBODY POSITIVE (H): Primary | ICD-10-CM

## 2021-07-01 LAB
ANA SER QL IF: NEGATIVE
CCP AB SER IA-ACNC: >340 U/ML
DEPRECATED CALCIDIOL+CALCIFEROL SERPL-MC: 50 UG/L (ref 20–75)
HIV 1 & 2 AB SERPL IA.RAPID: ABNORMAL
HIV 1 & 2 AB SERPL IA.RAPID: NONREACTIVE
HIV 1+2 AB+HIV1 P24 AG SERPL QL IA: REACTIVE
HIV 1+2 AB+HIV1P24 AG SERPLBLD IA.RAPID: ABNORMAL
RHEUMATOID FACT SER NEPH-ACNC: 58 IU/ML (ref 0–20)

## 2021-07-01 NOTE — TELEPHONE ENCOUNTER
Spoke with patient and discussed her results.  Per patient, she denies any history of IV drug abuse, high risk sex behavior or transfusion.  Would like confirmation test and provided patient with [phone] to schedule your laboratory appointment.  Patient voiced understanding.

## 2021-07-01 NOTE — TELEPHONE ENCOUNTER
Frederick Sood DOPhysicianSigned  1:44 PM    Addend             Attempted to call patient back and left message to call back.  Please refer to my previous note that was inadvertently closed.

## 2021-07-01 NOTE — TELEPHONE ENCOUNTER
Attempted to call patient back and left message to call back.  Please refer to my previous note that was inadvertently closed.

## 2021-07-01 NOTE — TELEPHONE ENCOUNTER
Linette from lab calling with critical lab.  Symone Acosta is not in office.  Will send to provider pool to advise.,    Status:  Final result   Visible to patient:  No (not released) Dx:  Screening for HIV (human immunodefici...    Ref Range & Units 1d ago    HIV Antigen Antibody Combo NR^Nonreactive     ReactiveAbnormal     Comment: HIV-1 p24 Ag and/or HIV-1/2 Ab Detected, Differentiation assay to follow.

## 2021-07-01 NOTE — TELEPHONE ENCOUNTER
I found the closed encounter and re-routed to RN pool; we can address as an addendum.    Lucy Vazquez RN  [facility]

## 2021-07-01 NOTE — TELEPHONE ENCOUNTER
I see Dr. Sood spoke to patient, she will have additional testing done and he provided her with scheduling number.    Lucy Vazquez RN  [facility]

## 2021-07-01 NOTE — TELEPHONE ENCOUNTER
Attempted to call patient and left message to call back.  If patient calls back, please let me know (may pull me out of exam room).  I would like to discuss sensitive lab results and next steps.    1. HIV antibody positive (H)  - HIV 1 antibody Western blot confirm; Future

## 2021-07-05 DIAGNOSIS — Z21 HIV ANTIBODY POSITIVE (H): ICD-10-CM

## 2021-07-05 PROCEDURE — 36415 COLL VENOUS BLD VENIPUNCTURE: CPT | Performed by: FAMILY MEDICINE

## 2021-07-05 PROCEDURE — 86689 HTLV/HIV CONFIRMJ ANTIBODY: CPT | Mod: 90 | Performed by: FAMILY MEDICINE

## 2021-07-05 PROCEDURE — 99000 SPECIMEN HANDLING OFFICE-LAB: CPT | Performed by: FAMILY MEDICINE

## 2021-07-06 LAB — HIV1 AB SERPL QL IB: NEGATIVE

## 2021-07-22 ENCOUNTER — MYC REFILL (OUTPATIENT)
Dept: FAMILY MEDICINE | Facility: CLINIC | Age: 56
End: 2021-07-22

## 2021-07-22 DIAGNOSIS — F98.8 ATTENTION DEFICIT DISORDER OF ADULT: ICD-10-CM

## 2021-07-23 RX ORDER — DEXTROAMPHETAMINE SACCHARATE, AMPHETAMINE ASPARTATE, DEXTROAMPHETAMINE SULFATE AND AMPHETAMINE SULFATE 5; 5; 5; 5 MG/1; MG/1; MG/1; MG/1
20 TABLET ORAL DAILY
Qty: 30 TABLET | Refills: 0 | Status: SHIPPED | OUTPATIENT
Start: 2021-07-23 | End: 2021-08-19

## 2021-07-23 RX ORDER — DEXTROAMPHETAMINE SACCHARATE, AMPHETAMINE ASPARTATE MONOHYDRATE, DEXTROAMPHETAMINE SULFATE AND AMPHETAMINE SULFATE 5; 5; 5; 5 MG/1; MG/1; MG/1; MG/1
20 CAPSULE, EXTENDED RELEASE ORAL DAILY
Qty: 30 CAPSULE | Refills: 0 | Status: SHIPPED | OUTPATIENT
Start: 2021-07-23 | End: 2021-08-19

## 2021-07-23 NOTE — TELEPHONE ENCOUNTER
Requested Prescriptions   Pending Prescriptions Disp Refills     amphetamine-dextroamphetamine (ADDERALL XR) 20 MG 24 hr capsule 30 capsule 0     Sig: Take 1 capsule (20 mg) by mouth daily Needs appointment for further refills.       There is no refill protocol information for this order        amphetamine-dextroamphetamine (ADDERALL) 20 MG tablet 30 tablet 0     Sig: Take 1 tablet (20 mg) by mouth daily Take in the early afternoon. Needs appointment for further refills.       There is no refill protocol information for this order        Last Written Prescription Date:  6/23/21  Last Fill Quantity: 30,  # refills: 0   Last office visit: 6/23/2021 with prescribing provider    Routing refill request to provider for review/approval because:  Drug not on the [facility] refill protocol

## 2021-08-09 NOTE — PROGRESS NOTES
Rheumatology Clinic Visit 1     Libertad Russell MRN# [id number]   YOB: 1965 Age: 55 year old     Date of Visit: 08/14/2021  Primary care provider: Symone Acosta          Assessment and Plan:     # Joint pain: Patient relates longstanding, migratory, waxing and waning polyarticular pain and swelling, along with prolonged morning stiffness.  Physical exam shows synovitis and tenderness at the left wrist with markedly restricted range of motion; there is cool effusion at the left knee and tenderness at the right ankle.  There is valgus angulation at the left second toe with synovial thickening palpable at several MTPs.  Laboratory evaluation on June 30, 2021 showed normal comprehensive metabolic panel; uric acid was 3.5; TSH, vitamin D, and cholesterol were normal.  Rheumatoid factor was elevated at 58, and cyclic citrullinated peptide antibodies were greater than 340 units.  MOY was negative.  CRP was normal at 4.2 left wrist x-ray done on June 23, 2021 showed severe joint space narrowing with erosions and cysts in the radiocarpal joint.    Picture is compatible with new diagnosis of rheumatoid arthritis with inflammatory involvement of many joints including the fingers, wrists, shoulders, ankles, and forefeet.  Seropositivity with rheumatoid factor and ACPA portend a difficult to treat and destructive course.  Exam features suggest longstanding destructive inflammatory disease with likely cartilage loss at the wrist and several MTPs.  I recommend checking foot x-rays, and beginning treatment.  Treatment will consist of combination short-term anti-inflammatory medication with prednisone, and longer acting joint protecting medication with methotrexate.  Plan will be to escalate methotrexate to maximum dose of 25 mg/week pending response and tolerability.  If methotrexate is well-tolerated, but symptoms are less than 90% suppressed after treatment with maximum dose, I will recommend  consideration of triple therapy with added hydroxychloroquine and sulfasalazine versus addition of tumor necrosis factor inhibitor combination with methotrexate.    We discussed that some current pain is likely due to cartilage loss, such as left wrist pain. It is likely that medication will not provide complete relief of symptoms arising in the wrist.  Intervention with bracing, injections, and surgery may be needed over time.  For now, continue use of flexible bracing, anti-inflammatory medications such as ibuprofen to relieve pain.    Plan:  1.  Start prednisone 20 mg once a day for 1 week, then 15 mg once a day for 1 week then 10 mg once a day for 1 week.  2.  Start methotrexate 6 tablets (15 mg) once weekly. While on methotrexate:   -- Check labs every 3 months (AST/ALT, Albumin, CBC with platelets)   -- Limit alcohol intake to 2 drinks weekly; use folate 1 mg daily.  --Tylenol 500-1000 mg can be used as needed up to three times daily for nausea/headache associated with dosing.  --If after 1 month, joint symptoms persist, I will recommend increasing methotrexate to 20 mg (8 tablets) once weekly.  3. Stop smoking, as patient states is planned  4. Continue with aggressive dental care for advanced caries.    RTC 2 months  Butch Odom MD  Staff Rheumatologist, [facility]              Active Problem List:     Patient Active Problem List    Diagnosis Date Noted     Paresthesia 06/23/2021     Priority: Medium     Nicotine abuse 06/23/2021     Priority: Medium     Irregular periods/menstrual cycles 06/23/2021     Priority: Medium     Lipoma of right axilla 05/12/2021     Priority: Medium     Added automatically from request for surgery [id number]       Lipoma of neck 05/12/2021     Priority: Medium     Added automatically from request for surgery [id number]       Essential hypertension with goal blood pressure less than 140/90 07/26/2016     Priority: Medium     General medical exam 10/01/2012     Priority: Medium      EMERGENCY CARE PLAN  Presenting Problem Signs and Symptoms Treatment Plan    Questions or conerns during clinic hours    I will call the clinic directly     Questions or conerns outside clinic hours    I will call the 24 hour nurse line at [phone]    Patient needs to schedule an appointment    I will call the 24 hour scheduling team at [phone] or clinic directly    Same day treatment     I will call the clinic first, nurse line if after hours, urgent care and express care if needed                            Problem list name updated by automated process. Provider to review       Raynaud phenomenon 09/24/2012     Priority: Medium     Rosacea 10/18/2011     Priority: Medium     June 22, 2013 improves with metrogel. One year refill.        CARDIOVASCULAR SCREENING; LDL GOAL LESS THAN 160 10/31/2010     Priority: Medium     Attention deficit disorder of adult 09/09/2010     Priority: Medium     June 22, 2013 appears be doing well, benefits from the medication with minimal side effects.  July 2, 2013 patient requesting increased dosage. At her next prescription will try Adderall XR 20 mg am, Adderall, short acting 10 mg, in the afternoon. Available July 10, 2013. Update in one month.   January 16, 2014 Needs appointment for further refills.    September 22, 2019 patient was seen September 18, 2019.  ADD addressed, okay with refills for 6 months.       Seasonal allergic rhinitis 07/11/2008     Priority: Medium     Tobacco use disorder 04/11/2007     Priority: Medium     June 22, 2013 will try Chantix. Reviewed side effects including increased depressive symptoms or anxiety, nausea. Reviewed smoking cessation.              History of Present Illness:   Libertad Russell presents for evaluation of inflammatory arthritis.  Referred by provider Karla.    Patient was seen by provider Karla in June 2021 for general medical checkup.  History of wrist swelling and pain and constitutional symptoms was elicited.   "Swelling at the left wrist was observed, and patient was referred to rheumatology.    Today, she reports that she first had symptoms 18 months ago, when both feet became painful. There was mild visible swelling in the toes. It was hard to walk. Pain receded, but recurred in afew months, and has been \"coming and going\" since. Pain is in the ball of the feet In the mornings it is \"super hard\" to get moving. Pain remits during the day.    Approximately 1 year ago (during pandemic), she noted \"on and off\" wrist pain. She developed a lump on the outside of the left wrist, associated with swelling and warmth. Since then, there has been swelling in both wrists. Pain is most noticeable in morning. Early morning stiffness is ~ 3 hours.    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    There were episodes of L arm and L leg numbness and weakness, starting several weeks ago. She has an MRI brain scheduled.    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           Review of Systems:       Constitutional: negative--gaining wt, no F/C/S  Skin: negative  Eyes: negative  Ears/Nose/Throat: negative  Respiratory: No shortness of breath, dyspnea on exertion, cough, or hemoptysis  Cardiovascular: negative  Gastrointestinal: negative  Genitourinary: negative  Musculoskeletal: negative  Neurologic: HPI  Psychiatric: negative  Hematologic/Lymphatic/Immunologic: negative  Endocrine: negative          Past Medical History:     Past Medical History:   Diagnosis Date     Arthritis     mother     Cancer (H) 2012    mother     Depressive disorder      History of blood transfusion     sister no iron     Hypertension 2010    mother     Thyroid disease     mother and 2 sisters     Past Surgical History:   Procedure Laterality Date     BREAST SURGERY       COSMETIC SURGERY      breast implants     EXCISE MASS AXILLA Right 6/8/2021    " Procedure: Excise Mass Axilla;  Surgeon: Artis Domínguez MD;  Location: [facility] OR     EXCISE TUMOR, SOFT TISSUE, NECK/THORAX, SUBCUTANEOUS N/A 6/8/2021    Procedure: EXCISION, soft tissue mass back of head;  Surgeon: Artis Domínguez MD;  Location: [facility] OR     EYE SURGERY  2015    l[facility]ik     LYMPH NODE BIOPSY  2001    told to be benign     SURGICAL HISTORY OF -   2000, 1998    breast biopsies     TUBAL LIGATION  2003            Social History:     Social History     Occupational History     Not on file   Tobacco Use     Smoking status: Current Every Day Smoker     Packs/day: 1.00     Years: 10.00     Pack years: 10.00     Types: Cigarettes     Smokeless tobacco: Never Used   Substance and Sexual Activity     Alcohol use: Yes     Comment: occasional     Drug use: No     Sexual activity: Yes     Partners: Male     Birth control/protection: Female Surgical     Comment: tubal     3 children; all healthy except ADD  Single  No EtOH for about a year.  Smokes 1 ppd X 30 years. Plans quitting next week       Family History:     Family History   Problem Relation Age of Onset     Hypertension Mother      Cancer Mother         lung cancer     Other Cancer Mother         Lung     Thyroid Disease Mother      C.A.D. Father         52 at first MI     Prostate Cancer Father         Bladder     Hypertension Other      Thyroid Disease Sister      Thyroid Disease Sister      Diabetes No family hx of      Cerebrovascular Disease No family hx of      Breast Cancer No family hx of      Cancer - colorectal No family hx of    Mother had wrist pain/swelling; probably diagnosed with RA.         Allergies:   No Known Allergies         Medications:     Current Outpatient Medications   Medication Sig Dispense Refill     amLODIPine (NORVASC) 5 MG tablet Take 1 tablet (5 mg) by mouth daily 30 tablet 1     amphetamine-dextroamphetamine (ADDERALL XR) 20 MG 24 hr capsule Take 1 capsule (20 mg) by mouth daily Needs appointment for further  "refills. 30 capsule 0     aspirin (ASA) 81 MG EC tablet Take 1 tablet (81 mg) by mouth daily 90 tablet 3     losartan (COZAAR) 100 MG tablet Take 1 tablet (100 mg) by mouth daily 90 tablet 3     methotrexate 2.5 MG tablet Take 6 tablets (15 mg) by mouth every 7 days 24 tablet 3     metroNIDAZOLE (METROGEL) 0.75 % external gel Apply 1 g topically 2 times daily 45 g 0     predniSONE (DELTASONE) 5 MG tablet Take 4 tablets once a day for 1 week, then take 3 tablets once a day for 1 week, then take 2 tablets once a day for 1 week, then off. 65 tablet 1     amphetamine-dextroamphetamine (ADDERALL) 20 MG tablet Take 1 tablet (20 mg) by mouth daily Take in the early afternoon. Needs appointment for further refills. (Patient not taking: Reported on 8/10/2021) 30 tablet 0     diazepam (VALIUM) 5 MG tablet Take 1 tablet 30 to 40 minutes prior to MRI.  May repeat after 30 minutes if needed.  You will need a . (Patient not taking: Reported on 8/10/2021) 2 tablet 0     varenicline (CHANTIX CONTINUING MONTH PAK) 1 MG tablet Take 1 tablet (1 mg) by mouth 2 times daily (Patient not taking: Reported on 8/10/2021) 60 tablet 1     varenicline (CHANTIX STARTING MONTH PAK) 0.5 MG X 11 & 1 MG X 42 tablet Take 0.5 mg tab daily for 3 days, THEN 0.5 mg tab twice daily for 4 days, THEN 1 mg twice daily. (Patient not taking: Reported on 8/10/2021) 53 tablet 0   Using adderall for ~ 8 years; ADD symptoms are much improved.         Physical Exam:   Blood pressure (!) 148/89, pulse 80, temperature 97.8  F (36.6  C), temperature source Oral, resp. rate 18, height 1.64 m (5' 4.57\"), weight 59.9 kg (132 lb), SpO2 99 %, not currently breastfeeding.  Wt Readings from Last 6 Encounters:   08/10/21 59.9 kg (132 lb)   06/28/21 61.2 kg (135 lb)   06/23/21 61.1 kg (134 lb 12.8 oz)   05/26/21 61.2 kg (135 lb)   05/03/21 62.1 kg (137 lb)   10/14/19 59.4 kg (131 lb)       Constitutional: well-developed, appearing stated age; cooperative  Eyes: nl EOM, " PERRLA, vision, conjunctiva, sclera  ENT: nl external ears, nose, hearing, lips, teeth, gums, throat  No mucous membrane lesions, normal saliva pool  Neck: no mass or thyroid enlargement  Resp: lungs clear to auscultation with reduced airflow intensity  Lymph: no cervical, supraclavicular, inguinal or epitrochlear nodes  MS:synovitis and tenderness at the left wrist with markedly restricted range of motion; there is cool effusion at the left knee and tenderness at the right ankle.  There is valgus angulation at the left second toe with synovial thickening palpable at several MTPs.  Skin: no nail pitting, alopecia, rash, nodules or lesions  Neuro: nl cranial nerves, strength, sensation, DTRs.   Psych: nl judgement, orientation, memory, affect.         Data:     @  RHEUM RESULTS Latest Ref Rng & Units 11/29/2005 3/25/2008 7/30/2010   ALBUMIN 3.4 - 5.0 g/dL 4.6 - -   ALT 0 - 50 U/L 23 - -   AST 0 - 45 U/L 27 - -   MOY INTERPRETATION NEG:Negative - - -   CREATININE 0.52 - 1.04 mg/dL 0.60 - 0.65   CRP 0.0 - 8.0 mg/L - - <5.0   ANDREY 0 - 1.0 - - <1.0  Interpretation:  Negative   GFR ESTIMATE, IF BLACK >60 mL/min/[1.73:m2] >80 - >90   GFR ESTIMATE >60 mL/min/[1.73:m2] >80 - >90   HEMATOCRIT 35.0 - 47.0 % - - 48.3(H)   HEMOGLOBIN 11.7 - 15.7 g/dL - 12.9 16.7(H)   HCVAB NR:Nonreactive - - -   WBC 4.0 - 11.0 10e9/L - - 4.5   RBC 3.8 - 5.2 10e12/L - - 5.19   RDW 10.0 - 15.0 % - - 12.7   MCHC 31.5 - 36.5 g/dL - - 34.6   MCV 78 - 100 fl - - 93   PLATELET COUNT 150 - 450 10e9/L - - 198   RHEUMATOID FACTOR <12 IU/mL - - 11   ESR 0 - 30 mm/h - - 11       Rheumatoid Factor   Date Value Ref Range Status   06/30/2021 58 (H) <12 IU/mL Final   ,   Cyclic Citrullinated Peptide Antibody, IgG   Date Value Ref Range Status   06/30/2021 >340 (H) <7 U/mL Final   ,  ,  ,  ,  ,   MOY interpretation   Date Value Ref Range Status   06/30/2021 Negative NEG^Negative Final     Comment:                                        Reference range:  <1:40   NEGATIVE  1:40 - 1:80  BORDERLINE POSITIVE  >1:80 POSITIVE     ,   MOY Screen by EIA   Date Value Ref Range Status   07/30/2010 <1.0  Interpretation:  Negative 0 - 1.0 Final   ,  ,  ,  ,  ,  ,  ,  ,  ,  ,  ,  ,  ,  ,  ,  ,  ,  ,  ,  ,  ,  ,  ,  ,  ,  ,  ,  ,  ,  ,  ,  ,  ,  ,  ,  ,  ,       Reviewed Rheumatology lab flowsheet

## 2021-08-09 NOTE — TELEPHONE ENCOUNTER
NOTES Status Details   OFFICE NOTE from referring provider Internal 06.23.2021 Symone Acosta, SULY CNP   OFFICE NOTE from other specialist N/A    DISCHARGE SUMMARY from hospital N/A    DISCHARGE REPORT from the ER N/A    MEDICATION LIST Internal    LABS (Any and all labs)      Internal    Biopsy/pathology (Anything related to diagnoses I.e. fluid aspirations, lip biopsy, muscle biopsy)      Internal 06.30.2021 CRP Inflammation    Internal 06.30.2021 Rheumatoid Factor   Imaging (All imaging related to diagnoses)     Echo N/A    HRCT N/A    CXR N/A    EMG N/A                   Scleroderma/Dermatomyositis diagnoses     Previous Cardiology notes  N/A    Previous Pulmonary notes N/A    Previous Dermatology notes N/A    Previous GI notes N/A    Lupus diagnoses     Previous Nephrology notes N/A    Previous Dermatology notes N/A    Previous Cardiology notes N/A

## 2021-08-10 ENCOUNTER — OFFICE VISIT (OUTPATIENT)
Dept: RHEUMATOLOGY | Facility: CLINIC | Age: 56
End: 2021-08-10
Attending: INTERNAL MEDICINE
Payer: COMMERCIAL

## 2021-08-10 ENCOUNTER — PRE VISIT (OUTPATIENT)
Dept: RHEUMATOLOGY | Facility: CLINIC | Age: 56
End: 2021-08-10

## 2021-08-10 VITALS
HEIGHT: 65 IN | TEMPERATURE: 97.8 F | HEART RATE: 80 BPM | BODY MASS INDEX: 21.99 KG/M2 | WEIGHT: 132 LBS | RESPIRATION RATE: 18 BRPM | DIASTOLIC BLOOD PRESSURE: 89 MMHG | OXYGEN SATURATION: 99 % | SYSTOLIC BLOOD PRESSURE: 148 MMHG

## 2021-08-10 DIAGNOSIS — M19.90 INFLAMMATORY ARTHRITIS: ICD-10-CM

## 2021-08-10 DIAGNOSIS — M05.741 RHEUMATOID ARTHRITIS INVOLVING BOTH HANDS WITH POSITIVE RHEUMATOID FACTOR (H): Primary | ICD-10-CM

## 2021-08-10 DIAGNOSIS — M05.742 RHEUMATOID ARTHRITIS INVOLVING BOTH HANDS WITH POSITIVE RHEUMATOID FACTOR (H): Primary | ICD-10-CM

## 2021-08-10 PROCEDURE — G0463 HOSPITAL OUTPT CLINIC VISIT: HCPCS

## 2021-08-10 PROCEDURE — 99205 OFFICE O/P NEW HI 60 MIN: CPT | Performed by: INTERNAL MEDICINE

## 2021-08-10 RX ORDER — PREDNISONE 5 MG/1
TABLET ORAL
Qty: 65 TABLET | Refills: 1 | Status: ON HOLD | OUTPATIENT
Start: 2021-08-10 | End: 2021-10-22

## 2021-08-10 ASSESSMENT — PAIN SCALES - GENERAL: PAINLEVEL: MILD PAIN (3)

## 2021-08-10 ASSESSMENT — MIFFLIN-ST. JEOR: SCORE: 1197.75

## 2021-08-10 NOTE — LETTER
8/10/2021       RE: Libertad Russell  [address] [facility] [address]     Dear Colleague,    Thank you for referring your patient, Libertad Russell, to the [facility] RHEUMATOLOGY CLINIC [place] at [facility]. Please see a copy of my visit note below.    Rheumatology Clinic Visit 1     Libertad Russell MRN# [id number]   YOB: 1965 Age: 55 year old     Date of Visit: 08/14/2021  Primary care provider: Symone Acosta          Assessment and Plan:     # Joint pain: Patient relates longstanding, migratory, waxing and waning polyarticular pain and swelling, along with prolonged morning stiffness.  Physical exam shows synovitis and tenderness at the left wrist with markedly restricted range of motion; there is cool effusion at the left knee and tenderness at the right ankle.  There is valgus angulation at the left second toe with synovial thickening palpable at several MTPs.  Laboratory evaluation on June 30, 2021 showed normal comprehensive metabolic panel; uric acid was 3.5; TSH, vitamin D, and cholesterol were normal.  Rheumatoid factor was elevated at 58, and cyclic citrullinated peptide antibodies were greater than 340 units.  MOY was negative.  CRP was normal at 4.2 left wrist x-ray done on June 23, 2021 showed severe joint space narrowing with erosions and cysts in the radiocarpal joint.    Picture is compatible with new diagnosis of rheumatoid arthritis with inflammatory involvement of many joints including the fingers, wrists, shoulders, ankles, and forefeet.  Seropositivity with rheumatoid factor and ACPA portend a difficult to treat and destructive course.  Exam features suggest longstanding destructive inflammatory disease with likely cartilage loss at the wrist and several MTPs.  I recommend checking foot x-rays, and beginning treatment.  Treatment will consist of combination short-term anti-inflammatory medication  with prednisone, and longer acting joint protecting medication with methotrexate.  Plan will be to escalate methotrexate to maximum dose of 25 mg/week pending response and tolerability.  If methotrexate is well-tolerated, but symptoms are less than 90% suppressed after treatment with maximum dose, I will recommend consideration of triple therapy with added hydroxychloroquine and sulfasalazine versus addition of tumor necrosis factor inhibitor combination with methotrexate.    We discussed that some current pain is likely due to cartilage loss, such as left wrist pain. It is likely that medication will not provide complete relief of symptoms arising in the wrist.  Intervention with bracing, injections, and surgery may be needed over time.  For now, continue use of flexible bracing, anti-inflammatory medications such as ibuprofen to relieve pain.    Plan:  1.  Start prednisone 20 mg once a day for 1 week, then 15 mg once a day for 1 week then 10 mg once a day for 1 week.  2.  Start methotrexate 6 tablets (15 mg) once weekly. While on methotrexate:   -- Check labs every 3 months (AST/ALT, Albumin, CBC with platelets)   -- Limit alcohol intake to 2 drinks weekly; use folate 1 mg daily.  --Tylenol 500-1000 mg can be used as needed up to three times daily for nausea/headache associated with dosing.  --If after 1 month, joint symptoms persist, I will recommend increasing methotrexate to 20 mg (8 tablets) once weekly.  3. Stop smoking, as patient states is planned  4. Continue with aggressive dental care for advanced caries.    RTC 2 months  Butch Odom MD  Staff Rheumatologist, [facility]              Active Problem List:     Patient Active Problem List    Diagnosis Date Noted     Paresthesia 06/23/2021     Priority: Medium     Nicotine abuse 06/23/2021     Priority: Medium     Irregular periods/menstrual cycles 06/23/2021     Priority: Medium     Lipoma of right axilla 05/12/2021     Priority: Medium     Added  automatically from request for surgery [id number]       Lipoma of neck 05/12/2021     Priority: Medium     Added automatically from request for surgery [id number]       Essential hypertension with goal blood pressure less than 140/90 07/26/2016     Priority: Medium     General medical exam 10/01/2012     Priority: Medium     EMERGENCY CARE PLAN  Presenting Problem Signs and Symptoms Treatment Plan    Questions or conerns during clinic hours    I will call the clinic directly     Questions or conerns outside clinic hours    I will call the 24 hour nurse line at [phone]    Patient needs to schedule an appointment    I will call the 24 hour scheduling team at [phone] or clinic directly    Same day treatment     I will call the clinic first, nurse line if after hours, urgent care and express care if needed                            Problem list name updated by automated process. Provider to review       Raynaud phenomenon 09/24/2012     Priority: Medium     Rosacea 10/18/2011     Priority: Medium     June 22, 2013 improves with metrogel. One year refill.        CARDIOVASCULAR SCREENING; LDL GOAL LESS THAN 160 10/31/2010     Priority: Medium     Attention deficit disorder of adult 09/09/2010     Priority: Medium     June 22, 2013 appears be doing well, benefits from the medication with minimal side effects.  July 2, 2013 patient requesting increased dosage. At her next prescription will try Adderall XR 20 mg am, Adderall, short acting 10 mg, in the afternoon. Available July 10, 2013. Update in one month.   January 16, 2014 Needs appointment for further refills.    September 22, 2019 patient was seen September 18, 2019.  ADD addressed, okay with refills for 6 months.       Seasonal allergic rhinitis 07/11/2008     Priority: Medium     Tobacco use disorder 04/11/2007     Priority: Medium     June 22, 2013 will try Chantix. Reviewed side effects including increased depressive symptoms or anxiety, nausea. Reviewed  "smoking cessation.              History of Present Illness:   Libertad Russell presents for evaluation of inflammatory arthritis.  Referred by provider Krala.    Patient was seen by provider Karla in June 2021 for general medical checkup.  History of wrist swelling and pain and constitutional symptoms was elicited.  Swelling at the left wrist was observed, and patient was referred to rheumatology.    Today, she reports that she first had symptoms 18 months ago, when both feet became painful. There was mild visible swelling in the toes. It was hard to walk. Pain receded, but recurred in afew months, and has been \"coming and going\" since. Pain is in the ball of the feet In the mornings it is \"super hard\" to get moving. Pain remits during the day.    Approximately 1 year ago (during pandemic), she noted \"on and off\" wrist pain. She developed a lump on the outside of the left wrist, associated with swelling and warmth. Since then, there has been swelling in both wrists. Pain is most noticeable in morning. Early morning stiffness is ~ 3 hours.    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    There were episodes of L arm and L leg numbness and weakness, starting several weeks ago. She has an MRI brain scheduled.    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           Review of Systems:       Constitutional: negative--gaining wt, no F/C/S  Skin: negative  Eyes: negative  Ears/Nose/Throat: negative  Respiratory: No shortness of breath, dyspnea on exertion, cough, or hemoptysis  Cardiovascular: negative  Gastrointestinal: negative  Genitourinary: negative  Musculoskeletal: negative  Neurologic: HPI  Psychiatric: negative  Hematologic/Lymphatic/Immunologic: negative  Endocrine: negative          Past Medical History:     Past Medical History:   Diagnosis Date     Arthritis     mother     Cancer (H) 2012    " mother     Depressive disorder      History of blood transfusion     sister no iron     Hypertension 2010    mother     Thyroid disease     mother and 2 sisters     Past Surgical History:   Procedure Laterality Date     BREAST SURGERY       COSMETIC SURGERY      breast implants     EXCISE MASS AXILLA Right 6/8/2021    Procedure: Excise Mass Axilla;  Surgeon: Artis Domínguez MD;  Location: [facility] OR     EXCISE TUMOR, SOFT TISSUE, NECK/THORAX, SUBCUTANEOUS N/A 6/8/2021    Procedure: EXCISION, soft tissue mass back of head;  Surgeon: Artis Domínguez MD;  Location: [facility] OR     EYE SURGERY  2015    lazik     LYMPH NODE BIOPSY  2001    told to be benign     SURGICAL HISTORY OF -   2000, 1998    breast biopsies     TUBAL LIGATION  2003            Social History:     Social History     Occupational History     Not on file   Tobacco Use     Smoking status: Current Every Day Smoker     Packs/day: 1.00     Years: 10.00     Pack years: 10.00     Types: Cigarettes     Smokeless tobacco: Never Used   Substance and Sexual Activity     Alcohol use: Yes     Comment: occasional     Drug use: No     Sexual activity: Yes     Partners: Male     Birth control/protection: Female Surgical     Comment: tubal     3 children; all healthy except ADD  Single  No EtOH for about a year.  Smokes 1 ppd X 30 years. Plans quitting next week       Family History:     Family History   Problem Relation Age of Onset     Hypertension Mother      Cancer Mother         lung cancer     Other Cancer Mother         Lung     Thyroid Disease Mother      C.A.D. Father         52 at first MI     Prostate Cancer Father         Bladder     Hypertension Other      Thyroid Disease Sister      Thyroid Disease Sister      Diabetes No family hx of      Cerebrovascular Disease No family hx of      Breast Cancer No family hx of      Cancer - colorectal No family hx of    Mother had wrist pain/swelling; probably diagnosed with RA.         Allergies:   No Known  "Allergies         Medications:     Current Outpatient Medications   Medication Sig Dispense Refill     amLODIPine (NORVASC) 5 MG tablet Take 1 tablet (5 mg) by mouth daily 30 tablet 1     amphetamine-dextroamphetamine (ADDERALL XR) 20 MG 24 hr capsule Take 1 capsule (20 mg) by mouth daily Needs appointment for further refills. 30 capsule 0     aspirin (ASA) 81 MG EC tablet Take 1 tablet (81 mg) by mouth daily 90 tablet 3     losartan (COZAAR) 100 MG tablet Take 1 tablet (100 mg) by mouth daily 90 tablet 3     methotrexate 2.5 MG tablet Take 6 tablets (15 mg) by mouth every 7 days 24 tablet 3     metroNIDAZOLE (METROGEL) 0.75 % external gel Apply 1 g topically 2 times daily 45 g 0     predniSONE (DELTASONE) 5 MG tablet Take 4 tablets once a day for 1 week, then take 3 tablets once a day for 1 week, then take 2 tablets once a day for 1 week, then off. 65 tablet 1     amphetamine-dextroamphetamine (ADDERALL) 20 MG tablet Take 1 tablet (20 mg) by mouth daily Take in the early afternoon. Needs appointment for further refills. (Patient not taking: Reported on 8/10/2021) 30 tablet 0     diazepam (VALIUM) 5 MG tablet Take 1 tablet 30 to 40 minutes prior to MRI.  May repeat after 30 minutes if needed.  You will need a . (Patient not taking: Reported on 8/10/2021) 2 tablet 0     varenicline (CHANTIX CONTINUING MONTH NANCY) 1 MG tablet Take 1 tablet (1 mg) by mouth 2 times daily (Patient not taking: Reported on 8/10/2021) 60 tablet 1     varenicline (CHANTIX STARTING MONTH NANCY) 0.5 MG X 11 & 1 MG X 42 tablet Take 0.5 mg tab daily for 3 days, THEN 0.5 mg tab twice daily for 4 days, THEN 1 mg twice daily. (Patient not taking: Reported on 8/10/2021) 53 tablet 0   Using adderall for ~ 8 years; ADD symptoms are much improved.         Physical Exam:   Blood pressure (!) 148/89, pulse 80, temperature 97.8  F (36.6  C), temperature source Oral, resp. rate 18, height 1.64 m (5' 4.57\"), weight 59.9 kg (132 lb), SpO2 99 %, not " currently breastfeeding.  Wt Readings from Last 6 Encounters:   08/10/21 59.9 kg (132 lb)   06/28/21 61.2 kg (135 lb)   06/23/21 61.1 kg (134 lb 12.8 oz)   05/26/21 61.2 kg (135 lb)   05/03/21 62.1 kg (137 lb)   10/14/19 59.4 kg (131 lb)       Constitutional: well-developed, appearing stated age; cooperative  Eyes: nl EOM, PERRLA, vision, conjunctiva, sclera  ENT: nl external ears, nose, hearing, lips, teeth, gums, throat  No mucous membrane lesions, normal saliva pool  Neck: no mass or thyroid enlargement  Resp: lungs clear to auscultation with reduced airflow intensity  Lymph: no cervical, supraclavicular, inguinal or epitrochlear nodes  MS:synovitis and tenderness at the left wrist with markedly restricted range of motion; there is cool effusion at the left knee and tenderness at the right ankle.  There is valgus angulation at the left second toe with synovial thickening palpable at several MTPs.  Skin: no nail pitting, alopecia, rash, nodules or lesions  Neuro: nl cranial nerves, strength, sensation, DTRs.   Psych: nl judgement, orientation, memory, affect.         Data:     @  RHEUM RESULTS Latest Ref Rng & Units 11/29/2005 3/25/2008 7/30/2010   ALBUMIN 3.4 - 5.0 g/dL 4.6 - -   ALT 0 - 50 U/L 23 - -   AST 0 - 45 U/L 27 - -   MOY INTERPRETATION NEG:Negative - - -   CREATININE 0.52 - 1.04 mg/dL 0.60 - 0.65   CRP 0.0 - 8.0 mg/L - - <5.0   ANDREY 0 - 1.0 - - <1.0  Interpretation:  Negative   GFR ESTIMATE, IF BLACK >60 mL/min/[1.73:m2] >80 - >90   GFR ESTIMATE >60 mL/min/[1.73:m2] >80 - >90   HEMATOCRIT 35.0 - 47.0 % - - 48.3(H)   HEMOGLOBIN 11.7 - 15.7 g/dL - 12.9 16.7(H)   HCVAB NR:Nonreactive - - -   WBC 4.0 - 11.0 10e9/L - - 4.5   RBC 3.8 - 5.2 10e12/L - - 5.19   RDW 10.0 - 15.0 % - - 12.7   MCHC 31.5 - 36.5 g/dL - - 34.6   MCV 78 - 100 fl - - 93   PLATELET COUNT 150 - 450 10e9/L - - 198   RHEUMATOID FACTOR <12 IU/mL - - 11   ESR 0 - 30 mm/h - - 11       Rheumatoid Factor   Date Value Ref Range Status   06/30/2021  58 (H) <12 IU/mL Final   ,   Cyclic Citrullinated Peptide Antibody, IgG   Date Value Ref Range Status   06/30/2021 >340 (H) <7 U/mL Final   ,  ,  ,  ,  ,   MOY interpretation   Date Value Ref Range Status   06/30/2021 Negative NEG^Negative Final     Comment:                                        Reference range:  <1:40  NEGATIVE  1:40 - 1:80  BORDERLINE POSITIVE  >1:80 POSITIVE     ,   MOY Screen by EIA   Date Value Ref Range Status   07/30/2010 <1.0  Interpretation:  Negative 0 - 1.0 Final   ,  ,  ,  ,  ,  ,  ,  ,  ,  ,  ,  ,  ,  ,  ,  ,  ,  ,  ,  ,  ,  ,  ,  ,  ,  ,  ,  ,  ,  ,  ,  ,  ,  ,  ,  ,  ,       Reviewed Rheumatology lab flowsheet        Again, thank you for allowing me to participate in the care of your patient.      Sincerely,    Butch Odom MD

## 2021-08-10 NOTE — NURSING NOTE
"Chief Complaint   Patient presents with     Consult     Pain in bottom of feet and wrists, possible arthritis        Vital signs:  Temp: 97.8  F (36.6  C) Temp src: Oral BP: (!) 148/89 Pulse: 80   Resp: 18 SpO2: 99 %     Height: 164 cm (5' 4.57\") Weight: 59.9 kg (132 lb)  Estimated body mass index is 22.26 kg/m  as calculated from the following:    Height as of this encounter: 1.64 m (5' 4.57\").    Weight as of this encounter: 59.9 kg (132 lb).        Celeste Murcia, [facility]  8/10/2021 1:06 PM      "

## 2021-08-10 NOTE — PATIENT INSTRUCTIONS
Diagnosis:  1.  S rheumatoid arthritis: There is involvement of many joints including the fingers, wrists, shoulders, ankles, and forefeet.  I recommend checking foot x-rays, and beginning treatment.  Treatment will consist of short-term anti-inflammatory medication with prednisone, and longer acting joint protecting medication methotrexate.  2.  Cartilage loss and pain, left wrist: It is likely that medication will not provide complete relief of symptoms arising in the wrist.  Intervention from orthopedics may be needed over time.  For now, continue use of flexible bracing, anti-inflammatory medications such as ibuprofen 2 relieve pain.    Plan:  1.  Start prednisone 20 mg once a day for 1 week, then 15 mg once a day for 1 week then 10 mg once a day for 1 week.  2.  Start methotrexate 6 tablets (15 mg) once weekly. While on methotrexate:   -- Check labs every 3 months (AST/ALT, Albumin, CBC with platelets)   -- Limit alcohol intake to 2 drinks weekly; use folate 1 mg daily.  --Tylenol 500-1000 mg can be used as needed up to three times daily for nausea/headache associated with dosing.  3. Stop smoking, as you plan  4. Continue with aggressive dental care.

## 2021-08-10 NOTE — LETTER
8/10/2021      RE: Libertad Russell  [address] [facility] [address]       Rheumatology Clinic Visit 1     Libertad Russell MRN# [id number]   YOB: 1965 Age: 55 year old     Date of Visit: 08/14/2021  Primary care provider: Symone Acosta          Assessment and Plan:     # Joint pain: Patient relates longstanding, migratory, waxing and waning polyarticular pain and swelling, along with prolonged morning stiffness.  Physical exam shows synovitis and tenderness at the left wrist with markedly restricted range of motion; there is cool effusion at the left knee and tenderness at the right ankle.  There is valgus angulation at the left second toe with synovial thickening palpable at several MTPs.  Laboratory evaluation on June 30, 2021 showed normal comprehensive metabolic panel; uric acid was 3.5; TSH, vitamin D, and cholesterol were normal.  Rheumatoid factor was elevated at 58, and cyclic citrullinated peptide antibodies were greater than 340 units.  MOY was negative.  CRP was normal at 4.2 left wrist x-ray done on June 23, 2021 showed severe joint space narrowing with erosions and cysts in the radiocarpal joint.    Picture is compatible with new diagnosis of rheumatoid arthritis with inflammatory involvement of many joints including the fingers, wrists, shoulders, ankles, and forefeet.  Seropositivity with rheumatoid factor and ACPA portend a difficult to treat and destructive course.  Exam features suggest longstanding destructive inflammatory disease with likely cartilage loss at the wrist and several MTPs.  I recommend checking foot x-rays, and beginning treatment.  Treatment will consist of combination short-term anti-inflammatory medication with prednisone, and longer acting joint protecting medication with methotrexate.  Plan will be to escalate methotrexate to maximum dose of 25 mg/week pending response and tolerability.  If methotrexate is well-tolerated, but symptoms are  less than 90% suppressed after treatment with maximum dose, I will recommend consideration of triple therapy with added hydroxychloroquine and sulfasalazine versus addition of tumor necrosis factor inhibitor combination with methotrexate.    We discussed that some current pain is likely due to cartilage loss, such as left wrist pain. It is likely that medication will not provide complete relief of symptoms arising in the wrist.  Intervention with bracing, injections, and surgery may be needed over time.  For now, continue use of flexible bracing, anti-inflammatory medications such as ibuprofen to relieve pain.    Plan:  1.  Start prednisone 20 mg once a day for 1 week, then 15 mg once a day for 1 week then 10 mg once a day for 1 week.  2.  Start methotrexate 6 tablets (15 mg) once weekly. While on methotrexate:   -- Check labs every 3 months (AST/ALT, Albumin, CBC with platelets)   -- Limit alcohol intake to 2 drinks weekly; use folate 1 mg daily.  --Tylenol 500-1000 mg can be used as needed up to three times daily for nausea/headache associated with dosing.  --If after 1 month, joint symptoms persist, I will recommend increasing methotrexate to 20 mg (8 tablets) once weekly.  3. Stop smoking, as patient states is planned  4. Continue with aggressive dental care for advanced caries.    RTC 2 months  Butch Odom MD  Staff Rheumatologist, [facility]              Active Problem List:     Patient Active Problem List    Diagnosis Date Noted     Paresthesia 06/23/2021     Priority: Medium     Nicotine abuse 06/23/2021     Priority: Medium     Irregular periods/menstrual cycles 06/23/2021     Priority: Medium     Lipoma of right axilla 05/12/2021     Priority: Medium     Added automatically from request for surgery [id number]       Lipoma of neck 05/12/2021     Priority: Medium     Added automatically from request for surgery [id number]       Essential hypertension with goal blood pressure less than 140/90 07/26/2016      Priority: Medium     General medical exam 10/01/2012     Priority: Medium     EMERGENCY CARE PLAN  Presenting Problem Signs and Symptoms Treatment Plan    Questions or conerns during clinic hours    I will call the clinic directly     Questions or conerns outside clinic hours    I will call the 24 hour nurse line at [phone]    Patient needs to schedule an appointment    I will call the 24 hour scheduling team at [phone] or clinic directly    Same day treatment     I will call the clinic first, nurse line if after hours, urgent care and express care if needed                            Problem list name updated by automated process. Provider to review       Raynaud phenomenon 09/24/2012     Priority: Medium     Rosacea 10/18/2011     Priority: Medium     June 22, 2013 improves with metrogel. One year refill.        CARDIOVASCULAR SCREENING; LDL GOAL LESS THAN 160 10/31/2010     Priority: Medium     Attention deficit disorder of adult 09/09/2010     Priority: Medium     June 22, 2013 appears be doing well, benefits from the medication with minimal side effects.  July 2, 2013 patient requesting increased dosage. At her next prescription will try Adderall XR 20 mg am, Adderall, short acting 10 mg, in the afternoon. Available July 10, 2013. Update in one month.   January 16, 2014 Needs appointment for further refills.    September 22, 2019 patient was seen September 18, 2019.  ADD addressed, okay with refills for 6 months.       Seasonal allergic rhinitis 07/11/2008     Priority: Medium     Tobacco use disorder 04/11/2007     Priority: Medium     June 22, 2013 will try Chantix. Reviewed side effects including increased depressive symptoms or anxiety, nausea. Reviewed smoking cessation.              History of Present Illness:   Libertad Russell presents for evaluation of inflammatory arthritis.  Referred by provider Karla.    Patient was seen by provider Karla in June 2021 for general medical checkup.   "History of wrist swelling and pain and constitutional symptoms was elicited.  Swelling at the left wrist was observed, and patient was referred to rheumatology.    Today, she reports that she first had symptoms 18 months ago, when both feet became painful. There was mild visible swelling in the toes. It was hard to walk. Pain receded, but recurred in afew months, and has been \"coming and going\" since. Pain is in the ball of the feet In the mornings it is \"super hard\" to get moving. Pain remits during the day.    Approximately 1 year ago (during pandemic), she noted \"on and off\" wrist pain. She developed a lump on the outside of the left wrist, associated with swelling and warmth. Since then, there has been swelling in both wrists. Pain is most noticeable in morning. Early morning stiffness is ~ 3 hours.    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    There were episodes of L arm and L leg numbness and weakness, starting several weeks ago. She has an MRI brain scheduled.    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           Review of Systems:       Constitutional: negative--gaining wt, no F/C/S  Skin: negative  Eyes: negative  Ears/Nose/Throat: negative  Respiratory: No shortness of breath, dyspnea on exertion, cough, or hemoptysis  Cardiovascular: negative  Gastrointestinal: negative  Genitourinary: negative  Musculoskeletal: negative  Neurologic: HPI  Psychiatric: negative  Hematologic/Lymphatic/Immunologic: negative  Endocrine: negative          Past Medical History:     Past Medical History:   Diagnosis Date     Arthritis     mother     Cancer (H) 2012    mother     Depressive disorder      History of blood transfusion     sister no iron     Hypertension 2010    mother     Thyroid disease     mother and 2 sisters     Past Surgical History:   Procedure Laterality Date     BREAST SURGERY       " COSMETIC SURGERY      breast implants     EXCISE MASS AXILLA Right 6/8/2021    Procedure: Excise Mass Axilla;  Surgeon: Artis Domínguez MD;  Location: [facility] OR     EXCISE TUMOR, SOFT TISSUE, NECK/THORAX, SUBCUTANEOUS N/A 6/8/2021    Procedure: EXCISION, soft tissue mass back of head;  Surgeon: Artis Domínguez MD;  Location: [facility] OR     EYE SURGERY  2015    lazik     LYMPH NODE BIOPSY  2001    told to be benign     SURGICAL HISTORY OF -   2000, 1998    breast biopsies     TUBAL LIGATION  2003            Social History:     Social History     Occupational History     Not on file   Tobacco Use     Smoking status: Current Every Day Smoker     Packs/day: 1.00     Years: 10.00     Pack years: 10.00     Types: Cigarettes     Smokeless tobacco: Never Used   Substance and Sexual Activity     Alcohol use: Yes     Comment: occasional     Drug use: No     Sexual activity: Yes     Partners: Male     Birth control/protection: Female Surgical     Comment: tubal     3 children; all healthy except ADD  Single  No EtOH for about a year.  Smokes 1 ppd X 30 years. Plans quitting next week       Family History:     Family History   Problem Relation Age of Onset     Hypertension Mother      Cancer Mother         lung cancer     Other Cancer Mother         Lung     Thyroid Disease Mother      C.A.D. Father         52 at first MI     Prostate Cancer Father         Bladder     Hypertension Other      Thyroid Disease Sister      Thyroid Disease Sister      Diabetes No family hx of      Cerebrovascular Disease No family hx of      Breast Cancer No family hx of      Cancer - colorectal No family hx of    Mother had wrist pain/swelling; probably diagnosed with RA.         Allergies:   No Known Allergies         Medications:     Current Outpatient Medications   Medication Sig Dispense Refill     amLODIPine (NORVASC) 5 MG tablet Take 1 tablet (5 mg) by mouth daily 30 tablet 1     amphetamine-dextroamphetamine (ADDERALL XR) 20 MG 24 hr  "capsule Take 1 capsule (20 mg) by mouth daily Needs appointment for further refills. 30 capsule 0     aspirin (ASA) 81 MG EC tablet Take 1 tablet (81 mg) by mouth daily 90 tablet 3     losartan (COZAAR) 100 MG tablet Take 1 tablet (100 mg) by mouth daily 90 tablet 3     methotrexate 2.5 MG tablet Take 6 tablets (15 mg) by mouth every 7 days 24 tablet 3     metroNIDAZOLE (METROGEL) 0.75 % external gel Apply 1 g topically 2 times daily 45 g 0     predniSONE (DELTASONE) 5 MG tablet Take 4 tablets once a day for 1 week, then take 3 tablets once a day for 1 week, then take 2 tablets once a day for 1 week, then off. 65 tablet 1     amphetamine-dextroamphetamine (ADDERALL) 20 MG tablet Take 1 tablet (20 mg) by mouth daily Take in the early afternoon. Needs appointment for further refills. (Patient not taking: Reported on 8/10/2021) 30 tablet 0     diazepam (VALIUM) 5 MG tablet Take 1 tablet 30 to 40 minutes prior to MRI.  May repeat after 30 minutes if needed.  You will need a . (Patient not taking: Reported on 8/10/2021) 2 tablet 0     varenicline (CHANTIX CONTINUING MONTH PAK) 1 MG tablet Take 1 tablet (1 mg) by mouth 2 times daily (Patient not taking: Reported on 8/10/2021) 60 tablet 1     varenicline (CHANTIX STARTING MONTH PAK) 0.5 MG X 11 & 1 MG X 42 tablet Take 0.5 mg tab daily for 3 days, THEN 0.5 mg tab twice daily for 4 days, THEN 1 mg twice daily. (Patient not taking: Reported on 8/10/2021) 53 tablet 0   Using adderall for ~ 8 years; ADD symptoms are much improved.         Physical Exam:   Blood pressure (!) 148/89, pulse 80, temperature 97.8  F (36.6  C), temperature source Oral, resp. rate 18, height 1.64 m (5' 4.57\"), weight 59.9 kg (132 lb), SpO2 99 %, not currently breastfeeding.  Wt Readings from Last 6 Encounters:   08/10/21 59.9 kg (132 lb)   06/28/21 61.2 kg (135 lb)   06/23/21 61.1 kg (134 lb 12.8 oz)   05/26/21 61.2 kg (135 lb)   05/03/21 62.1 kg (137 lb)   10/14/19 59.4 kg (131 lb) "       Constitutional: well-developed, appearing stated age; cooperative  Eyes: nl EOM, PERRLA, vision, conjunctiva, sclera  ENT: nl external ears, nose, hearing, lips, teeth, gums, throat  No mucous membrane lesions, normal saliva pool  Neck: no mass or thyroid enlargement  Resp: lungs clear to auscultation with reduced airflow intensity  Lymph: no cervical, supraclavicular, inguinal or epitrochlear nodes  MS:synovitis and tenderness at the left wrist with markedly restricted range of motion; there is cool effusion at the left knee and tenderness at the right ankle.  There is valgus angulation at the left second toe with synovial thickening palpable at several MTPs.  Skin: no nail pitting, alopecia, rash, nodules or lesions  Neuro: nl cranial nerves, strength, sensation, DTRs.   Psych: nl judgement, orientation, memory, affect.         Data:     @  RHEUM RESULTS Latest Ref Rng & Units 11/29/2005 3/25/2008 7/30/2010   ALBUMIN 3.4 - 5.0 g/dL 4.6 - -   ALT 0 - 50 U/L 23 - -   AST 0 - 45 U/L 27 - -   MOY INTERPRETATION NEG:Negative - - -   CREATININE 0.52 - 1.04 mg/dL 0.60 - 0.65   CRP 0.0 - 8.0 mg/L - - <5.0   ANDREY 0 - 1.0 - - <1.0  Interpretation:  Negative   GFR ESTIMATE, IF BLACK >60 mL/min/[1.73:m2] >80 - >90   GFR ESTIMATE >60 mL/min/[1.73:m2] >80 - >90   HEMATOCRIT 35.0 - 47.0 % - - 48.3(H)   HEMOGLOBIN 11.7 - 15.7 g/dL - 12.9 16.7(H)   HCVAB NR:Nonreactive - - -   WBC 4.0 - 11.0 10e9/L - - 4.5   RBC 3.8 - 5.2 10e12/L - - 5.19   RDW 10.0 - 15.0 % - - 12.7   MCHC 31.5 - 36.5 g/dL - - 34.6   MCV 78 - 100 fl - - 93   PLATELET COUNT 150 - 450 10e9/L - - 198   RHEUMATOID FACTOR <12 IU/mL - - 11   ESR 0 - 30 mm/h - - 11       Rheumatoid Factor   Date Value Ref Range Status   06/30/2021 58 (H) <12 IU/mL Final   ,   Cyclic Citrullinated Peptide Antibody, IgG   Date Value Ref Range Status   06/30/2021 >340 (H) <7 U/mL Final   ,  ,  ,  ,  ,   MOY interpretation   Date Value Ref Range Status   06/30/2021 Negative  NEG^Negative Final     Comment:                                        Reference range:  <1:40  NEGATIVE  1:40 - 1:80  BORDERLINE POSITIVE  >1:80 POSITIVE     ,   MOY Screen by EIA   Date Value Ref Range Status   07/30/2010 <1.0  Interpretation:  Negative 0 - 1.0 Final   ,  ,  ,  ,  ,  ,  ,  ,  ,  ,  ,  ,  ,  ,  ,  ,  ,  ,  ,  ,  ,  ,  ,  ,  ,  ,  ,  ,  ,  ,  ,  ,  ,  ,  ,  ,  ,       Reviewed Rheumatology lab flowsheet        Butch Odom MD

## 2021-08-11 ENCOUNTER — ANCILLARY PROCEDURE (OUTPATIENT)
Dept: GENERAL RADIOLOGY | Facility: CLINIC | Age: 56
End: 2021-08-11
Attending: INTERNAL MEDICINE
Payer: COMMERCIAL

## 2021-08-11 DIAGNOSIS — M05.742 RHEUMATOID ARTHRITIS INVOLVING BOTH HANDS WITH POSITIVE RHEUMATOID FACTOR (H): ICD-10-CM

## 2021-08-11 DIAGNOSIS — M05.741 RHEUMATOID ARTHRITIS INVOLVING BOTH HANDS WITH POSITIVE RHEUMATOID FACTOR (H): ICD-10-CM

## 2021-08-11 PROCEDURE — 73630 X-RAY EXAM OF FOOT: CPT | Mod: 50 | Performed by: RADIOLOGY

## 2021-08-17 ENCOUNTER — TELEPHONE (OUTPATIENT)
Dept: FAMILY MEDICINE | Facility: CLINIC | Age: 56
End: 2021-08-17

## 2021-08-17 ENCOUNTER — ANCILLARY PROCEDURE (OUTPATIENT)
Dept: MRI IMAGING | Facility: CLINIC | Age: 56
End: 2021-08-17
Attending: NURSE PRACTITIONER
Payer: COMMERCIAL

## 2021-08-17 DIAGNOSIS — R20.2 PARESTHESIA: ICD-10-CM

## 2021-08-17 PROCEDURE — 70549 MR ANGIOGRAPH NECK W/O&W/DYE: CPT | Mod: TC | Performed by: RADIOLOGY

## 2021-08-17 PROCEDURE — A9585 GADOBUTROL INJECTION: HCPCS | Performed by: RADIOLOGY

## 2021-08-17 PROCEDURE — 70553 MRI BRAIN STEM W/O & W/DYE: CPT | Mod: TC | Performed by: RADIOLOGY

## 2021-08-17 RX ORDER — GADOBUTROL 604.72 MG/ML
10 INJECTION INTRAVENOUS ONCE
Status: COMPLETED | OUTPATIENT
Start: 2021-08-17 | End: 2021-08-17

## 2021-08-17 RX ADMIN — GADOBUTROL 10 ML: 604.72 INJECTION INTRAVENOUS at 12:56

## 2021-08-17 NOTE — TELEPHONE ENCOUNTER
Dr. Gilloon calling from [facility], he is in the processing of reading and reporting on this patient's MRI's today.    Concern is right carotid artery narrowing; says cannot tell if is past dissection or really bad atherosclerosis.    He is signing the reports now so should be available to view soon.    Symone worked virtual until 1 pm today.  Radiologist says this can wait until Symone is available again 7 am tomorrow.    Patient was last seen 6/23/21 by Symone Acosta.  I see paresthesia discussed that day was reason for MRI of brain and carotids.       Routed high priority to Symone Acosta to address plan for follow up.    Lucy Vazquez RN  [facility]

## 2021-08-18 ENCOUNTER — HOSPITAL ENCOUNTER (OUTPATIENT)
Dept: CT IMAGING | Facility: CLINIC | Age: 56
Discharge: HOME | End: 2021-08-18
Attending: SURGERY | Admitting: SURGERY
Payer: COMMERCIAL

## 2021-08-18 ENCOUNTER — TELEPHONE (OUTPATIENT)
Dept: OTHER | Facility: CLINIC | Age: 56
End: 2021-08-18

## 2021-08-18 DIAGNOSIS — I65.29 CAROTID ARTERY STENOSIS: ICD-10-CM

## 2021-08-18 DIAGNOSIS — I65.29 STENOSIS OF CAROTID ARTERY, UNSPECIFIED LATERALITY: Primary | ICD-10-CM

## 2021-08-18 DIAGNOSIS — I65.29 CAROTID ARTERY STENOSIS: Primary | ICD-10-CM

## 2021-08-18 PROCEDURE — 250N000009 HC RX 250: Performed by: SURGERY

## 2021-08-18 PROCEDURE — 250N000011 HC RX IP 250 OP 636: Performed by: SURGERY

## 2021-08-18 PROCEDURE — 70498 CT ANGIOGRAPHY NECK: CPT

## 2021-08-18 RX ORDER — IOPAMIDOL 755 MG/ML
70 INJECTION, SOLUTION INTRAVASCULAR ONCE
Status: COMPLETED | OUTPATIENT
Start: 2021-08-18 | End: 2021-08-18

## 2021-08-18 RX ADMIN — SODIUM CHLORIDE 100 ML: 9 INJECTION, SOLUTION INTRAVENOUS at 17:28

## 2021-08-18 RX ADMIN — IOPAMIDOL 70 ML: 755 INJECTION, SOLUTION INTRAVENOUS at 17:27

## 2021-08-18 NOTE — TELEPHONE ENCOUNTER
Pt referred to [facility] by Symone Acosta, SULY CNP for carotid artery stenosis.     Patient has MR brain and MRA neck in Epic from yesterday.     Pt needs to be scheduled for CTA head and neck and consult with vascular surgery.  Will route to scheduling to coordinate an appointment ASAP.     Wendi ALDRIDGE, RN    [facility]  Office: [phone]  Fax: [phone]

## 2021-08-20 ENCOUNTER — OFFICE VISIT (OUTPATIENT)
Dept: OTHER | Facility: CLINIC | Age: 56
End: 2021-08-20
Attending: NURSE PRACTITIONER
Payer: COMMERCIAL

## 2021-08-20 ENCOUNTER — TELEPHONE (OUTPATIENT)
Dept: NEUROLOGY | Facility: CLINIC | Age: 56
End: 2021-08-20

## 2021-08-20 ENCOUNTER — MYC REFILL (OUTPATIENT)
Dept: FAMILY MEDICINE | Facility: CLINIC | Age: 56
End: 2021-08-20

## 2021-08-20 VITALS
HEIGHT: 64 IN | WEIGHT: 130 LBS | HEART RATE: 74 BPM | OXYGEN SATURATION: 100 % | SYSTOLIC BLOOD PRESSURE: 187 MMHG | DIASTOLIC BLOOD PRESSURE: 93 MMHG | BODY MASS INDEX: 22.2 KG/M2

## 2021-08-20 DIAGNOSIS — I70.209 ATHEROSCLEROSIS OF ARTERIES OF EXTREMITIES (H): ICD-10-CM

## 2021-08-20 DIAGNOSIS — F98.8 ATTENTION DEFICIT DISORDER OF ADULT: ICD-10-CM

## 2021-08-20 DIAGNOSIS — I65.29 STENOSIS OF CAROTID ARTERY, UNSPECIFIED LATERALITY: Primary | ICD-10-CM

## 2021-08-20 PROCEDURE — 99205 OFFICE O/P NEW HI 60 MIN: CPT | Performed by: SURGERY

## 2021-08-20 PROCEDURE — G0463 HOSPITAL OUTPT CLINIC VISIT: HCPCS

## 2021-08-20 RX ORDER — ASPIRIN 325 MG
325 TABLET ORAL DAILY
Qty: 90 TABLET | Refills: 3 | Status: ON HOLD | OUTPATIENT
Start: 2021-08-20 | End: 2021-10-23

## 2021-08-20 RX ORDER — DEXTROAMPHETAMINE SACCHARATE, AMPHETAMINE ASPARTATE MONOHYDRATE, DEXTROAMPHETAMINE SULFATE AND AMPHETAMINE SULFATE 5; 5; 5; 5 MG/1; MG/1; MG/1; MG/1
20 CAPSULE, EXTENDED RELEASE ORAL DAILY
Qty: 30 CAPSULE | Refills: 0 | Status: CANCELLED | OUTPATIENT
Start: 2021-08-20

## 2021-08-20 RX ORDER — ATORVASTATIN CALCIUM 20 MG/1
20 TABLET, FILM COATED ORAL DAILY
Qty: 30 TABLET | Refills: 3 | Status: SHIPPED | OUTPATIENT
Start: 2021-08-20 | End: 2022-01-13

## 2021-08-20 RX ORDER — DEXTROAMPHETAMINE SACCHARATE, AMPHETAMINE ASPARTATE, DEXTROAMPHETAMINE SULFATE AND AMPHETAMINE SULFATE 5; 5; 5; 5 MG/1; MG/1; MG/1; MG/1
20 TABLET ORAL DAILY
Qty: 30 TABLET | Refills: 0 | Status: CANCELLED | OUTPATIENT
Start: 2021-08-20

## 2021-08-20 ASSESSMENT — MIFFLIN-ST. JEOR: SCORE: 1179.68

## 2021-08-20 NOTE — NURSING NOTE
"   Libertad Russell is a 55 year old female who presents for:  Chief Complaint   Patient presents with     Consult        Vitals:    Vitals:    08/20/21 0918 08/20/21 0920   BP: (!) 187/95 (!) 187/93   BP Location: Left arm Right arm   Patient Position: Sitting Sitting   Cuff Size:  Adult Regular   Pulse: 73 74   SpO2: 100%    Weight: 130 lb (59 kg)    Height: 5' 4\" (1.626 m)        BMI:  Estimated body mass index is 22.31 kg/m  as calculated from the following:    Height as of this encounter: 5' 4\" (1.626 m).    Weight as of this encounter: 130 lb (59 kg).    Pain Score:  Data Unavailable        ZACARIAS SosaN, RN      "
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
## 2021-08-20 NOTE — TELEPHONE ENCOUNTER
[facility] Call Center    Phone Message    May a detailed message be left on voicemail: yes     Reason for Call: Appointment Intake    Referring Provider Name: Stenosis of carotid artery, unspecified laterality  Diagnosis and/or Symptoms: Symone Acosta APRN CNP in [facility]    Libertad calling to request a call back to discuss scheduling options for her referral. She stated that she was told to see a brain neurologist and she is already seeing a vascular neurologist. Per protocols, carotid stenosis is a neurosurgery diagnosis. Please call Libertad at your earliest convenience to discuss. This was sent to both neurology and neurosurgery pools.    Action Taken: Message routed to:  [facility] [facility] [place]    Travel Screening: Not Applicable

## 2021-08-27 NOTE — PROGRESS NOTES
SUBJECTIVE:   Libertad Russell is a 51 year old female who presents to clinic today for the following health issues:                                                                                                      Some-                                                                        Never   Rarely      times       Often  Very Often  1. How often do you have trouble wrapping up the final details of a project,  once the challenging parts have been done?   x     2. How often do you have difficulty getting things in order when you have to do a task that requires organization?   x     3. How often do you have problems remembering appointments or obligations?  x      4. When you have a task that requires a lot of thought, how often do you avoid or delay getting started?   x     5. How often do you fidget or squirm with your hands or feet when you have to sit down for a long time?  x      6. How often do you feel overly active and compelled to do things, like you were driven by a motor?  x        Part A                                                        7. How often do you make careless mistakes when you have to work on a boring or difficult project?  x      8. How often do you have difficulty keeping your attention when you are doing boring or repetitive work?  x      9. How often do you have difficulty concentrating on what people say to you, even when they are speaking to you directly?  x      10. How often do you misplace or have difficulty finding things at home or at work?  x      11. How often are you distracted by activity or noise around you?  x      12. How often do you leave your seat in meetings or other situations in which you are expected to remain seated?    x      13. How often do you feel restless or fidgety?    x      14. How often do you have difficulty unwinding and relaxing when you have time to yourself?  x      15. How often do you find yourself talking too much when you are in  Conjuntivae and eyelids appear normal, Sclerae : White without injection , Conjuntivae and eyelids appear normal, Sclerae : White without injection social situations?  x      16. When you're in a conversation, how often do you find yourself finishing the sentences of the people you are talking to, before they can finish them themselves?  x      17. How often do you have difficulty waiting your turn in situations when turn-taking is required?  x      18. How often do you interrupt others when they are busy?  x          Hypertension Follow-up: Lisinopril 40mg      Outpatient blood pressures are being checked at home.  Results fluctuate, patient states that they are high.    Low Salt Diet: no added salt        Amount of exercise or physical activity: 2-3 days/week for an average of 45-60 minutes    Problems taking medications regularly: Patient states that she has had a hard time taking them, but she is trying to get better at taking them as she is supposed to.    Medication side effects: none  Diet: low salt    Blood pressure is well controlled when she is taking her medication - is not be controlled when missing the  Medication .        *Patient would like to switch her Adderall to time release.  Isn't feeling like her medication is working any more.       Problem list and histories reviewed & adjusted, as indicated.  Additional history: as documented  .add  Patient Active Problem List   Diagnosis     Tobacco use disorder     Seasonal allergic rhinitis     Attention deficit disorder of adult     CARDIOVASCULAR SCREENING; LDL GOAL LESS THAN 160     Rosacea     Raynaud phenomenon     General medical exam     Essential hypertension with goal blood pressure less than 140/90     Past Surgical History:   Procedure Laterality Date     BREAST SURGERY       COSMETIC SURGERY      breast implants     EYE SURGERY  2015    lazik     LYMPH NODE BIOPSY  2001    told to be benign     SURGICAL HISTORY OF -   2000, 1998    breast biopsies     TUBAL LIGATION  2003       Social History   Substance Use Topics     Smoking status: Current Every Day Smoker     Packs/day: 1.00      Years: 15.00     Types: Cigarettes     Smokeless tobacco: Never Used     Alcohol use Yes      Comment: occasional     Family History   Problem Relation Age of Onset     Hypertension Mother      CANCER Mother      lung cancer     Other Cancer Mother      Lung     C.A.D. Father      52 at first MI     Hypertension Other      DIABETES No family hx of      CEREBROVASCULAR DISEASE No family hx of      Breast Cancer No family hx of      Cancer - colorectal No family hx of          Current Outpatient Prescriptions   Medication Sig Dispense Refill     metroNIDAZOLE (METROGEL) 0.75 % topical gel Apply topically 2 times daily 45 g 0     lisinopril (PRINIVIL/ZESTRIL) 40 MG tablet Take 1 tablet (40 mg) by mouth daily 90 tablet 1     amphetamine-dextroamphetamine (ADDERALL) 20 MG per tablet Take 1 tablet (20 mg) by mouth 2 times daily 60 tablet 0     aspirin 81 MG tablet Take 1 tablet (81 mg) by mouth daily Medication is being filled for 1 time refill only due to:  Patient needs to be seen because it has been more than one year since last visit. 100 tablet 0     [START ON 10/2/2017] amphetamine-dextroamphetamine (ADDERALL) 20 MG per tablet Take 1 tablet (20 mg) by mouth 2 times daily (Patient not taking: Reported on 9/21/2017) 60 tablet 0     [START ON 11/1/2017] amphetamine-dextroamphetamine (ADDERALL) 20 MG per tablet Take 1 tablet (20 mg) by mouth 2 times daily (Patient not taking: Reported on 9/21/2017) 60 tablet 0     ALPRAZolam (XANAX) 0.25 MG tablet Take 1 tablet 1 hour before flying and repeat if needed (Patient not taking: Reported on 9/21/2017) 4 tablet 0     No Known Allergies  BP Readings from Last 3 Encounters:   09/21/17 114/62   02/16/17 124/68   07/20/16 140/90    Wt Readings from Last 3 Encounters:   09/21/17 125 lb (56.7 kg)   02/16/17 125 lb 3.2 oz (56.8 kg)   07/20/16 122 lb (55.3 kg)                        Reviewed and updated as needed this visit by clinical staff     Reviewed and updated as needed this  "visit by Provider         ROS:  C: NEGATIVE for fever, chills, change in weight  E/M: NEGATIVE for ear, mouth and throat problems  R: NEGATIVE for significant cough or SOB  CV: NEGATIVE for chest pain, palpitations or peripheral edema, when taking the medication  and POSITIVE for elevated blood pressure readings when misses the medication    PSYCHIATRIC: POSITIVE for feeling like the medication isn't working the full 12 hours that she is working .   Her work is now having then work  12 hours per day  5 days per week.    The medication is wearing off the last hours of work.      OBJECTIVE:     /62 (BP Location: Left arm, Patient Position: Sitting, Cuff Size: Adult Regular)  Pulse 80  Temp 97.4  F (36.3  C) (Tympanic)  Ht 5' 5\" (1.651 m)  Wt 125 lb (56.7 kg)  LMP 09/07/2017 (Exact Date)  BMI 20.8 kg/m2  Body mass index is 20.8 kg/(m^2).   GENERAL: healthy, alert and no distress  NECK: no adenopathy, no asymmetry, masses, or scars and thyroid normal to palpation  RESP: lungs clear to auscultation - no rales, rhonchi or wheezes  CV: regular rate and rhythm, normal S1 S2, no S3 or S4, no murmur, click or rub, no peripheral edema and peripheral pulses strong  PSYCH: mentation appears normal and affect normal/bright, self assessment with some changes.  Due to hours worked,     Diagnostic Test Results:  none     ASSESSMENT/PLAN:     ASSESSMENT/PLAN:      ICD-10-CM    1. Attention deficit disorder of adult F98.8 amphetamine-dextroamphetamine (ADDERALL) 20 MG per tablet     amphetamine-dextroamphetamine (ADDERALL) 20 MG per tablet     amphetamine-dextroamphetamine (ADDERALL) 20 MG per tablet     amphetamine-dextroamphetamine (ADDERALL XR) 20 MG per 24 hr capsule     amphetamine-dextroamphetamine (ADDERALL XR) 20 MG per 24 hr capsule     amphetamine-dextroamphetamine (ADDERALL XR) 20 MG per 24 hr capsule   2. Essential hypertension with goal blood pressure less than 140/90 I10        Patient Instructions   While " you are working the 5 days of 12 hour shift.  Try taking the extended release in the  AM and then take the immediate release around 1-2 pm .     Once you are working your 8 hours you can then consider going back to the  Twice per day dosing you are now taking or use just the 1 extended pill.     Stay well hydrated - urine should be clear.    You should have to urinate  Every  3 hours or so    You are OVERDUE for pap smear.   Please get it done before the end of the year          MEDICATIONS:        - Continue other medications without change       - changed Adderall from twice per day to  1 extended pill in the AM and  1 immediate  Release in the afternoon   See Patient Instructions    HANNA CONTE NP, APRN CNP  [facility]
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
## 2021-09-10 ENCOUNTER — TELEPHONE (OUTPATIENT)
Dept: NEUROLOGY | Facility: CLINIC | Age: 56
End: 2021-09-10

## 2021-09-10 ENCOUNTER — OFFICE VISIT (OUTPATIENT)
Dept: NEUROLOGY | Facility: CLINIC | Age: 56
End: 2021-09-10
Attending: NURSE PRACTITIONER
Payer: COMMERCIAL

## 2021-09-10 VITALS
BODY MASS INDEX: 22.61 KG/M2 | DIASTOLIC BLOOD PRESSURE: 94 MMHG | HEIGHT: 64 IN | OXYGEN SATURATION: 98 % | HEART RATE: 98 BPM | SYSTOLIC BLOOD PRESSURE: 138 MMHG | WEIGHT: 132.4 LBS

## 2021-09-10 DIAGNOSIS — I65.21 STENOSIS OF RIGHT CAROTID ARTERY: ICD-10-CM

## 2021-09-10 DIAGNOSIS — G45.9 TRANSIENT ISCHEMIC ATTACK: ICD-10-CM

## 2021-09-10 PROCEDURE — G0463 HOSPITAL OUTPT CLINIC VISIT: HCPCS

## 2021-09-10 PROCEDURE — 99205 OFFICE O/P NEW HI 60 MIN: CPT | Performed by: STUDENT IN AN ORGANIZED HEALTH CARE EDUCATION/TRAINING PROGRAM

## 2021-09-10 ASSESSMENT — MIFFLIN-ST. JEOR: SCORE: 1190.56

## 2021-09-10 NOTE — TELEPHONE ENCOUNTER
Requested by general neurology to coordinate expedited appt with Stroke Neurology regarding pt imaging and POC to get a second opinion.    RNCC working with APPs to hopefully use TIA slot next week.    Madison Rousseau BS, RN, SCRN  RN Stroke Neurology Care Coordinator  [facility] Neuroscience Service Line

## 2021-09-10 NOTE — TELEPHONE ENCOUNTER
Tentatively scheduling pt for Wednesday 9/15 at 12:00pm with approval from Estrella De La Rosa NP. Attempted to call her but she did not answer and I was unable to leave a voicemail. Will attempt again to reach her on Monday.     Routing to Loraine GUERIN and Dr. Caruso who is attending on service next week so they are aware of this pt and request for visit by Dr. Casanova.    Madison Rousseau BS, RN, SCRN  RN Stroke Neurology Care Coordinator  [facility] Neuroscience Service Line
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
## 2021-09-10 NOTE — PATIENT INSTRUCTIONS
Imaging:  You do have narrowing of your R carotid artery. I see evidence of likely chronic damage to the R side of your brain from this artery.    I think those episodes were transient ischemic attacks (TIAs)   I understand Dr. Ray' concern about operating on you if this is subacute carotid dissection or dissection otherwise.   If it looked more classical stenosis I think a procedure would be a very good idea, but I understand the dilemma therein.  I would like you to see one of my collegaues in vascular neurology for another opinion.  I don't want this to get worse or there to be a stroke with permanent profound weakness.   If you get more symptoms like you had before, lay flat, drink fluids but also go to the ER again and I would let the surgeons office know in the mean time  Continue asprin 325, lipitor 20  Agree with smoking cessation.  Stay well hydrated.

## 2021-09-13 NOTE — TELEPHONE ENCOUNTER
LVM x1 (2nd call attempt) for pt regarding urgent stroke TAZ appt this week. Scheduled for Wednesday 9/15 at 12:00pm.    Madison Rousseau BS, RN, SCRN  RN Stroke Neurology Care Coordinator  [facility] Service Line

## 2021-09-13 NOTE — TELEPHONE ENCOUNTER
Spoke with pt and confirmed appt for 9/15 at 12:00pm.    Madison Rousseau BS, RN, SCRN  RN Stroke Neurology Care Coordinator  [facility] Neuroscience Service Line

## 2021-09-15 ENCOUNTER — TELEPHONE (OUTPATIENT)
Dept: CARE COORDINATION | Facility: CLINIC | Age: 56
End: 2021-09-15

## 2021-09-15 ENCOUNTER — VIRTUAL VISIT (OUTPATIENT)
Dept: NEUROLOGY | Facility: CLINIC | Age: 56
End: 2021-09-15
Attending: PHYSICIAN ASSISTANT
Payer: COMMERCIAL

## 2021-09-15 DIAGNOSIS — I63.231 CEREBROVASCULAR ACCIDENT (CVA) DUE TO STENOSIS OF RIGHT CAROTID ARTERY (H): ICD-10-CM

## 2021-09-15 DIAGNOSIS — I63.9 CEREBROVASCULAR ACCIDENT (CVA), UNSPECIFIED MECHANISM (H): Primary | ICD-10-CM

## 2021-09-15 PROCEDURE — 99215 OFFICE O/P EST HI 40 MIN: CPT | Mod: GT

## 2021-09-15 NOTE — PATIENT INSTRUCTIONS
Get 2 labs checked-- hemoglobin a1c, and lipid panel (assuming you have already been taking the atorvastatin for at least 6 weeks--- if not, then wait a month to check these labs)  Check echocardiogram
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
## 2021-09-15 NOTE — NURSING NOTE
Called patient for check out report, scheduled follow up and phone numbers given for lab and echo scheduling.    DEONDRE CRUZ, CMA
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
## 2021-09-20 ENCOUNTER — MYC REFILL (OUTPATIENT)
Dept: FAMILY MEDICINE | Facility: CLINIC | Age: 56
End: 2021-09-20

## 2021-09-20 DIAGNOSIS — F98.8 ATTENTION DEFICIT DISORDER OF ADULT: ICD-10-CM

## 2021-09-20 DIAGNOSIS — Z72.0 NICOTINE ABUSE: Primary | ICD-10-CM

## 2021-09-22 ENCOUNTER — MYC MEDICAL ADVICE (OUTPATIENT)
Facility: CLINIC | Age: 56
End: 2021-09-22

## 2021-09-22 RX ORDER — DEXTROAMPHETAMINE SACCHARATE, AMPHETAMINE ASPARTATE, DEXTROAMPHETAMINE SULFATE AND AMPHETAMINE SULFATE 5; 5; 5; 5 MG/1; MG/1; MG/1; MG/1
20 TABLET ORAL DAILY
Qty: 30 TABLET | Refills: 0 | Status: SHIPPED | OUTPATIENT
Start: 2021-09-22 | End: 2021-10-19

## 2021-09-22 RX ORDER — DEXTROAMPHETAMINE SACCHARATE, AMPHETAMINE ASPARTATE MONOHYDRATE, DEXTROAMPHETAMINE SULFATE AND AMPHETAMINE SULFATE 5; 5; 5; 5 MG/1; MG/1; MG/1; MG/1
20 CAPSULE, EXTENDED RELEASE ORAL DAILY
Qty: 30 CAPSULE | Refills: 0 | Status: SHIPPED | OUTPATIENT
Start: 2021-09-22 | End: 2021-10-19

## 2021-09-22 NOTE — TELEPHONE ENCOUNTER
Last Written Adderall XR 20 mg 24hr Prescription Date:  8/23/21  Last Fill Quantity: 30,  # refills: 0     Last Written Adderall 20 mg Prescription Date:  8/23/21  Last Fill Quantity: 30,  # refills: 0   Last office visit: 6/23/2021 with prescribing provider:  Symone Acosta   Future Office Visit:   Next 5 appointments (look out 90 days)    Sep 30, 2021  2:00 PM  Nurse Only with BE MA/LPN  [facility] ([facility] [address] [facility] [id number]  [phone]         Routing refill request to provider for review/approval because:  Drug not on the [facility] refill protocol     Mary Maynard, RN, BSN  [facility]

## 2021-09-22 NOTE — TELEPHONE ENCOUNTER
Correct. No spine & brain clinic. Just [facility] neurosurgery (Dr Freitas) for cerebral angiogram--- Dr. Quiros discussed her case with Dr. Freitas

## 2021-09-22 NOTE — TELEPHONE ENCOUNTER
Pt scheduled with Dr. Brown via neurosurgery referral placed by Loraine Chacon. Per review of her note, it appears this was an error as pt already has an TAZ appt for stroke follow-up scheduled in November and the neurosurgery referral was meant for cerebral angiogram.     Loraine, its my understanding that the spine and brain clinic in [place] does not have an ESN provider now that Dr. Lizama is gone? Do you want this routed to the [facility] scheduling?    Madison Rousseau BS, RN, SCRN  RN Stroke Neurology Care Coordinator  [facility] Neuroscience Service Line

## 2021-09-23 NOTE — TELEPHONE ENCOUNTER
Please cancel appt with Dr. Brown and schedule pt with Dr. Freitas for cerebral angiogram.    Madison Rousseau BS, RN, SCRN  RN Stroke Neurology Care Coordinator  [facility] Neuroscience Service Line

## 2021-09-24 ENCOUNTER — TELEPHONE (OUTPATIENT)
Dept: RADIOLOGY | Facility: CLINIC | Age: 56
End: 2021-09-24

## 2021-09-24 NOTE — TELEPHONE ENCOUNTER
Agreed with what you said, it's true she cannot just have an angiogram ordered by us without having risk/benefit discussion of this invasive procedure with the provider who does the procedure.

## 2021-09-24 NOTE — TELEPHONE ENCOUNTER
RECORDS RECEIVED FROM: Internal   REASON FOR VISIT: Cerebrovascular accident (CVA) due to stenosis of right carotid artery/ Discuss doing an angiogram   Date of Appt: 10/12/21   NOTES (FOR ALL VISITS) STATUS DETAILS   OFFICE NOTE from referring provider Internal Vesna GUERIN @ [facility] Neurology:  9/22/21 mychart encounter  9/15/21   OFFICE NOTE from other specialist Internal Dr Gilberto Casanova @ [facility] Neurology:  9/10/21    Dr Taina Ray @ [facility] Vascular:  8/20/21   DISCHARGE SUMMARY from hospital N/A    DISCHARGE REPORT from the ER N/A    OPERATIVE REPORT N/A    MEDICATION LIST Internal    IMAGING  (FOR ALL VISITS)     EMG N/A    EEG N/A    LUMBAR PUNCTURE N/A    AMADO SCAN N/A    ULTRASOUND (CAROTID BILAT) *VASCULAR* N/A    MRI (HEAD, NECK, SPINE) Internal  Juan:  MRA Neck Carotids 8/17/21  MRI Brain 8/17/21   CT (HEAD, NECK, SPINE) Internal  Jan:  CTA Head Neck 8/18/21

## 2021-09-25 ENCOUNTER — HEALTH MAINTENANCE LETTER (OUTPATIENT)
Age: 56
End: 2021-09-25

## 2021-09-30 ENCOUNTER — MYC MEDICAL ADVICE (OUTPATIENT)
Dept: FAMILY MEDICINE | Facility: CLINIC | Age: 56
End: 2021-09-30

## 2021-10-01 NOTE — TELEPHONE ENCOUNTER
TC contacted imaging and was told that this can be completed at the [facility] and she will call her to schedule this appointment. .

## 2021-10-08 ENCOUNTER — TELEPHONE (OUTPATIENT)
Dept: CARE COORDINATION | Facility: CLINIC | Age: 56
End: 2021-10-08

## 2021-10-08 NOTE — TELEPHONE ENCOUNTER
Called to assist with rescheduling upcoming stroke TAZ visit due to provider template change.    DEONDRE CRUZ, CMA

## 2021-10-10 ENCOUNTER — MYC MEDICAL ADVICE (OUTPATIENT)
Dept: FAMILY MEDICINE | Facility: CLINIC | Age: 56
End: 2021-10-10
Payer: COMMERCIAL

## 2021-10-10 ENCOUNTER — MYC MEDICAL ADVICE (OUTPATIENT)
Dept: OTHER | Facility: CLINIC | Age: 56
End: 2021-10-10

## 2021-10-10 DIAGNOSIS — N89.8 VAGINAL DISCHARGE: Primary | ICD-10-CM

## 2021-10-11 ENCOUNTER — DOCUMENTATION ONLY (OUTPATIENT)
Dept: LAB | Facility: CLINIC | Age: 56
End: 2021-10-11

## 2021-10-11 ENCOUNTER — APPOINTMENT (OUTPATIENT)
Dept: LAB | Facility: CLINIC | Age: 56
End: 2021-10-11
Payer: COMMERCIAL

## 2021-10-11 LAB
CLUE CELLS: ABNORMAL
TRICHOMONAS, WET PREP: ABNORMAL
WBC'S/HIGH POWER FIELD, WET PREP: ABNORMAL
YEAST, WET PREP: ABNORMAL

## 2021-10-11 PROCEDURE — 87210 SMEAR WET MOUNT SALINE/INK: CPT | Performed by: NURSE PRACTITIONER

## 2021-10-11 NOTE — TELEPHONE ENCOUNTER
I called patient, pharmacy selected ([facility]) if Rx needed after results back.    Scheduled for self collect wet prep at 4:30 today at [facility].    Patient verbalized understanding of and agreement with plan.    Lucy Vazquez RN  [facility]

## 2021-10-11 NOTE — TELEPHONE ENCOUNTER
I see the imaging tests in question were addressed by vascular team, not due until November and she was given a number by that team to call to schedule.    I see positive bacterial vaginosis 6/23/21.    Routed to PCP, patient would like to re-test/repeat the wet prep.    Lab only or visit needed?    Lucy Vazquez RN  [facility]

## 2021-10-12 ENCOUNTER — VIRTUAL VISIT (OUTPATIENT)
Dept: NEUROSURGERY | Facility: CLINIC | Age: 56
End: 2021-10-12
Payer: COMMERCIAL

## 2021-10-12 ENCOUNTER — PRE VISIT (OUTPATIENT)
Dept: NEUROSURGERY | Facility: CLINIC | Age: 56
End: 2021-10-12

## 2021-10-12 DIAGNOSIS — I63.231 CEREBROVASCULAR ACCIDENT (CVA) DUE TO STENOSIS OF RIGHT CAROTID ARTERY (H): Primary | ICD-10-CM

## 2021-10-12 PROCEDURE — 99207 PR SATISFY VISIT NUMBER: CPT | Performed by: RADIOLOGY

## 2021-10-12 RX ORDER — CLOPIDOGREL BISULFATE 75 MG/1
TABLET ORAL
Qty: 30 TABLET | Refills: 3 | Status: SHIPPED | OUTPATIENT
Start: 2021-10-12 | End: 2022-01-13

## 2021-10-12 NOTE — PATIENT INSTRUCTIONS
We will contact you to schedule your procedure.    If you have questions regarding your procedure, please contact me at [phone], option 3.    If you need to cancel, reschedule or have procedure scheduling related questions, please call Cleo at [phone].    Thank you,  Michael Pal RN, CNRN  Stroke & Endovascular Care Coordinator    Thank you for choosing [facility] for your health care needs.

## 2021-10-12 NOTE — PROGRESS NOTES
Libertad is a 55 year old who is being evaluated via a billable telephone visit.      What phone number would you like to be contacted at? [phone]  How would you like to obtain your AVS? [facility]  Phone call duration:  minutes

## 2021-10-12 NOTE — PROGRESS NOTES
Telephone neurovascular clinic visit    Background history  55-year-old presented with 2 separate episodes of TIA in July 2021. First of which she experienced left upper limb numbness and heaviness which lasted for 10 to 15 minutes and self resolved. Another occasion while working in the yard she noticed weakness in the left foot and almost tripped over. This also lasted for about 10 to 15 minutes and self resolved. She then attended her next routine clinic appointment and had scans that confirmed right-sided hemispheric CVA and severe right ICA narrowing. She complains of hearing heartbeat in both ears which stopped but has resumed again in the last 3 weeks with a different quality and hears a whooshing sound in both ears especially at nighttime. She has a background history of rheumatoid arthritis which was recently diagnosed as and is to commence on methotrexate. She is also hypertensive on medication as well as aspirin 325 mg daily.    Clinical examination  This appointment was by telephone I could not examine the patient. She sounded clear and appropriate with no speech deficits and normal phonation.    Imaging findings  MRA brain performed on 8/17/2021 revealed     1.  Abnormal right internal carotid artery flow void consistent with diminished or absent flow in this vessel. Suspect that this is related to subacute to chronic right internal carotid artery dissection.  2.  T2/FLAIR hyperintensity within the right greater than left deep cerebral white matter presumably reflects gliosis related to chronic ischemic injury. Suspect that there is a component of underlying border zone/watershed hypoperfusion injury given the abnormal right internal carotid artery.  3.  No acute infarct, hemorrhage or mass.    CTA head and neck performed on 8/18/2021 revealed     1. Severe stenosis (likely greater than 80% luminal diameter stenosis)  at the proximal right internal carotid artery.   2. Two areas of severe stenosis of  the intracranial distal right  internal carotid artery at the petrous cavernous junction and of the  entire supraclinoid segment.  3. Plaque without stenosis of the proximal and distal internal carotid  artery on the left.  4. Diminutive M1 segment of the right middle cerebral artery likely  due to decreased inflow.  5. Moderate-severe atherosclerotic narrowing at the origins of the  vertebral arteries bilaterally.       Progress   She states that she does not feel good and is always feeling tired but confirms that she has full power in both upper and lower limbs and is able to mobilize independently.    Plan  Diagnostic cerebral angiogram with right ICA angioplasty/stent with moderate sedation  To commence Plavix 7 days prior to procedure and to continue 325 mg of aspirin daily

## 2021-10-12 NOTE — LETTER
10/12/2021       RE: Libertad Russell  [address] [id number]     Dear Colleague,    Thank you for referring your patient, Libertad Russell, to the [facility] NEUROSURGERY CLINIC [place] at [facility]. Please see a copy of my visit note below.    Telephone neurovascular clinic visit    Background history  55-year-old presented with 2 separate episodes of TIA in July 2021. First of which she experienced left upper limb numbness and heaviness which lasted for 10 to 15 minutes and self resolved. Another occasion while working in the yard she noticed weakness in the left foot and almost tripped over. This also lasted for about 10 to 15 minutes and self resolved. She then attended her next routine clinic appointment and had scans that confirmed right-sided hemispheric CVA and severe right ICA narrowing. She complains of hearing heartbeat in both ears which stopped but has resumed again in the last 3 weeks with a different quality and hears a whooshing sound in both ears especially at nighttime. She has a background history of rheumatoid arthritis which was recently diagnosed as and is to commence on methotrexate. She is also hypertensive on medication as well as aspirin 325 mg daily.    Clinical examination  This appointment was by telephone I could not examine the patient. She sounded clear and appropriate with no speech deficits and normal phonation.    Imaging findings  MRA brain performed on 8/17/2021 revealed     1.  Abnormal right internal carotid artery flow void consistent with diminished or absent flow in this vessel. Suspect that this is related to subacute to chronic right internal carotid artery dissection.  2.  T2/FLAIR hyperintensity within the right greater than left deep cerebral white matter presumably reflects gliosis related to chronic ischemic injury. Suspect that there is a component of underlying border zone/watershed  hypoperfusion injury given the abnormal right internal carotid artery.  3.  No acute infarct, hemorrhage or mass.    CTA head and neck performed on 8/18/2021 revealed     1. Severe stenosis (likely greater than 80% luminal diameter stenosis)  at the proximal right internal carotid artery.   2. Two areas of severe stenosis of the intracranial distal right  internal carotid artery at the petrous cavernous junction and of the  entire supraclinoid segment.  3. Plaque without stenosis of the proximal and distal internal carotid  artery on the left.  4. Diminutive M1 segment of the right middle cerebral artery likely  due to decreased inflow.  5. Moderate-severe atherosclerotic narrowing at the origins of the  vertebral arteries bilaterally.       Progress   She states that she does not feel good and is always feeling tired but confirms that she has full power in both upper and lower limbs and is able to mobilize independently.    Plan  Diagnostic cerebral angiogram with right ICA angioplasty/stent with moderate sedation  To commence Plavix 7 days prior to procedure and to continue 325 mg of aspirin daily      Again, thank you for allowing me to participate in the care of your patient.      Sincerely,    Juan Carlos Freitas MD

## 2021-10-13 DIAGNOSIS — Z11.59 ENCOUNTER FOR SCREENING FOR OTHER VIRAL DISEASES: ICD-10-CM

## 2021-10-13 RX ORDER — NALOXONE HYDROCHLORIDE 0.4 MG/ML
0.4 INJECTION, SOLUTION INTRAMUSCULAR; INTRAVENOUS; SUBCUTANEOUS
Status: CANCELLED | OUTPATIENT
Start: 2021-10-13

## 2021-10-13 RX ORDER — FLUMAZENIL 0.1 MG/ML
0.2 INJECTION, SOLUTION INTRAVENOUS
Status: CANCELLED | OUTPATIENT
Start: 2021-10-13

## 2021-10-13 RX ORDER — NALOXONE HYDROCHLORIDE 0.4 MG/ML
0.2 INJECTION, SOLUTION INTRAMUSCULAR; INTRAVENOUS; SUBCUTANEOUS
Status: CANCELLED | OUTPATIENT
Start: 2021-10-13

## 2021-10-13 RX ORDER — HEPARIN SODIUM 200 [USP'U]/100ML
1 INJECTION, SOLUTION INTRAVENOUS CONTINUOUS PRN
Status: CANCELLED | OUTPATIENT
Start: 2021-10-13

## 2021-10-13 RX ORDER — FENTANYL CITRATE 50 UG/ML
25-50 INJECTION, SOLUTION INTRAMUSCULAR; INTRAVENOUS EVERY 5 MIN PRN
Status: CANCELLED | OUTPATIENT
Start: 2021-10-13

## 2021-10-14 ENCOUNTER — ANCILLARY PROCEDURE (OUTPATIENT)
Dept: CARDIOLOGY | Facility: CLINIC | Age: 56
End: 2021-10-14
Attending: PHYSICIAN ASSISTANT
Payer: COMMERCIAL

## 2021-10-14 DIAGNOSIS — I63.9 CEREBROVASCULAR ACCIDENT (CVA), UNSPECIFIED MECHANISM (H): ICD-10-CM

## 2021-10-14 LAB — LVEF ECHO: NORMAL

## 2021-10-14 PROCEDURE — 93306 TTE W/DOPPLER COMPLETE: CPT | Performed by: INTERNAL MEDICINE

## 2021-10-14 RX ORDER — ACYCLOVIR 200 MG/1
20 CAPSULE ORAL ONCE
Status: COMPLETED | OUTPATIENT
Start: 2021-10-14 | End: 2021-10-14

## 2021-10-14 RX ADMIN — ACYCLOVIR 20 ML: 200 CAPSULE ORAL at 15:31

## 2021-10-15 ENCOUNTER — TELEPHONE (OUTPATIENT)
Dept: RADIOLOGY | Facility: CLINIC | Age: 56
End: 2021-10-15

## 2021-10-15 ENCOUNTER — PATIENT OUTREACH (OUTPATIENT)
Dept: NEUROLOGY | Facility: CLINIC | Age: 56
End: 2021-10-15

## 2021-10-15 NOTE — PATIENT INSTRUCTIONS
You are scheduled for a cerebral angiogram with intention to treat, with Dr. Freitas on 10/22/21 at 9:00 AM2.     Please follow these instructions:    * Prior to your procedure you will need to obtain COVID-19 testing within 2-4 days of your scheduled procedure. You are scheduled for 10/19/21 at 4:00 PM at [facility], located at [address] [place] [id number]. Their phone number is [phone]. You will also need to obtain blood work (ordered by Dr. Salcedo) at time of COVID testing. Scheduling will contact you to arrange your blood work. If you have not been contacted within 7 days of your procedure please reach out to scheduling at [phone] to schedule blood work 2-4 days prior to your procedure.      * Begin taking Plavix 75 mg daily on 10/15/21. Continue taking aspirin 325 mg daily. You will remain on these medications for your procedure.     * You should arrive at the [facility] waiting room at the [facility] at 7:30 AM. The address is [address] [place] [address]. The phone number is [phone]. A map is enclosed.    * Do not eat after Midnight; you may drink clear liquids (includes water, Jell-O, clear broth, apple juice or any non-carbonated beverage that you can see through) until 7:00 AM.     * You may take your medications with a sip of water the morning of the procedure.     * You will stay overnight at the hospital for observation after the procedure. We aim to discharge you by 11:00 AM the following day. Please have your ride available by 10:00 AM.     PLEASE NOTE our COVID-19 visitors policy: For the protection of our patients and visitors, patients are allowed one consistent visitor, 18 years of age or older, per adult patient in the hospital. Visitors must wear a mask at all times while in the hospital.     If you have questions regarding your procedure, please contact me at [phone], option 3.    If you need  to cancel, reschedule or have procedure scheduling related questions, please call Cleo at [phone].    Thank you,  Michael Pal, RN, CNRN  Stroke & Endovascular Care Coordinator

## 2021-10-15 NOTE — TELEPHONE ENCOUNTER
----- Message from Taina Horvath sent at 10/15/2021  9:34 AM CDT -----  Regarding: FW: RONAL LAB ON 10/19 AT [facility],  Please schedule this patient either here, the hospital or at [place]. If you don't know how, talk to Jenniffer and she can show you.  ThanksMary  ----- Message -----  From: Mindy Pal RN  Sent: 10/14/2021  10:47 AM CDT  To: Mindy Gonzalez RN  Subject: RONAL LAB ON 10/19 AT [place]                       Please schedule lab appointment.    ThanksMichael

## 2021-10-15 NOTE — PROGRESS NOTES
Mailbox full. [employer] message sent with patient instructions. Mindy Pal RN 10/15/2021 8:29 AM     Addendum:  MyChart message read by patient. Second [employer] message sent with date/time of blood work. Mindy Pal RN 10/15/2021 4:46 PM

## 2021-10-15 NOTE — TELEPHONE ENCOUNTER
Called patient 2x cant leave .  full. Called patient wanting to let her know she is scheduled for labs same day as Covid test 10/19/21.

## 2021-10-19 ENCOUNTER — LAB (OUTPATIENT)
Dept: LAB | Facility: CLINIC | Age: 56
End: 2021-10-19
Payer: COMMERCIAL

## 2021-10-19 DIAGNOSIS — Z11.59 ENCOUNTER FOR SCREENING FOR OTHER VIRAL DISEASES: ICD-10-CM

## 2021-10-19 PROCEDURE — U0005 INFEC AGEN DETEC AMPLI PROBE: HCPCS

## 2021-10-19 PROCEDURE — U0003 INFECTIOUS AGENT DETECTION BY NUCLEIC ACID (DNA OR RNA); SEVERE ACUTE RESPIRATORY SYNDROME CORONAVIRUS 2 (SARS-COV-2) (CORONAVIRUS DISEASE [COVID-19]), AMPLIFIED PROBE TECHNIQUE, MAKING USE OF HIGH THROUGHPUT TECHNOLOGIES AS DESCRIBED BY CMS-2020-01-R: HCPCS

## 2021-10-20 LAB — SARS-COV-2 RNA RESP QL NAA+PROBE: NEGATIVE

## 2021-10-22 ENCOUNTER — HOSPITAL ENCOUNTER (INPATIENT)
Facility: CLINIC | Age: 56
LOS: 1 days | Discharge: HOME | DRG: 035 | End: 2021-10-23
Attending: RADIOLOGY | Admitting: RADIOLOGY
Payer: COMMERCIAL

## 2021-10-22 ENCOUNTER — APPOINTMENT (OUTPATIENT)
Dept: MEDSURG UNIT | Facility: CLINIC | Age: 56
DRG: 035 | End: 2021-10-22
Attending: RADIOLOGY
Payer: COMMERCIAL

## 2021-10-22 ENCOUNTER — APPOINTMENT (OUTPATIENT)
Dept: INTERVENTIONAL RADIOLOGY/VASCULAR | Facility: CLINIC | Age: 56
DRG: 035 | End: 2021-10-22
Attending: RADIOLOGY
Payer: COMMERCIAL

## 2021-10-22 DIAGNOSIS — I65.29 STENOSIS OF CAROTID ARTERY, UNSPECIFIED LATERALITY: ICD-10-CM

## 2021-10-22 DIAGNOSIS — I63.231 CEREBROVASCULAR ACCIDENT (CVA) DUE TO STENOSIS OF RIGHT CAROTID ARTERY (H): ICD-10-CM

## 2021-10-22 DIAGNOSIS — I63.9 CEREBROVASCULAR ACCIDENT (CVA), UNSPECIFIED MECHANISM (H): ICD-10-CM

## 2021-10-22 LAB
ANION GAP SERPL CALCULATED.3IONS-SCNC: 6 MMOL/L (ref 3–14)
APTT PPP: 28 SECONDS (ref 22–38)
BASOPHILS # BLD AUTO: 0.1 10E3/UL (ref 0–0.2)
BASOPHILS NFR BLD AUTO: 1 %
BUN SERPL-MCNC: 14 MG/DL (ref 7–30)
CALCIUM SERPL-MCNC: 8.6 MG/DL (ref 8.5–10.1)
CHLORIDE BLD-SCNC: 106 MMOL/L (ref 94–109)
CHOLEST SERPL-MCNC: 105 MG/DL
CO2 SERPL-SCNC: 28 MMOL/L (ref 20–32)
CREAT SERPL-MCNC: 0.59 MG/DL (ref 0.52–1.04)
EOSINOPHIL # BLD AUTO: 0.6 10E3/UL (ref 0–0.7)
EOSINOPHIL NFR BLD AUTO: 8 %
ERYTHROCYTE [DISTWIDTH] IN BLOOD BY AUTOMATED COUNT: 13.9 % (ref 10–15)
FASTING STATUS PATIENT QL REPORTED: YES
GFR SERPL CREATININE-BSD FRML MDRD: >90 ML/MIN/1.73M2
GLUCOSE BLD-MCNC: 98 MG/DL (ref 70–99)
GLUCOSE BLDC GLUCOMTR-MCNC: 117 MG/DL (ref 70–99)
GLUCOSE BLDC GLUCOMTR-MCNC: 91 MG/DL (ref 70–99)
HBA1C MFR BLD: 6 % (ref 0–5.6)
HCT VFR BLD AUTO: 32.6 % (ref 35–47)
HDLC SERPL-MCNC: 44 MG/DL
HGB BLD-MCNC: 10.9 G/DL (ref 11.7–15.7)
IMM GRANULOCYTES # BLD: 0 10E3/UL
IMM GRANULOCYTES NFR BLD: 0 %
INR PPP: 1.06 (ref 0.85–1.15)
LDLC SERPL CALC-MCNC: 49 MG/DL
LYMPHOCYTES # BLD AUTO: 1.4 10E3/UL (ref 0.8–5.3)
LYMPHOCYTES NFR BLD AUTO: 18 %
MCH RBC QN AUTO: 28.8 PG (ref 26.5–33)
MCHC RBC AUTO-ENTMCNC: 33.4 G/DL (ref 31.5–36.5)
MCV RBC AUTO: 86 FL (ref 78–100)
MONOCYTES # BLD AUTO: 0.7 10E3/UL (ref 0–1.3)
MONOCYTES NFR BLD AUTO: 9 %
NEUTROPHILS # BLD AUTO: 5 10E3/UL (ref 1.6–8.3)
NEUTROPHILS NFR BLD AUTO: 64 %
NONHDLC SERPL-MCNC: 61 MG/DL
NRBC # BLD AUTO: 0 10E3/UL
NRBC BLD AUTO-RTO: 0 /100
PLATELET # BLD AUTO: 268 10E3/UL (ref 150–450)
POTASSIUM BLD-SCNC: 3.5 MMOL/L (ref 3.4–5.3)
RBC # BLD AUTO: 3.79 10E6/UL (ref 3.8–5.2)
SODIUM SERPL-SCNC: 140 MMOL/L (ref 133–144)
TRIGL SERPL-MCNC: 58 MG/DL
WBC # BLD AUTO: 7.8 10E3/UL (ref 4–11)

## 2021-10-22 PROCEDURE — C1725 CATH, TRANSLUMIN NON-LASER: HCPCS

## 2021-10-22 PROCEDURE — 272N000192 HC ACCESSORY CR2

## 2021-10-22 PROCEDURE — 37215 TRANSCATH STENT CCA W/EPS: CPT | Mod: RT | Performed by: RADIOLOGY

## 2021-10-22 PROCEDURE — 250N000009 HC RX 250: Performed by: PSYCHIATRY & NEUROLOGY

## 2021-10-22 PROCEDURE — 272N000599 HC WIRE DEVICE NEURO PROTECTION CR22

## 2021-10-22 PROCEDURE — 250N000011 HC RX IP 250 OP 636: Performed by: STUDENT IN AN ORGANIZED HEALTH CARE EDUCATION/TRAINING PROGRAM

## 2021-10-22 PROCEDURE — 999N000142 HC STATISTIC PROCEDURE PREP ONLY

## 2021-10-22 PROCEDURE — C1876 STENT, NON-COA/NON-COV W/DEL: HCPCS

## 2021-10-22 PROCEDURE — 83036 HEMOGLOBIN GLYCOSYLATED A1C: CPT

## 2021-10-22 PROCEDURE — 99152 MOD SED SAME PHYS/QHP 5/>YRS: CPT

## 2021-10-22 PROCEDURE — 250N000011 HC RX IP 250 OP 636: Performed by: PSYCHIATRY & NEUROLOGY

## 2021-10-22 PROCEDURE — 85730 THROMBOPLASTIN TIME PARTIAL: CPT

## 2021-10-22 PROCEDURE — 250N000013 HC RX MED GY IP 250 OP 250 PS 637: Performed by: STUDENT IN AN ORGANIZED HEALTH CARE EDUCATION/TRAINING PROGRAM

## 2021-10-22 PROCEDURE — 272N000116 HC CATH CR1

## 2021-10-22 PROCEDURE — C1769 GUIDE WIRE: HCPCS

## 2021-10-22 PROCEDURE — 99152 MOD SED SAME PHYS/QHP 5/>YRS: CPT | Mod: GC | Performed by: RADIOLOGY

## 2021-10-22 PROCEDURE — 258N000003 HC RX IP 258 OP 636: Performed by: STUDENT IN AN ORGANIZED HEALTH CARE EDUCATION/TRAINING PROGRAM

## 2021-10-22 PROCEDURE — 272N000197 HC ACCESSORY CR6

## 2021-10-22 PROCEDURE — 80061 LIPID PANEL: CPT

## 2021-10-22 PROCEDURE — 200N000002 HC R&B ICU UMMC

## 2021-10-22 PROCEDURE — 272N000506 HC NEEDLE CR6

## 2021-10-22 PROCEDURE — 37215 TRANSCATH STENT CCA W/EPS: CPT

## 2021-10-22 PROCEDURE — 250N000009 HC RX 250: Performed by: STUDENT IN AN ORGANIZED HEALTH CARE EDUCATION/TRAINING PROGRAM

## 2021-10-22 PROCEDURE — 255N000002 HC RX 255 OP 636: Performed by: RADIOLOGY

## 2021-10-22 PROCEDURE — 272N000572 HC SHEATH CR9

## 2021-10-22 PROCEDURE — C1760 CLOSURE DEV, VASC: HCPCS

## 2021-10-22 PROCEDURE — 85610 PROTHROMBIN TIME: CPT

## 2021-10-22 PROCEDURE — 85004 AUTOMATED DIFF WBC COUNT: CPT

## 2021-10-22 PROCEDURE — 99153 MOD SED SAME PHYS/QHP EA: CPT

## 2021-10-22 PROCEDURE — 80048 BASIC METABOLIC PNL TOTAL CA: CPT

## 2021-10-22 PROCEDURE — 76937 US GUIDE VASCULAR ACCESS: CPT | Mod: 26 | Performed by: RADIOLOGY

## 2021-10-22 PROCEDURE — 36415 COLL VENOUS BLD VENIPUNCTURE: CPT

## 2021-10-22 PROCEDURE — 272N000302 HC DEVICE INFLATION CR5

## 2021-10-22 RX ORDER — ASPIRIN 325 MG
325 TABLET ORAL DAILY
Status: DISCONTINUED | OUTPATIENT
Start: 2021-10-22 | End: 2021-10-23 | Stop reason: HOSPADM

## 2021-10-22 RX ORDER — DEXTROSE MONOHYDRATE 25 G/50ML
25-50 INJECTION, SOLUTION INTRAVENOUS
Status: DISCONTINUED | OUTPATIENT
Start: 2021-10-22 | End: 2021-10-23 | Stop reason: HOSPADM

## 2021-10-22 RX ORDER — ATROPINE SULFATE 0.1 MG/ML
.5-1 INJECTION INTRAVENOUS
Status: DISCONTINUED | OUTPATIENT
Start: 2021-10-22 | End: 2021-10-23

## 2021-10-22 RX ORDER — NALOXONE HYDROCHLORIDE 0.4 MG/ML
0.2 INJECTION, SOLUTION INTRAMUSCULAR; INTRAVENOUS; SUBCUTANEOUS
Status: DISCONTINUED | OUTPATIENT
Start: 2021-10-22 | End: 2021-10-23 | Stop reason: HOSPADM

## 2021-10-22 RX ORDER — GLYCOPYRROLATE 0.2 MG/ML
.1-.2 INJECTION, SOLUTION INTRAMUSCULAR; INTRAVENOUS
Status: COMPLETED | OUTPATIENT
Start: 2021-10-22 | End: 2021-10-22

## 2021-10-22 RX ORDER — CYCLOBENZAPRINE HCL 5 MG
5 TABLET ORAL 3 TIMES DAILY
Status: DISCONTINUED | OUTPATIENT
Start: 2021-10-22 | End: 2021-10-23 | Stop reason: HOSPADM

## 2021-10-22 RX ORDER — HEPARIN SODIUM 1000 [USP'U]/ML
500-8000 INJECTION, SOLUTION INTRAVENOUS; SUBCUTANEOUS
Status: COMPLETED | OUTPATIENT
Start: 2021-10-22 | End: 2021-10-22

## 2021-10-22 RX ORDER — GLYCOPYRROLATE 0.2 MG/ML
0.2 INJECTION, SOLUTION INTRAMUSCULAR; INTRAVENOUS ONCE
Status: DISCONTINUED | OUTPATIENT
Start: 2021-10-22 | End: 2021-10-23

## 2021-10-22 RX ORDER — LIDOCAINE 40 MG/G
CREAM TOPICAL
Status: DISCONTINUED | OUTPATIENT
Start: 2021-10-22 | End: 2021-10-23 | Stop reason: HOSPADM

## 2021-10-22 RX ORDER — HYDRALAZINE HYDROCHLORIDE 20 MG/ML
10 INJECTION INTRAMUSCULAR; INTRAVENOUS EVERY 10 MIN PRN
Status: DISCONTINUED | OUTPATIENT
Start: 2021-10-22 | End: 2021-10-22

## 2021-10-22 RX ORDER — SODIUM CHLORIDE 9 MG/ML
INJECTION, SOLUTION INTRAVENOUS CONTINUOUS
Status: CANCELLED | OUTPATIENT
Start: 2021-10-22

## 2021-10-22 RX ORDER — HYDRALAZINE HYDROCHLORIDE 20 MG/ML
10 INJECTION INTRAMUSCULAR; INTRAVENOUS
Status: DISCONTINUED | OUTPATIENT
Start: 2021-10-22 | End: 2021-10-23 | Stop reason: HOSPADM

## 2021-10-22 RX ORDER — FENTANYL CITRATE 50 UG/ML
25-50 INJECTION, SOLUTION INTRAMUSCULAR; INTRAVENOUS EVERY 5 MIN PRN
Status: DISCONTINUED | OUTPATIENT
Start: 2021-10-22 | End: 2021-10-23

## 2021-10-22 RX ORDER — ONDANSETRON 2 MG/ML
4 INJECTION INTRAMUSCULAR; INTRAVENOUS EVERY 6 HOURS PRN
Status: DISCONTINUED | OUTPATIENT
Start: 2021-10-22 | End: 2021-10-23 | Stop reason: HOSPADM

## 2021-10-22 RX ORDER — ONDANSETRON 4 MG/1
4 TABLET, ORALLY DISINTEGRATING ORAL EVERY 6 HOURS PRN
Status: DISCONTINUED | OUTPATIENT
Start: 2021-10-22 | End: 2021-10-23 | Stop reason: HOSPADM

## 2021-10-22 RX ORDER — HEPARIN SODIUM 200 [USP'U]/100ML
1 INJECTION, SOLUTION INTRAVENOUS CONTINUOUS PRN
Status: DISCONTINUED | OUTPATIENT
Start: 2021-10-22 | End: 2021-10-23

## 2021-10-22 RX ORDER — SODIUM CHLORIDE 9 MG/ML
INJECTION, SOLUTION INTRAVENOUS CONTINUOUS
Status: DISCONTINUED | OUTPATIENT
Start: 2021-10-22 | End: 2021-10-22

## 2021-10-22 RX ORDER — NALOXONE HYDROCHLORIDE 0.4 MG/ML
0.4 INJECTION, SOLUTION INTRAMUSCULAR; INTRAVENOUS; SUBCUTANEOUS
Status: DISCONTINUED | OUTPATIENT
Start: 2021-10-22 | End: 2021-10-23 | Stop reason: HOSPADM

## 2021-10-22 RX ORDER — HEPARIN SODIUM 200 [USP'U]/100ML
1 INJECTION, SOLUTION INTRAVENOUS CONTINUOUS PRN
Status: DISCONTINUED | OUTPATIENT
Start: 2021-10-22 | End: 2021-10-22

## 2021-10-22 RX ORDER — NICOTINE POLACRILEX 4 MG
15-30 LOZENGE BUCCAL
Status: DISCONTINUED | OUTPATIENT
Start: 2021-10-22 | End: 2021-10-23 | Stop reason: HOSPADM

## 2021-10-22 RX ORDER — IODIXANOL 320 MG/ML
150 INJECTION, SOLUTION INTRAVASCULAR ONCE
Status: COMPLETED | OUTPATIENT
Start: 2021-10-22 | End: 2021-10-22

## 2021-10-22 RX ORDER — CLOPIDOGREL BISULFATE 75 MG/1
75 TABLET ORAL DAILY
Status: DISCONTINUED | OUTPATIENT
Start: 2021-10-22 | End: 2021-10-23 | Stop reason: HOSPADM

## 2021-10-22 RX ORDER — LIDOCAINE 40 MG/G
CREAM TOPICAL
Status: CANCELLED | OUTPATIENT
Start: 2021-10-22

## 2021-10-22 RX ORDER — FLUMAZENIL 0.1 MG/ML
0.2 INJECTION, SOLUTION INTRAVENOUS
Status: DISCONTINUED | OUTPATIENT
Start: 2021-10-22 | End: 2021-10-23

## 2021-10-22 RX ADMIN — MIDAZOLAM 0.5 MG: 1 INJECTION INTRAMUSCULAR; INTRAVENOUS at 12:10

## 2021-10-22 RX ADMIN — CYCLOBENZAPRINE 5 MG: 5 TABLET, FILM COATED ORAL at 14:59

## 2021-10-22 RX ADMIN — MIDAZOLAM 0.5 MG: 1 INJECTION INTRAMUSCULAR; INTRAVENOUS at 12:31

## 2021-10-22 RX ADMIN — IODIXANOL 100 ML: 320 INJECTION, SOLUTION INTRAVASCULAR at 13:35

## 2021-10-22 RX ADMIN — LIDOCAINE HYDROCHLORIDE 8 ML: 10 INJECTION, SOLUTION EPIDURAL; INFILTRATION; INTRACAUDAL; PERINEURAL at 12:38

## 2021-10-22 RX ADMIN — MIDAZOLAM 0.5 MG: 1 INJECTION INTRAMUSCULAR; INTRAVENOUS at 12:20

## 2021-10-22 RX ADMIN — FENTANYL CITRATE 25 MCG: 50 INJECTION INTRAMUSCULAR; INTRAVENOUS at 12:31

## 2021-10-22 RX ADMIN — MIDAZOLAM 0.5 MG: 1 INJECTION INTRAMUSCULAR; INTRAVENOUS at 12:48

## 2021-10-22 RX ADMIN — CLOPIDOGREL BISULFATE 75 MG: 75 TABLET ORAL at 15:05

## 2021-10-22 RX ADMIN — FENTANYL CITRATE 25 MCG: 50 INJECTION INTRAMUSCULAR; INTRAVENOUS at 12:20

## 2021-10-22 RX ADMIN — FENTANYL CITRATE 25 MCG: 50 INJECTION INTRAMUSCULAR; INTRAVENOUS at 12:47

## 2021-10-22 RX ADMIN — HEPARIN SODIUM 3000 UNITS: 200 INJECTION, SOLUTION INTRAVENOUS at 12:39

## 2021-10-22 RX ADMIN — ASPIRIN 325 MG ORAL TABLET 325 MG: 325 PILL ORAL at 15:05

## 2021-10-22 RX ADMIN — GLYCOPYRROLATE 0.2 MG: 1 INJECTION INTRAMUSCULAR; INTRAVENOUS at 13:26

## 2021-10-22 RX ADMIN — SODIUM CHLORIDE: 9 INJECTION, SOLUTION INTRAVENOUS at 08:42

## 2021-10-22 RX ADMIN — HEPARIN SODIUM 4500 UNITS: 1000 INJECTION, SOLUTION INTRAVENOUS; SUBCUTANEOUS at 12:26

## 2021-10-22 RX ADMIN — CYCLOBENZAPRINE 5 MG: 5 TABLET, FILM COATED ORAL at 20:29

## 2021-10-22 RX ADMIN — FENTANYL CITRATE 25 MCG: 50 INJECTION INTRAMUSCULAR; INTRAVENOUS at 12:10

## 2021-10-22 ASSESSMENT — ACTIVITIES OF DAILY LIVING (ADL)
ADLS_ACUITY_SCORE: 14
ADLS_ACUITY_SCORE: 12
ADLS_ACUITY_SCORE: 14
ADLS_ACUITY_SCORE: 12
ADLS_ACUITY_SCORE: 14
ADLS_ACUITY_SCORE: 14

## 2021-10-22 ASSESSMENT — VISUAL ACUITY
OU: NORMAL ACUITY

## 2021-10-22 NOTE — PLAN OF CARE
Patient arrived at 1315 in stable condition. All VS WNL limits. Patient denies any headache or blurred/double vision. Patient remains comfortably at this time and denies any pain. Access site remains clean dry and intact. A/O x4. Left  strength weaker than the rightt upon arrival, patient states this is her baseline RT arthritis. Patient had dinner and is resting comfortably.

## 2021-10-22 NOTE — PROGRESS NOTES
[facility]     Endovascular Surgical Neuroradiology Pre-Procedure Note      HPI:  Libertad Russell is a 55-year-old right hand dominant  presented with 2 separate episodes of TIA in July 2021. First of which she experienced left upper limb numbness and heaviness which lasted for 10 to 15 minutes and self resolved. Another occasion while working in the yard she noticed weakness in the left foot and almost tripped over. This also lasted for about 10 to 15 minutes and self resolved. She then attended her next routine clinic appointment and had scans that confirmed right-sided hemispheric CVA and severe right ICA narrowing. She complains of hearing heartbeat in both ears which stopped but has resumed again in the last 3 weeks with a different quality and hears a whooshing sound in both ears especially at nighttime. She has a background history of rheumatoid arthritis which was recently diagnosed as and is to commence on methotrexate. She is also hypertensive on medication as well as aspirin 325 mg daily.    Medical History:  Reviewed, usually  Past Medical History:   Diagnosis Date     ADHD (attention deficit hyperactivity disorder)      Cerebrovascular accident (CVA) due to stenosis of right carotid artery (H) 9/15/2021     Depressive disorder      HTN (hypertension)      RA (rheumatoid arthritis) (H)      Raynaud disease        Surgical History:  Past Surgical History:   Procedure Laterality Date     BREAST BIOPSY, RT/LT  2000, 1998    breast biopsies; reported to be benign     EXCISE MASS AXILLA Right 06/08/2021    Excise right axillary lipoma;  Surgeon: Artis Domínguez MD;  Location:  OR     EXCISE TUMOR, SOFT TISSUE, NECK/THORAX, SUBCUTANEOUS N/A 06/08/2021    EXCISION of lipoma from left occiput;  Surgeon: Artis Domínguez MD;  Location:  OR     EYE SURGERY  2015    Lasik for vision correction     LYMPH NODE BIOPSY  2001    cervical lymph  node biopsy; told to be benign     DC BREAST AUGMENTATION Bilateral     breast implants     TUBAL LIGATION  2003       Family History:  Family History   Problem Relation Age of Onset     Hypertension Mother      Cancer Mother         lung cancer     Other Cancer Mother         Lung     Thyroid Disease Mother      C.A.D. Father         52 at first MI     Prostate Cancer Father         Bladder     Hypertension Other      Thyroid Disease Sister      Thyroid Disease Sister      Diabetes No family hx of      Cerebrovascular Disease No family hx of      Breast Cancer No family hx of      Cancer - colorectal No family hx of        Social History:  Social History     Socioeconomic History     Marital status: Single     Spouse name: Not on file     Number of children: Not on file     Years of education: Not on file     Highest education level: Not on file   Occupational History     Not on file   Tobacco Use     Smoking status: Current Every Day Smoker     Packs/day: 1.00     Years: 10.00     Pack years: 10.00     Types: Cigarettes     Smokeless tobacco: Never Used   Substance and Sexual Activity     Alcohol use: Yes     Comment: occasional     Drug use: No     Sexual activity: Yes     Partners: Male     Birth control/protection: Female Surgical     Comment: tubal   Other Topics Concern     Parent/sibling w/ CABG, MI or angioplasty before 65F 55M? No   Social History Narrative     Not on file     Social Determinants of Health     Financial Resource Strain:      Difficulty of Paying Living Expenses:    Food Insecurity:      Worried About Running Out of Food in the Last Year:      Ran Out of Food in the Last Year:    Transportation Needs:      Lack of Transportation (Medical):      Lack of Transportation (Non-Medical):    Physical Activity:      Days of Exercise per Week:      Minutes of Exercise per Session:    Stress:      Feeling of Stress :    Social Connections:      Frequency of Communication with Friends and Family:       Frequency of Social Gatherings with Friends and Family:      Attends Bahai Services:      Active Member of Clubs or Organizations:      Attends Club or Organization Meetings:      Marital Status:    Intimate Partner Violence:      Fear of Current or Ex-Partner:      Emotionally Abused:      Physically Abused:      Sexually Abused:        Allergies:  No Known Allergies    Is there a contrast allergy?  No    Medications:  Medications Prior to Admission   Medication Sig Dispense Refill Last Dose     amLODIPine (NORVASC) 5 MG tablet TAKE 1 TABLET(5 MG) BY MOUTH DAILY 90 tablet 1 10/21/2021 at Unknown time     amphetamine-dextroamphetamine (ADDERALL) 20 MG tablet Take 1 tablet (20 mg) by mouth daily Take in the early afternoon. Needs appointment for further refills. 30 tablet 0 10/21/2021 at Unknown time     aspirin (ASA) 325 MG tablet Take 1 tablet (325 mg) by mouth daily 90 tablet 3 10/21/2021 at Unknown time     atorvastatin (LIPITOR) 20 MG tablet Take 1 tablet (20 mg) by mouth daily 30 tablet 3 10/21/2021 at Unknown time     clopidogrel (PLAVIX) 75 MG tablet Begin taking 1 tab daily 7 days prior to procedure 30 tablet 3 10/21/2021 at Unknown time     losartan (COZAAR) 100 MG tablet Take 1 tablet (100 mg) by mouth daily 90 tablet 3 10/21/2021 at Unknown time     diazepam (VALIUM) 5 MG tablet Take 1 tablet 30 to 40 minutes prior to MRI.  May repeat after 30 minutes if needed.  You will need a . (Patient not taking: Reported on 9/15/2021) 2 tablet 0      methotrexate 2.5 MG tablet Take 6 tablets (15 mg) by mouth every 7 days (Patient not taking: Reported on 8/20/2021) 24 tablet 3      metroNIDAZOLE (METROGEL) 0.75 % external gel Apply 1 g topically 2 times daily 45 g 0      nicotine (NICORETTE) 2 MG gum Place 1 each (2 mg) inside cheek every hour as needed for smoking cessation 50 each 2    .    ROS:  The 10 point Review of Systems is negative other than noted in the HPI or here.  Amlodipine  amlodipine    PHYSICAL EXAMINATION  Vital Signs:  B/P: 124/79,  T: 97.8,  P: 79,  R: 16    Cardio:  RRR  Pulmonary:  no respiratory distress  Abdomen:  soft    Neurologic  Mental Status:  fully alert  Cranial Nerves:  visual fields intact  Motor:  no abnormal movements  Sensory:  intact/symmetric to light touch and pin prick throughout upper and lower extremities  Coordination:  FNF and HS intact without dysmetria    Pre-procedure National Institutes of Health Stroke Scale:   Not applicable    LABS  (most recent Cr, BUN, GFR, PLT, INR, PTT within the past 7 days):  Recent Labs   Lab 10/22/21  0817      INR 1.06   PTT 28        Platelet Function P2Y12 (PRU):  Not applicable      ASSESSMENT: Right hemispheric CVA with 80% stenosis of right ICA    PLAN: Diagnostic cerebral angiogram plus right carotid stent placement         PRE-PROCEDURE SEDATION ASSESSMENT     Pre-Procedure Sedation Assessment done at 0900.    Expected Level:  Moderate Sedation    Indication:  Sedation is required to allow for neurointerventional procedure.    Consent obtained from patient after discussing the risks, benefits and alternatives.     PO Intake:  Appropriately NPO for procedure    ASA Class:  Class 2 - MILD SYSTEMIC DISEASE, NO ACUTE PROBLEMS, NO FUNCTIONAL LIMITATIONS.    Mallampati:  Grade 1:  Soft palate, uvula, tonsillar pillars, and posterior pharyngeal wall visible    History and physical reviewed and no updates needed. I have reviewed the lab findings, diagnostic data, medications, and the plan for sedation. I have determined this patient to be an appropriate candidate for the planned sedation and procedure and have reassessed the patient IMMEDIATELY PRIOR to sedation and procedure.    Patient was discussed with the Attending, , who agrees with the plan.    JOSE Jimenez  Neuro IR fellow  pager: [phone]

## 2021-10-22 NOTE — PROCEDURES
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  [] Pre stenting angioplasty, followed by placement of a exact stent in the right internal carotid artery, followed by post stenting angioplasty    Plan:    [] Keep right leg flat for 2 hours  [] Maintain systolic blood pressure less than 140 mmHg  [] Continue with dual antiplatelet therapy  [] Resume PTA medications  [] Full code  [] Monitor neuro ICU with every hour neurochecks      Primary Surgeon:  Dr. Juan Carlos Freitas  Secondary Surgeon:  Dr. Juan Carlos Freitas  Secondary Surgeon Review:  None  Fellow: Dr. Triplett, Dr. Salcedo, Dr. Valderrama  Additional Assistants:  None    Prior to the start of the procedure and with procedural staff participation, I verbally confirmed: the patient s identity using two indicators, relevant allergies, that the procedure was appropriate and matched the consent or emergent situation, and that the correct equipment/implants were available. Immediately prior to starting the procedure I conducted the Time Out with the procedural staff and re-confirmed the patient s name, procedure, and site/side. (The Joint Commission universal protocol was followed.)  Yes    PRU value: Not applicable    Anesthesia:  Conscious Sedation  Medications: 2 mg of midazolam, 100 mcg of fentanyl, 0.2 mg of Robinul, 4500 international units of heparin  Puncture site:  Right Femoral Artery    Fluoroscopy time (minutes):  43  Radiation dose (mGy):  563.2     Contrast amount (mL):  100     Estimated blood loss (mL):  50    Closure: 6French AngioSeal Device     Disposition:  Will be followed in hospital by the Neuro Endovascular team.        Sedation Post-Procedure Summary    Sedatives: Fentanyl and Midazolam (Versed)    Vital signs and pulse oximetry were monitored and remained stable throughout the procedure, and sedation was maintained until the procedure was complete.  The patient was monitored by staff until sedation discharge criteria were met.    Patient tolerance:  Patient tolerated the procedure well with no immediate complications.    Time of sedation in minutes:  60 minutes from beginning to end of physician one to one monitoring.    ORION MANE MD  Pager:  [phone]

## 2021-10-22 NOTE — PROGRESS NOTES
Patient Name: Libertad Russell  Medical Record Number: [id number]  Today's Date: 10/22/2021    Procedure: Cerebral angiogram with intervention  Proceduralist: Dr. Freitas, Dr. Triplett, Dr. Valderrama and Dr. Salcedo  Pathology present: NA    Procedure Start: 1212  Procedure end: 1330  Sedation medications administered:    Fentanyl 100mcg   Midazolam 2mg     Report given to: Celeste WESTBROOK  : NICK    Pre procedure distal pulses and neuro assessment charted in flowsheet.    Other Notes: Pt arrived to IR room 3 from 2A. Consent reviewed. Pt denies any questions or concerns regarding procedure. Pt positioned supine and monitored per protocol. Pt tolerated procedure. BP dropped to 60'3/30's as closure device deployed. Pt positioned in trendelenberg. BP increased to 90's/60/s within 5 minutes. Pt transferred back to .    Right groin WNL, +CMS, distal pulses palpable. AngioSeal closure device deployed at 1315. FLAT BEDREST FOR 3 HOURS UNTIL 1615.

## 2021-10-23 VITALS
BODY MASS INDEX: 24.03 KG/M2 | OXYGEN SATURATION: 98 % | DIASTOLIC BLOOD PRESSURE: 72 MMHG | SYSTOLIC BLOOD PRESSURE: 123 MMHG | TEMPERATURE: 98.1 F | RESPIRATION RATE: 16 BRPM | WEIGHT: 140 LBS | HEART RATE: 73 BPM

## 2021-10-23 PROCEDURE — 037K3DZ DILATION OF RIGHT INTERNAL CAROTID ARTERY WITH INTRALUMINAL DEVICE, PERCUTANEOUS APPROACH: ICD-10-PCS | Performed by: RADIOLOGY

## 2021-10-23 PROCEDURE — 250N000013 HC RX MED GY IP 250 OP 250 PS 637: Performed by: STUDENT IN AN ORGANIZED HEALTH CARE EDUCATION/TRAINING PROGRAM

## 2021-10-23 RX ORDER — ONDANSETRON 4 MG/1
4 TABLET, ORALLY DISINTEGRATING ORAL EVERY 6 HOURS PRN
Status: DISCONTINUED | OUTPATIENT
Start: 2021-10-23 | End: 2021-10-23

## 2021-10-23 RX ORDER — ASPIRIN 325 MG
325 TABLET ORAL DAILY
Qty: 90 TABLET | Refills: 3 | Status: SHIPPED | OUTPATIENT
Start: 2021-10-23 | End: 2022-09-28

## 2021-10-23 RX ORDER — CLOPIDOGREL BISULFATE 75 MG/1
75 TABLET ORAL DAILY
Status: DISCONTINUED | OUTPATIENT
Start: 2021-10-23 | End: 2021-10-23

## 2021-10-23 RX ORDER — SODIUM CHLORIDE 9 MG/ML
INJECTION, SOLUTION INTRAVENOUS CONTINUOUS
Status: DISCONTINUED | OUTPATIENT
Start: 2021-10-23 | End: 2021-10-23 | Stop reason: HOSPADM

## 2021-10-23 RX ORDER — ONDANSETRON 2 MG/ML
4 INJECTION INTRAMUSCULAR; INTRAVENOUS EVERY 6 HOURS PRN
Status: DISCONTINUED | OUTPATIENT
Start: 2021-10-23 | End: 2021-10-23

## 2021-10-23 RX ORDER — LOSARTAN POTASSIUM 50 MG/1
100 TABLET ORAL DAILY
Status: DISCONTINUED | OUTPATIENT
Start: 2021-10-23 | End: 2021-10-23 | Stop reason: HOSPADM

## 2021-10-23 RX ORDER — PROCHLORPERAZINE MALEATE 10 MG
10 TABLET ORAL EVERY 6 HOURS PRN
Status: DISCONTINUED | OUTPATIENT
Start: 2021-10-23 | End: 2021-10-23 | Stop reason: HOSPADM

## 2021-10-23 RX ORDER — ASPIRIN 325 MG
325 TABLET ORAL DAILY
Status: DISCONTINUED | OUTPATIENT
Start: 2021-10-23 | End: 2021-10-23

## 2021-10-23 RX ORDER — PROCHLORPERAZINE 25 MG
25 SUPPOSITORY, RECTAL RECTAL EVERY 12 HOURS PRN
Status: DISCONTINUED | OUTPATIENT
Start: 2021-10-23 | End: 2021-10-23 | Stop reason: HOSPADM

## 2021-10-23 RX ADMIN — CLOPIDOGREL BISULFATE 75 MG: 75 TABLET ORAL at 09:02

## 2021-10-23 RX ADMIN — LOSARTAN POTASSIUM 100 MG: 50 TABLET, FILM COATED ORAL at 09:01

## 2021-10-23 RX ADMIN — ASPIRIN 325 MG ORAL TABLET 325 MG: 325 PILL ORAL at 09:02

## 2021-10-23 RX ADMIN — CYCLOBENZAPRINE 5 MG: 5 TABLET, FILM COATED ORAL at 09:02

## 2021-10-23 ASSESSMENT — VISUAL ACUITY
OU: NORMAL ACUITY

## 2021-10-23 ASSESSMENT — ACTIVITIES OF DAILY LIVING (ADL)
ADLS_ACUITY_SCORE: 5
ADLS_ACUITY_SCORE: 14
ADLS_ACUITY_SCORE: 5
ADLS_ACUITY_SCORE: 14
ADLS_ACUITY_SCORE: 5

## 2021-10-23 NOTE — DISCHARGE SUMMARY
[facility]    Endovascular Surgical Neuroradiology Discharge Summary    Date of Admission: 10/22/2021  Date of Discharge: 10/23/2021    Disposition: Discharged to home  Primary Care Physician: Symone Acosta      Admission Diagnosis:   Symptomatic right ICA stenosis requiring stent placement    Discharge Diagnosis:   Symptomatic right ICA stenosis requiring stent placement    Problem Leading to Hospitalization (from [facility]):     55-year-old presented with 2 separate episodes of TIA in July 2021. First of which she experienced left upper limb numbness and heaviness which lasted for 10 to 15 minutes and self resolved. Another occasion while working in the yard she noticed weakness in the left foot and almost tripped over. This also lasted for about 10 to 15 minutes and self resolved. She then attended her next routine clinic appointment and had scans that confirmed right-sided hemispheric CVA and severe right ICA narrowing. She complains of hearing heartbeat in both ears which stopped but has resumed again in the last 3 weeks with a different quality and hears a whooshing sound in both ears especially at nighttime. She has a background history of rheumatoid arthritis which was recently diagnosed as and is to commence on methotrexate. She is also hypertensive on medication as well as aspirin 325 mg daily and plavix 75 mg qday.    She was admitted and treated with a right ICA stent.  Symptomatic right internal carotid artery near occlusion post  bifurcation with collapse of distal communicating segment of right  ICA. Right opthalmic artery is first intradural branch. We performed  pre stent angioplasty, stent placement with 8 X 40 mm Precise stent  and post stent angioplasty. Post stenting there continues to be near  occlusion of the right ICA with collapse distally at the right ICA  communicating segment.     Plan:  [] ICU for 24 hours, -140  [] ASA and plavix  for 1 month, then ASA  [] Follow up US in 1 month and clinic visit with Dr Freitas,We will schedule follow up in 1 month, Michael us RN will call patient.    Brief Hospital Course:   Patient was admitted to neurointensive care unit for overnight observation after procedure. He/She had an uneventful course. There were no new problems. Patient was tolerating oral diet and was ambulating independently. Her groin access site was dry and intact without any evidence of hematoma or tenderness. She was provided detailed explanation about her treatment and follow-up plan. She was discharged home in stable condition.     Complications: None.     Other problems addressed during this hospitalization:  none    PHYSICAL EXAMINATION  Vital Signs:  B/P: 126/72, T: 98.8, P: 73, R: 16    Mentation: Awake, alert & oriented x3  Speech: Normal. No dysarthria or aphasia  Cranial nerve exam: Pupils equal, round and reactive to light bilaterally, visual fields full, extraocular movements intact, Facial sensations intact, face symmetric, hearing normal B/L, Shoulder shrug strong B/L, tongue movements intact  Motor: baseline LUE weakness, otherwise 5/5 strength  Sensations: Intact B/L to light touch  Coordination: Finger to nose test intact B/L  Gait: Deferred          mRS 1 - No significant disability. Able to carry out all usual activities, despite some symptoms.      What you may eat or drink after angiogram:  -- There are no changes in what you should eat or drink after this test except that you should drink at least six to eight 8-ounce glasses of fluid each day for 2 days to flush the contrast (dye) out of your body unless you are on a limited fluids diet.    Moving around after angiogram:  -- After your test: Rest 48 hours and follow the special activity instructions listed.    Special activity restrictions:  -- Limit physical activity for 48 hours.  Rest on a sofa or bed as much as possible.  -- No strenuous activity.  -- No long  walks.  -- Avoid climbing stairs if possible.  If you must climb stairs, do it slowly.    Lifting:  -- Do not lift more than 10 pounds for 48 hours.   -- Do not lift more than 20 pounds for 7 days. (A full gallon of water weighs about 8 pounds)    Sexual activity:  -- You can resume sexual activity in 2 days.  Bathing/Swimming:  -- You may shower in 24 hours .  -- You may NOT take a tub bath, swim, or sit in water for 5 days.    The groin puncture site:  Care:  -- Keep the area clean and dry except for during daily shower.  -- Clean the site daily using soap and water while standing in the shower.  Pat dry thoroughly.  -- Cover the groin site with a bandaid until the skin has closed.   -- When you sneeze, cough or laugh, hold pressure on the puncture site.  -- If you must strain when having a bowel movement, hold gentle pressure to the puncture site.    Anesthesia or sedation information:  You have received medication for your procedure that made you sleepy:  -- You may be sleepy, feel less coordinated or feel weak.  To prevent injury, be careful when you walk, bathe, cook or use electrical devices/tools.  -- You may NOT drive or operate mechanical equipment until 24 hours after your procedure.  You also must stop taking narcotic pain medications before driving.  -- A responsible adult should remain with you for at least 24 hours after your procedure.  You should stay at home and rest today.  -- This may cause you to react differently to alcohol.  Do not drink alcohol for at least 24 hours after your procedure.  -- This may cause you to not think clearly.  Do not make important decisions for 24 hours after your procedure.  -- To prevent burns, do not smoke.    Local anesthesia:  -- You had local anesthesia (numbing medicine) for your procedure.  The numbness should go away a few hours after the procedure.    Your medicines:  -- It is important that you take the medicines on your list.  Work with your health care  provider or pharmacist if you have questions about your medicine.    Return to work:  -- You can return to work in 24 hours if your work does not stop you from following your care instructions (such as lifting).  If your work does not allow you to follow the instructions, you should return to work in 48 hours.        Medications    Current Discharge Medication List      CONTINUE these medications which have NOT CHANGED    Details   amLODIPine (NORVASC) 5 MG tablet TAKE 1 TABLET(5 MG) BY MOUTH DAILY  Qty: 90 tablet, Refills: 1    Associated Diagnoses: Essential hypertension with goal blood pressure less than 140/90      amphetamine-dextroamphetamine (ADDERALL) 20 MG tablet Take 1 tablet (20 mg) by mouth daily Take in the early afternoon. Needs appointment for further refills.  Qty: 30 tablet, Refills: 0    Associated Diagnoses: Attention deficit disorder of adult      aspirin (ASA) 325 MG tablet Take 1 tablet (325 mg) by mouth daily  Qty: 90 tablet, Refills: 3    Associated Diagnoses: Stenosis of carotid artery, unspecified laterality      atorvastatin (LIPITOR) 20 MG tablet Take 1 tablet (20 mg) by mouth daily  Qty: 30 tablet, Refills: 3    Associated Diagnoses: Stenosis of carotid artery, unspecified laterality      clopidogrel (PLAVIX) 75 MG tablet Begin taking 1 tab daily 7 days prior to procedure  Qty: 30 tablet, Refills: 3    Associated Diagnoses: Cerebrovascular accident (CVA) due to stenosis of right carotid artery (H)      losartan (COZAAR) 100 MG tablet Take 1 tablet (100 mg) by mouth daily  Qty: 90 tablet, Refills: 3    Associated Diagnoses: Essential hypertension with goal blood pressure less than 140/90      diazepam (VALIUM) 5 MG tablet Take 1 tablet 30 to 40 minutes prior to MRI.  May repeat after 30 minutes if needed.  You will need a .  Qty: 2 tablet, Refills: 0    Associated Diagnoses: Paresthesia      methotrexate 2.5 MG tablet Take 6 tablets (15 mg) by mouth every 7 days  Qty: 24 tablet,  Refills: 3    Associated Diagnoses: Rheumatoid arthritis involving both hands with positive rheumatoid factor (H)      metroNIDAZOLE (METROGEL) 0.75 % external gel Apply 1 g topically 2 times daily  Qty: 45 g, Refills: 0    Associated Diagnoses: Rosacea      nicotine (NICORETTE) 2 MG gum Place 1 each (2 mg) inside cheek every hour as needed for smoking cessation  Qty: 50 each, Refills: 2    Associated Diagnoses: Nicotine abuse             Additional recommendations and follow up: We will schedule follow up in 1 month, Michael us RN will call patient.    No discharge procedures on file.    Patient was discussed with the attending physician, Dr. Freitas

## 2021-10-23 NOTE — PLAN OF CARE
Major Shift Events:  Neuro exams remian unchanged throughout the night. A&Ox4. Pupils are equal and reactive. Strength is equal bilaterally. No headache or changes in vision reported by pt. Access site remains clean dry and intact. Pt voids spontaneously and tolerates regular diet.  Plan: Continue plan of care.   For vital signs and complete assessments, please see documentation flowsheets.

## 2021-10-25 ENCOUNTER — TELEPHONE (OUTPATIENT)
Dept: RADIOLOGY | Facility: CLINIC | Age: 56
End: 2021-10-25

## 2021-10-25 ENCOUNTER — PATIENT OUTREACH (OUTPATIENT)
Dept: CARE COORDINATION | Facility: CLINIC | Age: 56
End: 2021-10-25

## 2021-10-25 DIAGNOSIS — I65.21 STENOSIS OF RIGHT CAROTID ARTERY: Primary | ICD-10-CM

## 2021-10-25 DIAGNOSIS — Z71.89 OTHER SPECIFIED COUNSELING: ICD-10-CM

## 2021-10-25 NOTE — PROGRESS NOTES
Clinic Care Coordination Contact  [facility]/Voicemail       Clinical Data: Care Coordinator Outreach  Reason for referral: TCM outreach  Outreach attempted x 1.  Left message on patient's voicemail with call back information and requested return call.  Plan:  Care Coordinator will try to reach patient again in 1-2 business days.       Guerita Strauss  Community Health Worker  [facility] [place]  Ph: [phone]

## 2021-10-26 ENCOUNTER — TELEPHONE (OUTPATIENT)
Dept: RADIOLOGY | Facility: CLINIC | Age: 56
End: 2021-10-26

## 2021-10-26 NOTE — PROGRESS NOTES
Clinic Care Coordination Contact  [facility]/Voicemail     Clinical Data: Care Coordinator Outreach  Outreach attempted x 2.  Left message on patient's voicemail with call back information and requested return call.  Plan: Care Coordinator will do no further outreaches at this time.      JAMISON Olsen  [phone]  [facility] Resource [facility]

## 2021-11-10 ENCOUNTER — HOSPITAL ENCOUNTER (OUTPATIENT)
Dept: ULTRASOUND IMAGING | Facility: CLINIC | Age: 56
End: 2021-11-10
Attending: SURGERY
Payer: COMMERCIAL

## 2021-11-10 DIAGNOSIS — I65.29 STENOSIS OF CAROTID ARTERY, UNSPECIFIED LATERALITY: ICD-10-CM

## 2021-11-10 DIAGNOSIS — I70.209 ATHEROSCLEROSIS OF ARTERIES OF EXTREMITIES (H): ICD-10-CM

## 2021-11-10 PROCEDURE — 93880 EXTRACRANIAL BILAT STUDY: CPT | Mod: 26 | Performed by: SURGERY

## 2021-11-10 PROCEDURE — 93880 EXTRACRANIAL BILAT STUDY: CPT

## 2021-11-10 PROCEDURE — 93924 LWR XTR VASC STDY BILAT: CPT

## 2021-11-10 PROCEDURE — 93924 LWR XTR VASC STDY BILAT: CPT | Mod: 26 | Performed by: SURGERY

## 2021-11-12 ENCOUNTER — OFFICE VISIT (OUTPATIENT)
Dept: OTHER | Facility: CLINIC | Age: 56
End: 2021-11-12
Attending: SURGERY
Payer: COMMERCIAL

## 2021-11-12 VITALS — DIASTOLIC BLOOD PRESSURE: 77 MMHG | HEART RATE: 78 BPM | SYSTOLIC BLOOD PRESSURE: 129 MMHG

## 2021-11-12 DIAGNOSIS — I70.209 ATHEROSCLEROSIS OF ARTERIES OF EXTREMITIES (H): Primary | ICD-10-CM

## 2021-11-12 PROCEDURE — 99215 OFFICE O/P EST HI 40 MIN: CPT | Performed by: SURGERY

## 2021-11-12 NOTE — PROGRESS NOTES
Vascular Surgery Progress Note     Date: November 12, 2021     Reason for Visit:  Follow-up right carotid dissection    Subjective:  In the interim from her last visit, Ms. Russell was seen by Neuro-IR. A cerebral angiogram was obtained and a right ICA stent placed. She says she does not really feel any different after that procedure.   She has felt debilitated from her RA and notes that she feels generalized fatigue that is very troublesome.   She has has (+)15 lb weight gain in the last 6 months. No night sweats. No fevers.  No sudden blindness in either eye. She notes that her vision is worse, and seems to have progressive trouble following her Lasik procedure 5 years ago; she does not have areas with visual defects. No sudden weakness or numbness in either arm or leg; she has had shaking of the hands, especially on the left; this has resulted in her dropping things from the left hand. No episodes of difficulty speaking or understanding speech.      Current Outpatient Medications:      amLODIPine (NORVASC) 5 MG tablet, TAKE 1 TABLET(5 MG) BY MOUTH DAILY, Disp: 90 tablet, Rfl: 1     amphetamine-dextroamphetamine (ADDERALL XR) 20 MG 24 hr capsule, Take 1 capsule (20 mg) by mouth daily, Disp: 30 capsule, Rfl: 0     amphetamine-dextroamphetamine (ADDERALL) 20 MG tablet, Take 1 tablet (20 mg) by mouth daily Take in the early afternoon. Needs appointment for further refills., Disp: 30 tablet, Rfl: 0     aspirin (ASA) 325 MG tablet, Take 1 tablet (325 mg) by mouth daily, Disp: 90 tablet, Rfl: 3     atorvastatin (LIPITOR) 20 MG tablet, Take 1 tablet (20 mg) by mouth daily, Disp: 30 tablet, Rfl: 3     clopidogrel (PLAVIX) 75 MG tablet, Begin taking 1 tab daily 7 days prior to procedure, Disp: 30 tablet, Rfl: 3     diazepam (VALIUM) 5 MG tablet, Take 1 tablet 30 to 40 minutes prior to MRI.  May repeat after 30 minutes if needed.  You will need a ., Disp: 2 tablet, Rfl: 0     losartan (COZAAR) 100 MG tablet, Take 1  "tablet (100 mg) by mouth daily, Disp: 90 tablet, Rfl: 3     methotrexate 2.5 MG tablet, Take 6 tablets (15 mg) by mouth every 7 days, Disp: 24 tablet, Rfl: 3     metroNIDAZOLE (METROGEL) 0.75 % external gel, Apply 1 g topically 2 times daily (Patient taking differently: Apply 1 applicator topically once as needed ), Disp: 45 g, Rfl: 0     nicotine (NICORETTE) 2 MG gum, Place 1 each (2 mg) inside cheek every hour as needed for smoking cessation, Disp: 50 each, Rfl: 2     Physical Exam       BP: 129/77 Pulse: 78            Vital Signs with Ranges  Pulse:  [78] 78  BP: (129)/(77) 129/77  0 lbs 0 oz    Constitutional: cooperative, no apparent distress, sitting comfortably in chair.   HENT: EOMI and conjugate. Moist mucous membranes. Facial sensation and muscle movement symmetric BL. Hearing intact to normal speaking tone. No hoarseness of voice. Shoulder shrug symmetric. Tongue protrudes midline.   Musculoskeletal: grossly normal and symmetric ROM and strength in BL extremities   Neurologic: Awake, alert, oriented to name, place, time, and situation    Imaging:  I have reviewed the following imaging studies:  CTA Neck (8/18/21): \"1. Severe stenosis (likely greater than 80% luminal diameter stenosis) at the proximal right internal carotid artery. The exact degree of stenosis cannot be measured as there is swallowing motion while scanning through the carotid bifurcations. 2. Two areas of severe stenosis of the intracranial distal right internal carotid artery at the petrous cavernous junction and of the entire supraclinoid segment. 3. Combination of inflow and outflow restriction of the right internal carotid artery resulting in a diminutive right internal carotid artery in the neck. 4. Plaque without stenosis of the proximal and distal internal carotid artery on the left. 5. Diminutive M1 segment of the right middle cerebral artery likely due to decreased inflow. 6. Moderate-severe atherosclerotic narrowing at the " "origins of the vertebral arteries bilaterally.\"     ABIs (11/10/21): \"1.  Right JAZMIN 1.05, with exercise JAZMIN of 0.88, suggesting mild exercise-induced arterial insufficiency. 2.  Left JAZMIN 1.14, with exercise JAZMIN of 0.96.  3.  Waveforms in both legs would suggest underlying atherosclerosis or peripheral arterial disease.\"    US Carotid Duplex (11/10/21): \"1. Right side: Significantly diminished flow is present through a right common to internal carotid stent, which appears patent. There is marginal continuous diastolic flow, with a higher resistance waveform visualized (but waveform interpretation is difficult with the low flow present).  2. Left side: Approximately 50-69% stenosis of the left internal carotid artery predicted.\"      Assessment & Plan   Ms. Russell is a 55 year old female with history of RA, HTN, depression, ADHD, PAD, and tobacco abuse, who presents with symptomatic right carotid disease; based on the imaging, this appears to be consistent with spontaneous right carotid artery dissection.       Smoking cessation: she has been vaping and is rarely using it, just \"a puff here and there\" to stave off nicotine cravings. She has not bought a pack of cigarettes in months. I congratulated her on her efforts and encouraged her to quit vaping entirely as well.     Given the placement of a carotid artery stent, I will defer her ongoing carotid disease care to Neuro-IR. This will need longitudinal surveillance and management. Based on the available imaging, I am concerned for risk of eventual stent thrombosis and occlusion.     She is welcomed to follow-up as needed with Vascular Surgery in future. If she develops symptomatic claudication, she should return for repeat evaluation.     Taina Ray    Total time spent on the date of this encounter doing: chart review, review of test results, patient visit, physical exam, education, counseling, developing plan of care, and documenting = 40 minutes    "

## 2021-11-12 NOTE — PROGRESS NOTES
[facility]        Patient is here for a  follow up.     Pt is currently taking Aspirin, Statin and Plavix.    /77 (BP Location: Right arm, Patient Position: Chair, Cuff Size: Adult Regular)   Pulse 78   Breastfeeding No     The provider has been notified that the patient has no concerns.     Questions patient would like addressed today are: N/A.    Refills are needed: N/A    Has homecare services and agency name:  Jessica Clay MA

## 2021-11-13 DIAGNOSIS — I10 ESSENTIAL HYPERTENSION WITH GOAL BLOOD PRESSURE LESS THAN 140/90: ICD-10-CM

## 2021-11-16 RX ORDER — LOSARTAN POTASSIUM 100 MG/1
TABLET ORAL
Qty: 30 TABLET | OUTPATIENT
Start: 2021-11-16

## 2021-11-20 ENCOUNTER — MYC REFILL (OUTPATIENT)
Dept: FAMILY MEDICINE | Facility: CLINIC | Age: 56
End: 2021-11-20
Payer: COMMERCIAL

## 2021-11-20 DIAGNOSIS — F98.8 ATTENTION DEFICIT DISORDER OF ADULT: ICD-10-CM

## 2021-11-22 ENCOUNTER — MYC REFILL (OUTPATIENT)
Dept: FAMILY MEDICINE | Facility: CLINIC | Age: 56
End: 2021-11-22
Payer: COMMERCIAL

## 2021-11-22 DIAGNOSIS — F98.8 ATTENTION DEFICIT DISORDER OF ADULT: ICD-10-CM

## 2021-11-22 RX ORDER — DEXTROAMPHETAMINE SACCHARATE, AMPHETAMINE ASPARTATE, DEXTROAMPHETAMINE SULFATE AND AMPHETAMINE SULFATE 5; 5; 5; 5 MG/1; MG/1; MG/1; MG/1
20 TABLET ORAL DAILY
Qty: 30 TABLET | Refills: 0 | Status: CANCELLED | OUTPATIENT
Start: 2021-11-22

## 2021-11-23 ENCOUNTER — OFFICE VISIT (OUTPATIENT)
Dept: NEUROSURGERY | Facility: CLINIC | Age: 56
End: 2021-11-23
Payer: COMMERCIAL

## 2021-11-23 VITALS
HEART RATE: 49 BPM | DIASTOLIC BLOOD PRESSURE: 84 MMHG | OXYGEN SATURATION: 92 % | BODY MASS INDEX: 23.34 KG/M2 | RESPIRATION RATE: 16 BRPM | WEIGHT: 136 LBS | SYSTOLIC BLOOD PRESSURE: 134 MMHG

## 2021-11-23 DIAGNOSIS — I65.21 CAROTID STENOSIS, RIGHT: ICD-10-CM

## 2021-11-23 DIAGNOSIS — R42 VERTIGO: Primary | ICD-10-CM

## 2021-11-23 PROCEDURE — 99207 PR SATISFY VISIT NUMBER: CPT | Performed by: RADIOLOGY

## 2021-11-23 RX ORDER — DEXTROAMPHETAMINE SACCHARATE, AMPHETAMINE ASPARTATE, DEXTROAMPHETAMINE SULFATE AND AMPHETAMINE SULFATE 5; 5; 5; 5 MG/1; MG/1; MG/1; MG/1
20 TABLET ORAL DAILY
Qty: 30 TABLET | Refills: 0 | Status: SHIPPED | OUTPATIENT
Start: 2021-11-23 | End: 2021-12-17

## 2021-11-23 NOTE — LETTER
11/23/2021       RE: Libertad Russell  [address] [address]     Dear Colleague,    Thank you for referring your patient, Libertad Russell, to the [facility] NEUROS[facility]GERY [facility] [place] [facility]. Please see a copy of my visit note below.    Endovascular Surgical Neuroradiology Clinic Note:    S:  Ms. Maurer is presenting for routine ESN follow-up visit after her R ICA stenting and angioplasty performed 10/22/21.     She continues to have her dizziness symptoms and left arm weakness and bilateral pulsatile tinnitus after stenting. Of note, all these symptoms had been present 6 months prior to stenting. Interval medical history is notable for initiation of methotrexate for rheumatoid arthritis this past Thursday. She has not noticed a different in her joint pain as of yet. She denies any changes in her vision since stenting.     O:  Awake and alert  CN II-XII intact  No pronator drift  Strength 5/5 in all extremities but with giveaway weakness present in left biceps and triceps on initial testing   No sensory deficits     Imaging:   Carotid ultrasound 11/10/21 per radiologist:   1. Right side: Significantly diminished flow is present through a  right common to internal carotid stent, which appears patent. There is  marginal continuous diastolic flow, with a higher resistance waveform  visualized (but waveform interpretation is difficult with the low flow  present).       2. Left side: Approximately 50-69% stenosis of the left internal  carotid artery predicted.     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 There has been minimal revascularization since stent placement 1 month post and this is expected given her pseudo-occlusion. We would like to reassess this patient in 6 months to  "assess for flow changes.     P:  - return to clinic in 6 months with repeat bilateral carotid ultrasound   - follow-up with Dr. Casanova for dizziness symptoms  - continue ASA    Patient discussed with Dr. Zena Jung \"Dat\" MD Daisha   Neurosurgery, PGY-5    Attestation signed by Juan Carlos Freitas MD at 11/29/2021 11:48 AM:  I agree with the above note by Dr. Ashton        on  11/23/21        Again, thank you for allowing me to participate in the care of your patient.      Sincerely,    Juan Carlos Freitas MD      "

## 2021-11-23 NOTE — TELEPHONE ENCOUNTER
Routing refill request to provider for review/approval because:  Drug not on the [facility] refill protocol     Last Written Prescription Date:  10-20-21  Last Fill Quantity: 30,  # refills: 0   Last office visit: 6/23/2021 with prescribing provider:  Symone Acosta   Future Office Visit:

## 2021-11-23 NOTE — PATIENT INSTRUCTIONS
Follow-up with Dr. Freitas in 6 months with a carotid ultrasound prior to your appointment.    If you have any questions please contact me at [phone], option 3.    Michael Pal RN, CNRN  Stroke & Endovascular Care Coordinator    Thank you for choosing [facility] for your health care needs.

## 2021-11-23 NOTE — PROGRESS NOTES
"Endovascular Surgical Neuroradiology Clinic Note:    S:  Ms. Maurer is presenting for routine ESN follow-up visit after her R ICA stenting and angioplasty performed 10/22/21.     She continues to have her dizziness symptoms and left arm weakness and bilateral pulsatile tinnitus after stenting. Of note, all these symptoms had been present 6 months prior to stenting. Interval medical history is notable for initiation of methotrexate for rheumatoid arthritis this past Thursday. She has not noticed a different in her joint pain as of yet. She denies any changes in her vision since stenting.     O:  Awake and alert  CN II-XII intact  No pronator drift  Strength 5/5 in all extremities but with giveaway weakness present in left biceps and triceps on initial testing   No sensory deficits     Imaging:   Carotid ultrasound 11/10/21 per radiologist:   1. Right side: Significantly diminished flow is present through a  right common to internal carotid stent, which appears patent. There is  marginal continuous diastolic flow, with a higher resistance waveform  visualized (but waveform interpretation is difficult with the low flow  present).       2. Left side: Approximately 50-69% stenosis of the left internal  carotid artery predicted.     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 There has been minimal revascularization since stent placement 1 month post and this is expected given her pseudo-occlusion. We would like to reassess this patient in 6 months to assess for flow changes.     P:  - return to clinic in 6 months with repeat bilateral carotid ultrasound   - follow-up with Dr. Casanova for dizziness symptoms  - continue ASA    Patient discussed with Dr. Zena Jung \"Dta\" MD [facility]   Neurosurgery, PGY-5  "

## 2021-11-24 NOTE — TELEPHONE ENCOUNTER
Duplicate refill request for Adderall 20 mg.     Last written on 11/23/21.       Rosy Hutchins RN BSN  [facility]

## 2021-11-30 NOTE — PROGRESS NOTES
Assessment & Plan     High priority for 2019-nCoV vaccine  Plans to receive at work    Weakness of left hand  We will set up for physical therapy  - [facility]; Future    Stenosis of right carotid artery  Previous vascular and neurology notes reviewed.  Continue aspirin and Plavix.    Rheumatoid arthritis involving both hands with positive rheumatoid factor (H)  Previous rheumatology notes reviewed.  Continue to follow with rheumatology.    Review of external notes as documented elsewhere in note  Review of the result(s) of each unique test - Labs, imaging  Ordering of each unique test  30 minutes spent on the date of the encounter doing chart review, history and exam, documentation and further activities per the note       No follow-ups on file.    SULY Melvin CNP  [facility] KARLIE Maurer is a 55 year old who presents for the following health issues     HPI       Hospital Follow-up Visit:    Hospital/Nursing Home/ Rehab Facility: [facility]  Date of Admission: 10/22/21  Date of Discharge: 10/23/21   Reason(s) for Admission: Cerebrovascular accident (CVA) due to stenosis of right carotid artery (H)     Cerebrovascular accident (CVA), unspecified mechanism (H)          Was your hospitalization related to COVID-19? No   Problems taking medications regularly:  None  Medication changes since discharge: None  Problems adhering to non-medication therapy:  None    Summary of hospitalization:  [facility] hospital discharge summary reviewed  Diagnostic Tests/Treatments reviewed.  Follow up needed: none  Other Healthcare Providers Involved in Patient s Care:         Specialist appointment - Plans to schedule  Update since discharge: improved.Post Discharge Medication Reconciliation: discharge medications reconciled, continue medications without change.  Plan of care communicated with patient          Here today for  follow-up after having stent placed due to carotid stenosis, positive CVA.  Had recent follow-up with neurosurgeon.  Plan to follow-up again in 6 months. Since last visit was also diagnosed with rheumatoid arthritis.  Has been taking methotrexate and tolerating it well.  Plans to schedule follow-up with rheumatology.  Has still been having some occasional numbness and tingling.  Continues to notice swelling and tenderness to joints, mainly wrists.  Continues to hear blood whooshing in ears and can feel a thumping in head.  Reports this makes it difficult to sleep.  Foot pain is much less.    Is going to get COVID vaccine at work.     Has been working from home off and on as needed. Hard to get to work due to exhaustion. Has hard time zipping and buttoning pants due to pain in wrists and hands. Is currently working from home due to father being on hospice and will work from his house.        Review of Systems   Constitutional: Negative for fatigue.   HENT: Negative for ear pain, rhinorrhea and sore throat.         Whooshing sound in ears   Eyes: Negative for visual disturbance.   Respiratory: Negative for cough, chest tightness, shortness of breath and wheezing.    Cardiovascular: Negative for chest pain and palpitations.   Gastrointestinal: Negative for abdominal distention, abdominal pain, constipation, diarrhea, nausea and vomiting.   Endocrine: Negative for cold intolerance and heat intolerance.   Musculoskeletal: Positive for arthralgias (Bilateral wrists) and joint swelling (Bilateral wrists). Negative for myalgias.   Skin: Negative for rash.   Neurological: Positive for dizziness. Negative for light-headedness, numbness and headaches.   Psychiatric/Behavioral: Negative for dysphoric mood and sleep disturbance. The patient is not nervous/anxious.           Objective    /78 (BP Location: Right arm, Patient Position: Sitting, Cuff Size: Adult Regular)   Pulse 84   Temp (!) 96.7  F (35.9  C) (Tympanic)    Resp 12   Wt 64 kg (141 lb 3.2 oz)   LMP 05/20/2021 (Approximate)   SpO2 100%   BMI 24.24 kg/m    Body mass index is 24.24 kg/m .  Physical Exam  Constitutional:       Appearance: Normal appearance. She is well-developed.   HENT:      Head: Normocephalic and atraumatic.      Right Ear: Tympanic membrane and external ear normal. No middle ear effusion.      Left Ear: Tympanic membrane and external ear normal.  No middle ear effusion.      Nose: No mucosal edema.   Neck:      Thyroid: No thyromegaly.   Cardiovascular:      Rate and Rhythm: Normal rate and regular rhythm.      Heart sounds: Normal heart sounds.   Pulmonary:      Effort: Pulmonary effort is normal.      Breath sounds: Normal breath sounds.   Abdominal:      General: Bowel sounds are normal.      Palpations: Abdomen is soft.   Musculoskeletal:      Comments: Swelling noted to bilateral wrists   Skin:     General: Skin is warm and dry.   Neurological:      Mental Status: She is alert.      Motor: Weakness (Left hand) present.   Psychiatric:         Behavior: Behavior normal.

## 2021-12-01 ENCOUNTER — OFFICE VISIT (OUTPATIENT)
Dept: FAMILY MEDICINE | Facility: CLINIC | Age: 56
End: 2021-12-01
Payer: COMMERCIAL

## 2021-12-01 VITALS
BODY MASS INDEX: 24.24 KG/M2 | WEIGHT: 141.2 LBS | TEMPERATURE: 96.7 F | OXYGEN SATURATION: 100 % | SYSTOLIC BLOOD PRESSURE: 132 MMHG | DIASTOLIC BLOOD PRESSURE: 78 MMHG | RESPIRATION RATE: 12 BRPM | HEART RATE: 84 BPM

## 2021-12-01 DIAGNOSIS — I65.21 STENOSIS OF RIGHT CAROTID ARTERY: ICD-10-CM

## 2021-12-01 DIAGNOSIS — M05.742 RHEUMATOID ARTHRITIS INVOLVING BOTH HANDS WITH POSITIVE RHEUMATOID FACTOR (H): ICD-10-CM

## 2021-12-01 DIAGNOSIS — R29.898 WEAKNESS OF LEFT HAND: ICD-10-CM

## 2021-12-01 DIAGNOSIS — M05.741 RHEUMATOID ARTHRITIS INVOLVING BOTH HANDS WITH POSITIVE RHEUMATOID FACTOR (H): ICD-10-CM

## 2021-12-01 DIAGNOSIS — Z23 HIGH PRIORITY FOR 2019-NCOV VACCINE: Primary | ICD-10-CM

## 2021-12-01 PROCEDURE — 99214 OFFICE O/P EST MOD 30 MIN: CPT | Performed by: NURSE PRACTITIONER

## 2021-12-01 ASSESSMENT — ENCOUNTER SYMPTOMS
NAUSEA: 0
HEADACHES: 0
PALPITATIONS: 0
VOMITING: 0
MYALGIAS: 0
JOINT SWELLING: 1
NERVOUS/ANXIOUS: 0
ARTHRALGIAS: 1
WHEEZING: 0
CHEST TIGHTNESS: 0
CONSTIPATION: 0
ABDOMINAL DISTENTION: 0
FATIGUE: 0
NUMBNESS: 0
SORE THROAT: 0
DYSPHORIC MOOD: 0
DIZZINESS: 1
RHINORRHEA: 0
COUGH: 0
DIARRHEA: 0
ABDOMINAL PAIN: 0
SHORTNESS OF BREATH: 0
LIGHT-HEADEDNESS: 0
SLEEP DISTURBANCE: 0

## 2021-12-01 ASSESSMENT — PAIN SCALES - GENERAL: PAINLEVEL: EXTREME PAIN (8)

## 2021-12-16 ENCOUNTER — TELEPHONE (OUTPATIENT)
Dept: NEUROLOGY | Facility: CLINIC | Age: 56
End: 2021-12-16
Payer: COMMERCIAL

## 2021-12-17 ENCOUNTER — MYC REFILL (OUTPATIENT)
Dept: FAMILY MEDICINE | Facility: CLINIC | Age: 56
End: 2021-12-17
Payer: COMMERCIAL

## 2021-12-17 DIAGNOSIS — F98.8 ATTENTION DEFICIT DISORDER OF ADULT: ICD-10-CM

## 2021-12-17 NOTE — TELEPHONE ENCOUNTER
Called patient and offered appointment on 12/22 and patient accepted  
Called patient to offer sooner appointment with Dr. Casanova, but patient did not  and I was unable to leave a voicemail.  
Detail Level: Zone

## 2021-12-21 RX ORDER — DEXTROAMPHETAMINE SACCHARATE, AMPHETAMINE ASPARTATE, DEXTROAMPHETAMINE SULFATE AND AMPHETAMINE SULFATE 5; 5; 5; 5 MG/1; MG/1; MG/1; MG/1
20 TABLET ORAL DAILY
Qty: 30 TABLET | Refills: 0 | Status: SHIPPED | OUTPATIENT
Start: 2021-12-21 | End: 2022-01-17

## 2021-12-21 RX ORDER — DEXTROAMPHETAMINE SACCHARATE, AMPHETAMINE ASPARTATE MONOHYDRATE, DEXTROAMPHETAMINE SULFATE AND AMPHETAMINE SULFATE 5; 5; 5; 5 MG/1; MG/1; MG/1; MG/1
20 CAPSULE, EXTENDED RELEASE ORAL DAILY
Qty: 30 CAPSULE | Refills: 0 | Status: SHIPPED | OUTPATIENT
Start: 2021-12-21 | End: 2022-01-17

## 2021-12-21 NOTE — TELEPHONE ENCOUNTER
Last Written Adderall XR 20 mg Prescription Date:  11/8/21  Last Fill Quantity: 30,  # refills: 0     Last Written Adderall 20 mg Prescription Date:  11/23/21  Last Fill Quantity: 30,  # refills: 0   Last office visit: 12/1/2021 with prescribing provider:  MARY Olivas with 3 month F/U advised.  Future Office Visit:   Next 5 appointments (look out 90 days)    Dec 22, 2021  4:00 PM  (Arrive by 3:45 PM)  Return Visit with Gilberto Casanova MD  [facility] [place] ([facility]) [address] [place] [id number] [phone]   Feb 01, 2022  3:30 PM  (Arrive by 3:15 PM)  Return Visit with Gilberto Casanova MD  [facility] [place] ([facility] ) [address] [place] [address]  [address] [place] [id number]  [phone]         Routing refill request to provider for review/approval because:  Drug not on the [facility] refill protocol     Mary Maynard, RN, BSN  [facility]

## 2021-12-22 ENCOUNTER — OFFICE VISIT (OUTPATIENT)
Dept: NEUROLOGY | Facility: CLINIC | Age: 56
End: 2021-12-22
Payer: COMMERCIAL

## 2021-12-22 VITALS
WEIGHT: 141 LBS | HEART RATE: 62 BPM | DIASTOLIC BLOOD PRESSURE: 84 MMHG | SYSTOLIC BLOOD PRESSURE: 149 MMHG | BODY MASS INDEX: 24.2 KG/M2

## 2021-12-22 DIAGNOSIS — H93.13 TINNITUS, BILATERAL: ICD-10-CM

## 2021-12-22 DIAGNOSIS — I63.231 CEREBROVASCULAR ACCIDENT (CVA) DUE TO STENOSIS OF RIGHT CAROTID ARTERY (H): Primary | ICD-10-CM

## 2021-12-22 DIAGNOSIS — R42 DIZZINESS: ICD-10-CM

## 2021-12-22 PROCEDURE — 99215 OFFICE O/P EST HI 40 MIN: CPT | Performed by: STUDENT IN AN ORGANIZED HEALTH CARE EDUCATION/TRAINING PROGRAM

## 2021-12-22 ASSESSMENT — PAIN SCALES - GENERAL: PAINLEVEL: MILD PAIN (3)

## 2021-12-22 NOTE — LETTER
To whom it may concern,     Ms. Russell is a patient of mine who has a complicated medical history.  She currently is able to work from home but I am in agreement that it would be extremely difficult for her to work in person right now given her limitations.  She will continue to follow with me and we will continue to assess for improvement.    Thank you very much for your support of my patient.      Sincerely,            Bubba Casanova MD   of Neurology  [facility]

## 2021-12-22 NOTE — PATIENT INSTRUCTIONS
Lets work with PT (overall strength, dizziness)  OT: fine motor, hand weakness  ENT: another opinion about bilateral tinnitus and dizziness as well.    Overall: dizziness is multifactorial, ? Is RA a part of it, less likely, is your stent and body still adjusting to the now correct blood flow possibly too.  Keep the [facility] appointments let me know what they think  I would also keep your eye doctor appointment  Keep taking the aspirin and plavix, can mention the nose bleeds to the ENT doctors.

## 2021-12-22 NOTE — PROGRESS NOTES
"[facility]/[place]  Section of General Neurology  Return Patient Visit    Libertad Russell MRN# [id number]   Age: 56 year old YOB: 1965     Brief history of symptoms: The patient was initially seen in neurologic consultation on 9/10 for evaluation of serial L sided weakness/numbness. Please see the comprehensive neurologic consultation note from that date in the Epic records for details.         Interval history:   After our last visit I referred her to our colleagues in vascular neurology for further discussion about the possiblity of intervening on her symptomatic R carotid disease.  She did have L sided weakness and evidence of chronic R sided strokes to history exam and imaging prior to this intervention.   She has since had a stent placed in her RCA after further work up with cerebral angiography which per last CUS appears patent, though there is some L.    She continues to have fatigue and rhematoid arthritis (now on methotrexate) symptoms as well as dizziness.   The symptoms of left arm weakness and bilateral pulsatile tinnitus and dizziness had been present for some time prior to stenting as well.     She feels her vision is getting worse, she has an eye doctor appointment. Had lasix previously.  She still gets tinnitus frequently feels like a \"pressure\".    The dizziness:  Not all the time.    It is a general unsteadiness but not rooming or pre syncopal feeling.    She gets out of breath easily.  She hasn't been as active as she has been before.    Her left wrist is swollen and she does have R shoulder pain.    Her father just  of lung cancer.      She has been too exhausted to go back to work in person, able to work from home.    Methotrexate started 6 weeks ago has helped, less foot pain, just L wrist.    She is going to work with OT        Past Medical History:     Patient Active Problem List   Diagnosis     Tobacco use disorder     Seasonal allergic rhinitis     " Attention deficit disorder of adult     Rosacea     Raynaud phenomenon     Essential hypertension with goal blood pressure less than 140/90     Lipoma of right axilla     Lipoma of neck     Paresthesia     Nicotine abuse     Irregular periods/menstrual cycles     Cerebrovascular accident (CVA) due to stenosis of right carotid artery (H)     Cerebrovascular accident (CVA), unspecified mechanism (H)     Rheumatoid arthritis involving both hands with positive rheumatoid factor (H)     Stenosis of right carotid artery     Past Medical History:   Diagnosis Date     ADHD (attention deficit hyperactivity disorder)      Cerebrovascular accident (CVA) due to stenosis of right carotid artery (H) 9/15/2021     Depressive disorder      HTN (hypertension)      RA (rheumatoid arthritis) (H)      Raynaud disease         Past Surgical History:     Past Surgical History:   Procedure Laterality Date     BREAST BIOPSY, RT/LT  ,     breast biopsies; reported to be benign     EXCISE MASS AXILLA Right 2021    Excise right axillary lipoma;  Surgeon: Artis Domínguez MD;  Location: [facility] OR     EXCISE TUMOR, SOFT TISSUE, NECK/THORAX, SUBCUTANEOUS N/A 2021    EXCISION of lipoma from left occiput;  Surgeon: Artis Domínguez MD;  Location: [facility] OR     EYE SURGERY      Lasik for vision correction     IR CAROTID CEREBRAL ANGIOGRAM BILATERAL  10/22/2021     LYMPH NODE BIOPSY      cervical lymph node biopsy; told to be benign     MT BREAST AUGMENTATION Bilateral     breast implants     TUBAL LIGATION       VASCULAR SURGERY      Stent        Social History:     Social History     Tobacco Use     Smoking status: Former Smoker     Packs/day: 1.00     Years: 10.00     Pack years: 10.00     Types: Cigarettes     Quit date: 10/5/2021     Years since quittin.2     Smokeless tobacco: Never Used   Vaping Use     Vaping Use: Some days   Substance Use Topics     Alcohol use: Not Currently     Comment:  occasional     Drug use: No        Family History:     Family History   Problem Relation Age of Onset     Hypertension Mother      Cancer Mother         lung cancer     Other Cancer Mother         Lung     Thyroid Disease Mother      C.A.D. Father         52 at first MI     Prostate Cancer Father         Bladder     Hypertension Other      Thyroid Disease Sister      Thyroid Disease Sister      Diabetes No family hx of      Cerebrovascular Disease No family hx of      Breast Cancer No family hx of      Cancer - colorectal No family hx of         Medications:     Current Outpatient Medications   Medication Sig     amLODIPine (NORVASC) 5 MG tablet TAKE 1 TABLET(5 MG) BY MOUTH DAILY     amphetamine-dextroamphetamine (ADDERALL XR) 20 MG 24 hr capsule Take 1 capsule (20 mg) by mouth daily     amphetamine-dextroamphetamine (ADDERALL) 20 MG tablet Take 1 tablet (20 mg) by mouth daily Take in the early afternoon. Needs appointment for further refills.     aspirin (ASA) 325 MG tablet Take 1 tablet (325 mg) by mouth daily     atorvastatin (LIPITOR) 20 MG tablet Take 1 tablet (20 mg) by mouth daily     clopidogrel (PLAVIX) 75 MG tablet Begin taking 1 tab daily 7 days prior to procedure     losartan (COZAAR) 100 MG tablet Take 1 tablet (100 mg) by mouth daily     methotrexate 2.5 MG tablet Take 6 tablets (15 mg) by mouth every 7 days     metroNIDAZOLE (METROGEL) 0.75 % external gel Apply 1 g topically 2 times daily (Patient taking differently: Apply 1 applicator topically once as needed )     nicotine (NICORETTE) 2 MG gum Place 1 each (2 mg) inside cheek every hour as needed for smoking cessation (Patient not taking: Reported on 12/1/2021)     No current facility-administered medications for this visit.        Allergies:   No Known Allergies     Review of Systems:   As above     Physical Exam:   Vitals: BP (!) 149/84 (BP Location: Right arm, Patient Position: Sitting, Cuff Size: Adult Regular)   Pulse 62   Wt 64 kg (141 lb)    LMP 05/20/2021 (Approximate)   BMI 24.20 kg/m     CV: peripheral pulse appreciated  Lungs: breathing comfortably  Extremities: no edema      Neuro:   General Appearance: No apparent distress, well-nourished, well-groomed, pleasant     Mental Status: Alert and oriented to person, place, and time. Speech fluent and comprehension intact. No dysarthria.     Cranial Nerves:   II: Visual fields: normal  III: Pupils: 3 mm, equal, round, reactive to light   III,IV,VI: Extraocular Movements: intact   V: Facial sensation: intact to light touch  VII: Facial strength: intact without asymmetry  VIII: Hearing: intact grossly  IX: Palate: intact   XII: Tongue movement: normal     Motor Exam:     Motor Exam:   Upper Extremities  Deltoid  Bicep  Tricep  Wrist Extensors  strength Intrinsic Muscles    Right  5  5  5  5 5 4+/5-   Left  4+  5  5 5 4+ 4+      Lower Extremities  Hip Flexors  Knee Extensors  Knee   Flexors  Dorsi Flexion  Plantar   Flexion    Right  5  5  5  5  5    Left  4+ 5  5  5  5    Hand strength confounded by RA   No abnormal movements. Tone is normal throughout.    Sensory: intact to light touch    Coordination: no dysmetria with finger-to-nose bilaterally    Reflexes: biceps, triceps, brachioradialis, patellar, and ankle jerks 2+ and symmetric.     Gait: normal casual gait, normal stride length         Data: Pertinent prior to visit   Exam: Bilateral carotid duplex Doppler ultrasound dated 11/10/2021  2:51 PM     Clinical history: [facility] to schedule; evaluate progress of suspected  right ICA dissection; Stenosis of carotid artery, unspecified  laterality     Ordering provider: HENRY SANABRIA     Technique: Grayscale (B-mode) and duplex and spectral Doppler  ultrasound of the extracranial internal carotid, external carotid,  vertebral artery origins. Velocity measurements obtained with angle  correction at or less than 60 degrees.     Findings:  The flow velocities were measured in the bilateral  carotid arteries as  follows (in cm/s):      Right side:   Proximal CCA: 43 cm/s  Distal CCA: 74 cm/s      Proximal ICA: 19/0 cm/s  Mid ICA: 21/2 cm/s  Distal ICA: 67/8 cm/s     External carotid artery: 281 cm/s  Vertebral artery: Antegrade flow      A common to internal carotid stent is present. Very blunted waveforms  are seen throughout the internal carotid artery, reflecting low  velocities and low flow.      Left side:   Proximal CCA: 142 cm/s  Mid CCA: 128 cm/s     Proximal ICA: 124/55 cm/s  Mid ICA: 155/59 cm/s  Distal ICA: 138/43 cm/s     External carotid artery: 324 cm/s  Vertebral artery: Antegrade flow     ICA/CCA ratio: 1.21     Mixed plaque is present.                                                                      Impression:     1. Right side: Significantly diminished flow is present through a  right common to internal carotid stent, which appears patent. There is  marginal continuous diastolic flow, with a higher resistance waveform  visualized (but waveform interpretation is difficult with the low flow  present).       2. Left side: Approximately 50-69% stenosis of the left internal  carotid artery predicted.     HENRY SANABRIA MD            MRI brain MRA head/neck 8/17/21     IMPRESSION:   1.  Abnormal right internal carotid artery flow void consistent with  diminished or absent flow in this vessel. Suspect that this is related  to subacute to chronic right internal carotid artery dissection.  Please see the separately reported neck MRA for additional details.  2.  T2/FLAIR hyperintensity within the right greater than left deep  cerebral white matter presumably reflects gliosis related to chronic  ischemic injury. Suspect that there is a component of underlying  border zone/watershed hypoperfusion injury given the abnormal right  internal carotid artery.  3.  No acute infarct, hemorrhage or mass.                    Neck:                                                       IMPRESSION:    1.  Diminutive right internal carotid artery demonstrates abnormal  signal. 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 Chronic flow-limiting atherosclerotic  stenosis is the other primary consideration.  2.  The status of the right internal carotid intracranially is not  well evaluated on this study.  3.  Consider further evaluation of these findings with head and neck  CTA.  4.  Mild atherosclerosis of the left carotid artery without  hemodynamically significant narrowing by NASCET criteria.  5.  Patent vertebral arteries.     Procedures:     CTA neck 8/18  IMPRESSION:  1. Severe stenosis (likely greater than 80% luminal diameter stenosis)  at the proximal right internal carotid artery. The exact degree of  stenosis cannot be measured as there is swallowing motion while  scanning through the carotid bifurcations.  2. Two areas of severe stenosis of the intracranial distal right  internal carotid artery at the petrous cavernous junction and of the  entire supraclinoid segment.  3. Combination of inflow and outflow restriction of the right internal  carotid artery resulting in a diminutive right internal carotid artery  in the neck.  4. Plaque without stenosis of the proximal and distal internal carotid  artery on the left.  5. Diminutive M1 segment of the right middle cerebral artery likely  due to decreased inflow.  6. Moderate-severe atherosclerotic narrowing at the origins of the  vertebral arteries bilaterally.     I personally reviewed the above imaging and agree with the findings in the report.         Laboratory:  Rheumatoid factor was elevated at 58, and cyclic citrullinated peptide antibodies were greater than 340 units.  MOY was negative per a previous rheum note    Lab Results   Component Value Date    WBC 7.8 10/22/2021    WBC 6.2 05/26/2021     Lab Results   Component Value Date    RBC 3.79 10/22/2021    RBC 4.38 05/26/2021     Lab Results   Component  Value Date    HGB 10.9 10/22/2021    HGB 12.2 05/26/2021     Lab Results   Component Value Date    HCT 32.6 10/22/2021    HCT 37.1 05/26/2021     Lab Results   Component Value Date    MCV 86 10/22/2021    MCV 85 05/26/2021     Lab Results   Component Value Date    MCH 28.8 10/22/2021    MCH 27.9 05/26/2021     Lab Results   Component Value Date    MCHC 33.4 10/22/2021    MCHC 32.9 05/26/2021     Lab Results   Component Value Date    RDW 13.9 10/22/2021    RDW 14.1 05/26/2021     Lab Results   Component Value Date     10/22/2021     05/26/2021     Last Comprehensive Metabolic Panel:  Sodium   Date Value Ref Range Status   10/22/2021 140 133 - 144 mmol/L Final   06/30/2021 138 133 - 144 mmol/L Final     Potassium   Date Value Ref Range Status   10/22/2021 3.5 3.4 - 5.3 mmol/L Final   06/30/2021 4.0 3.4 - 5.3 mmol/L Final     Chloride   Date Value Ref Range Status   10/22/2021 106 94 - 109 mmol/L Final   06/30/2021 107 94 - 109 mmol/L Final     Carbon Dioxide   Date Value Ref Range Status   06/30/2021 30 20 - 32 mmol/L Final     Carbon Dioxide (CO2)   Date Value Ref Range Status   10/22/2021 28 20 - 32 mmol/L Final     Anion Gap   Date Value Ref Range Status   10/22/2021 6 3 - 14 mmol/L Final   06/30/2021 1 (L) 3 - 14 mmol/L Final     Glucose   Date Value Ref Range Status   10/22/2021 98 70 - 99 mg/dL Final   06/30/2021 102 (H) 70 - 99 mg/dL Final     Comment:     Fasting specimen     GLUCOSE BY METER POCT   Date Value Ref Range Status   10/22/2021 117 (H) 70 - 99 mg/dL Final     Urea Nitrogen   Date Value Ref Range Status   10/22/2021 14 7 - 30 mg/dL Final   06/30/2021 12 7 - 30 mg/dL Final     Creatinine   Date Value Ref Range Status   10/22/2021 0.59 0.52 - 1.04 mg/dL Final   06/30/2021 0.58 0.52 - 1.04 mg/dL Final     GFR Estimate   Date Value Ref Range Status   10/22/2021 >90 >60 mL/min/1.73m2 Final     Comment:     As of July 11, 2021, eGFR is calculated by the CKD-EPI creatinine equation, without  race adjustment. eGFR can be influenced by muscle mass, exercise, and diet. The reported eGFR is an estimation only and is only applicable if the renal function is stable.   06/30/2021 >90 >60 mL/min/[1.73_m2] Final     Comment:     Non  GFR Calc  Starting 12/18/2018, serum creatinine based estimated GFR (eGFR) will be   calculated using the Chronic Kidney Disease Epidemiology Collaboration   (CKD-EPI) equation.       Calcium   Date Value Ref Range Status   10/22/2021 8.6 8.5 - 10.1 mg/dL Final   06/30/2021 9.0 8.5 - 10.1 mg/dL Final     Bilirubin Total   Date Value Ref Range Status   06/30/2021 0.3 0.2 - 1.3 mg/dL Final     Alkaline Phosphatase   Date Value Ref Range Status   06/30/2021 76 40 - 150 U/L Final     ALT   Date Value Ref Range Status   06/30/2021 21 0 - 50 U/L Final     AST   Date Value Ref Range Status   06/30/2021 13 0 - 45 U/L Final                        Assessment and Plan:   Assessment:  Libertad Russell is a 55 year old female who presents today in follow up evaluation of left sided neurological symptoms.  We discussed that she was having serial small strokes from her very stenosed R carotid previously.  I am glad they were able to stent this.  I think her weakness on the left is improved to some degree but this is confounded by her RA especially in her hands.  She remains very unsteady and has some bilateral tinnitus, both of which have been long standing problems.  We discussed that this procedure was to prevent further strokes but may take some time to adjust to the change in cerebral blood flow.  I am overall quite pleased with her progress.  She is feeling good on the methotrexate for RA currently.       Plan:  --Continue anti platelet agents per Dr. Freitas regarding stent  (aspirin/plavix)  --Continue lipitor 20 mg   --ENT referral for their opinion on her tinnitus, dizziness. Pending ENT recs could consider repeat imaging but given how recent MRI is unclear what  repeating would show at this time in the absence of new numbness or weakness given non specific nature of dizziness.  --Recommended PT to help with balance training, OT (already had a script) to help with hand strength/fine movements that are worse in her left hand.  --She is going to see the [facility] for another opinion as well.  I look forward to reading their thoughts on her case as well.  --Will see her back in 3 months, she can call or mychart with issues in the mean time.           Bubba Casanova MD   of Neurology   [facility]      The total time of this encounter today amounted to 48 minutes. This time included time spent with the patient, prep work, ordering tests, and performing post visit documentation.

## 2021-12-22 NOTE — LETTER
"    2021         RE: Libertad Russell  [address] [facility] [address] [facility] [address]        Dear Colleague,    Thank you for referring your patient, Libertad Russell, to the [facility] [place]. Please see a copy of my visit note below.    [facility]  Section of General Neurology  Return Patient Visit    Libertad Russell MRN# [id number]   Age: 56 year old YOB: 1965     Brief history of symptoms: The patient was initially seen in neurologic consultation on 9/10 for evaluation of serial L sided weakness/numbness. Please see the comprehensive neurologic consultation note from that date in the Epic records for details.         Interval history:   After our last visit I referred her to our colleagues in vascular neurology for further discussion about the possiblity of intervening on her symptomatic R carotid disease.  She did have L sided weakness and evidence of chronic R sided strokes to history exam and imaging prior to this intervention.   She has since had a stent placed in her RCA after further work up with cerebral angiography which per last CUS appears patent, though there is some L.    She continues to have fatigue and rhematoid arthritis (now on methotrexate) symptoms as well as dizziness.   The symptoms of left arm weakness and bilateral pulsatile tinnitus and dizziness had been present for some time prior to stenting as well.     She feels her vision is getting worse, she has an eye doctor appointment. Had lasix previously.  She still gets tinnitus frequently feels like a \"pressure\".    The dizziness:  Not all the time.    It is a general unsteadiness but not rooming or pre syncopal feeling.    She gets out of breath easily.  She hasn't been as active as she has been before.    Her left wrist is swollen and she does have R shoulder pain.    Her father just  of lung cancer.      She has been too exhausted to go back to work in person, " able to work from home.    Methotrexate started 6 weeks ago has helped, less foot pain, just L wrist.    She is going to work with OT        Past Medical History:     Patient Active Problem List   Diagnosis     Tobacco use disorder     Seasonal allergic rhinitis     Attention deficit disorder of adult     Rosacea     Raynaud phenomenon     Essential hypertension with goal blood pressure less than 140/90     Lipoma of right axilla     Lipoma of neck     Paresthesia     Nicotine abuse     Irregular periods/menstrual cycles     Cerebrovascular accident (CVA) due to stenosis of right carotid artery (H)     Cerebrovascular accident (CVA), unspecified mechanism (H)     Rheumatoid arthritis involving both hands with positive rheumatoid factor (H)     Stenosis of right carotid artery     Past Medical History:   Diagnosis Date     ADHD (attention deficit hyperactivity disorder)      Cerebrovascular accident (CVA) due to stenosis of right carotid artery (H) 9/15/2021     Depressive disorder      HTN (hypertension)      RA (rheumatoid arthritis) (H)      Raynaud disease         Past Surgical History:     Past Surgical History:   Procedure Laterality Date     BREAST BIOPSY, RT/LT  2000, 1998    breast biopsies; reported to be benign     EXCISE MASS AXILLA Right 06/08/2021    Excise right axillary lipoma;  Surgeon: Artis Domínguez MD;  Location: [facility] OR     EXCISE TUMOR, SOFT TISSUE, NECK/THORAX, SUBCUTANEOUS N/A 06/08/2021    EXCISION of lipoma from left occiput;  Surgeon: Artis Domínguez MD;  Location: [facility] OR     EYE SURGERY  2015    Lasik for vision correction     IR CAROTID CEREBRAL ANGIOGRAM BILATERAL  10/22/2021     LYMPH NODE BIOPSY  2001    cervical lymph node biopsy; told to be benign     KS BREAST AUGMENTATION Bilateral     breast implants     TUBAL LIGATION  2003     VASCULAR SURGERY  2021    Stent        Social History:     Social History     Tobacco Use     Smoking status: Former Smoker     Packs/day:  1.00     Years: 10.00     Pack years: 10.00     Types: Cigarettes     Quit date: 10/5/2021     Years since quittin.2     Smokeless tobacco: Never Used   Vaping Use     Vaping Use: Some days   Substance Use Topics     Alcohol use: Not Currently     Comment: occasional     Drug use: No        Family History:     Family History   Problem Relation Age of Onset     Hypertension Mother      Cancer Mother         lung cancer     Other Cancer Mother         Lung     Thyroid Disease Mother      C.A.D. Father         52 at first MI     Prostate Cancer Father         Bladder     Hypertension Other      Thyroid Disease Sister      Thyroid Disease Sister      Diabetes No family hx of      Cerebrovascular Disease No family hx of      Breast Cancer No family hx of      Cancer - colorectal No family hx of         Medications:     Current Outpatient Medications   Medication Sig     amLODIPine (NORVASC) 5 MG tablet TAKE 1 TABLET(5 MG) BY MOUTH DAILY     amphetamine-dextroamphetamine (ADDERALL XR) 20 MG 24 hr capsule Take 1 capsule (20 mg) by mouth daily     amphetamine-dextroamphetamine (ADDERALL) 20 MG tablet Take 1 tablet (20 mg) by mouth daily Take in the early afternoon. Needs appointment for further refills.     aspirin (ASA) 325 MG tablet Take 1 tablet (325 mg) by mouth daily     atorvastatin (LIPITOR) 20 MG tablet Take 1 tablet (20 mg) by mouth daily     clopidogrel (PLAVIX) 75 MG tablet Begin taking 1 tab daily 7 days prior to procedure     losartan (COZAAR) 100 MG tablet Take 1 tablet (100 mg) by mouth daily     methotrexate 2.5 MG tablet Take 6 tablets (15 mg) by mouth every 7 days     metroNIDAZOLE (METROGEL) 0.75 % external gel Apply 1 g topically 2 times daily (Patient taking differently: Apply 1 applicator topically once as needed )     nicotine (NICORETTE) 2 MG gum Place 1 each (2 mg) inside cheek every hour as needed for smoking cessation (Patient not taking: Reported on 2021)     No current  facility-administered medications for this visit.        Allergies:   No Known Allergies     Review of Systems:   As above     Physical Exam:   Vitals: BP (!) 149/84 (BP Location: Right arm, Patient Position: Sitting, Cuff Size: Adult Regular)   Pulse 62   Wt 64 kg (141 lb)   LMP 05/20/2021 (Approximate)   BMI 24.20 kg/m     CV: peripheral pulse appreciated  Lungs: breathing comfortably  Extremities: no edema      Neuro:   General Appearance: No apparent distress, well-nourished, well-groomed, pleasant     Mental Status: Alert and oriented to person, place, and time. Speech fluent and comprehension intact. No dysarthria.     Cranial Nerves:   II: Visual fields: normal  III: Pupils: 3 mm, equal, round, reactive to light   III,IV,VI: Extraocular Movements: intact   V: Facial sensation: intact to light touch  VII: Facial strength: intact without asymmetry  VIII: Hearing: intact grossly  IX: Palate: intact   XII: Tongue movement: normal     Motor Exam:     Motor Exam:   Upper Extremities  Deltoid  Bicep  Tricep  Wrist Extensors  strength Intrinsic Muscles    Right  5  5  5  5 5 4+/5-   Left  4+  5  5 5 4+ 4+      Lower Extremities  Hip Flexors  Knee Extensors  Knee   Flexors  Dorsi Flexion  Plantar   Flexion    Right  5  5  5  5  5    Left  4+ 5  5  5  5    Hand strength confounded by RA   No abnormal movements. Tone is normal throughout.    Sensory: intact to light touch    Coordination: no dysmetria with finger-to-nose bilaterally    Reflexes: biceps, triceps, brachioradialis, patellar, and ankle jerks 2+ and symmetric.     Gait: normal casual gait, normal stride length         Data: Pertinent prior to visit   Exam: Bilateral carotid duplex Doppler ultrasound dated 11/10/2021  2:51 PM     Clinical history: [facility] to schedule; evaluate progress of suspected  right ICA dissection; Stenosis of carotid artery, unspecified  laterality     Ordering provider: HENRY SANABRIA     Technique: Grayscale (B-mode)  and duplex and spectral Doppler  ultrasound of the extracranial internal carotid, external carotid,  vertebral artery origins. Velocity measurements obtained with angle  correction at or less than 60 degrees.     Findings:  The flow velocities were measured in the bilateral carotid arteries as  follows (in cm/s):      Right side:   Proximal CCA: 43 cm/s  Distal CCA: 74 cm/s      Proximal ICA: 19/0 cm/s  Mid ICA: 21/2 cm/s  Distal ICA: 67/8 cm/s     External carotid artery: 281 cm/s  Vertebral artery: Antegrade flow      A common to internal carotid stent is present. Very blunted waveforms  are seen throughout the internal carotid artery, reflecting low  velocities and low flow.      Left side:   Proximal CCA: 142 cm/s  Mid CCA: 128 cm/s     Proximal ICA: 124/55 cm/s  Mid ICA: 155/59 cm/s  Distal ICA: 138/43 cm/s     External carotid artery: 324 cm/s  Vertebral artery: Antegrade flow     ICA/CCA ratio: 1.21     Mixed plaque is present.                                                                      Impression:     1. Right side: Significantly diminished flow is present through a  right common to internal carotid stent, which appears patent. There is  marginal continuous diastolic flow, with a higher resistance waveform  visualized (but waveform interpretation is difficult with the low flow  present).       2. Left side: Approximately 50-69% stenosis of the left internal  carotid artery predicted.     HENRY SANABRIA MD            MRI brain MRA head/neck 8/17/21     IMPRESSION:   1.  Abnormal right internal carotid artery flow void consistent with  diminished or absent flow in this vessel. Suspect that this is related  to subacute to chronic right internal carotid artery dissection.  Please see the separately reported neck MRA for additional details.  2.  T2/FLAIR hyperintensity within the right greater than left deep  cerebral white matter presumably reflects gliosis related to chronic  ischemic injury.  Suspect that there is a component of underlying  border zone/watershed hypoperfusion injury given the abnormal right  internal carotid artery.  3.  No acute infarct, hemorrhage or mass.                    Neck:                                                       IMPRESSION:   1.  Diminutive right internal carotid artery demonstrates abnormal  signal. 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 Chronic flow-limiting atherosclerotic  stenosis is the other primary consideration.  2.  The status of the right internal carotid intracranially is not  well evaluated on this study.  3.  Consider further evaluation of these findings with head and neck  CTA.  4.  Mild atherosclerosis of the left carotid artery without  hemodynamically significant narrowing by NASCET criteria.  5.  Patent vertebral arteries.     Procedures:     CTA neck 8/18  IMPRESSION:  1. Severe stenosis (likely greater than 80% luminal diameter stenosis)  at the proximal right internal carotid artery. The exact degree of  stenosis cannot be measured as there is swallowing motion while  scanning through the carotid bifurcations.  2. Two areas of severe stenosis of the intracranial distal right  internal carotid artery at the petrous cavernous junction and of the  entire supraclinoid segment.  3. Combination of inflow and outflow restriction of the right internal  carotid artery resulting in a diminutive right internal carotid artery  in the neck.  4. Plaque without stenosis of the proximal and distal internal carotid  artery on the left.  5. Diminutive M1 segment of the right middle cerebral artery likely  due to decreased inflow.  6. Moderate-severe atherosclerotic narrowing at the origins of the  vertebral arteries bilaterally.     I personally reviewed the above imaging and agree with the findings in the report.         Laboratory:  Rheumatoid factor was elevated at 58, and cyclic citrullinated peptide  antibodies were greater than 340 units.  MOY was negative per a previous rheum note    Lab Results   Component Value Date    WBC 7.8 10/22/2021    WBC 6.2 05/26/2021     Lab Results   Component Value Date    RBC 3.79 10/22/2021    RBC 4.38 05/26/2021     Lab Results   Component Value Date    HGB 10.9 10/22/2021    HGB 12.2 05/26/2021     Lab Results   Component Value Date    HCT 32.6 10/22/2021    HCT 37.1 05/26/2021     Lab Results   Component Value Date    MCV 86 10/22/2021    MCV 85 05/26/2021     Lab Results   Component Value Date    MCH 28.8 10/22/2021    MCH 27.9 05/26/2021     Lab Results   Component Value Date    MCHC 33.4 10/22/2021    MCHC 32.9 05/26/2021     Lab Results   Component Value Date    RDW 13.9 10/22/2021    RDW 14.1 05/26/2021     Lab Results   Component Value Date     10/22/2021     05/26/2021     Last Comprehensive Metabolic Panel:  Sodium   Date Value Ref Range Status   10/22/2021 140 133 - 144 mmol/L Final   06/30/2021 138 133 - 144 mmol/L Final     Potassium   Date Value Ref Range Status   10/22/2021 3.5 3.4 - 5.3 mmol/L Final   06/30/2021 4.0 3.4 - 5.3 mmol/L Final     Chloride   Date Value Ref Range Status   10/22/2021 106 94 - 109 mmol/L Final   06/30/2021 107 94 - 109 mmol/L Final     Carbon Dioxide   Date Value Ref Range Status   06/30/2021 30 20 - 32 mmol/L Final     Carbon Dioxide (CO2)   Date Value Ref Range Status   10/22/2021 28 20 - 32 mmol/L Final     Anion Gap   Date Value Ref Range Status   10/22/2021 6 3 - 14 mmol/L Final   06/30/2021 1 (L) 3 - 14 mmol/L Final     Glucose   Date Value Ref Range Status   10/22/2021 98 70 - 99 mg/dL Final   06/30/2021 102 (H) 70 - 99 mg/dL Final     Comment:     Fasting specimen     GLUCOSE BY METER POCT   Date Value Ref Range Status   10/22/2021 117 (H) 70 - 99 mg/dL Final     Urea Nitrogen   Date Value Ref Range Status   10/22/2021 14 7 - 30 mg/dL Final   06/30/2021 12 7 - 30 mg/dL Final     Creatinine   Date Value Ref Range  Status   10/22/2021 0.59 0.52 - 1.04 mg/dL Final   06/30/2021 0.58 0.52 - 1.04 mg/dL Final     GFR Estimate   Date Value Ref Range Status   10/22/2021 >90 >60 mL/min/1.73m2 Final     Comment:     As of July 11, 2021, eGFR is calculated by the CKD-EPI creatinine equation, without race adjustment. eGFR can be influenced by muscle mass, exercise, and diet. The reported eGFR is an estimation only and is only applicable if the renal function is stable.   06/30/2021 >90 >60 mL/min/[1.73_m2] Final     Comment:     Non  GFR Calc  Starting 12/18/2018, serum creatinine based estimated GFR (eGFR) will be   calculated using the Chronic Kidney Disease Epidemiology Collaboration   (CKD-EPI) equation.       Calcium   Date Value Ref Range Status   10/22/2021 8.6 8.5 - 10.1 mg/dL Final   06/30/2021 9.0 8.5 - 10.1 mg/dL Final     Bilirubin Total   Date Value Ref Range Status   06/30/2021 0.3 0.2 - 1.3 mg/dL Final     Alkaline Phosphatase   Date Value Ref Range Status   06/30/2021 76 40 - 150 U/L Final     ALT   Date Value Ref Range Status   06/30/2021 21 0 - 50 U/L Final     AST   Date Value Ref Range Status   06/30/2021 13 0 - 45 U/L Final                        Assessment and Plan:   Assessment:  Libertad Russell is a 55 year old female who presents today in follow up evaluation of left sided neurological symptoms.  We discussed that she was having serial small strokes from her very stenosed R carotid previously.  I am glad they were able to stent this.  I think her weakness on the left is improved to some degree but this is confounded by her RA especially in her hands.  She remains very unsteady and has some bilateral tinnitus, both of which have been long standing problems.  We discussed that this procedure was to prevent further strokes but may take some time to adjust to the change in cerebral blood flow.  I am overall quite pleased with her progress.  She is feeling good on the methotrexate for RA currently.        Plan:  --Continue anti platelet agents per Dr. Freitas regarding stent  (aspirin/plavix)  --Continue lipitor 20 mg   --ENT referral for their opinion on her tinnitus, dizziness. Pending ENT recs could consider repeat imaging but given how recent MRI is unclear what repeating would show at this time in the absence of new numbness or weakness given non specific nature of dizziness.  --Recommended PT to help with balance training, OT (already had a script) to help with hand strength/fine movements that are worse in her left hand.  --She is going to see the [facility] for another opinion as well.  I look forward to reading their thoughts on her case as well.  --Will see her back in 3 months, she can call or mychart with issues in the mean time.           Bubba Casanova MD   of Neurology   [facility]      The total time of this encounter today amounted to 48 minutes. This time included time spent with the patient, prep work, ordering tests, and performing post visit documentation.        Again, thank you for allowing me to participate in the care of your patient.        Sincerely,        Gilberto Casanova MD

## 2021-12-31 ENCOUNTER — TRANSFERRED RECORDS (OUTPATIENT)
Dept: MULTI SPECIALTY CLINIC | Facility: CLINIC | Age: 56
End: 2021-12-31
Payer: COMMERCIAL

## 2022-01-12 ENCOUNTER — MYC MEDICAL ADVICE (OUTPATIENT)
Dept: FAMILY MEDICINE | Facility: CLINIC | Age: 57
End: 2022-01-12
Payer: COMMERCIAL

## 2022-01-12 DIAGNOSIS — I65.29 STENOSIS OF CAROTID ARTERY, UNSPECIFIED LATERALITY: ICD-10-CM

## 2022-01-12 DIAGNOSIS — I63.231 CEREBROVASCULAR ACCIDENT (CVA) DUE TO STENOSIS OF RIGHT CAROTID ARTERY (H): ICD-10-CM

## 2022-01-12 NOTE — TELEPHONE ENCOUNTER
Routing refill request to provider for review/approval because:  Needs provider approval  Hemoglobin out of range    Hemoglobin   Date Value Ref Range Status   10/22/2021 10.9 (L) 11.7 - 15.7 g/dL Final   05/26/2021 12.2 11.7 - 15.7 g/dL Final     Last Office visit with Symone Acosta on 12/1/21.    Rosy Hutchins RN BSN  [facility]

## 2022-01-13 RX ORDER — ATORVASTATIN CALCIUM 20 MG/1
20 TABLET, FILM COATED ORAL DAILY
Qty: 90 TABLET | Refills: 1 | Status: SHIPPED | OUTPATIENT
Start: 2022-01-13 | End: 2022-07-08

## 2022-01-13 RX ORDER — CLOPIDOGREL BISULFATE 75 MG/1
TABLET ORAL
Qty: 30 TABLET | Refills: 3 | Status: SHIPPED | OUTPATIENT
Start: 2022-01-13 | End: 2022-04-07

## 2022-01-17 ENCOUNTER — MYC REFILL (OUTPATIENT)
Dept: FAMILY MEDICINE | Facility: CLINIC | Age: 57
End: 2022-01-17
Payer: COMMERCIAL

## 2022-01-17 DIAGNOSIS — F98.8 ATTENTION DEFICIT DISORDER OF ADULT: ICD-10-CM

## 2022-01-18 RX ORDER — DEXTROAMPHETAMINE SACCHARATE, AMPHETAMINE ASPARTATE, DEXTROAMPHETAMINE SULFATE AND AMPHETAMINE SULFATE 5; 5; 5; 5 MG/1; MG/1; MG/1; MG/1
20 TABLET ORAL DAILY
Qty: 30 TABLET | Refills: 0 | Status: SHIPPED | OUTPATIENT
Start: 2022-01-18 | End: 2022-02-17

## 2022-01-18 RX ORDER — DEXTROAMPHETAMINE SACCHARATE, AMPHETAMINE ASPARTATE MONOHYDRATE, DEXTROAMPHETAMINE SULFATE AND AMPHETAMINE SULFATE 5; 5; 5; 5 MG/1; MG/1; MG/1; MG/1
20 CAPSULE, EXTENDED RELEASE ORAL DAILY
Qty: 30 CAPSULE | Refills: 0 | Status: SHIPPED | OUTPATIENT
Start: 2022-01-18 | End: 2022-02-17

## 2022-01-18 NOTE — TELEPHONE ENCOUNTER
Routing refill request to provider for review/approval because:  Drug not on the [facility] refill protocol     amphetamine-dextroamphetamine (ADDERALL) 20 MG tablet  amphetamine-dextroamphetamine (ADDERALL XR) 20 MG 24 hr capsule  Last Written Prescription Date:  12/21/21  Last Fill Quantity: 30,  # refills: 0   Last office visit: 12/1/2021 with prescribing provider:  jael Francis Office Visit:   Next 5 appointments (look out 90 days)    Feb 01, 2022  3:30 PM  (Arrive by 3:15 PM)  Return Visit with Gilberto Casanova MD  [facility] ([facility] ) [address] [place] [id number] [phone]   Mar 23, 2022 10:30 AM  (Arrive by 10:15 AM)  Return Visit with Gilberto Casanova MD  [facility] [place] ([facility]) [address] [place] [id number] [phone]

## 2022-01-31 ENCOUNTER — TELEPHONE (OUTPATIENT)
Dept: RHEUMATOLOGY | Facility: CLINIC | Age: 57
End: 2022-01-31
Payer: COMMERCIAL

## 2022-01-31 DIAGNOSIS — M05.741 RHEUMATOID ARTHRITIS INVOLVING BOTH HANDS WITH POSITIVE RHEUMATOID FACTOR (H): Primary | ICD-10-CM

## 2022-01-31 DIAGNOSIS — M05.742 RHEUMATOID ARTHRITIS INVOLVING BOTH HANDS WITH POSITIVE RHEUMATOID FACTOR (H): Primary | ICD-10-CM

## 2022-01-31 NOTE — TELEPHONE ENCOUNTER
[facility] Call Center    Phone Message    May a detailed message be left on voicemail: yes     Reason for Call: Other: Pt states that she needs labs done for Dr Odom and scheduled an appointment for 2/3. There is no active orders for Dr Odom. Please update lab orders.    Action Taken: Message routed to:  Other:  Rheumatology    Travel Screening: Not Applicable

## 2022-02-01 ENCOUNTER — OFFICE VISIT (OUTPATIENT)
Dept: NEUROLOGY | Facility: CLINIC | Age: 57
End: 2022-02-01
Attending: STUDENT IN AN ORGANIZED HEALTH CARE EDUCATION/TRAINING PROGRAM
Payer: COMMERCIAL

## 2022-02-01 VITALS
BODY MASS INDEX: 24.24 KG/M2 | SYSTOLIC BLOOD PRESSURE: 144 MMHG | HEART RATE: 81 BPM | DIASTOLIC BLOOD PRESSURE: 83 MMHG | HEIGHT: 64 IN | WEIGHT: 142 LBS | OXYGEN SATURATION: 100 %

## 2022-02-01 DIAGNOSIS — I65.21 STENOSIS OF RIGHT CAROTID ARTERY: ICD-10-CM

## 2022-02-01 DIAGNOSIS — R42 VERTIGO: ICD-10-CM

## 2022-02-01 DIAGNOSIS — I63.231 CEREBROVASCULAR ACCIDENT (CVA) DUE TO STENOSIS OF RIGHT CAROTID ARTERY (H): Primary | ICD-10-CM

## 2022-02-01 PROCEDURE — 99215 OFFICE O/P EST HI 40 MIN: CPT | Performed by: STUDENT IN AN ORGANIZED HEALTH CARE EDUCATION/TRAINING PROGRAM

## 2022-02-01 ASSESSMENT — MIFFLIN-ST. JEOR: SCORE: 1229.11

## 2022-02-01 NOTE — PROGRESS NOTES
"[facility]/[place]  Section of General Neurology  Return Patient Visit     Libertad Russell MRN# [id number]   Age: 56 year old YOB: 1965      Brief history of symptoms: The patient was initially seen in neurologic consultation on 9/10 for evaluation of serial L sided weakness/numbness. Please see the comprehensive neurologic consultation note from that date in the Epic records for details.           Interval history:     December visit:   I referred her to our colleagues in vascular neurology for further discussion about the possiblity of intervening on her symptomatic R carotid disease.  She did have L sided weakness and evidence of chronic R sided strokes to history exam and imaging prior to this intervention.   She has since had a stent placed in her RCA after further work up with cerebral angiography which per last CUS appears patent, though there is some L.     She continues to have fatigue and rhematoid arthritis (now on methotrexate) symptoms as well as dizziness.   The symptoms of left arm weakness and bilateral pulsatile tinnitus and dizziness had been present for some time prior to stenting as well.      She feels her vision is getting worse, she has an eye doctor appointment. Had lasix previously.  She still gets tinnitus frequently feels like a \"pressure\".    The dizziness:  Not all the time.    It is a general unsteadiness but not rooming or pre syncopal feeling.    She gets out of breath easily.  She hasn't been as active as she has been before.    Her left wrist is swollen and she does have R shoulder pain.    Her father just  of lung cancer.       She has been too exhausted to go back to work in person, able to work from home.    Methotrexate started 6 weeks ago has helped, less foot pain, just L wrist.    She is going to work with OT    Today:    She saw Dr. Kurtis Meadows recently at [place] in neurology who noted in his assessment and plan:    \"#1 Reported left " "hemiparesis  #2 Stenosis right internal carotid artery, possible post dissection  #3 Status post angioplasty and stent right internal carotid artery with persistent poor distal flow  #4 Rheumatoid arthritis  #5 History of smoking  #6 Hypertension  #7 Hyperlipidemia    I have asked my  to have images pushed from [facility]. Patient the history was felt to have a symptomatic right carotid artery stenosis. I will reimage the brain and get the outside films to compare. I am going to get a CT perfusion study as well as MR angiography of the neck to assess the area of the stent. I will review those studies with her in detail and look toward getting a vascular neurosurgeons opinion after studies are available\"      She is now back to work but from home.  But she is worried about going back.  She still does endorse fatigue, likely 2/2 RA she suspects.   She overall does seem to be recalibrating/ re adjusting post procedure better.  Previous R shoulder/neck issues have improved as has her dizziness.    She is overall pleased with her progress but remains apprehensive about the future.  Feels she is too young to be having problems of this sort.  She continues to refrain from smoking.         Past Medical History:          Patient Active Problem List   Diagnosis     Tobacco use disorder     Seasonal allergic rhinitis     Attention deficit disorder of adult     Rosacea     Raynaud phenomenon     Essential hypertension with goal blood pressure less than 140/90     Lipoma of right axilla     Lipoma of neck     Paresthesia     Nicotine abuse     Irregular periods/menstrual cycles     Cerebrovascular accident (CVA) due to stenosis of right carotid artery (H)     Cerebrovascular accident (CVA), unspecified mechanism (H)     Rheumatoid arthritis involving both hands with positive rheumatoid factor (H)     Stenosis of right carotid artery      Past Medical History   Past Medical History:   Diagnosis Date     ADHD " (attention deficit hyperactivity disorder)       Cerebrovascular accident (CVA) due to stenosis of right carotid artery (H) 9/15/2021     Depressive disorder       HTN (hypertension)       RA (rheumatoid arthritis) (H)       Raynaud disease               Past Surgical History:      Past Surgical History         Past Surgical History:   Procedure Laterality Date     BREAST BIOPSY, RT/LT   ,      breast biopsies; reported to be benign     EXCISE MASS AXILLA Right 2021     Excise right axillary lipoma;  Surgeon: Artis Domínguez MD;  Location: [facility] OR     EXCISE TUMOR, SOFT TISSUE, NECK/THORAX, SUBCUTANEOUS N/A 2021     EXCISION of lipoma from left occiput;  Surgeon: Artis Domínguez MD;  Location: [facility] OR     EYE SURGERY        Lasik for vision correction     IR CAROTID CEREBRAL ANGIOGRAM BILATERAL   10/22/2021     LYMPH NODE BIOPSY        cervical lymph node biopsy; told to be benign     AK BREAST AUGMENTATION Bilateral       breast implants     TUBAL LIGATION        VASCULAR SURGERY        Stent             Social History:      Social History            Tobacco Use     Smoking status: Former Smoker       Packs/day: 1.00       Years: 10.00       Pack years: 10.00       Types: Cigarettes       Quit date: 10/5/2021       Years since quittin.2     Smokeless tobacco: Never Used   Vaping Use     Vaping Use: Some days   Substance Use Topics     Alcohol use: Not Currently       Comment: occasional     Drug use: No          Family History:      Family History         Family History   Problem Relation Age of Onset     Hypertension Mother       Cancer Mother           lung cancer     Other Cancer Mother           Lung     Thyroid Disease Mother       C.A.D. Father           52 at first MI     Prostate Cancer Father           Bladder     Hypertension Other       Thyroid Disease Sister       Thyroid Disease Sister       Diabetes No family hx of       Cerebrovascular Disease No  family hx of       Breast Cancer No family hx of       Cancer - colorectal No family hx of               Medications:           Current Outpatient Medications   Medication Sig     amLODIPine (NORVASC) 5 MG tablet TAKE 1 TABLET(5 MG) BY MOUTH DAILY     amphetamine-dextroamphetamine (ADDERALL XR) 20 MG 24 hr capsule Take 1 capsule (20 mg) by mouth daily     amphetamine-dextroamphetamine (ADDERALL) 20 MG tablet Take 1 tablet (20 mg) by mouth daily Take in the early afternoon. Needs appointment for further refills.     aspirin (ASA) 325 MG tablet Take 1 tablet (325 mg) by mouth daily     atorvastatin (LIPITOR) 20 MG tablet Take 1 tablet (20 mg) by mouth daily     clopidogrel (PLAVIX) 75 MG tablet Begin taking 1 tab daily 7 days prior to procedure     losartan (COZAAR) 100 MG tablet Take 1 tablet (100 mg) by mouth daily     methotrexate 2.5 MG tablet Take 6 tablets (15 mg) by mouth every 7 days     metroNIDAZOLE (METROGEL) 0.75 % external gel Apply 1 g topically 2 times daily (Patient taking differently: Apply 1 applicator topically once as needed )     nicotine (NICORETTE) 2 MG gum Place 1 each (2 mg) inside cheek every hour as needed for smoking cessation (Patient not taking: Reported on 12/1/2021)      No current facility-administered medications for this visit.          Allergies:   No Known Allergies      Review of Systems:   As above      Physical Exam:   Vitals: BP (!) 149/84 (BP Location: Right arm, Patient Position: Sitting, Cuff Size: Adult Regular)   Pulse 62   Wt 64 kg (141 lb)   LMP 05/20/2021 (Approximate)   BMI 24.20 kg/m     CV: peripheral pulse appreciated  Lungs: breathing comfortably  Extremities: no edema        Neuro:   General Appearance: No apparent distress, well-nourished, well-groomed, pleasant      Mental Status: Alert and oriented to person, place, and time. Speech fluent and comprehension intact. No dysarthria.      Cranial Nerves:   II: Visual fields: normal  III: Pupils: 3 mm, equal,  round, reactive to light   III,IV,VI: Extraocular Movements: intact   V: Facial sensation: intact to light touch  VII: Facial strength: intact without asymmetry  VIII: Hearing: intact grossly  IX: Palate: intact   XII: Tongue movement: normal     Motor Exam:      Motor Exam:   Upper Extremities  Deltoid  Bicep  Tricep  Wrist Extensors  strength Intrinsic Muscles    Right  5  5  5  5 5 4+/5-   Left  5  5  5 5 4+/5- 4+      Lower Extremities  Hip Flexors  Knee Extensors  Knee   Flexors  Dorsi Flexion  Plantar   Flexion    Right  5  5  5  5  5    Left  5 5  5  5  5    Hand strength confounded by RA   No abnormal movements. Tone is normal throughout.     Sensory: intact to light touch and pinprick     Coordination: no dysmetria with finger-to-nose bilaterally     Reflexes: biceps, triceps, brachioradialis, patellar, and ankle jerks 2+ and symmetric.      Gait: normal casual gait, normal stride length          Data: Pertinent prior to visit   Exam: Bilateral carotid duplex Doppler ultrasound dated 11/10/2021  2:51 PM     Clinical history: [facility] to schedule; evaluate progress of suspected  right ICA dissection; Stenosis of carotid artery, unspecified  laterality     Ordering provider: HENRY SANABRIA     Technique: Grayscale (B-mode) and duplex and spectral Doppler  ultrasound of the extracranial internal carotid, external carotid,  vertebral artery origins. Velocity measurements obtained with angle  correction at or less than 60 degrees.     Findings:  The flow velocities were measured in the bilateral carotid arteries as  follows (in cm/s):      Right side:   Proximal CCA: 43 cm/s  Distal CCA: 74 cm/s      Proximal ICA: 19/0 cm/s  Mid ICA: 21/2 cm/s  Distal ICA: 67/8 cm/s     External carotid artery: 281 cm/s  Vertebral artery: Antegrade flow      A common to internal carotid stent is present. Very blunted waveforms  are seen throughout the internal carotid artery, reflecting low  velocities and low flow.       Left side:   Proximal CCA: 142 cm/s  Mid CCA: 128 cm/s     Proximal ICA: 124/55 cm/s  Mid ICA: 155/59 cm/s  Distal ICA: 138/43 cm/s     External carotid artery: 324 cm/s  Vertebral artery: Antegrade flow     ICA/CCA ratio: 1.21     Mixed plaque is present.                                                                      Impression:     1. Right side: Significantly diminished flow is present through a  right common to internal carotid stent, which appears patent. There is  marginal continuous diastolic flow, with a higher resistance waveform  visualized (but waveform interpretation is difficult with the low flow  present).       2. Left side: Approximately 50-69% stenosis of the left internal  carotid artery predicted.     HENRY SANABRIA MD               MRI brain MRA head/neck 8/17/21     IMPRESSION:   1.  Abnormal right internal carotid artery flow void consistent with  diminished or absent flow in this vessel. Suspect that this is related  to subacute to chronic right internal carotid artery dissection.  Please see the separately reported neck MRA for additional details.  2.  T2/FLAIR hyperintensity within the right greater than left deep  cerebral white matter presumably reflects gliosis related to chronic  ischemic injury. Suspect that there is a component of underlying  border zone/watershed hypoperfusion injury given the abnormal right  internal carotid artery.  3.  No acute infarct, hemorrhage or mass.                    Neck:                                                       IMPRESSION:   1.  Diminutive right internal carotid artery demonstrates abnormal  signal. 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 Chronic flow-limiting atherosclerotic  stenosis is the other primary consideration.  2.  The status of the right internal carotid intracranially is not  well evaluated on this study.  3.  Consider further evaluation of these findings with  head and neck  CTA.  4.  Mild atherosclerosis of the left carotid artery without  hemodynamically significant narrowing by NASCET criteria.  5.  Patent vertebral arteries.     Procedures:     CTA neck 8/18  IMPRESSION:  1. Severe stenosis (likely greater than 80% luminal diameter stenosis)  at the proximal right internal carotid artery. The exact degree of  stenosis cannot be measured as there is swallowing motion while  scanning through the carotid bifurcations.  2. Two areas of severe stenosis of the intracranial distal right  internal carotid artery at the petrous cavernous junction and of the  entire supraclinoid segment.  3. Combination of inflow and outflow restriction of the right internal  carotid artery resulting in a diminutive right internal carotid artery  in the neck.  4. Plaque without stenosis of the proximal and distal internal carotid  artery on the left.  5. Diminutive M1 segment of the right middle cerebral artery likely  due to decreased inflow.  6. Moderate-severe atherosclerotic narrowing at the origins of the  vertebral arteries bilaterally.     I personally reviewed the above imaging and agree with the findings in the report.         Laboratory:  Rheumatoid factor was elevated at 58, and cyclic citrullinated peptide antibodies were greater than 340 units.  MOY was negative per a previous rheum note           Lab Results   Component Value Date     WBC 7.8 10/22/2021     WBC 6.2 05/26/2021            Lab Results   Component Value Date     RBC 3.79 10/22/2021     RBC 4.38 05/26/2021            Lab Results   Component Value Date     HGB 10.9 10/22/2021     HGB 12.2 05/26/2021            Lab Results   Component Value Date     HCT 32.6 10/22/2021     HCT 37.1 05/26/2021            Lab Results   Component Value Date     MCV 86 10/22/2021     MCV 85 05/26/2021            Lab Results   Component Value Date     MCH 28.8 10/22/2021     MCH 27.9 05/26/2021            Lab Results   Component Value Date      MCHC 33.4 10/22/2021     MCHC 32.9 05/26/2021            Lab Results   Component Value Date     RDW 13.9 10/22/2021     RDW 14.1 05/26/2021            Lab Results   Component Value Date      10/22/2021      05/26/2021      Last Comprehensive Metabolic Panel:        Sodium   Date Value Ref Range Status   10/22/2021 140 133 - 144 mmol/L Final   06/30/2021 138 133 - 144 mmol/L Final            Potassium   Date Value Ref Range Status   10/22/2021 3.5 3.4 - 5.3 mmol/L Final   06/30/2021 4.0 3.4 - 5.3 mmol/L Final            Chloride   Date Value Ref Range Status   10/22/2021 106 94 - 109 mmol/L Final   06/30/2021 107 94 - 109 mmol/L Final            Carbon Dioxide   Date Value Ref Range Status   06/30/2021 30 20 - 32 mmol/L Final            Carbon Dioxide (CO2)   Date Value Ref Range Status   10/22/2021 28 20 - 32 mmol/L Final            Anion Gap   Date Value Ref Range Status   10/22/2021 6 3 - 14 mmol/L Final   06/30/2021 1 (L) 3 - 14 mmol/L Final              Glucose   Date Value Ref Range Status   10/22/2021 98 70 - 99 mg/dL Final   06/30/2021 102 (H) 70 - 99 mg/dL Final       Comment:       Fasting specimen            GLUCOSE BY METER POCT   Date Value Ref Range Status   10/22/2021 117 (H) 70 - 99 mg/dL Final            Urea Nitrogen   Date Value Ref Range Status   10/22/2021 14 7 - 30 mg/dL Final   06/30/2021 12 7 - 30 mg/dL Final            Creatinine   Date Value Ref Range Status   10/22/2021 0.59 0.52 - 1.04 mg/dL Final   06/30/2021 0.58 0.52 - 1.04 mg/dL Final              GFR Estimate   Date Value Ref Range Status   10/22/2021 >90 >60 mL/min/1.73m2 Final       Comment:       As of July 11, 2021, eGFR is calculated by the CKD-EPI creatinine equation, without race adjustment. eGFR can be influenced by muscle mass, exercise, and diet. The reported eGFR is an estimation only and is only applicable if the renal function is stable.   06/30/2021 >90 >60 mL/min/[1.73_m2] Final       Comment:        Non  GFR Calc  Starting 12/18/2018, serum creatinine based estimated GFR (eGFR) will be   calculated using the Chronic Kidney Disease Epidemiology Collaboration   (CKD-EPI) equation.               Calcium   Date Value Ref Range Status   10/22/2021 8.6 8.5 - 10.1 mg/dL Final   06/30/2021 9.0 8.5 - 10.1 mg/dL Final            Bilirubin Total   Date Value Ref Range Status   06/30/2021 0.3 0.2 - 1.3 mg/dL Final            Alkaline Phosphatase   Date Value Ref Range Status   06/30/2021 76 40 - 150 U/L Final            ALT   Date Value Ref Range Status   06/30/2021 21 0 - 50 U/L Final            AST   Date Value Ref Range Status   06/30/2021 13 0 - 45 U/L Final          MRA neck 1/26/22    1. String sign configuration of the right internal carotid artery above the   right internal carotid artery stent may be related to a tandem stenosis with   narrowing immediately above the stent, and more particularly intracranial   stenosis of the supraclinoid right internal artery (which may be the principal   flow limiting factor).     2. No evidence of acute ischemia on DWI although there is extensive white matter   change throughout the right hemisphere consistent with the effects of   hypoperfusion and extensive vasodilatation throughout the right hemisphere.     3. 1.5 cm masslike/nodular configuration of the nasopharyngeal adenoidal tissue   is somewhat unusual for a patient of this age and may warrant direct endoscopic   evaluation.         CT perfusion 1/26/22  IMPRESSION:   In keeping with findings of right carotid stenosis, perfusion study   demonstrates Tmax greater than six seconds of 36 mL at the right frontal and   parietal lobes. CBF less than 30 percent is 0. These regions are associated with   elevated Tmax and MTT.      MRI brain 1/26/22      MRI of the brain: No acute restriction of diffusion on DWI but there is   extensive confluent white matter asymmetric FLAIR signal abnormality within the   right  hemisphere much of which is in a watershed type distribution suggesting   effects of chronic hypoperfusion of the right hemisphere. White matter disease   in a scattered random pattern is present with a lesser degree on the left side.   Asymmetric signal within the high cervical and right petrous carotid artery is   consistent with slow are impaired flow. Post gadolinium there is diffuse   prominence of the intraparenchymal and pial vascular structures throughout the   right hemisphere consistent with vasodilatation of collateral circulation   routes.   Midline prominence of the adenoidal nasopharyngeal tissues in a discrete nodule   or masslike configuration spanning 1.4 cm is somewhat unusual for a patient of   this age and may warrant endoscopic evaluation.                  Assessment and Plan:   Assessment:  Libertad Russell is a 55 year old female who presents today in follow up evaluation of left sided neurological symptoms.  We discussed that she was having serial small strokes from her very stenosed R carotid previously.  This is what is demonstrated by chronic watershed infarcts in her [facility] report, which we discussed.   I think her weakness on the left continues to improve to some degree but this is confounded by her RA especially in her hands.  I am overall quite pleased with her progress.  She is feeling good on the methotrexate for RA currently and will get new rheumatological lab work soon.      Since our last visit she has had CT perfusion, MRI brain, MRA neck among other testing at the [facility] as above.  This showed chronic MRI T2 changes representing chronic watershed infarcts (related to known carotid stenosis s/p stent), potentially abnormal adenoid finding of unclear significance that she will discuss with ENT, potential flow limiting stenosis above her stent which she will review with the [facility] neurosurgery team shortly per their direction.   I reviewed these results with her as  well.      Plan:  --Continue anti platelet agents per Dr. Freitas regarding stent  (aspirin/plavix).  Discussed I would not stop these if requested for a dental procedure.  Would prioritize keeping the stent patent.    --Continue lipitor 20 mg   --ENT referral previously for their opinion on her tinnitus as well adenoid findings on MRI as above.  She has an upcoming appointment with them soon.  --Recommended OT (gave new script) to help with hand strength/fine movements that are worse in her left hand.  --She will see Dr. Trinidad at the [facility] next month as well as Dr. Proctor in neurosurgery/vascular to discuss MRA findings and if any further intervention is warranted.  --Encouraged her to keep/make an appointment with Dr. Freitas in 6 months from their last visit as previously instructed.  This may depend on what Dr. Proctor and her decide as well.   --I will see her back in ~ 3 months to check in and see how she is progressing after her further appointments.               Bubba Casanova MD   of Neurology   [facility]        The total time of this encounter today amounted to 42 minutes. This time included time spent with the patient, prep work, ordering tests, and performing post visit documentation.

## 2022-02-01 NOTE — LETTER
"    2022         RE: Libertad Russell  [address] [facility] [address]        Dear Colleague,    Thank you for referring your patient, Libertad Russell, to the [facility] NEUROLOGY CLINICS [facility]. Please see a copy of my visit note below.    [facility]  Section of General Neurology  Return Patient Visit     Libertad Russell MRN# [id number]   Age: 56 year old YOB: 1965      Brief history of symptoms: The patient was initially seen in neurologic consultation on 9/10 for evaluation of serial L sided weakness/numbness. Please see the comprehensive neurologic consultation note from that date in the Epic records for details.           Interval history:     December visit:   I referred her to our colleagues in vascular neurology for further discussion about the possiblity of intervening on her symptomatic R carotid disease.  She did have L sided weakness and evidence of chronic R sided strokes to history exam and imaging prior to this intervention.   She has since had a stent placed in her RCA after further work up with cerebral angiography which per last CUS appears patent, though there is some L.     She continues to have fatigue and rhematoid arthritis (now on methotrexate) symptoms as well as dizziness.   The symptoms of left arm weakness and bilateral pulsatile tinnitus and dizziness had been present for some time prior to stenting as well.      She feels her vision is getting worse, she has an eye doctor appointment. Had lasix previously.  She still gets tinnitus frequently feels like a \"pressure\".    The dizziness:  Not all the time.    It is a general unsteadiness but not rooming or pre syncopal feeling.    She gets out of breath easily.  She hasn't been as active as she has been before.    Her left wrist is swollen and she does have R shoulder pain.    Her father just  of lung cancer.       She has been too exhausted to go back to work in person, " "able to work from home.    Methotrexate started 6 weeks ago has helped, less foot pain, just L wrist.    She is going to work with OT    Today:    She saw Dr. Kurtis Meadows recently at [facility] in neurology who noted in his assessment and plan:    \"#1 Reported left hemiparesis  #2 Stenosis right internal carotid artery, possible post dissection  #3 Status post angioplasty and stent right internal carotid artery with persistent poor distal flow  #4 Rheumatoid arthritis  #5 History of smoking  #6 Hypertension  #7 Hyperlipidemia    I have asked my  to have images pushed from [facility]. Patient the history was felt to have a symptomatic right carotid artery stenosis. I will reimage the brain and get the outside films to compare. I am going to get a CT perfusion study as well as MR angiography of the neck to assess the area of the stent. I will review those studies with her in detail and look toward getting a vascular neurosurgeons opinion after studies are available\"      She is now back to work but from home.  But she is worried about going back.  She still does endorse fatigue, likely 2/2 RA she suspects.   She overall does seem to be recalibrating/ re adjusting post procedure better.  Previous R shoulder/neck issues have improved as has her dizziness.    She is overall pleased with her progress but remains apprehensive about the future.  Feels she is too young to be having problems of this sort.  She continues to refrain from smoking.         Past Medical History:          Patient Active Problem List   Diagnosis     Tobacco use disorder     Seasonal allergic rhinitis     Attention deficit disorder of adult     Rosacea     Raynaud phenomenon     Essential hypertension with goal blood pressure less than 140/90     Lipoma of right axilla     Lipoma of neck     Paresthesia     Nicotine abuse     Irregular periods/menstrual cycles     Cerebrovascular accident (CVA) due to stenosis of right carotid artery (H) "     Cerebrovascular accident (CVA), unspecified mechanism (H)     Rheumatoid arthritis involving both hands with positive rheumatoid factor (H)     Stenosis of right carotid artery      Past Medical History        Past Medical History:   Diagnosis Date     ADHD (attention deficit hyperactivity disorder)       Cerebrovascular accident (CVA) due to stenosis of right carotid artery (H) 9/15/2021     Depressive disorder       HTN (hypertension)       RA (rheumatoid arthritis) (H)       Raynaud disease               Past Surgical History:      Past Surgical History         Past Surgical History:   Procedure Laterality Date     BREAST BIOPSY, RT/LT   ,      breast biopsies; reported to be benign     EXCISE MASS AXILLA Right 2021     Excise right axillary lipoma;  Surgeon: Artis Domínguez MD;  Location: [facility] OR     EXCISE TUMOR, SOFT TISSUE, NECK/THORAX, SUBCUTANEOUS N/A 2021     EXCISION of lipoma from left occiput;  Surgeon: Artis Domínguez MD;  Location: [facility] OR     EYE SURGERY        Lasik for vision correction     IR CAROTID CEREBRAL ANGIOGRAM BILATERAL   10/22/2021     LYMPH NODE BIOPSY        cervical lymph node biopsy; told to be benign     ID BREAST AUGMENTATION Bilateral       breast implants     TUBAL LIGATION   2003     VASCULAR SURGERY        Stent             Social History:      Social History            Tobacco Use     Smoking status: Former Smoker       Packs/day: 1.00       Years: 10.00       Pack years: 10.00       Types: Cigarettes       Quit date: 10/5/2021       Years since quittin.2     Smokeless tobacco: Never Used   Vaping Use     Vaping Use: Some days   Substance Use Topics     Alcohol use: Not Currently       Comment: occasional     Drug use: No          Family History:      Family History         Family History   Problem Relation Age of Onset     Hypertension Mother       Cancer Mother           lung cancer     Other Cancer Mother           Lung      Thyroid Disease Mother       C.A.D. Father           52 at first MI     Prostate Cancer Father           Bladder     Hypertension Other       Thyroid Disease Sister       Thyroid Disease Sister       Diabetes No family hx of       Cerebrovascular Disease No family hx of       Breast Cancer No family hx of       Cancer - colorectal No family hx of               Medications:           Current Outpatient Medications   Medication Sig     amLODIPine (NORVASC) 5 MG tablet TAKE 1 TABLET(5 MG) BY MOUTH DAILY     amphetamine-dextroamphetamine (ADDERALL XR) 20 MG 24 hr capsule Take 1 capsule (20 mg) by mouth daily     amphetamine-dextroamphetamine (ADDERALL) 20 MG tablet Take 1 tablet (20 mg) by mouth daily Take in the early afternoon. Needs appointment for further refills.     aspirin (ASA) 325 MG tablet Take 1 tablet (325 mg) by mouth daily     atorvastatin (LIPITOR) 20 MG tablet Take 1 tablet (20 mg) by mouth daily     clopidogrel (PLAVIX) 75 MG tablet Begin taking 1 tab daily 7 days prior to procedure     losartan (COZAAR) 100 MG tablet Take 1 tablet (100 mg) by mouth daily     methotrexate 2.5 MG tablet Take 6 tablets (15 mg) by mouth every 7 days     metroNIDAZOLE (METROGEL) 0.75 % external gel Apply 1 g topically 2 times daily (Patient taking differently: Apply 1 applicator topically once as needed )     nicotine (NICORETTE) 2 MG gum Place 1 each (2 mg) inside cheek every hour as needed for smoking cessation (Patient not taking: Reported on 12/1/2021)      No current facility-administered medications for this visit.          Allergies:   No Known Allergies      Review of Systems:   As above      Physical Exam:   Vitals: BP (!) 149/84 (BP Location: Right arm, Patient Position: Sitting, Cuff Size: Adult Regular)   Pulse 62   Wt 64 kg (141 lb)   LMP 05/20/2021 (Approximate)   BMI 24.20 kg/m     CV: peripheral pulse appreciated  Lungs: breathing comfortably  Extremities: no edema        Neuro:   General Appearance: No  apparent distress, well-nourished, well-groomed, pleasant      Mental Status: Alert and oriented to person, place, and time. Speech fluent and comprehension intact. No dysarthria.      Cranial Nerves:   II: Visual fields: normal  III: Pupils: 3 mm, equal, round, reactive to light   III,IV,VI: Extraocular Movements: intact   V: Facial sensation: intact to light touch  VII: Facial strength: intact without asymmetry  VIII: Hearing: intact grossly  IX: Palate: intact   XII: Tongue movement: normal     Motor Exam:      Motor Exam:   Upper Extremities  Deltoid  Bicep  Tricep  Wrist Extensors  strength Intrinsic Muscles    Right  5  5  5  5 5 4+/5-   Left  5  5  5 5 4+/5- 4+      Lower Extremities  Hip Flexors  Knee Extensors  Knee   Flexors  Dorsi Flexion  Plantar   Flexion    Right  5  5  5  5  5    Left  5 5  5  5  5    Hand strength confounded by RA   No abnormal movements. Tone is normal throughout.     Sensory: intact to light touch and pinprick     Coordination: no dysmetria with finger-to-nose bilaterally     Reflexes: biceps, triceps, brachioradialis, patellar, and ankle jerks 2+ and symmetric.      Gait: normal casual gait, normal stride length          Data: Pertinent prior to visit   Exam: Bilateral carotid duplex Doppler ultrasound dated 11/10/2021  2:51 PM     Clinical history: [facility] to schedule; evaluate progress of suspected  right ICA dissection; Stenosis of carotid artery, unspecified  laterality     Ordering provider: HENRY SANABRIA     Technique: Grayscale (B-mode) and duplex and spectral Doppler  ultrasound of the extracranial internal carotid, external carotid,  vertebral artery origins. Velocity measurements obtained with angle  correction at or less than 60 degrees.     Findings:  The flow velocities were measured in the bilateral carotid arteries as  follows (in cm/s):      Right side:   Proximal CCA: 43 cm/s  Distal CCA: 74 cm/s      Proximal ICA: 19/0 cm/s  Mid ICA: 21/2 cm/s  Distal  ICA: 67/8 cm/s     External carotid artery: 281 cm/s  Vertebral artery: Antegrade flow      A common to internal carotid stent is present. Very blunted waveforms  are seen throughout the internal carotid artery, reflecting low  velocities and low flow.      Left side:   Proximal CCA: 142 cm/s  Mid CCA: 128 cm/s     Proximal ICA: 124/55 cm/s  Mid ICA: 155/59 cm/s  Distal ICA: 138/43 cm/s     External carotid artery: 324 cm/s  Vertebral artery: Antegrade flow     ICA/CCA ratio: 1.21     Mixed plaque is present.                                                                      Impression:     1. Right side: Significantly diminished flow is present through a  right common to internal carotid stent, which appears patent. There is  marginal continuous diastolic flow, with a higher resistance waveform  visualized (but waveform interpretation is difficult with the low flow  present).       2. Left side: Approximately 50-69% stenosis of the left internal  carotid artery predicted.     HENRY SANABRIA MD               MRI brain MRA head/neck 8/17/21     IMPRESSION:   1.  Abnormal right internal carotid artery flow void consistent with  diminished or absent flow in this vessel. Suspect that this is related  to subacute to chronic right internal carotid artery dissection.  Please see the separately reported neck MRA for additional details.  2.  T2/FLAIR hyperintensity within the right greater than left deep  cerebral white matter presumably reflects gliosis related to chronic  ischemic injury. Suspect that there is a component of underlying  border zone/watershed hypoperfusion injury given the abnormal right  internal carotid artery.  3.  No acute infarct, hemorrhage or mass.                    Neck:                                                       IMPRESSION:   1.  Diminutive right internal carotid artery demonstrates abnormal  signal. Favor this reflects a subacute to chronic dissection with  residual flow  limiting stenosis. Chronic flow-limiting atherosclerotic  stenosis is the other primary consideration.  2.  The status of the right internal carotid intracranially is not  well evaluated on this study.  3.  Consider further evaluation of these findings with head and neck  CTA.  4.  Mild atherosclerosis of the left carotid artery without  hemodynamically significant narrowing by NASCET criteria.  5.  Patent vertebral arteries.     Procedures:     CTA neck 8/18  IMPRESSION:  1. Severe stenosis (likely greater than 80% luminal diameter stenosis)  at the proximal right internal carotid artery. The exact degree of  stenosis cannot be measured as there is swallowing motion while  scanning through the carotid bifurcations.  2. Two areas of severe stenosis of the intracranial distal right  internal carotid artery at the petrous cavernous junction and of the  entire supraclinoid segment.  3. Combination of inflow and outflow restriction of the right internal  carotid artery resulting in a diminutive right internal carotid artery  in the neck.  4. Plaque without stenosis of the proximal and distal internal carotid  artery on the left.  5. Diminutive M1 segment of the right middle cerebral artery likely  due to decreased inflow.  6. Moderate-severe atherosclerotic narrowing at the origins of the  vertebral arteries bilaterally.     I personally reviewed the above imaging and agree with the findings in the report.         Laboratory:  Rheumatoid factor was elevated at 58, and cyclic citrullinated peptide antibodies were greater than 340 units.  MOY was negative per a previous rheum note           Lab Results   Component Value Date     WBC 7.8 10/22/2021     WBC 6.2 05/26/2021            Lab Results   Component Value Date     RBC 3.79 10/22/2021     RBC 4.38 05/26/2021            Lab Results   Component Value Date     HGB 10.9 10/22/2021     HGB 12.2 05/26/2021            Lab Results   Component Value Date     HCT 32.6  10/22/2021     HCT 37.1 05/26/2021            Lab Results   Component Value Date     MCV 86 10/22/2021     MCV 85 05/26/2021            Lab Results   Component Value Date     MCH 28.8 10/22/2021     MCH 27.9 05/26/2021            Lab Results   Component Value Date     MCHC 33.4 10/22/2021     MCHC 32.9 05/26/2021            Lab Results   Component Value Date     RDW 13.9 10/22/2021     RDW 14.1 05/26/2021            Lab Results   Component Value Date      10/22/2021      05/26/2021      Last Comprehensive Metabolic Panel:        Sodium   Date Value Ref Range Status   10/22/2021 140 133 - 144 mmol/L Final   06/30/2021 138 133 - 144 mmol/L Final            Potassium   Date Value Ref Range Status   10/22/2021 3.5 3.4 - 5.3 mmol/L Final   06/30/2021 4.0 3.4 - 5.3 mmol/L Final            Chloride   Date Value Ref Range Status   10/22/2021 106 94 - 109 mmol/L Final   06/30/2021 107 94 - 109 mmol/L Final            Carbon Dioxide   Date Value Ref Range Status   06/30/2021 30 20 - 32 mmol/L Final            Carbon Dioxide (CO2)   Date Value Ref Range Status   10/22/2021 28 20 - 32 mmol/L Final            Anion Gap   Date Value Ref Range Status   10/22/2021 6 3 - 14 mmol/L Final   06/30/2021 1 (L) 3 - 14 mmol/L Final              Glucose   Date Value Ref Range Status   10/22/2021 98 70 - 99 mg/dL Final   06/30/2021 102 (H) 70 - 99 mg/dL Final       Comment:       Fasting specimen            GLUCOSE BY METER POCT   Date Value Ref Range Status   10/22/2021 117 (H) 70 - 99 mg/dL Final            Urea Nitrogen   Date Value Ref Range Status   10/22/2021 14 7 - 30 mg/dL Final   06/30/2021 12 7 - 30 mg/dL Final            Creatinine   Date Value Ref Range Status   10/22/2021 0.59 0.52 - 1.04 mg/dL Final   06/30/2021 0.58 0.52 - 1.04 mg/dL Final              GFR Estimate   Date Value Ref Range Status   10/22/2021 >90 >60 mL/min/1.73m2 Final       Comment:       As of July 11, 2021, eGFR is calculated by the CKD-EPI  creatinine equation, without race adjustment. eGFR can be influenced by muscle mass, exercise, and diet. The reported eGFR is an estimation only and is only applicable if the renal function is stable.   06/30/2021 >90 >60 mL/min/[1.73_m2] Final       Comment:       Non  GFR Calc  Starting 12/18/2018, serum creatinine based estimated GFR (eGFR) will be   calculated using the Chronic Kidney Disease Epidemiology Collaboration   (CKD-EPI) equation.               Calcium   Date Value Ref Range Status   10/22/2021 8.6 8.5 - 10.1 mg/dL Final   06/30/2021 9.0 8.5 - 10.1 mg/dL Final            Bilirubin Total   Date Value Ref Range Status   06/30/2021 0.3 0.2 - 1.3 mg/dL Final            Alkaline Phosphatase   Date Value Ref Range Status   06/30/2021 76 40 - 150 U/L Final            ALT   Date Value Ref Range Status   06/30/2021 21 0 - 50 U/L Final            AST   Date Value Ref Range Status   06/30/2021 13 0 - 45 U/L Final          MRA neck 1/26/22    1. String sign configuration of the right internal carotid artery above the   right internal carotid artery stent may be related to a tandem stenosis with   narrowing immediately above the stent, and more particularly intracranial   stenosis of the supraclinoid right internal artery (which may be the principal   flow limiting factor).     2. No evidence of acute ischemia on DWI although there is extensive white matter   change throughout the right hemisphere consistent with the effects of   hypoperfusion and extensive vasodilatation throughout the right hemisphere.     3. 1.5 cm masslike/nodular configuration of the nasopharyngeal adenoidal tissue   is somewhat unusual for a patient of this age and may warrant direct endoscopic   evaluation.         CT perfusion 1/26/22  IMPRESSION:   In keeping with findings of right carotid stenosis, perfusion study   demonstrates Tmax greater than six seconds of 36 mL at the right frontal and   parietal lobes. CBF less than  30 percent is 0. These regions are associated with   elevated Tmax and MTT.      MRI brain 1/26/22      MRI of the brain: No acute restriction of diffusion on DWI but there is   extensive confluent white matter asymmetric FLAIR signal abnormality within the   right hemisphere much of which is in a watershed type distribution suggesting   effects of chronic hypoperfusion of the right hemisphere. White matter disease   in a scattered random pattern is present with a lesser degree on the left side.   Asymmetric signal within the high cervical and right petrous carotid artery is   consistent with slow are impaired flow. Post gadolinium there is diffuse   prominence of the intraparenchymal and pial vascular structures throughout the   right hemisphere consistent with vasodilatation of collateral circulation   routes.   Midline prominence of the adenoidal nasopharyngeal tissues in a discrete nodule   or masslike configuration spanning 1.4 cm is somewhat unusual for a patient of   this age and may warrant endoscopic evaluation.                  Assessment and Plan:   Assessment:  Libertad Russell is a 55 year old female who presents today in follow up evaluation of left sided neurological symptoms.  We discussed that she was having serial small strokes from her very stenosed R carotid previously.  This is what is demonstrated by chronic watershed infarcts in her [place] clinic report, which we discussed.   I think her weakness on the left continues to improve to some degree but this is confounded by her RA especially in her hands.  I am overall quite pleased with her progress.  She is feeling good on the methotrexate for RA currently and will get new rheumatological lab work soon.      Since our last visit she has had CT perfusion, MRI brain, MRA neck among other testing at the [facility] as above.  This showed chronic MRI T2 changes representing chronic watershed infarcts (related to known carotid stenosis s/p stent),  potentially abnormal adenoid finding of unclear significance that she will discuss with ENT, potential flow limiting stenosis above her stent which she will review with the [facility] neurosurgery team shortly per their direction.   I reviewed these results with her as well.      Plan:  --Continue anti platelet agents per Dr. Freitas regarding stent  (aspirin/plavix).  Discussed I would not stop these if requested for a dental procedure.  Would prioritize keeping the stent patent.    --Continue lipitor 20 mg   --ENT referral previously for their opinion on her tinnitus as well adenoid findings on MRI as above.  She has an upcoming appointment with them soon.  --Recommended OT (gave new script) to help with hand strength/fine movements that are worse in her left hand.  --[facility] as [facility] as Dr. Proctor in neurosurgery/vascular to discuss MRA findings and if any further intervention is warranted.  --Encouraged her to keep/make an appointment with Dr. Freitas in 6 months from their last visit as previously instructed.  This may depend on what Dr. Proctor and her decide as well.   --I will see her back in ~ 3 months to check in and see how she is progressing after her further appointments.               Bubba Casanova MD   of Neurology   [facility]        The total time of this encounter today amounted to 42 minutes. This time included time spent with the patient, prep work, ordering tests, and performing post visit documentation.          Again, thank you for allowing me to participate in the care of your patient.        Sincerely,        Gilberto Casanova MD

## 2022-02-01 NOTE — NURSING NOTE
"Libertad Russell is a 56 year old female who presents for:  Chief Complaint   Patient presents with     Follow Up     MRI        Initial Vitals:  BP (!) 144/83   Pulse 81   Ht 1.626 m (5' 4\")   Wt 64.4 kg (142 lb)   LMP 05/20/2021 (Approximate)   SpO2 100%   BMI 24.37 kg/m   Estimated body mass index is 24.37 kg/m  as calculated from the following:    Height as of this encounter: 1.626 m (5' 4\").    Weight as of this encounter: 64.4 kg (142 lb).. Body surface area is 1.71 meters squared. BP completed using cuff size: regular    Nursing Comments:     Tigre Hays    "

## 2022-02-01 NOTE — PATIENT INSTRUCTIONS
Continue aspirin and plavix   Let your dentist know you are on both of these--not sure if they would be willing to something I would not stop the plavix for dental work  Work with OT--new order given  I'll be curious what the [place] people tell you.    I would schedule that 6 month follow up with our vascular people too.    Up to you if you want to keep that March appointment, if you want to push it back that's fine if so let's check in ~May or June

## 2022-02-07 ENCOUNTER — OFFICE VISIT (OUTPATIENT)
Dept: OTOLARYNGOLOGY | Facility: CLINIC | Age: 57
End: 2022-02-07
Payer: COMMERCIAL

## 2022-02-07 VITALS
SYSTOLIC BLOOD PRESSURE: 126 MMHG | WEIGHT: 146 LBS | DIASTOLIC BLOOD PRESSURE: 80 MMHG | HEART RATE: 79 BPM | BODY MASS INDEX: 25.06 KG/M2 | OXYGEN SATURATION: 100 %

## 2022-02-07 DIAGNOSIS — J39.2 NASOPHARYNGEAL MASS: Primary | ICD-10-CM

## 2022-02-07 PROCEDURE — 31231 NASAL ENDOSCOPY DX: CPT | Performed by: OTOLARYNGOLOGY

## 2022-02-07 PROCEDURE — 99203 OFFICE O/P NEW LOW 30 MIN: CPT | Mod: 25 | Performed by: OTOLARYNGOLOGY

## 2022-02-07 NOTE — NURSING NOTE
"Chief Complaint   Patient presents with     Consult       Vitals:    02/07/22 1240   BP: 126/80   BP Location: Right arm   Patient Position: Sitting   Cuff Size: Adult Large   Pulse: 79   SpO2: 100%   Weight: 66.2 kg (146 lb)     Wt Readings from Last 1 Encounters:   02/07/22 66.2 kg (146 lb)     Ht Readings from Last 1 Encounters:   02/01/22 1.626 m (5' 4\")       Alka Conti [facility], 2/7/2022 12:42 PM    "

## 2022-02-07 NOTE — LETTER
2/7/2022         RE: Libertad Russell  [address] [address]        Dear Colleague,    Thank you for referring your patient, Libertad Russell, to the [facility]. Please see a copy of my visit note below.    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    Outside records have been reviewed from the [facility].  She was being followed for history of RIGHT carotid steonsis and history of CVA.  On a recent MRI done on 1/26/22 an incidental finding of a mass in the nasopharynx was found, as described:  3. 1.5 cm masslike/nodular configuration of the midline nasopharyngeal adenoidal tissue   is somewhat unusual for a patient of this age and may warrant direct endoscopic   evaluation.     She denies any nasal symptoms.  No recent changes in nasal airway, sinus health, or sense of smell.  No new symptoms of headache or visual disturbances.    Past Medical History -   Patient Active Problem List   Diagnosis     Tobacco use disorder     Seasonal allergic rhinitis     Attention deficit disorder of adult     Rosacea     Raynaud phenomenon     Essential hypertension with goal blood pressure less than 140/90     Lipoma of right axilla     Lipoma of neck     Paresthesia     Nicotine abuse     Irregular periods/menstrual cycles     Cerebrovascular accident (CVA) due to stenosis of right carotid artery (H)     Cerebrovascular accident (CVA), unspecified mechanism (H)     Rheumatoid arthritis involving both hands with positive rheumatoid factor (H)     Stenosis of right carotid artery       Current Medications -   Current Outpatient Medications:      amLODIPine (NORVASC) 5 MG tablet, TAKE 1 TABLET(5 MG) BY MOUTH DAILY, Disp: 90 tablet, Rfl: 1     amphetamine-dextroamphetamine (ADDERALL XR) 20 MG 24 hr capsule, Take 1  capsule (20 mg) by mouth daily, Disp: 30 capsule, Rfl: 0     amphetamine-dextroamphetamine (ADDERALL) 20 MG tablet, Take 1 tablet (20 mg) by mouth daily Take in the early afternoon. Needs appointment for further refills., Disp: 30 tablet, Rfl: 0     aspirin (ASA) 325 MG tablet, Take 1 tablet (325 mg) by mouth daily, Disp: 90 tablet, Rfl: 3     atorvastatin (LIPITOR) 20 MG tablet, Take 1 tablet (20 mg) by mouth daily, Disp: 90 tablet, Rfl: 1     clopidogrel (PLAVIX) 75 MG tablet, Begin taking 1 tab daily 7 days prior to procedure, Disp: 30 tablet, Rfl: 3     losartan (COZAAR) 100 MG tablet, Take 1 tablet (100 mg) by mouth daily, Disp: 90 tablet, Rfl: 3     methotrexate 2.5 MG tablet, Take 6 tablets (15 mg) by mouth every 7 days, Disp: 24 tablet, Rfl: 3     metroNIDAZOLE (METROGEL) 0.75 % external gel, Apply 1 g topically 2 times daily (Patient taking differently: Apply 1 applicator topically once as needed ), Disp: 45 g, Rfl: 0     nicotine (NICORETTE) 2 MG gum, Place 1 each (2 mg) inside cheek every hour as needed for smoking cessation (Patient not taking: Reported on 2021), Disp: 50 each, Rfl: 2    Allergies - No Known Allergies    Social History -   Social History     Socioeconomic History     Marital status: Single     Spouse name: Not on file     Number of children: Not on file     Years of education: Not on file     Highest education level: Not on file   Occupational History     Not on file   Tobacco Use     Smoking status: Former Smoker     Packs/day: 1.00     Years: 10.00     Pack years: 10.00     Types: Cigarettes     Quit date: 10/5/2021     Years since quittin.3     Smokeless tobacco: Never Used   Vaping Use     Vaping Use: Some days   Substance and Sexual Activity     Alcohol use: Not Currently     Comment: occasional     Drug use: No     Sexual activity: Yes     Partners: Male     Birth control/protection: Female Surgical     Comment: tubal   Other Topics Concern     Parent/sibling w/ CABG, MI  or angioplasty before 65F 55M? No   Social History Narrative     Not on file     Social Determinants of Health     Financial Resource Strain: Not on file   Food Insecurity: Not on file   Transportation Needs: Not on file   Physical Activity: Not on file   Stress: Not on file   Social Connections: Not on file   Intimate Partner Violence: Not on file   Housing Stability: Not on file       Family History -   Family History   Problem Relation Age of Onset     Hypertension Mother      Cancer Mother         lung cancer     Other Cancer Mother         Lung     Thyroid Disease Mother      C.A.D. Father         52 at first MI     Prostate Cancer Father         Bladder     Hypertension Other      Thyroid Disease Sister      Thyroid Disease Sister      Diabetes No family hx of      Cerebrovascular Disease No family hx of      Breast Cancer No family hx of      Cancer - colorectal No family hx of        Review of Systems - As per HPI and PMHx, otherwise 10+ system review of the head and neck, and general constitution is negative.    Physical Exam  /80 (BP Location: Right arm, Patient Position: Sitting, Cuff Size: Adult Large)   Pulse 79   Wt 66.2 kg (146 lb)   LMP 05/20/2021 (Approximate)   SpO2 100%   BMI 25.06 kg/m      General - The patient is well nourished and well developed, and appears to have good nutritional status.  Alert and oriented to person and place, answers questions and cooperates with examination appropriately.   Head and Face - Normocephalic and atraumatic, with no gross asymmetry noted of the contour of the facial features.  The facial nerve is intact, with strong symmetric movements.  Voice and Breathing - The patient was breathing comfortably without the use of accessory muscles. There was no wheezing, stridor, or stertor.  The patients voice was clear and strong, and had appropriate pitch and quality.  Ears - The tympanic membranes are normal in appearance, bony landmarks are intact.  No  retraction, perforation, or masses.  No fluid or purulence was seen in the external canal or the middle ear. No evidence of infection of the middle ear or external canal, cerumen was normal in appearance.  Eyes - Extraocular movements intact, and the pupils were reactive to light.  Sclera were not icteric or injected, conjunctiva were pink and moist.  Mouth - Examination of the oral cavity showed pink, healthy oral mucosa. No lesions or ulcerations noted.  The tongue was mobile and midline, and the dentition were in good condition.    Throat - The walls of the oropharynx were smooth, pink, moist, symmetric, and had no lesions or ulcerations.  The tonsillar pillars and soft palate were symmetric.  The uvula was midline on elevation.    Neck - Normal midline excursion of the laryngotracheal complex during swallowing.  Full range of motion on passive movement.  Palpation of the occipital, submental, submandibular, internal jugular chain, and supraclavicular nodes did not demonstrate any abnormal lymph nodes or masses.  The carotid pulse was palpable bilaterally.  Palpation of the thyroid was soft and smooth, with no nodules or goiter appreciated.  The trachea was mobile and midline.      To further evaluate the nasal cavity, I performed rigid nasal endoscopy, and color photographs were taken for the permanent medical record.  I first sprayed the nasal cavity bilaterally with a mix of lidocaine and neosynephrine.  I then began on the left side using a 2.7mm, 30 degree rigid nasal endoscope.  The septum was fairly straight and the nasal airway was open.  No abnormal secretions, purulence, or polyps were noted. The left middle turbinate and middle meatus were clearly visualized and normal in appearance.  Looking up, the olfactory cleft was unobstructed.  Going further back, the sphenoethmoid recess was normal in appearance, with healthy appearing mucosa on the face of the sphenoid.  The nasopharynx was unremarkable, and  the eustachian tube opening on this side was unobstructed.  I had a clear view of the mass in question, and it appears to be an adenoid pad.  No ulceration, normal mucosa.    I then turned my attention to the right side.  Once again, the septum was fairly straight, and the airway was open.  No abnormal secretions, purulence, polyps were noted.  The right middle turbinate and middle meatus were clearly visualized and normal in appearance.  Looking up, the olfactory cleft was unobstructed.  Going further back the right sphenoethmoid recess was normal in appearance, and eustachian tube opening was unobstructed.      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  To be sure, I have asked her to follow up with me in 1-2 months for another look with the scope.      Again, thank you for allowing me to participate in the care of your patient.        Sincerely,        Pastor Pickering MD

## 2022-02-15 ENCOUNTER — DOCUMENTATION ONLY (OUTPATIENT)
Dept: NEUROLOGY | Facility: CLINIC | Age: 57
End: 2022-02-15
Payer: COMMERCIAL

## 2022-02-15 DIAGNOSIS — M05.741 RHEUMATOID ARTHRITIS INVOLVING BOTH HANDS WITH POSITIVE RHEUMATOID FACTOR (H): ICD-10-CM

## 2022-02-15 DIAGNOSIS — M05.742 RHEUMATOID ARTHRITIS INVOLVING BOTH HANDS WITH POSITIVE RHEUMATOID FACTOR (H): ICD-10-CM

## 2022-02-16 NOTE — TELEPHONE ENCOUNTER
Methotrexate 2.5 mg tab - take 6 tabs every 7 days      Last Written Prescription Date:  8/10/2021  Last Fill Quantity: 24 ,   # refills: 3  Last Office Visit: 8/10/2021  Future Office visit: 6/16/2022    CBC RESULTS: Recent Labs   Lab Test 10/22/21  0817   WBC 7.8   RBC 3.79*   HGB 10.9*   HCT 32.6*   MCV 86   MCH 28.8   MCHC 33.4   RDW 13.9          Creatinine   Date Value Ref Range Status   10/22/2021 0.59 0.52 - 1.04 mg/dL Final   06/30/2021 0.58 0.52 - 1.04 mg/dL Final   ]    Liver Function Studies -   Recent Labs   Lab Test 06/30/21  0910   PROTTOTAL 7.4   ALBUMIN 3.6   BILITOTAL 0.3   ALKPHOS 76   AST 13   ALT 21       Routing refill request to provider for review/approval because:  DMARD    Call clinic to schedule follow up appointment.      Need lab appt. for bloods to be drawn

## 2022-02-18 ENCOUNTER — LAB (OUTPATIENT)
Dept: LAB | Facility: CLINIC | Age: 57
End: 2022-02-18
Payer: COMMERCIAL

## 2022-02-18 DIAGNOSIS — M05.741 RHEUMATOID ARTHRITIS INVOLVING BOTH HANDS WITH POSITIVE RHEUMATOID FACTOR (H): ICD-10-CM

## 2022-02-18 DIAGNOSIS — M05.742 RHEUMATOID ARTHRITIS INVOLVING BOTH HANDS WITH POSITIVE RHEUMATOID FACTOR (H): ICD-10-CM

## 2022-02-18 LAB
ALBUMIN SERPL-MCNC: 3.9 G/DL (ref 3.4–5)
ALT SERPL W P-5'-P-CCNC: 24 U/L (ref 0–50)
AST SERPL W P-5'-P-CCNC: 9 U/L (ref 0–45)
ERYTHROCYTE [DISTWIDTH] IN BLOOD BY AUTOMATED COUNT: 17.1 % (ref 10–15)
HCT VFR BLD AUTO: 30.1 % (ref 35–47)
HGB BLD-MCNC: 9.2 G/DL (ref 11.7–15.7)
MCH RBC QN AUTO: 24.2 PG (ref 26.5–33)
MCHC RBC AUTO-ENTMCNC: 30.6 G/DL (ref 31.5–36.5)
MCV RBC AUTO: 79 FL (ref 78–100)
PLATELET # BLD AUTO: 437 10E3/UL (ref 150–450)
RBC # BLD AUTO: 3.8 10E6/UL (ref 3.8–5.2)
WBC # BLD AUTO: 7.3 10E3/UL (ref 4–11)

## 2022-02-18 PROCEDURE — 85027 COMPLETE CBC AUTOMATED: CPT

## 2022-02-18 PROCEDURE — 84450 TRANSFERASE (AST) (SGOT): CPT

## 2022-02-18 PROCEDURE — 84460 ALANINE AMINO (ALT) (SGPT): CPT

## 2022-02-18 PROCEDURE — 82040 ASSAY OF SERUM ALBUMIN: CPT

## 2022-02-18 PROCEDURE — 36415 COLL VENOUS BLD VENIPUNCTURE: CPT

## 2022-02-22 DIAGNOSIS — B96.89 BV (BACTERIAL VAGINOSIS): ICD-10-CM

## 2022-02-22 DIAGNOSIS — N76.0 BV (BACTERIAL VAGINOSIS): ICD-10-CM

## 2022-02-25 ENCOUNTER — TELEPHONE (OUTPATIENT)
Dept: RHEUMATOLOGY | Facility: CLINIC | Age: 57
End: 2022-02-25
Payer: COMMERCIAL

## 2022-02-25 RX ORDER — METRONIDAZOLE 500 MG/1
TABLET ORAL
Qty: 14 TABLET | Refills: 0 | OUTPATIENT
Start: 2022-02-25

## 2022-03-01 ENCOUNTER — MYC MEDICAL ADVICE (OUTPATIENT)
Dept: FAMILY MEDICINE | Facility: CLINIC | Age: 57
End: 2022-03-01
Payer: COMMERCIAL

## 2022-03-01 ENCOUNTER — TELEPHONE (OUTPATIENT)
Dept: OCCUPATIONAL THERAPY | Facility: CLINIC | Age: 57
End: 2022-03-01
Payer: COMMERCIAL

## 2022-03-10 NOTE — TELEPHONE ENCOUNTER
JIMBOM (2nd) for PT to call [phone] to reschedule the 6.16 Dr. Odom appt. I did mention that the schedule is pushed out until 8.4.

## 2022-03-15 ENCOUNTER — HOSPITAL ENCOUNTER (OUTPATIENT)
Dept: OCCUPATIONAL THERAPY | Facility: CLINIC | Age: 57
Setting detail: THERAPIES SERIES
Discharge: HOME | End: 2022-03-15
Attending: NURSE PRACTITIONER
Payer: COMMERCIAL

## 2022-03-15 PROCEDURE — 97165 OT EVAL LOW COMPLEX 30 MIN: CPT | Mod: GO | Performed by: OCCUPATIONAL THERAPIST

## 2022-03-15 ASSESSMENT — ACTIVITIES OF DAILY LIVING (ADL)
IADL_QUICK_ADDS: MEAL PLANNING/PREPARATION;HOME/FINANCIAL/MANAGEMENT;COMMUNICATION/COMPUTER USE;COMMUNITY MOBILITY;CARE OF OTHERS

## 2022-03-15 NOTE — PROGRESS NOTES
03/15/22 1300   Quick Adds   Type of Visit Initial Outpatient Occupational Therapy Evaluation   General Information   Start Of Care Date 03/15/22   Referring Physician Gilberto Casanova   Orders Evaluate and treat as indicated   Other Orders [facility]   Orders Date 02/01/22   Medical Diagnosis RA, CVA   Precautions/Limitations No known precautions/limitations   Surgical/Medical History Reviewed Yes   Additional Occupational Profile Info/Pertinent History of Current Problem 55-year-old presented with 2 separate episodes of TIA in July 2021. First of which she experienced left upper limb numbness and heaviness which lasted for 10 to 15 minutes and self resolved. Another occasion while working in the yard she noticed weakness in the left foot and almost tripped over. This also lasted for about 10 to 15 minutes and self resolved. She then attended her next routine clinic appointment and had scans that confirmed right-sided hemispheric CVA and severe right ICA narrowing.   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   Patient/family Goals Statement Strength and L UE function   Pain   Patient currently in pain Yes   Pain location L wrist and Right shoulder   Pain description Ache;Discomfort;Dull;Deep   Pain comments L hand/wrist.  Reports she was diangosed with CVA and stent placement and RA at the same time of onset of pain.  During this time she also had covid 2x.  All seem to be playing a role in current limitations and will need to all be treated.   Fall Risk Screen   Fall screen completed by OT   Have you fallen 2 or more times in the past year? No   Have you fallen and  had an injury in the past year? No   Is patient a fall risk? No   Cognitive Status Examination   Orientation Orientation to person, place and time   Visual Perception   Visual Perception No deficits were identified   Visual Perception Comments Previously had Lasik, eye dr suggested glasses or more lasic   Sensation   Sensation Comments WNl per report   Hand Strength   Hand Dominance Right   Left Hand  (pounds) 11 pounds   Right Hand  (pounds) 52 pounds   Left Lateral Pinch (pounds) 13 pounds   Right Lateral Pinch (pounds) 16 pounds   Left Three Point Pinch (pounds) 6 pounds   Right Three Point Pinch (pounds) 15 pounds   Hand Strength Comments L BNL with pain   Coordination   Left Hand, Nine Hole Peg Test (seconds) 23.94   Right Hand, Nine Hole Peg Test (seconds) 19.22   Coordination Comments WNL   Balance   Balance Quick Add No deficits were identified   Functional Mobility   Ambulation WNL   Transfer Skill   Level of [place]: Transfers independent   Bathing   Level of [place] - Bathing independent   Upper Body Dressing   Level of [place]: Dress Upper Body independent   Lower Body Dressing   Level of [place]: Dress Lower Body independent   Lower Body Dressing Comments buttons are very challenging, shoes with arches can be painful   Toileting   Level of [place]: Toilet independent   Grooming   Level of [place]: Grooming independent   Grooming Comments takes a lot longer   Eating/Self-Feeding   Level of [place]: Eating independent   Eating/Self Feeding Comments Uses R hand   Activity Tolerance   Activity Tolerance more tired now than usual   Instrumental Activities of Daily Living Assessment   IADL Assessment/Observations Sons do all outdoor work as needed.  Currently working on painting house.  All IADLs wnl.    IADL Quick Adds Meal Planning/Preparation;Home/Financial/Management;Communication/Computer Use;Community Mobility;Care of Others   Planned Therapy Interventions    Planned Therapy Interventions ADL training;IADL training;Orthotic fitting/training;Prosthetic fitting/training;Strengthening;Self care/Home management;ROM;Therapeutic activities;Stretching   Planned Modalities Electrical stimulation;TENS   Adult OT Eval Goals   OT Eval Goals (Adult) 1;2    OT Goal 1   Goal Identifier HEP   Goal Description Patient will be independent with UE graded HEP to manage pain and increase strength and coordination for increased independence with adls and iadls such as buttons and lifting items at home.   Goal Progress initaited putty HEP   Target Date 06/13/22    OT Goal 2   Goal Identifier AE/techniques   Goal Description Patietn will demo /report 3-5 new adapted methods or pieces of equipment to assist wtih L UE pain and weakness.   Goal Progress Educated on continued use of splint.   Target Date 06/13/22   Clinical Impression   Criteria for Skilled Therapeutic Interventions Met Yes, treatment indicated   OT Diagnosis Impaired participation in ADLS and IADLs   Influenced by the following impairments pain adn weakness   Assessment of Occupational Performance 3-5 Performance Deficits   Identified Performance Deficits dressing, cooking, cleaning, home maintenence   Clinical Decision Making (Complexity) Low complexity   Therapy Frequency up to 10 visits   Predicted Duration of Therapy Intervention (days/wks) in 90 days as needed   Risks and Benefits of Treatment have been explained. Yes   Patient, Family & other staff in agreement with plan of care Yes   Clinical Impression Comments Skilled OT services needed to train in HEP and adapted methods in order to manage CVA weakness, RA pain and stiffness, and post covid process.   Education Assessment   Barriers To Learning No Barriers   Preferred Learning Style Listening;Demonstration;Pictures/video   Therapy Certification   Certification date from 03/15/22   Certification date to 06/13/22   Total Evaluation Time   OT Eval, Low Complexity Minutes  (40922) 40

## 2022-03-19 ENCOUNTER — MYC REFILL (OUTPATIENT)
Dept: FAMILY MEDICINE | Facility: CLINIC | Age: 57
End: 2022-03-19
Payer: COMMERCIAL

## 2022-03-19 DIAGNOSIS — F98.8 ATTENTION DEFICIT DISORDER OF ADULT: ICD-10-CM

## 2022-03-21 ENCOUNTER — MYC REFILL (OUTPATIENT)
Dept: FAMILY MEDICINE | Facility: CLINIC | Age: 57
End: 2022-03-21
Payer: COMMERCIAL

## 2022-03-21 DIAGNOSIS — F98.8 ATTENTION DEFICIT DISORDER OF ADULT: ICD-10-CM

## 2022-03-21 RX ORDER — DEXTROAMPHETAMINE SACCHARATE, AMPHETAMINE ASPARTATE MONOHYDRATE, DEXTROAMPHETAMINE SULFATE AND AMPHETAMINE SULFATE 5; 5; 5; 5 MG/1; MG/1; MG/1; MG/1
20 CAPSULE, EXTENDED RELEASE ORAL DAILY
Qty: 30 CAPSULE | Refills: 0 | Status: SHIPPED | OUTPATIENT
Start: 2022-03-21 | End: 2022-04-15

## 2022-03-21 RX ORDER — DEXTROAMPHETAMINE SACCHARATE, AMPHETAMINE ASPARTATE MONOHYDRATE, DEXTROAMPHETAMINE SULFATE AND AMPHETAMINE SULFATE 5; 5; 5; 5 MG/1; MG/1; MG/1; MG/1
20 CAPSULE, EXTENDED RELEASE ORAL DAILY
Qty: 30 CAPSULE | Refills: 0
Start: 2022-03-21

## 2022-03-21 RX ORDER — DEXTROAMPHETAMINE SACCHARATE, AMPHETAMINE ASPARTATE, DEXTROAMPHETAMINE SULFATE AND AMPHETAMINE SULFATE 5; 5; 5; 5 MG/1; MG/1; MG/1; MG/1
20 TABLET ORAL DAILY
Qty: 30 TABLET | Refills: 0 | Status: SHIPPED | OUTPATIENT
Start: 2022-03-21 | End: 2022-04-15

## 2022-03-21 NOTE — TELEPHONE ENCOUNTER
Routing refill request to provider for review/approval because:  Drug not on the [facility] refill protocol. Patient has apt scheduled with Symone Acosta CNP 4/7/22.     Requested Prescriptions   Pending Prescriptions Disp Refills     amphetamine-dextroamphetamine (ADDERALL XR) 20 MG 24 hr capsule 30 capsule 0     Sig: Take 1 capsule (20 mg) by mouth daily       There is no refill protocol information for this order        amphetamine-dextroamphetamine (ADDERALL) 20 MG tablet 30 tablet 0     Sig: Take 1 tablet (20 mg) by mouth daily Take in the early afternoon. Needs virtual/video visit with PCP prior to next refill.       There is no refill protocol information for this order        [place] Pierce RN   [facility]

## 2022-03-21 NOTE — TELEPHONE ENCOUNTER
Duplicate medication request. Medication was filled 3/21/22 and Receipt confirmed by pharmacy (3/21/2022  1:25 PM CDT).     [place] Pierce RN   [facility]

## 2022-03-23 ENCOUNTER — TELEPHONE (OUTPATIENT)
Dept: RADIOLOGY | Facility: CLINIC | Age: 57
End: 2022-03-23

## 2022-03-24 ENCOUNTER — TELEPHONE (OUTPATIENT)
Dept: RADIOLOGY | Facility: CLINIC | Age: 57
End: 2022-03-24
Payer: COMMERCIAL

## 2022-03-25 ENCOUNTER — TELEPHONE (OUTPATIENT)
Dept: RADIOLOGY | Facility: CLINIC | Age: 57
End: 2022-03-25
Payer: COMMERCIAL

## 2022-04-07 ENCOUNTER — VIRTUAL VISIT (OUTPATIENT)
Dept: FAMILY MEDICINE | Facility: CLINIC | Age: 57
End: 2022-04-07
Payer: COMMERCIAL

## 2022-04-07 DIAGNOSIS — I63.231 CEREBROVASCULAR ACCIDENT (CVA) DUE TO STENOSIS OF RIGHT CAROTID ARTERY (H): ICD-10-CM

## 2022-04-07 DIAGNOSIS — I65.21 STENOSIS OF RIGHT CAROTID ARTERY: ICD-10-CM

## 2022-04-07 DIAGNOSIS — R73.09 ABNORMAL GLUCOSE: ICD-10-CM

## 2022-04-07 DIAGNOSIS — Z12.11 SCREEN FOR COLON CANCER: Primary | ICD-10-CM

## 2022-04-07 DIAGNOSIS — D64.9 ANEMIA, UNSPECIFIED TYPE: ICD-10-CM

## 2022-04-07 DIAGNOSIS — R53.83 FATIGUE, UNSPECIFIED TYPE: ICD-10-CM

## 2022-04-07 PROCEDURE — 99214 OFFICE O/P EST MOD 30 MIN: CPT | Mod: TEL | Performed by: NURSE PRACTITIONER

## 2022-04-07 RX ORDER — CLOPIDOGREL BISULFATE 75 MG/1
TABLET ORAL
Qty: 30 TABLET | Refills: 3 | Status: SHIPPED | OUTPATIENT
Start: 2022-04-07 | End: 2022-09-28

## 2022-04-07 ASSESSMENT — ENCOUNTER SYMPTOMS
HEADACHES: 0
CHEST TIGHTNESS: 0
FATIGUE: 1
DIZZINESS: 0
NAUSEA: 0
MYALGIAS: 0
NERVOUS/ANXIOUS: 0
SORE THROAT: 0
CONSTIPATION: 0
ARTHRALGIAS: 0
NUMBNESS: 0
ABDOMINAL DISTENTION: 0
LIGHT-HEADEDNESS: 0
PALPITATIONS: 0
VOMITING: 0
SLEEP DISTURBANCE: 0
ABDOMINAL PAIN: 0
WHEEZING: 0
DIARRHEA: 0
COUGH: 0
SHORTNESS OF BREATH: 0
DYSPHORIC MOOD: 0
RHINORRHEA: 0

## 2022-04-07 NOTE — PROGRESS NOTES
Libertad is a 56 year old who is being evaluated via a billable telephone visit.      What phone number would you like to be contacted at? [phone]  How would you like to obtain your AVS? MyChart    Assessment & Plan      Cerebrovascular accident (CVA) due to stenosis of right carotid artery (H)  Plan to continue Plavix indefinitely.  Previous notes from neurologist reviewed.  - clopidogrel (PLAVIX) 75 MG tablet; Begin taking 1 tab daily 7 days prior to procedure    Screen for colon cancer  Declines colonoscopy-is agreeable to Cologuard  - COLOGUARD([employer])    Anemia, unspecified type  We will set up for lab only appointment-full plan will depend on outcome.  - CBC with Platelets & Differential; Future  - Iron & Iron Binding Capacity; Future  - TSH with free T4 reflex; Future  - Vitamin D Deficiency; Future    Abnormal glucose  - Hemoglobin A1c; Future    Fatigue, unspecified type  Most recent labs from [facility] reviewed-hemoglobin 8.9.  We will set up for lab only appointment.  Full plan will depend on outcome.  - CBC with Platelets & Differential; Future  - Iron & Iron Binding Capacity; Future  - TSH with free T4 reflex; Future  - Vitamin D Deficiency; Future    Stenosis of right carotid artery  Received call from neurosurgeon at [facility] earlier this AM.  Had angiogram found to have 100% occlusion of the right internal carotid artery.  There is decent collateral blood supply.  Important that stays well-hydrated and blood pressure kept slightly above normal 1 30-1 40 systolic, also should stop smoking.  Encouraged Seun to check blood pressure daily at home-she is planning to buy a new blood pressure machine    Review of external notes as documented elsewhere in note  Review of the result(s) of each unique test - Labs  Ordering of each unique test  Prescription drug management  32 minutes spent on the date of the encounter doing chart review, history and exam, documentation and further activities  per the note       No follow-ups on file.    SULY Melvin CNP  [facility] KARLIE Maurer is a 56 year old who presents for the following health issues     History of Present Illness       Reason for visit:  Vitamin D deficiency test result from [place]  Symptom onset:  3-4 weeks ago  Symptoms include:  Fatigue  Symptom intensity:  Moderate  Symptom progression:  Staying the same  Had these symptoms before:  Yes  Has tried/received treatment for these symptoms:  No    She eats 2-3 servings of fruits and vegetables daily.She consumes 2 sweetened beverage(s) daily.She exercises with enough effort to increase her heart rate 9 or less minutes per day.  She exercises with enough effort to increase her heart rate 3 or less days per week.   She is taking medications regularly.     Needs to discuss stopping blood pressure medication due to blockage in brain.     Hypertension Follow-up      Do you check your blood pressure regularly outside of the clinic? No     Are you following a low salt diet? Yes    Are your blood pressures ever more than 140 on the top number (systolic) OR more   than 90 on the bottom number (diastolic), for example 140/90? Not checking       Medication Followup of atorvastatin 20 mg    Taking Medication as prescribed: yes    Side Effects:  None    Medication Helping Symptoms:  yes       Medication Followup of Adderall XR 20 mg    Taking Medication as prescribed: yes    Side Effects:  None    Medication Helping Symptoms:  yes     Medication Followup of Adderall 20 mg    Taking Medication as prescribed: yes    Side Effects:  None    Medication Helping Symptoms:  yes                                                                                                       Some-                                                                        Never   Rarely      times       Often  Very Often  1. How often do you have trouble wrapping up the final details of a  project,  once the challenging parts have been done?  x      2. How often do you have difficulty getting things in order when you have to do a task that requires organization?   x     3. How often do you have problems remembering appointments or obligations? x       4. When you have a task that requires a lot of thought, how often do you avoid or delay getting started?   x     5. How often do you fidget or squirm with your hands or feet when you have to sit down for a long time?  x      6. How often do you feel overly active and compelled to do things, like you were driven by a motor?  x        Part A                                                        7. How often do you make careless mistakes when you have to work on a boring or difficult project?   x     8. How often do you have difficulty keeping your attention when you are doing boring or repetitive work?   x     9. How often do you have difficulty concentrating on what people say to you, even when they are speaking to you directly?  x      10. How often do you misplace or have difficulty finding things at home or at work?   x     11. How often are you distracted by activity or noise around you?  x      12. How often do you leave your seat in meetings or other situations in which you are expected to remain seated?    x      13. How often do you feel restless or fidgety?    x      14. How often do you have difficulty unwinding and relaxing when you have time to yourself?  x      15. How often do you find yourself talking too much when you are in social situations?  x      16. When you're in a conversation, how often do you find yourself finishing the sentences of the people you are talking to, before they can finish them themselves?  x      17. How often do you have difficulty waiting your turn in situations when turn-taking is required?  x      18. How often do you interrupt others when they are busy?  x              Phone call completed due to COVID 19  outbreak.     Has been to [place]. Thought stent to carotid artery was blocked. Nothing that they can do unless do surgery. Need to keep blood pressure higher to continue to get collateral blood flow. Is going to buy new a new blood pressure machine today.     Quit smoking-used vape to quit smoking. Is weaning off of vape.     Is feeling more tired and fatigued. Had Hbg checked and was 8.9. Did have recent angiogram-no bleeding with that. After last angiogram groin is more sore. Is taking plavix and aspirin.     Continues to take atorvastatin and tolerating well. No increased muscle or joint aches.     Continues to take adderall XR 20 mg in AM and 20 mg IR in the PM if needs to. Feels like is doing well on current dose.     Had mammogram done at work 12/21-it was normal.     No family history of colon cancer. No IBD.     Review of Systems   Constitutional: Positive for fatigue.   HENT: Negative for ear pain, rhinorrhea and sore throat.         Continues to hear heart beat in ear   Eyes: Negative for visual disturbance.   Respiratory: Negative for cough, chest tightness, shortness of breath and wheezing.    Cardiovascular: Negative for chest pain and palpitations.   Gastrointestinal: Negative for abdominal distention, abdominal pain, constipation, diarrhea, nausea and vomiting.   Endocrine: Negative for cold intolerance and heat intolerance.   Musculoskeletal: Negative for arthralgias and myalgias.   Skin: Negative for rash.   Neurological: Negative for dizziness, light-headedness, numbness and headaches.   Psychiatric/Behavioral: Negative for dysphoric mood and sleep disturbance. The patient is not nervous/anxious.           Objective           Vitals:  No vitals were obtained today due to virtual visit.    Physical Exam   healthy, alert and no distress  PSYCH: Alert and oriented times 3; coherent speech, normal   rate and volume, able to articulate logical thoughts, able   to abstract reason, no tangential thoughts,  no hallucinations   or delusions  Her affect is normal  RESP: No cough, no audible wheezing, able to talk in full sentences  Remainder of exam unable to be completed due to telephone visits          Phone call duration: 21 minutes

## 2022-04-15 ENCOUNTER — MYC REFILL (OUTPATIENT)
Dept: FAMILY MEDICINE | Facility: CLINIC | Age: 57
End: 2022-04-15
Payer: COMMERCIAL

## 2022-04-15 DIAGNOSIS — F98.8 ATTENTION DEFICIT DISORDER OF ADULT: ICD-10-CM

## 2022-04-18 ENCOUNTER — LAB (OUTPATIENT)
Dept: LAB | Facility: CLINIC | Age: 57
End: 2022-04-18
Payer: COMMERCIAL

## 2022-04-18 DIAGNOSIS — R53.83 FATIGUE, UNSPECIFIED TYPE: ICD-10-CM

## 2022-04-18 DIAGNOSIS — R73.09 ABNORMAL GLUCOSE: ICD-10-CM

## 2022-04-18 DIAGNOSIS — D64.9 ANEMIA, UNSPECIFIED TYPE: ICD-10-CM

## 2022-04-18 LAB
BASOPHILS # BLD AUTO: 0.1 10E3/UL (ref 0–0.2)
BASOPHILS NFR BLD AUTO: 2 %
EOSINOPHIL # BLD AUTO: 0.3 10E3/UL (ref 0–0.7)
EOSINOPHIL NFR BLD AUTO: 6 %
ERYTHROCYTE [DISTWIDTH] IN BLOOD BY AUTOMATED COUNT: 20.6 % (ref 10–15)
HBA1C MFR BLD: 5.8 % (ref 0–5.6)
HCT VFR BLD AUTO: 30.1 % (ref 35–47)
HGB BLD-MCNC: 9.2 G/DL (ref 11.7–15.7)
IRON SATN MFR SERPL: 8 % (ref 15–46)
IRON SERPL-MCNC: 37 UG/DL (ref 35–180)
LYMPHOCYTES # BLD AUTO: 1.2 10E3/UL (ref 0.8–5.3)
LYMPHOCYTES NFR BLD AUTO: 24 %
MCH RBC QN AUTO: 24.4 PG (ref 26.5–33)
MCHC RBC AUTO-ENTMCNC: 30.6 G/DL (ref 31.5–36.5)
MCV RBC AUTO: 80 FL (ref 78–100)
MONOCYTES # BLD AUTO: 0.3 10E3/UL (ref 0–1.3)
MONOCYTES NFR BLD AUTO: 5 %
NEUTROPHILS # BLD AUTO: 3.3 10E3/UL (ref 1.6–8.3)
NEUTROPHILS NFR BLD AUTO: 64 %
PLATELET # BLD AUTO: 380 10E3/UL (ref 150–450)
RBC # BLD AUTO: 3.77 10E6/UL (ref 3.8–5.2)
TIBC SERPL-MCNC: 488 UG/DL (ref 240–430)
TSH SERPL DL<=0.005 MIU/L-ACNC: 1.15 MU/L (ref 0.4–4)
WBC # BLD AUTO: 5.1 10E3/UL (ref 4–11)

## 2022-04-18 PROCEDURE — 36415 COLL VENOUS BLD VENIPUNCTURE: CPT

## 2022-04-18 PROCEDURE — 84443 ASSAY THYROID STIM HORMONE: CPT

## 2022-04-18 PROCEDURE — 82306 VITAMIN D 25 HYDROXY: CPT

## 2022-04-18 PROCEDURE — 83550 IRON BINDING TEST: CPT

## 2022-04-18 PROCEDURE — 83036 HEMOGLOBIN GLYCOSYLATED A1C: CPT

## 2022-04-18 PROCEDURE — 85025 COMPLETE CBC W/AUTO DIFF WBC: CPT

## 2022-04-18 PROCEDURE — 82607 VITAMIN B-12: CPT

## 2022-04-18 PROCEDURE — 99000 SPECIMEN HANDLING OFFICE-LAB: CPT

## 2022-04-18 PROCEDURE — 83021 HEMOGLOBIN CHROMOTOGRAPHY: CPT | Mod: 90

## 2022-04-18 NOTE — TELEPHONE ENCOUNTER
Routing refill request to provider for review/approval because:  Drug not on the [facility] refill protocol     amphetamine-dextroamphetamine (ADDERALL) 20 MG tablet 30 tablet 0 3/21/2022     amphetamine-dextroamphetamine (ADDERALL XR) 20 MG 24 hr capsule 30 capsule 0 3/21/2022       Last office visit: 12/1/2021 Virtual 4-7-22 with prescribing provider:  Symone Acosta   Future Office Visit:   Next 5 appointments (look out 90 days)    Jun 09, 2022  3:30 PM  (Arrive by 3:15 PM)  Return Visit with Gilberto Casanova MD  [facility] [place] ([facility] [place]) [address] [address] [place] [id number] [phone]

## 2022-04-19 DIAGNOSIS — D64.9 ANEMIA, UNSPECIFIED TYPE: Primary | ICD-10-CM

## 2022-04-19 LAB
DEPRECATED CALCIDIOL+CALCIFEROL SERPL-MC: 38 UG/L (ref 20–75)
VIT B12 SERPL-MCNC: 377 PG/ML (ref 193–986)

## 2022-04-19 RX ORDER — DEXTROAMPHETAMINE SACCHARATE, AMPHETAMINE ASPARTATE MONOHYDRATE, DEXTROAMPHETAMINE SULFATE AND AMPHETAMINE SULFATE 5; 5; 5; 5 MG/1; MG/1; MG/1; MG/1
20 CAPSULE, EXTENDED RELEASE ORAL DAILY
Qty: 30 CAPSULE | Refills: 0 | Status: SHIPPED | OUTPATIENT
Start: 2022-04-19 | End: 2022-05-12

## 2022-04-19 RX ORDER — DEXTROAMPHETAMINE SACCHARATE, AMPHETAMINE ASPARTATE, DEXTROAMPHETAMINE SULFATE AND AMPHETAMINE SULFATE 5; 5; 5; 5 MG/1; MG/1; MG/1; MG/1
20 TABLET ORAL DAILY
Qty: 30 TABLET | Refills: 0 | Status: SHIPPED | OUTPATIENT
Start: 2022-04-19 | End: 2022-05-12

## 2022-04-21 ENCOUNTER — PATIENT OUTREACH (OUTPATIENT)
Dept: ONCOLOGY | Facility: CLINIC | Age: 57
End: 2022-04-21
Payer: COMMERCIAL

## 2022-04-21 DIAGNOSIS — D64.9 ANEMIA, UNSPECIFIED TYPE: Primary | ICD-10-CM

## 2022-04-21 LAB — HGB S BLD QL: NEGATIVE

## 2022-04-21 NOTE — PROGRESS NOTES
Writer received referral for [facility]. Reviewed for urgency based on labs and symptomology. Appropriate scheduling instructions added and referral sent to New Patient Scheduling for completion.

## 2022-04-22 ENCOUNTER — MYC MEDICAL ADVICE (OUTPATIENT)
Dept: FAMILY MEDICINE | Facility: CLINIC | Age: 57
End: 2022-04-22
Payer: COMMERCIAL

## 2022-04-22 NOTE — PROGRESS NOTES
RECORDS STATUS - ALL OTHER DIAGNOSIS      Action    Action Taken 4/22/2022 3:23pm LIZETTE   I called [facility] Dept Ph: [phone] option 2    4/26/2022 3:29pm LIZETTE   I called [facility] again - they will push imaging over to PACS    4/27/2022 10:17AM LIZETTE   I called [facility] again - they will push the images over STAT soon- I mentioned that the pt is in clinic     RECORDS RECEIVED FROM: [facility]/ [facility]    DATE RECEIVED: 4/27/2022   NOTES STATUS DETAILS   OFFICE NOTE from referring provider Complete Epic   ref CNP [place]   DISCHARGE SUMMARY from hospital     DISCHARGE REPORT from the ER     OPERATIVE REPORT     MEDICATION LIST Complete [facility]   CLINICAL TRIAL TREATMENTS TO DATE     LABS     PATHOLOGY REPORTS     ANYTHING RELATED TO DIAGNOSIS Complete Labs last updated on 4/19/2022   GENONOMIC TESTING     TYPE:     IMAGING (NEED IMAGES & REPORT)     CT SCANS Requested- [facility]  CT Head 1/25/2022   MRI Requested- [facility]  MRI Brain     MRI Neck    MAMMO     ULTRASOUND     PET

## 2022-04-25 LAB — COLOGUARD-ABSTRACT: NEGATIVE

## 2022-04-27 ENCOUNTER — LAB (OUTPATIENT)
Dept: INFUSION THERAPY | Facility: HOSPITAL | Age: 57
End: 2022-04-27
Attending: INTERNAL MEDICINE
Payer: COMMERCIAL

## 2022-04-27 ENCOUNTER — PRE VISIT (OUTPATIENT)
Dept: ONCOLOGY | Facility: HOSPITAL | Age: 57
End: 2022-04-27

## 2022-04-27 ENCOUNTER — ONCOLOGY VISIT (OUTPATIENT)
Dept: ONCOLOGY | Facility: HOSPITAL | Age: 57
End: 2022-04-27
Attending: NURSE PRACTITIONER
Payer: COMMERCIAL

## 2022-04-27 VITALS
BODY MASS INDEX: 23.69 KG/M2 | HEART RATE: 79 BPM | DIASTOLIC BLOOD PRESSURE: 72 MMHG | TEMPERATURE: 98 F | RESPIRATION RATE: 16 BRPM | HEIGHT: 65 IN | OXYGEN SATURATION: 100 % | WEIGHT: 142.2 LBS | SYSTOLIC BLOOD PRESSURE: 143 MMHG

## 2022-04-27 DIAGNOSIS — D50.9 MICROCYTIC ANEMIA: ICD-10-CM

## 2022-04-27 DIAGNOSIS — D50.0 IRON DEFICIENCY ANEMIA DUE TO CHRONIC BLOOD LOSS: Primary | ICD-10-CM

## 2022-04-27 DIAGNOSIS — D50.9 MICROCYTIC ANEMIA: Primary | ICD-10-CM

## 2022-04-27 DIAGNOSIS — D64.9 ANEMIA, UNSPECIFIED TYPE: ICD-10-CM

## 2022-04-27 LAB
BASOPHILS # BLD AUTO: 0.1 10E3/UL (ref 0–0.2)
BASOPHILS NFR BLD AUTO: 2 %
EOSINOPHIL # BLD AUTO: 0.3 10E3/UL (ref 0–0.7)
EOSINOPHIL NFR BLD AUTO: 4 %
ERYTHROCYTE [DISTWIDTH] IN BLOOD BY AUTOMATED COUNT: 19.8 % (ref 10–15)
FERRITIN SERPL-MCNC: 6 NG/ML (ref 10–130)
HCT VFR BLD AUTO: 29.3 % (ref 35–47)
HGB BLD-MCNC: 9.2 G/DL (ref 11.7–15.7)
IMM GRANULOCYTES # BLD: 0 10E3/UL
IMM GRANULOCYTES NFR BLD: 0 %
LYMPHOCYTES # BLD AUTO: 1.5 10E3/UL (ref 0.8–5.3)
LYMPHOCYTES NFR BLD AUTO: 24 %
MCH RBC QN AUTO: 24.7 PG (ref 26.5–33)
MCHC RBC AUTO-ENTMCNC: 31.4 G/DL (ref 31.5–36.5)
MCV RBC AUTO: 79 FL (ref 78–100)
MONOCYTES # BLD AUTO: 0.5 10E3/UL (ref 0–1.3)
MONOCYTES NFR BLD AUTO: 8 %
NEUTROPHILS # BLD AUTO: 3.9 10E3/UL (ref 1.6–8.3)
NEUTROPHILS NFR BLD AUTO: 62 %
NRBC # BLD AUTO: 0 10E3/UL
NRBC BLD AUTO-RTO: 0 /100
PLATELET # BLD AUTO: 329 10E3/UL (ref 150–450)
RBC # BLD AUTO: 3.72 10E6/UL (ref 3.8–5.2)
RETICS # AUTO: 0.04 10E6/UL (ref 0.01–0.11)
RETICS/RBC NFR AUTO: 1.1 % (ref 0.8–2.7)
WBC # BLD AUTO: 6.3 10E3/UL (ref 4–11)

## 2022-04-27 PROCEDURE — 99204 OFFICE O/P NEW MOD 45 MIN: CPT | Performed by: INTERNAL MEDICINE

## 2022-04-27 PROCEDURE — G0463 HOSPITAL OUTPT CLINIC VISIT: HCPCS

## 2022-04-27 PROCEDURE — 85045 AUTOMATED RETICULOCYTE COUNT: CPT

## 2022-04-27 PROCEDURE — 36415 COLL VENOUS BLD VENIPUNCTURE: CPT

## 2022-04-27 PROCEDURE — 82728 ASSAY OF FERRITIN: CPT

## 2022-04-27 PROCEDURE — 85025 COMPLETE CBC W/AUTO DIFF WBC: CPT

## 2022-04-27 RX ORDER — FERROUS SULFATE 325(65) MG
325 TABLET, DELAYED RELEASE (ENTERIC COATED) ORAL DAILY
Qty: 90 TABLET | Refills: 1 | Status: SHIPPED | OUTPATIENT
Start: 2022-04-27 | End: 2022-11-23

## 2022-04-27 ASSESSMENT — PAIN SCALES - GENERAL: PAINLEVEL: NO PAIN (0)

## 2022-04-27 NOTE — PROGRESS NOTES
"Oncology Rooming Note    April 27, 2022 11:09 AM   Libertad Russell is a 56 year old female who presents for:    Chief Complaint   Patient presents with     Hematology     Initial Vitals: BP (!) 143/72   Pulse 79   Temp 98  F (36.7  C)   Resp 16   Ht 1.651 m (5' 5\")   Wt 64.5 kg (142 lb 3.2 oz)   LMP 05/20/2021 (Approximate)   SpO2 100%   BMI 23.66 kg/m   Estimated body mass index is 23.66 kg/m  as calculated from the following:    Height as of this encounter: 1.651 m (5' 5\").    Weight as of this encounter: 64.5 kg (142 lb 3.2 oz). Body surface area is 1.72 meters squared.  No Pain (0) Comment: Data Unavailable   Patient's last menstrual period was 05/20/2021 (approximate).  Allergies reviewed: Yes  Medications reviewed: Yes    Medications: Medication refills not needed today.  Pharmacy name entered into [facility]:    [facility] [place], [place] - [address] [facility] [place] [place] [address] [facility]  WRITTEN PRESCRIPTION REQUESTED  [id number] - [facility] [address] AT [place] include [address] beds at the [facility]    Clinical concerns: None       Amberly Odom LPN            "

## 2022-04-27 NOTE — PROGRESS NOTES
[facility] Hematology and Oncology Consult Note      Patient: Libertad Russell  MRN: [id number]  Date of Service: Apr 27, 2022      We have been asked by NADINE Acosta to evaluate Libertad Russell for anemia.    Assessment/Plan:    1.  Anemia: Iron deficiency anemia.  Her ferritin is low which is diagnostic.  Other supporting findings include low normal MCV, elevated TIBC and low saturation index.  I reviewed all this with the patient.  Are going to start her on oral iron replacement.  1 tablet daily for 6 months.  We will have her return to clinic in two months to make sure she is responding appropriately.  She should also have a colonoscopy and an upper endoscopy if that is negative.  She has no signs or symptoms of ulcer disease or malignancy.  Plan was reviewed with patient.  Questions were answered.    ECOG Performance  1    Staging History:    Cancer Staging  No matching staging information was found for the patient.    History:    Libertad is a 56-year-old woman who is referred for evaluation of anemia.  She was recently found to have anemia at a primary care visit.  She was referred for further evaluation.  Overall she is feeling okay.  She started methotrexate 4 months ago for diagnosis of rheumatoid arthritis.  She has no unintentional weight loss or abdominal pain.  Her sister has pernicious anemia.  No change in her bowel or bladder habits.  She recently completed a Cologuard but is still waiting on the results.  She denies any melena or bright red blood per rectum.  She denies any personal history of anemia and has not taken iron in the past.    Past History:    Past Medical History:   Diagnosis Date     ADHD (attention deficit hyperactivity disorder)      Cerebrovascular accident (CVA) due to stenosis of right carotid artery (H) 9/15/2021     Depressive disorder      HTN (hypertension)      RA (rheumatoid arthritis) (H)      Raynaud disease     Family History   Problem Relation Age of Onset      Hypertension Mother      Cancer Mother         lung cancer     Other Cancer Mother         Lung     Thyroid Disease Mother      C.A.D. Father         52 at first MI     Prostate Cancer Father         Bladder     Hypertension Other      Thyroid Disease Sister      Thyroid Disease Sister      Diabetes No family hx of      Cerebrovascular Disease No family hx of      Breast Cancer No family hx of      Cancer - colorectal No family hx of       [unfilled] Social History     Socioeconomic History     Marital status: Single     Spouse name: Not on file     Number of children: Not on file     Years of education: Not on file     Highest education level: Not on file   Occupational History     Not on file   Tobacco Use     Smoking status: Former Smoker     Packs/day: 1.00     Years: 10.00     Pack years: 10.00     Types: Cigarettes     Quit date: 10/5/2021     Years since quittin.5     Smokeless tobacco: Never Used   Vaping Use     Vaping Use: Some days   Substance and Sexual Activity     Alcohol use: Not Currently     Comment: occasional     Drug use: No     Sexual activity: Yes     Partners: Male     Birth control/protection: Female Surgical     Comment: tubal   Other Topics Concern     Parent/sibling w/ CABG, MI or angioplasty before 65F 55M? No   Social History Narrative     Not on file     Social Determinants of Health     Financial Resource Strain: Not on file   Food Insecurity: Not on file   Transportation Needs: Not on file   Physical Activity: Not on file   Stress: Not on file   Social Connections: Not on file   Intimate Partner Violence: Not on file   Housing Stability: Not on file        Allergies:    No Known Allergies    Review of Systems:    As above in the history.     Review of Systems otherwise Negative for:  General: chills, fever or night sweats  Psychological: anxiety or depression  Ophthalmic: blurry vision, double vision or loss of vision, vision change  ENT: epistaxis, oral lesions, hearing  "changes  Hematological and Lymphatic: bleeding, bruising, jaundice, swollen lymph nodes  Endocrine: hot flashes, unexpected weight changes  Respiratory: cough, hemoptysis, orthopnea or shortness of breath/HALEY  Cardiovascular: chest pain, edema, palpitations or PND  Gastrointestinal: abdominal pain, blood in stools, change in bowel habits, constipation, diarrhea or nausea/vomiting  Genito-Urinary: change in urinary stream, incontinence, frequency/urgency  Musculoskeletal: joint pain, stiffness, swelling, muscle pain  Neurological: dizziness, headaches, numbness/tingling  Dermatological: lumps and rash    Physical Exam:    BP (!) 143/72   Pulse 79   Temp 98  F (36.7  C)   Resp 16   Ht 1.651 m (5' 5\")   Wt 64.5 kg (142 lb 3.2 oz)   LMP 05/20/2021 (Approximate)   SpO2 100%   BMI 23.66 kg/m      General: patient appears stated age of 56 year old. Nontoxic and in no distress.   HEENT: Head: atraumatic, normocephalic. Sclerae anicteric.  Chest:  Normal respiratory effort  Cardiac:  No edema.   Abdomen: abdomen is non-distended  Extremities: normal tone and muscle bulk.   Skin: no lesions or rash on visible skin. Warm and dry.   CNS: alert and oriented. Grossly non-focal.   Psychiatric: normal mood and affect.     Lab Results:    Recent Results (from the past 168 hour(s))   Ferritin   Result Value Ref Range    Ferritin 6 (L) 10 - 130 ng/mL   CBC with platelets and differential   Result Value Ref Range    WBC Count 6.3 4.0 - 11.0 10e3/uL    RBC Count 3.72 (L) 3.80 - 5.20 10e6/uL    Hemoglobin 9.2 (L) 11.7 - 15.7 g/dL    Hematocrit 29.3 (L) 35.0 - 47.0 %    MCV 79 78 - 100 fL    MCH 24.7 (L) 26.5 - 33.0 pg    MCHC 31.4 (L) 31.5 - 36.5 g/dL    RDW 19.8 (H) 10.0 - 15.0 %    Platelet Count 329 150 - 450 10e3/uL    % Neutrophils 62 %    % Lymphocytes 24 %    % Monocytes 8 %    % Eosinophils 4 %    % Basophils 2 %    % Immature Granulocytes 0 %    NRBCs per 100 WBC 0 <1 /100    Absolute Neutrophils 3.9 1.6 - 8.3 10e3/uL    " Absolute Lymphocytes 1.5 0.8 - 5.3 10e3/uL    Absolute Monocytes 0.5 0.0 - 1.3 10e3/uL    Absolute Eosinophils 0.3 0.0 - 0.7 10e3/uL    Absolute Basophils 0.1 0.0 - 0.2 10e3/uL    Absolute Immature Granulocytes 0.0 <=0.4 10e3/uL    Absolute NRBCs 0.0 10e3/uL   Reticulocyte count   Result Value Ref Range    % Reticulocyte 1.1 0.8 - 2.7 %    Absolute Reticulocyte 0.042 0.010 - 0.110 10e6/uL     Imaging Results:    No results found.      Signed by: Octavio Davies MD

## 2022-04-27 NOTE — LETTER
4/27/2022         RE: Lbiertad Russell  [address] [facility] [address]        Dear Colleague,    Thank you for referring your patient, Libertad Russell, to the [facility] [place]. Please see a copy of my visit note below.    [facility] Hematology and Oncology Consult Note      Patient: Libertad Russell  MRN: [id number]  Date of Service: Apr 27, 2022      We have been asked by NADINE Acosta to evaluate Libertad Russell for anemia.    Assessment/Plan:    1.  Anemia: Iron deficiency anemia.  Her ferritin is low which is diagnostic.  Other supporting findings include low normal MCV, elevated TIBC and low saturation index.  I reviewed all this with the patient.  Are going to start her on oral iron replacement.  1 tablet daily for 6 months.  We will have her return to clinic in two months to make sure she is responding appropriately.  She should also have a colonoscopy and an upper endoscopy if that is negative.  She has no signs or symptoms of ulcer disease or malignancy.  Plan was reviewed with patient.  Questions were answered.    ECOG Performance  1    Staging History:    Cancer Staging  No matching staging information was found for the patient.    History:    Libertad is a 56-year-old woman who is referred for evaluation of anemia.  She was recently found to have anemia at a primary care visit.  She was referred for further evaluation.  Overall she is feeling okay.  She started methotrexate 4 months ago for diagnosis of rheumatoid arthritis.  She has no unintentional weight loss or abdominal pain.  Her sister has pernicious anemia.  No change in her bowel or bladder habits.  She recently completed a Cologuard but is still waiting on the results.  She denies any melena or bright red blood per rectum.  She denies any personal history of anemia and has not taken iron in the past.    Past History:    Past Medical History:   Diagnosis Date     ADHD (attention deficit  hyperactivity disorder)      Cerebrovascular accident (CVA) due to stenosis of right carotid artery (H) 9/15/2021     Depressive disorder      HTN (hypertension)      RA (rheumatoid arthritis) (H)      Raynaud disease     Family History   Problem Relation Age of Onset     Hypertension Mother      Cancer Mother         lung cancer     Other Cancer Mother         Lung     Thyroid Disease Mother      C.A.D. Father         52 at first MI     Prostate Cancer Father         Bladder     Hypertension Other      Thyroid Disease Sister      Thyroid Disease Sister      Diabetes No family hx of      Cerebrovascular Disease No family hx of      Breast Cancer No family hx of      Cancer - colorectal No family hx of       [unfilled] Social History     Socioeconomic History     Marital status: Single     Spouse name: Not on file     Number of children: Not on file     Years of education: Not on file     Highest education level: Not on file   Occupational History     Not on file   Tobacco Use     Smoking status: Former Smoker     Packs/day: 1.00     Years: 10.00     Pack years: 10.00     Types: Cigarettes     Quit date: 10/5/2021     Years since quittin.5     Smokeless tobacco: Never Used   Vaping Use     Vaping Use: Some days   Substance and Sexual Activity     Alcohol use: Not Currently     Comment: occasional     Drug use: No     Sexual activity: Yes     Partners: Male     Birth control/protection: Female Surgical     Comment: tubal   Other Topics Concern     Parent/sibling w/ CABG, MI or angioplasty before 65F 55M? No   Social History Narrative     Not on file     Social Determinants of Health     Financial Resource Strain: Not on file   Food Insecurity: Not on file   Transportation Needs: Not on file   Physical Activity: Not on file   Stress: Not on file   Social Connections: Not on file   Intimate Partner Violence: Not on file   Housing Stability: Not on file        Allergies:    No Known Allergies    Review of Systems:    As  "above in the history.     Review of Systems otherwise Negative for:  General: chills, fever or night sweats  Psychological: anxiety or depression  Ophthalmic: blurry vision, double vision or loss of vision, vision change  ENT: epistaxis, oral lesions, hearing changes  Hematological and Lymphatic: bleeding, bruising, jaundice, swollen lymph nodes  Endocrine: hot flashes, unexpected weight changes  Respiratory: cough, hemoptysis, orthopnea or shortness of breath/HALEY  Cardiovascular: chest pain, edema, palpitations or PND  Gastrointestinal: abdominal pain, blood in stools, change in bowel habits, constipation, diarrhea or nausea/vomiting  Genito-Urinary: change in urinary stream, incontinence, frequency/urgency  Musculoskeletal: joint pain, stiffness, swelling, muscle pain  Neurological: dizziness, headaches, numbness/tingling  Dermatological: lumps and rash    Physical Exam:    BP (!) 143/72   Pulse 79   Temp 98  F (36.7  C)   Resp 16   Ht 1.651 m (5' 5\")   Wt 64.5 kg (142 lb 3.2 oz)   LMP 05/20/2021 (Approximate)   SpO2 100%   BMI 23.66 kg/m      General: patient appears stated age of 56 year old. Nontoxic and in no distress.   HEENT: Head: atraumatic, normocephalic. Sclerae anicteric.  Chest:  Normal respiratory effort  Cardiac:  No edema.   Abdomen: abdomen is non-distended  Extremities: normal tone and muscle bulk.   Skin: no lesions or rash on visible skin. Warm and dry.   CNS: alert and oriented. Grossly non-focal.   Psychiatric: normal mood and affect.     Lab Results:    Recent Results (from the past 168 hour(s))   Ferritin   Result Value Ref Range    Ferritin 6 (L) 10 - 130 ng/mL   CBC with platelets and differential   Result Value Ref Range    WBC Count 6.3 4.0 - 11.0 10e3/uL    RBC Count 3.72 (L) 3.80 - 5.20 10e6/uL    Hemoglobin 9.2 (L) 11.7 - 15.7 g/dL    Hematocrit 29.3 (L) 35.0 - 47.0 %    MCV 79 78 - 100 fL    MCH 24.7 (L) 26.5 - 33.0 pg    MCHC 31.4 (L) 31.5 - 36.5 g/dL    RDW 19.8 (H) 10.0 - " "15.0 %    Platelet Count 329 150 - 450 10e3/uL    % Neutrophils 62 %    % Lymphocytes 24 %    % Monocytes 8 %    % Eosinophils 4 %    % Basophils 2 %    % Immature Granulocytes 0 %    NRBCs per 100 WBC 0 <1 /100    Absolute Neutrophils 3.9 1.6 - 8.3 10e3/uL    Absolute Lymphocytes 1.5 0.8 - 5.3 10e3/uL    Absolute Monocytes 0.5 0.0 - 1.3 10e3/uL    Absolute Eosinophils 0.3 0.0 - 0.7 10e3/uL    Absolute Basophils 0.1 0.0 - 0.2 10e3/uL    Absolute Immature Granulocytes 0.0 <=0.4 10e3/uL    Absolute NRBCs 0.0 10e3/uL   Reticulocyte count   Result Value Ref Range    % Reticulocyte 1.1 0.8 - 2.7 %    Absolute Reticulocyte 0.042 0.010 - 0.110 10e6/uL     Imaging Results:    No results found.      Signed by: Octavio Davies MD      Oncology Rooming Note    April 27, 2022 11:09 AM   Libertad Russell is a 56 year old female who presents for:    Chief Complaint   Patient presents with     Hematology     Initial Vitals: BP (!) 143/72   Pulse 79   Temp 98  F (36.7  C)   Resp 16   Ht 1.651 m (5' 5\")   Wt 64.5 kg (142 lb 3.2 oz)   LMP 05/20/2021 (Approximate)   SpO2 100%   BMI 23.66 kg/m   Estimated body mass index is 23.66 kg/m  as calculated from the following:    Height as of this encounter: 1.651 m (5' 5\").    Weight as of this encounter: 64.5 kg (142 lb 3.2 oz). Body surface area is 1.72 meters squared.  No Pain (0) Comment: Data Unavailable   Patient's last menstrual period was 05/20/2021 (approximate).  Allergies reviewed: Yes  Medications reviewed: Yes    Medications: Medication refills not needed today.  Pharmacy name entered into [facility]:    [facility] [place] [address] [place] [address] [facility]  WRITTEN PRESCRIPTION REQUESTED  [id number] - [place], [place] - [address] [facility]  AT [facility]    Clinical concerns: None       Amberly Odom LPN                Again, thank you for " allowing me to participate in the care of your patient.        Sincerely,        Octavio Davies MD

## 2022-04-28 LAB
PATH REPORT.COMMENTS IMP SPEC: NORMAL
PATH REPORT.COMMENTS IMP SPEC: NORMAL
PATH REPORT.FINAL DX SPEC: NORMAL
PATH REPORT.RELEVANT HX SPEC: NORMAL

## 2022-05-09 ENCOUNTER — MYC MEDICAL ADVICE (OUTPATIENT)
Dept: FAMILY MEDICINE | Facility: CLINIC | Age: 57
End: 2022-05-09
Payer: COMMERCIAL

## 2022-05-10 DIAGNOSIS — M05.742 RHEUMATOID ARTHRITIS INVOLVING BOTH HANDS WITH POSITIVE RHEUMATOID FACTOR (H): ICD-10-CM

## 2022-05-10 DIAGNOSIS — M05.741 RHEUMATOID ARTHRITIS INVOLVING BOTH HANDS WITH POSITIVE RHEUMATOID FACTOR (H): ICD-10-CM

## 2022-05-10 NOTE — TELEPHONE ENCOUNTER
Per hematology needs upper and lower endoscopy.  Needs labs 2 months after last appointment with hematology-those orders are in.  No need to follow-up with me as I can see her hematology note.    Symone Acosta NP-C

## 2022-05-10 NOTE — TELEPHONE ENCOUNTER
No orders seen for the upper and lower endoscopy, so RN unable to give scheduling number.  Francisca Glass RN  [facility]

## 2022-05-10 NOTE — TELEPHONE ENCOUNTER
I see 4/27/22 referral to heme/onc for anemia per Symone Acosta.     She saw oncology that day.      Routed patient's question to Symone Acosta, does she need to see Symone again?    Lucy Vazquez RN  [facility]

## 2022-05-11 NOTE — TELEPHONE ENCOUNTER
METHOTREXATE 2.5MG TABLETS - YELLOW      Last Written Prescription Date:  2-16-22  Last Fill Quantity: 24,   # refills: 3  Last Office Visit: 8-10-21  Future Office visit:  8-4-22    RECENT OUTSIDE LABS AVAILABLE IN THE LAB TAB OR CARE EVERYWHERE.  3-22-22 CR        CBC RESULTS: Recent Labs   Lab Test 04/27/22  1057   WBC 6.3   RBC 3.72*   HGB 9.2*   HCT 29.3*   MCV 79   MCH 24.7*   MCHC 31.4*   RDW 19.8*          Creatinine   Date Value Ref Range Status   10/22/2021 0.59 0.52 - 1.04 mg/dL Final   06/30/2021 0.58 0.52 - 1.04 mg/dL Final   ]3-22-22 outside lab  Creatinine, S 0.59 - 1.04 mg/dL 0.75        Liver Function Studies -   Recent Labs   Lab Test 02/18/22  1534 06/30/21  0910   PROTTOTAL  --  7.4   ALBUMIN 3.9 3.6   BILITOTAL  --  0.3   ALKPHOS  --  76   AST 9 13   ALT 24 21       Routing refill request to provider for review/approval because:  Per protocol  Last LFT's 2-18-22

## 2022-05-12 ENCOUNTER — MYC REFILL (OUTPATIENT)
Dept: FAMILY MEDICINE | Facility: CLINIC | Age: 57
End: 2022-05-12
Payer: COMMERCIAL

## 2022-05-12 DIAGNOSIS — F98.8 ATTENTION DEFICIT DISORDER OF ADULT: ICD-10-CM

## 2022-05-17 RX ORDER — DEXTROAMPHETAMINE SACCHARATE, AMPHETAMINE ASPARTATE, DEXTROAMPHETAMINE SULFATE AND AMPHETAMINE SULFATE 5; 5; 5; 5 MG/1; MG/1; MG/1; MG/1
20 TABLET ORAL DAILY
Qty: 30 TABLET | Refills: 0 | Status: SHIPPED | OUTPATIENT
Start: 2022-05-17 | End: 2022-06-15

## 2022-05-17 RX ORDER — DEXTROAMPHETAMINE SACCHARATE, AMPHETAMINE ASPARTATE MONOHYDRATE, DEXTROAMPHETAMINE SULFATE AND AMPHETAMINE SULFATE 5; 5; 5; 5 MG/1; MG/1; MG/1; MG/1
20 CAPSULE, EXTENDED RELEASE ORAL DAILY
Qty: 30 CAPSULE | Refills: 0 | Status: SHIPPED | OUTPATIENT
Start: 2022-05-17 | End: 2022-06-15

## 2022-05-17 NOTE — TELEPHONE ENCOUNTER
Routing refill request to provider for review/approval because:  Drug not on the [facility] refill protocol     amphetamine-dextroamphetamine (ADDERALL XR) 20 MG 24 hr capsule  amphetamine-dextroamphetamine (ADDERALL) 20 MG tablet  Last Written Prescription Date:  4/19/22  Last Fill Quantity: 30,  # refills: 0   Last office visit: 12/1/2021 with prescribing provider:  Virtual visit with Karla 4/7/22   Future Office Visit:   Next 5 appointments (look out 90 days)    Jun 09, 2022  3:30 PM  (Arrive by 3:15 PM)  Return Visit with Gilberto Casanova MD  [facility] [place] ([facility]) [address] [place] [id number]  [phone]   Jun 29, 2022  2:00 PM  (Arrive by 1:45 PM)  Return Visit with Octavio Davies MD  [facility] [place] ([facility] ) [address] [place] [id number] [phone]   Aug 04, 2022  1:30 PM  Return Visit with Butch Odom MD  [facility] ([facility] ) [address] [place] [id number] [phone]

## 2022-05-23 DIAGNOSIS — D50.0 IRON DEFICIENCY ANEMIA DUE TO CHRONIC BLOOD LOSS: Primary | ICD-10-CM

## 2022-06-03 ENCOUNTER — E-VISIT (OUTPATIENT)
Dept: FAMILY MEDICINE | Facility: CLINIC | Age: 57
End: 2022-06-03
Payer: COMMERCIAL

## 2022-06-03 DIAGNOSIS — L30.9 DERMATITIS: Primary | ICD-10-CM

## 2022-06-03 PROCEDURE — 99421 OL DIG E/M SVC 5-10 MIN: CPT | Performed by: NURSE PRACTITIONER

## 2022-06-06 RX ORDER — TRIAMCINOLONE ACETONIDE 1 MG/G
CREAM TOPICAL 2 TIMES DAILY
Qty: 45 G | Refills: 1 | Status: SHIPPED | OUTPATIENT
Start: 2022-06-06

## 2022-06-06 NOTE — PATIENT INSTRUCTIONS
Thank you for choosing us for your care. I have placed an order for a prescription so that you can start treatment. View your full visit summary for details by clicking on the link below. Your pharmacist will able to address any questions you may have about the medication.     If you're not feeling better within 5-7 days, please schedule an appointment.  You can schedule an appointment right here in [facility], or call [phone]  If the visit is for the same symptoms as your eVisit, we'll refund the cost of your eVisit if seen within seven days.

## 2022-06-08 NOTE — PROGRESS NOTES
Rheumatology Clinic Visit     Libertad Russell MRN# [id number]   YOB: 1965 Age: 56 year old     Date of Visit: 06/09/2022  Primary care provider: Symone Acosta          Assessment and Plan:     # Rheumatoid arthritis (RF+, CCP+, dx 8/21) with evidence of OA overlap and hx Raynaud's (MOY negative):  Current tx Methotrexate 15 mg weekly (1/2022). Reduced inflammatory joint disease on exam today. Laboratory data from April 27, 2022 showed ferritin of 6; hemoglobin 9.2 with an MCV of 79.  In February 2022, ALT, AST, and albumin were normal.  Rheumatoid factor was elevated at 58, and cyclic citrullinated peptide antibodies were greater than 340 units.  MOY was negative.  CRP was normal at 4.2 left wrist x-ray done on June 23, 2021 showed severe joint space narrowing with erosions and cysts in the radiocarpal joint.    Discussion: Pt with 50% improvement after 6 months of mono therapy. Has some ongoing active dz that warrants progression of therapy. Will first attempt to max out Methotrexate, will increase to 20 mg weekly and plan to increase to 25 mg wkly at next visit if active dz present. Seropositivity erosive disease with rheumatoid factor and ACPA portend a difficult to treat and destructive course, If methotrexate is well-tolerated, but symptoms are less than 90% suppressed after treatment with maximum dose, I will recommend consideration of triple therapy with added hydroxychloroquine and sulfasalazine versus addition of tumor necrosis factor inhibitor combination with methotrexate.    # Tobacco use: s/p 30 pack-years. smoking cessation reported 2021.    Plan:  1) Increase methotrexate to 20 mg (8 tablets) weekly    2) Start folic acid 1 mg daily (this will hopefully stop the hair loss)    3) Labs every 3-4 months (update this month)     4) Return in 3-4 months     I saw this patient with the Nurse Practitioner, who acted as a scribe. I agree with the findings and recommendations.    Butch  LASHA Odom.  [facility]  Pager [phone]    Part of documentation scribed by me, Liyah Aguirre CNP            Active Problem List:     Patient Active Problem List    Diagnosis Date Noted     Rheumatoid arthritis involving both hands with positive rheumatoid factor (H) 12/01/2021     Priority: Medium     Stenosis of right carotid artery 12/01/2021     Priority: Medium     Cerebrovascular accident (CVA), unspecified mechanism (H) 10/22/2021     Priority: Medium     Cerebrovascular accident (CVA) due to stenosis of right carotid artery (H) 09/15/2021     Priority: Medium     Paresthesia 06/23/2021     Priority: Medium     Nicotine abuse 06/23/2021     Priority: Medium     Irregular periods/menstrual cycles 06/23/2021     Priority: Medium     Lipoma of right axilla 05/12/2021     Priority: Medium     Added automatically from request for surgery [id number]       Lipoma of neck 05/12/2021     Priority: Medium     Added automatically from request for surgery [id number]       Essential hypertension with goal blood pressure less than 140/90 07/26/2016     Priority: Medium     Raynaud phenomenon 09/24/2012     Priority: Medium     Rosacea 10/18/2011     Priority: Medium     June 22, 2013 improves with metrogel. One year refill.        Attention deficit disorder of adult 09/09/2010     Priority: Medium     June 22, 2013 appears be doing well, benefits from the medication with minimal side effects.  July 2, 2013 patient requesting increased dosage. At her next prescription will try Adderall XR 20 mg am, Adderall, short acting 10 mg, in the afternoon. Available July 10, 2013. Update in one month.   January 16, 2014 Needs appointment for further refills.    September 22, 2019 patient was seen September 18, 2019.  ADD addressed, okay with refills for 6 months.       Seasonal allergic rhinitis 07/11/2008     Priority: Medium     Tobacco use disorder 04/11/2007     Priority: Medium     June 22, 2013 will try  "Chantix. Reviewed side effects including increased depressive symptoms or anxiety, nausea. Reviewed smoking cessation.              History of Present Illness:   Libertad Russell presents for folllowup of inflammatory arthritis.    Interval history June 9, 2022    Pt was originally seen in Rheumatology 8/2021 and dx with seropositive erosive RA (dx 8/21, +RF, + CCP at 340, left wrist x-ray showed severe joint space narrowing with erosions and cysts in the radiocarpal joint). Current treatment: Methotrexate 15 mg weekly (1/2022).   Presented with hand, wrist feet pain, swelling and stiffness with synovitis and TTP on exam.  Pt waited to start methotrexate as she had R carotid artery stent placed, father passed and has been seeing neurology for CVA. Of note, pt reports up until 1 yr ago she was in excellent health.  Since starting Methotrexate she has 50% improvement in pain, stiffness and swelling of wrists and feet. Has L wrist/ arm weakness that she believes is related to her stroke as it has not changed since then. Is tolerating Methotrexate with mild hair loss. Of note pt quite smoking 10/21 :)     ROS: Has mild diffuse hair loss. Is fatigued in the setting of anemia that seems to be improving with iron. Denies mouth sores, N/V.  Will be having a colonoscopy/ endoscopy in near future to investigate source of blood loss. Denies infections or fevers, blood in stool or diarrhea. Continues to have \"wooshing\" sound in bilateral ears, this has improved 30% since starting methotrexate shes unsure if this is why the improvement or r/t to stent, of note this symptom is worse with laying down.  Has rash on arms, legs, feet that started 10 days ago and after walking through the woods, was seen and dx poison ivy, started topical therapy and is improving. Has poor dentition with broken teeth, plan was to have these removed but now on blood thinner and so not an option currently.     Patient was seen on April 27 by Dr. Davies " "for iron deficiency anemia.  History of rheumatoid arthritis and started methotrexate therapy 4 months prior to visit was noted.  Iron deficiency anemia with low ferritin was in the impression, and recommendation was to start oral iron replacement for 6 months with follow-up in 2 months for hemoglobin measurement, and recommendation for colonoscopy and upper endoscopy if colonoscopy was negative.  No other signs or symptoms of ulcer disease or active malignancy were noted.    Patient was seen in neurology on February 1, 2022 in follow-up of symptomatic right carotid disease.  History of cerebrovascular accident and stent placement in the right carotid artery was reviewed.  Impression was an left sided neurologic symptoms, likely due to serial small strokes from high-grade obstruction in the right carotid artery.  Left-sided weakness was continuing to improve.  Recommendation was to continue antiplatelet agents (aspirin and Plavix).  Recommendation was to keep the stent patent, continue Lipitor, and referral to ENT for opinion on tinnitus.  Referral to occupational therapy for hand strength and fine movement of the hands was recommended.    Hx: 8-2021    Patient was seen by provider Karla in June 2021 for general medical checkup.  History of wrist swelling and pain and constitutional symptoms was elicited.  Swelling at the left wrist was observed, and patient was referred to rheumatology.    Today, she reports that she first had symptoms 18 months ago, when both feet became painful. There was mild visible swelling in the toes. It was hard to walk. Pain receded, but recurred in afew months, and has been \"coming and going\" since. Pain is in the ball of the feet In the mornings it is \"super hard\" to get moving. Pain remits during the day.    Approximately 1 year ago (during pandemic), she noted \"on and off\" wrist pain. She developed a lump on the outside of the left wrist, associated with swelling and warmth. Since " then, there has been swelling in both wrists. Pain is most noticeable in morning. Early morning stiffness is ~ 3 hours.    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    There were episodes of L arm and L leg numbness and weakness, starting several weeks ago. She has an MRI brain scheduled.    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           Review of Systems:       Per above          Past Medical History:     Past Medical History:   Diagnosis Date     ADHD (attention deficit hyperactivity disorder)      Cerebrovascular accident (CVA) due to stenosis of right carotid artery (H) 9/15/2021     Depressive disorder      HTN (hypertension)      RA (rheumatoid arthritis) (H)      Raynaud disease      Past Surgical History:   Procedure Laterality Date     BREAST BIOPSY, RT/LT  ,     breast biopsies; reported to be benign     EXCISE MASS AXILLA Right 2021    Excise right axillary lipoma;  Surgeon: Artis Domínguez MD;  Location: [facility] OR     EXCISE TUMOR, SOFT TISSUE, NECK/THORAX, SUBCUTANEOUS N/A 2021    EXCISION of lipoma from left occiput;  Surgeon: Artis Domínguez MD;  Location: [facility] OR     EYE SURGERY      Lasik for vision correction     IR CAROTID CEREBRAL ANGIOGRAM BILATERAL  10/22/2021     LYMPH NODE BIOPSY      cervical lymph node biopsy; told to be benign     SC BREAST AUGMENTATION Bilateral     breast implants     TUBAL LIGATION       VASCULAR SURGERY      Stent            Social History:     Social History     Occupational History     Not on file   Tobacco Use     Smoking status: Former Smoker     Packs/day: 1.00     Years: 10.00     Pack years: 10.00     Types: Cigarettes     Quit date: 10/5/2021     Years since quittin.6     Smokeless tobacco: Never Used   Vaping Use     Vaping Use: Some days   Substance and Sexual Activity     Alcohol use:  Not Currently     Comment: occasional     Drug use: No     Sexual activity: Yes     Partners: Male     Birth control/protection: Female Surgical     Comment: tubal     3 children; all healthy except ADD  Single  No EtOH for about a year.  Smoked 1 ppd X 30 years. Quit 10/2021       Family History:     Family History   Problem Relation Age of Onset     Hypertension Mother      Cancer Mother         lung cancer     Other Cancer Mother         Lung     Thyroid Disease Mother      C.A.D. Father         52 at first MI     Prostate Cancer Father         Bladder     Hypertension Other      Thyroid Disease Sister      Thyroid Disease Sister      Diabetes No family hx of      Cerebrovascular Disease No family hx of      Breast Cancer No family hx of      Cancer - colorectal No family hx of    Mother had wrist pain/swelling; probably diagnosed with RA.         Allergies:   No Known Allergies         Medications:     Current Outpatient Medications   Medication Sig Dispense Refill     amLODIPine (NORVASC) 5 MG tablet TAKE 1 TABLET(5 MG) BY MOUTH DAILY 90 tablet 1     amphetamine-dextroamphetamine (ADDERALL XR) 20 MG 24 hr capsule Take 1 capsule (20 mg) by mouth daily 30 capsule 0     amphetamine-dextroamphetamine (ADDERALL) 20 MG tablet Take 1 tablet (20 mg) by mouth daily Take in the early afternoon. Needs virtual/video visit with PCP prior to next refill. 30 tablet 0     aspirin (ASA) 325 MG tablet Take 1 tablet (325 mg) by mouth daily 90 tablet 3     atorvastatin (LIPITOR) 20 MG tablet Take 1 tablet (20 mg) by mouth daily 90 tablet 1     clopidogrel (PLAVIX) 75 MG tablet Begin taking 1 tab daily 7 days prior to procedure 30 tablet 3     ferrous sulfate (FE TABS) 325 (65 Fe) MG EC tablet Take 1 tablet (325 mg) by mouth daily Take with orange juice. 90 tablet 1     folic acid (FOLVITE) 1 MG tablet Take 1 tablet (1 mg) by mouth daily 90 tablet 3     losartan (COZAAR) 100 MG tablet Take 1 tablet (100 mg) by mouth daily 90 tablet  3     methotrexate 2.5 MG tablet Take 8 tablets (20 mg) by mouth every 7 days 24 tablet 2     triamcinolone (KENALOG) 0.1 % external cream Apply topically 2 times daily 45 g 1   Using adderall for ~ 8 years; ADD symptoms are much improved.         Physical Exam:   Blood pressure (Abnormal) 148/80, pulse 73, weight 61.4 kg (135 lb 6.4 oz), last menstrual period 05/20/2021, SpO2 100 %, not currently breastfeeding.  Wt Readings from Last 6 Encounters:   06/09/22 61.2 kg (135 lb)   06/09/22 61.4 kg (135 lb 6.4 oz)   04/27/22 64.5 kg (142 lb 3.2 oz)   02/07/22 66.2 kg (146 lb)   02/01/22 64.4 kg (142 lb)   12/22/21 64 kg (141 lb)       Constitutional: well-developed, appearing stated age; cooperative  Eyes: nl EOM, PERRLA, vision, conjunctiva, sclera  ENT: nl external ears, nose, hearing, lips, throat.  Multiple back teeth with poor dentition.  No mucous membrane lesions, normal saliva pool  Neck: no mass or thyroid enlargement  Resp: lungs clear to auscultation with mildly reduced airflow intensity  Lymph: no cervical, supraclavicular  MS:Mild synovitis and tenderness at the left wrist with markedly restricted range of motion and swelling at ulnar wrist; knees and ankle improved.  Enlargement of CMC's and mutliple DIP's suggestive of OA.There is valgus angulation at the left second toe with synovial thickening palpable at several MTPs and aman appearance of MTP's. L 5th MTP thickened/swollen. R 2-3rd MTPs with swelling, -TTP,appears start of deformity with plantar displacement of the metatarsal heads.  Skin: Macular erythremic scattered rash on forearms and ankles appears to be in healing stage consistent with pt's report of recently tx poison ivy.   Neuro: strength.   Psych: nl judgement, orientation, memory, affect.         Data:     @  RHEUM RESULTS Latest Ref Rng & Units 11/29/2005 3/25/2008 7/30/2010   ALBUMIN 3.4 - 5.0 g/dL 4.6 - -   ALT 0 - 50 U/L 23 - -   AST 0 - 45 U/L 27 - -   MOY INTERPRETATION NEG:Negative  - - -   CREATININE 0.52 - 1.04 mg/dL 0.60 - 0.65   CRP 0.0 - 8.0 mg/L - - <5.0   ANDREY 0 - 1.0 - - <1.0  Interpretation:  Negative   GFR ESTIMATE, IF BLACK >60 mL/min/[1.73:m2] >80 - >90   GFR ESTIMATE >60 mL/min/1.73m2 >80 - >90   HEMATOCRIT 35.0 - 47.0 % - - 48.3(H)   HEMOGLOBIN 11.7 - 15.7 g/dL - 12.9 16.7(H)   HCVAB NR:Nonreactive - - -   WBC 4.0 - 11.0 10e3/uL - - 4.5   RBC 3.80 - 5.20 10e6/uL - - 5.19   RDW 10.0 - 15.0 % - - 12.7   MCHC 31.5 - 36.5 g/dL - - 34.6   MCV 78 - 100 fL - - 93   PLATELET COUNT 150 - 450 10e3/uL - - 198   RHEUMATOID FACTOR <12 IU/mL - - 11   ESR 0 - 30 mm/h - - 11       Rheumatoid Factor   Date Value Ref Range Status   06/30/2021 58 (H) <12 IU/mL Final   ,   Cyclic Citrullinated Peptide Antibody, IgG   Date Value Ref Range Status   06/30/2021 >340 (H) <7 U/mL Final   ,  ,  ,  ,  ,   MOY interpretation   Date Value Ref Range Status   06/30/2021 Negative NEG^Negative Final     Comment:                                        Reference range:  <1:40  NEGATIVE  1:40 - 1:80  BORDERLINE POSITIVE  >1:80 POSITIVE     ,   MOY Screen by EIA   Date Value Ref Range Status   07/30/2010 <1.0  Interpretation:  Negative 0 - 1.0 Final   ,  ,  ,  ,  ,  ,  ,  ,  ,  ,  ,  ,  ,  ,  ,  ,  ,  ,  ,  ,  ,  ,  ,  ,  ,  ,  ,  ,  ,  ,  ,  ,  ,  ,  ,  ,  ,       Reviewed Rheumatology lab flowsheet

## 2022-06-09 ENCOUNTER — OFFICE VISIT (OUTPATIENT)
Dept: NEUROLOGY | Facility: CLINIC | Age: 57
End: 2022-06-09
Payer: COMMERCIAL

## 2022-06-09 ENCOUNTER — OFFICE VISIT (OUTPATIENT)
Dept: RHEUMATOLOGY | Facility: CLINIC | Age: 57
End: 2022-06-09
Payer: COMMERCIAL

## 2022-06-09 VITALS
BODY MASS INDEX: 22.53 KG/M2 | HEART RATE: 73 BPM | OXYGEN SATURATION: 100 % | WEIGHT: 135.4 LBS | SYSTOLIC BLOOD PRESSURE: 148 MMHG | DIASTOLIC BLOOD PRESSURE: 80 MMHG

## 2022-06-09 VITALS
SYSTOLIC BLOOD PRESSURE: 148 MMHG | WEIGHT: 135 LBS | HEART RATE: 73 BPM | DIASTOLIC BLOOD PRESSURE: 80 MMHG | BODY MASS INDEX: 22.47 KG/M2

## 2022-06-09 DIAGNOSIS — M05.741 RHEUMATOID ARTHRITIS INVOLVING BOTH HANDS WITH POSITIVE RHEUMATOID FACTOR (H): ICD-10-CM

## 2022-06-09 DIAGNOSIS — M05.742 RHEUMATOID ARTHRITIS INVOLVING BOTH HANDS WITH POSITIVE RHEUMATOID FACTOR (H): ICD-10-CM

## 2022-06-09 DIAGNOSIS — R42 DIZZINESS: ICD-10-CM

## 2022-06-09 DIAGNOSIS — I65.21 STENOSIS OF RIGHT CAROTID ARTERY: Primary | ICD-10-CM

## 2022-06-09 PROCEDURE — 99214 OFFICE O/P EST MOD 30 MIN: CPT | Performed by: INTERNAL MEDICINE

## 2022-06-09 PROCEDURE — 99214 OFFICE O/P EST MOD 30 MIN: CPT | Performed by: STUDENT IN AN ORGANIZED HEALTH CARE EDUCATION/TRAINING PROGRAM

## 2022-06-09 RX ORDER — FOLIC ACID 1 MG/1
1 TABLET ORAL DAILY
Qty: 90 TABLET | Refills: 3 | Status: SHIPPED | OUTPATIENT
Start: 2022-06-09 | End: 2023-02-16

## 2022-06-09 ASSESSMENT — PAIN SCALES - GENERAL: PAINLEVEL: MILD PAIN (2)

## 2022-06-09 NOTE — PROGRESS NOTES
"[facility]/[place]  Section of General Neurology  Return Patient Visit    Libertad Russell MRN# [id number]   Age: 56 year old YOB: 1965         Interval history:     She continues to follow with [place] neurology and neurosurgery as well  She is working with Dr. Odom regarding her RA, with plans for prednisone and methotrexate    Reviewed [place] notes with the patient, she was not clear on thte plan going forward.  She has stopped norvasc 5 mg to be more permissive with BP for a bit  Would continue the losartan 100 mg for now.    Taking iron has improved her fatigue.    She had a weird pain on the side of her head but this has improved of late, she will monitor.    ENT told her she may need more work up re tinnitus, pulsatile at times but improving.    Strength is better.        Past Medical History:   Dr. Proctor at [place] visit (3/22):  \"Underwent catheter angiography which shows intracranial tapering to occlusion of the right internal carotid artery. There is good collateral flow from the contralateral side as well as through some leptomeningeal posterior cerebral artery collaterals. There is new formation of moyamoya like vessels along the M1 trunk. I contacted her over the phone. At this point, I would recommend maximizing medical therapy. It might be reasonable to slightly decrease her blood pressure support to let the blood pressure rise slightly. In addition, I recommend that she continues antiplatelet and stay well-hydrated. If she continues to be symptomatic, then I would consider an indirect bypass.\"        Plan from our last visit:  \"--Continue anti platelet agents per Dr. Freitas regarding stent  (aspirin/plavix).  Discussed I would not stop these if requested for a dental procedure.  Would prioritize keeping the stent patent.    --Continue lipitor 20 mg   --ENT referral previously for their opinion on her tinnitus as well adenoid findings on MRI as above.  She has an " "upcoming appointment with them soon.  --Recommended OT (gave new script) to help with hand strength/fine movements that are worse in her left hand.  --She will see Dr. Trinidad at the [facility] next month as well as Dr. Proctor in neurosurgery/vascular to discuss MRA findings and if any further intervention is warranted.  --Encouraged her to keep/make an appointment with Dr. Freitas in 6 months from their last visit as previously instructed.  This may depend on what Dr. Proctor and her decide as well.   --I will see her back in ~ 3 months to check in and see how she is progressing after her further appointments.\"    Patient Active Problem List   Diagnosis     Tobacco use disorder     Seasonal allergic rhinitis     Attention deficit disorder of adult     Rosacea     Raynaud phenomenon     Essential hypertension with goal blood pressure less than 140/90     Lipoma of right axilla     Lipoma of neck     Paresthesia     Nicotine abuse     Irregular periods/menstrual cycles     Cerebrovascular accident (CVA) due to stenosis of right carotid artery (H)     Cerebrovascular accident (CVA), unspecified mechanism (H)     Rheumatoid arthritis involving both hands with positive rheumatoid factor (H)     Stenosis of right carotid artery     Past Medical History:   Diagnosis Date     ADHD (attention deficit hyperactivity disorder)      Cerebrovascular accident (CVA) due to stenosis of right carotid artery (H) 9/15/2021     Depressive disorder      HTN (hypertension)      RA (rheumatoid arthritis) (H)      Raynaud disease         Past Surgical History:     Past Surgical History:   Procedure Laterality Date     BREAST BIOPSY, RT/LT  2000, 1998    breast biopsies; reported to be benign     EXCISE MASS AXILLA Right 06/08/2021    Excise right axillary lipoma;  Surgeon: Artis Domínguez MD;  Location: [facility] OR     EXCISE TUMOR, SOFT TISSUE, NECK/THORAX, SUBCUTANEOUS N/A 06/08/2021    EXCISION of lipoma from left occiput;  " Surgeon: Artis Domínguez MD;  Location: [facility] OR     EYE SURGERY      Lasik for vision correction     IR CAROTID CEREBRAL ANGIOGRAM BILATERAL  10/22/2021     LYMPH NODE BIOPSY      cervical lymph node biopsy; told to be benign     NM BREAST AUGMENTATION Bilateral     breast implants     TUBAL LIGATION       VASCULAR SURGERY      Stent        Social History:     Social History     Tobacco Use     Smoking status: Former Smoker     Packs/day: 1.00     Years: 10.00     Pack years: 10.00     Types: Cigarettes     Quit date: 10/5/2021     Years since quittin.6     Smokeless tobacco: Never Used   Vaping Use     Vaping Use: Some days   Substance Use Topics     Alcohol use: Not Currently     Comment: occasional     Drug use: No        Family History:     Family History   Problem Relation Age of Onset     Hypertension Mother      Cancer Mother         lung cancer     Other Cancer Mother         Lung     Thyroid Disease Mother      C.A.D. Father         52 at first MI     Prostate Cancer Father         Bladder     Hypertension Other      Thyroid Disease Sister      Thyroid Disease Sister      Diabetes No family hx of      Cerebrovascular Disease No family hx of      Breast Cancer No family hx of      Cancer - colorectal No family hx of         Medications:     Current Outpatient Medications   Medication Sig     amLODIPine (NORVASC) 5 MG tablet TAKE 1 TABLET(5 MG) BY MOUTH DAILY     amphetamine-dextroamphetamine (ADDERALL XR) 20 MG 24 hr capsule Take 1 capsule (20 mg) by mouth daily     amphetamine-dextroamphetamine (ADDERALL) 20 MG tablet Take 1 tablet (20 mg) by mouth daily Take in the early afternoon. Needs virtual/video visit with PCP prior to next refill.     aspirin (ASA) 325 MG tablet Take 1 tablet (325 mg) by mouth daily     atorvastatin (LIPITOR) 20 MG tablet Take 1 tablet (20 mg) by mouth daily     clopidogrel (PLAVIX) 75 MG tablet Begin taking 1 tab daily 7 days prior to procedure     ferrous  sulfate (FE TABS) 325 (65 Fe) MG EC tablet Take 1 tablet (325 mg) by mouth daily Take with orange juice.     losartan (COZAAR) 100 MG tablet Take 1 tablet (100 mg) by mouth daily     methotrexate 2.5 MG tablet TAKE 6 TABLETS(15 MG) BY MOUTH EVERY 7 DAYS     triamcinolone (KENALOG) 0.1 % external cream Apply topically 2 times daily     No current facility-administered medications for this visit.        Allergies:   No Known Allergies       Physical Exam:   Vitals: BP (!) 148/80   Pulse 73   Wt 61.2 kg (135 lb)   LMP 05/20/2021 (Approximate)   BMI 22.47 kg/m     Mental Status: Alert and oriented to person, place, and time. Speech fluent and comprehension intact. No dysarthria.      Cranial Nerves:   II: Visual fields: normal  III: Pupils: 3 mm, equal, round, reactive to light   III,IV,VI: Extraocular Movements: intact   V: Facial sensation: intact to light touch  VII: Facial strength: intact without asymmetry  XII: Tongue movement: normal     Motor Exam:   Upper Extremities  Deltoid  Bicep  Tricep  Wrist Extensors  strength Intrinsic Muscles    Right  5  5  5  5 5 5   Left  5  5  5 5 5 4+    No clear pronator drift on the left today.   5/5 in LE as well .     Reflexes: biceps, triceps, brachioradialis, patellar, and ankle jerks 2+ and symmetric.      Gait: normal casual gait, normal stride length             Data: Pertinent prior to visit   Exam: Bilateral carotid duplex Doppler ultrasound dated 11/10/2021  2:51 PM     Clinical history: [facility] to schedule; evaluate progress of suspected  right ICA dissection; Stenosis of carotid artery, unspecified  laterality     Ordering provider: HENRY SANABRIA     Technique: Grayscale (B-mode) and duplex and spectral Doppler  ultrasound of the extracranial internal carotid, external carotid,  vertebral artery origins. Velocity measurements obtained with angle  correction at or less than 60 degrees.     Findings:  The flow velocities were measured in the bilateral  carotid arteries as  follows (in cm/s):      Right side:   Proximal CCA: 43 cm/s  Distal CCA: 74 cm/s      Proximal ICA: 19/0 cm/s  Mid ICA: 21/2 cm/s  Distal ICA: 67/8 cm/s     External carotid artery: 281 cm/s  Vertebral artery: Antegrade flow      A common to internal carotid stent is present. Very blunted waveforms  are seen throughout the internal carotid artery, reflecting low  velocities and low flow.      Left side:   Proximal CCA: 142 cm/s  Mid CCA: 128 cm/s     Proximal ICA: 124/55 cm/s  Mid ICA: 155/59 cm/s  Distal ICA: 138/43 cm/s     External carotid artery: 324 cm/s  Vertebral artery: Antegrade flow     ICA/CCA ratio: 1.21     Mixed plaque is present.                                                                      Impression:     1. Right side: Significantly diminished flow is present through a  right common to internal carotid stent, which appears patent. There is  marginal continuous diastolic flow, with a higher resistance waveform  visualized (but waveform interpretation is difficult with the low flow  present).       2. Left side: Approximately 50-69% stenosis of the left internal  carotid artery predicted.     HENRY SANABRIA MD               MRI brain MRA head/neck 8/17/21     IMPRESSION:   1.  Abnormal right internal carotid artery flow void consistent with  diminished or absent flow in this vessel. Suspect that this is related  to subacute to chronic right internal carotid artery dissection.  Please see the separately reported neck MRA for additional details.  2.  T2/FLAIR hyperintensity within the right greater than left deep  cerebral white matter presumably reflects gliosis related to chronic  ischemic injury. Suspect that there is a component of underlying  border zone/watershed hypoperfusion injury given the abnormal right  internal carotid artery.  3.  No acute infarct, hemorrhage or  mass.                    Neck:                                                       IMPRESSION:   1.  Diminutive right internal carotid artery demonstrates abnormal  signal. 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 Chronic flow-limiting atherosclerotic  stenosis is the other primary consideration.  2.  The status of the right internal carotid intracranially is not  well evaluated on this study.  3.  Consider further evaluation of these findings with head and neck  CTA.  4.  Mild atherosclerosis of the left carotid artery without  hemodynamically significant narrowing by NASCET criteria.  5.  Patent vertebral arteries.     Procedures:     CTA neck 8/18  IMPRESSION:  1. Severe stenosis (likely greater than 80% luminal diameter stenosis)  at the proximal right internal carotid artery. The exact degree of  stenosis cannot be measured as there is swallowing motion while  scanning through the carotid bifurcations.  2. Two areas of severe stenosis of the intracranial distal right  internal carotid artery at the petrous cavernous junction and of the  entire supraclinoid segment.  3. Combination of inflow and outflow restriction of the right internal  carotid artery resulting in a diminutive right internal carotid artery  in the neck.  4. Plaque without stenosis of the proximal and distal internal carotid  artery on the left.  5. Diminutive M1 segment of the right middle cerebral artery likely  due to decreased inflow.  6. Moderate-severe atherosclerotic narrowing at the origins of the  vertebral arteries bilaterally.     I personally reviewed the above imaging and agree with the findings in the report.                Assessment and Plan:   Libertad Russell is a very pleasant 55 year old female who presents today in follow up evaluation of left sided neurological symptoms.  She was having serial small strokes from her very stenosed R carotid previously.  This is what is  demonstrated by chronic watershed infarcts in her [facility] report previously.   Her weakness is improved today, potentially because of working with OT, RA being under better control subjectively.      I reviewed my understanding of her recent [place] visits with Libertad.  I would continue maximum medical therapy via aspirin/plavix/statin as below until follow up with Dr. Freitas.  For running her blood pressure a little more permissively as noted in the [place] neurosurgery note I would target 140-160 or so SBP, which we discussed.  She has stopped norvasc and is just on losartan.  I would follow up with her PCP in this regard overall.   The higher BP will prevent further watershed infarcts hopefully.   Should she have symptoms suggested laying flat and drinking water and should symptoms persist to go to the ER.  Discussed it seems there is still some limited blood flow on her affected side at this time on the basis of the angiography but that the vessels do interconnect and can compensate to a degree.   She will continue her home OT exercises and was encouraged make an appointment with Dr. Freitas to check in overall given subsequent angiogram/post stent cares.   She will follow up with me PRN.  I am happy to see her back any time to continue to guide her complex neurological cares.              Bubba Casanova MD   of Neurology   [facility]      The total time of this encounter today amounted to 31 minutes. This time included time spent with the patient, prep work, ordering tests, and performing post visit documentation.

## 2022-06-09 NOTE — LETTER
"    6/9/2022         RE: Libertad Russell  [address] [address]        Dear Colleague,    Thank you for referring your patient, Libertad Russell, to the [facility] [place]. Please see a copy of my visit note below.    [facility]  Section of General Neurology  Return Patient Visit    Libertad Rsusell MRN# [id number]   Age: 56 year old YOB: 1965         Interval history:     She continues to follow with [place] neurology and neurosurgery as well  She is working with Dr. Odom regarding her RA, with plans for prednisone and methotrexate    Reviewed [place] notes with the patient, she was not clear on thte plan going forward.  She has stopped norvasc 5 mg to be more permissive with BP for a bit  Would continue the losartan 100 mg for now.    Taking iron has improved her fatigue.    She had a weird pain on the side of her head but this has improved of late, she will monitor.    ENT told her she may need more work up re tinnitus, pulsatile at times but improving.    Strength is better.        Past Medical History:   Dr. Proctor at [place] visit (3/22):  \"Underwent catheter angiography which shows intracranial tapering to occlusion of the right internal carotid artery. There is good collateral flow from the contralateral side as well as through some leptomeningeal posterior cerebral artery collaterals. There is new formation of moyamoya like vessels along the M1 trunk. I contacted her over the phone. At this point, I would recommend maximizing medical therapy. It might be reasonable to slightly decrease her blood pressure support to let the blood pressure rise slightly. In addition, I recommend that she continues antiplatelet and stay well-hydrated. If she continues to be symptomatic, then I would consider an indirect bypass.\"        Plan from our last visit:  \"--Continue anti platelet agents per Dr. Freitas regarding " "stent  (aspirin/plavix).  Discussed I would not stop these if requested for a dental procedure.  Would prioritize keeping the stent patent.    --Continue lipitor 20 mg   --ENT referral previously for their opinion on her tinnitus as well adenoid findings on MRI as above.  She has an upcoming appointment with them soon.  --Recommended OT (gave new script) to help with hand strength/fine movements that are worse in her left hand.  --She will see Dr. Trinidad at the [facility] next month as well as Dr. Proctor in neurosurgery/vascular to discuss MRA findings and if any further intervention is warranted.  --Encouraged her to keep/make an appointment with Dr. Freitas in 6 months from their last visit as previously instructed.  This may depend on what Dr. Proctor and her decide as well.   --I will see her back in ~ 3 months to check in and see how she is progressing after her further appointments.\"    Patient Active Problem List   Diagnosis     Tobacco use disorder     Seasonal allergic rhinitis     Attention deficit disorder of adult     Rosacea     Raynaud phenomenon     Essential hypertension with goal blood pressure less than 140/90     Lipoma of right axilla     Lipoma of neck     Paresthesia     Nicotine abuse     Irregular periods/menstrual cycles     Cerebrovascular accident (CVA) due to stenosis of right carotid artery (H)     Cerebrovascular accident (CVA), unspecified mechanism (H)     Rheumatoid arthritis involving both hands with positive rheumatoid factor (H)     Stenosis of right carotid artery     Past Medical History:   Diagnosis Date     ADHD (attention deficit hyperactivity disorder)      Cerebrovascular accident (CVA) due to stenosis of right carotid artery (H) 9/15/2021     Depressive disorder      HTN (hypertension)      RA (rheumatoid arthritis) (H)      Raynaud disease         Past Surgical History:     Past Surgical History:   Procedure Laterality Date     BREAST BIOPSY, RT/LT  2000, 1998    " breast biopsies; reported to be benign     EXCISE MASS AXILLA Right 2021    Excise right axillary lipoma;  Surgeon: Artis Domínguez MD;  Location: [facility] OR     EXCISE TUMOR, SOFT TISSUE, NECK/THORAX, SUBCUTANEOUS N/A 2021    EXCISION of lipoma from left occiput;  Surgeon: Artis Domínguez MD;  Location: [facility] OR     EYE SURGERY      Lasik for vision correction     IR CAROTID CEREBRAL ANGIOGRAM BILATERAL  10/22/2021     LYMPH NODE BIOPSY      cervical lymph node biopsy; told to be benign     NH BREAST AUGMENTATION Bilateral     breast implants     TUBAL LIGATION       VASCULAR SURGERY      Stent        Social History:     Social History     Tobacco Use     Smoking status: Former Smoker     Packs/day: 1.00     Years: 10.00     Pack years: 10.00     Types: Cigarettes     Quit date: 10/5/2021     Years since quittin.6     Smokeless tobacco: Never Used   Vaping Use     Vaping Use: Some days   Substance Use Topics     Alcohol use: Not Currently     Comment: occasional     Drug use: No        Family History:     Family History   Problem Relation Age of Onset     Hypertension Mother      Cancer Mother         lung cancer     Other Cancer Mother         Lung     Thyroid Disease Mother      C.A.D. Father         52 at first MI     Prostate Cancer Father         Bladder     Hypertension Other      Thyroid Disease Sister      Thyroid Disease Sister      Diabetes No family hx of      Cerebrovascular Disease No family hx of      Breast Cancer No family hx of      Cancer - colorectal No family hx of         Medications:     Current Outpatient Medications   Medication Sig     amLODIPine (NORVASC) 5 MG tablet TAKE 1 TABLET(5 MG) BY MOUTH DAILY     amphetamine-dextroamphetamine (ADDERALL XR) 20 MG 24 hr capsule Take 1 capsule (20 mg) by mouth daily     amphetamine-dextroamphetamine (ADDERALL) 20 MG tablet Take 1 tablet (20 mg) by mouth daily Take in the early afternoon. Needs virtual/video visit  with PCP prior to next refill.     aspirin (ASA) 325 MG tablet Take 1 tablet (325 mg) by mouth daily     atorvastatin (LIPITOR) 20 MG tablet Take 1 tablet (20 mg) by mouth daily     clopidogrel (PLAVIX) 75 MG tablet Begin taking 1 tab daily 7 days prior to procedure     ferrous sulfate (FE TABS) 325 (65 Fe) MG EC tablet Take 1 tablet (325 mg) by mouth daily Take with orange juice.     losartan (COZAAR) 100 MG tablet Take 1 tablet (100 mg) by mouth daily     methotrexate 2.5 MG tablet TAKE 6 TABLETS(15 MG) BY MOUTH EVERY 7 DAYS     triamcinolone (KENALOG) 0.1 % external cream Apply topically 2 times daily     No current facility-administered medications for this visit.        Allergies:   No Known Allergies       Physical Exam:   Vitals: BP (!) 148/80   Pulse 73   Wt 61.2 kg (135 lb)   LMP 05/20/2021 (Approximate)   BMI 22.47 kg/m     Mental Status: Alert and oriented to person, place, and time. Speech fluent and comprehension intact. No dysarthria.      Cranial Nerves:   II: Visual fields: normal  III: Pupils: 3 mm, equal, round, reactive to light   III,IV,VI: Extraocular Movements: intact   V: Facial sensation: intact to light touch  VII: Facial strength: intact without asymmetry  XII: Tongue movement: normal     Motor Exam:   Upper Extremities  Deltoid  Bicep  Tricep  Wrist Extensors  strength Intrinsic Muscles    Right  5  5  5  5 5 5   Left  5  5  5 5 5 4+    No clear pronator drift on the left today.   5/5 in LE as well .     Reflexes: biceps, triceps, brachioradialis, patellar, and ankle jerks 2+ and symmetric.      Gait: normal casual gait, normal stride length             Data: Pertinent prior to visit   Exam: Bilateral carotid duplex Doppler ultrasound dated 11/10/2021  2:51 PM     Clinical history: [facility] to schedule; evaluate progress of suspected  right ICA dissection; Stenosis of carotid artery, unspecified  laterality     Ordering provider: HENRY SANABRIA     Technique: Grayscale  (B-mode) and duplex and spectral Doppler  ultrasound of the extracranial internal carotid, external carotid,  vertebral artery origins. Velocity measurements obtained with angle  correction at or less than 60 degrees.     Findings:  The flow velocities were measured in the bilateral carotid arteries as  follows (in cm/s):      Right side:   Proximal CCA: 43 cm/s  Distal CCA: 74 cm/s      Proximal ICA: 19/0 cm/s  Mid ICA: 21/2 cm/s  Distal ICA: 67/8 cm/s     External carotid artery: 281 cm/s  Vertebral artery: Antegrade flow      A common to internal carotid stent is present. Very blunted waveforms  are seen throughout the internal carotid artery, reflecting low  velocities and low flow.      Left side:   Proximal CCA: 142 cm/s  Mid CCA: 128 cm/s     Proximal ICA: 124/55 cm/s  Mid ICA: 155/59 cm/s  Distal ICA: 138/43 cm/s     External carotid artery: 324 cm/s  Vertebral artery: Antegrade flow     ICA/CCA ratio: 1.21     Mixed plaque is present.                                                                      Impression:     1. Right side: Significantly diminished flow is present through a  right common to internal carotid stent, which appears patent. There is  marginal continuous diastolic flow, with a higher resistance waveform  visualized (but waveform interpretation is difficult with the low flow  present).       2. Left side: Approximately 50-69% stenosis of the left internal  carotid artery predicted.     HENRY SANABRIA MD               MRI brain MRA head/neck 8/17/21     IMPRESSION:   1.  Abnormal right internal carotid artery flow void consistent with  diminished or absent flow in this vessel. Suspect that this is related  to subacute to chronic right internal carotid artery dissection.  Please see the separately reported neck MRA for additional details.  2.  T2/FLAIR hyperintensity within the right greater than left deep  cerebral white matter presumably reflects gliosis related to chronic  ischemic  injury. Suspect that there is a component of underlying  border zone/watershed hypoperfusion injury given the abnormal right  internal carotid artery.  3.  No acute infarct, hemorrhage or mass.                    Neck:                                                       IMPRESSION:   1.  Diminutive right internal carotid artery demonstrates abnormal  signal. 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 Chronic flow-limiting atherosclerotic  stenosis is the other primary consideration.  2.  The status of the right internal carotid intracranially is not  well evaluated on this study.  3.  Consider further evaluation of these findings with head and neck  CTA.  4.  Mild atherosclerosis of the left carotid artery without  hemodynamically significant narrowing by NASCET criteria.  5.  Patent vertebral arteries.     Procedures:     CTA neck 8/18  IMPRESSION:  1. Severe stenosis (likely greater than 80% luminal diameter stenosis)  at the proximal right internal carotid artery. The exact degree of  stenosis cannot be measured as there is swallowing motion while  scanning through the carotid bifurcations.  2. Two areas of severe stenosis of the intracranial distal right  internal carotid artery at the petrous cavernous junction and of the  entire supraclinoid segment.  3. Combination of inflow and outflow restriction of the right internal  carotid artery resulting in a diminutive right internal carotid artery  in the neck.  4. Plaque without stenosis of the proximal and distal internal carotid  artery on the left.  5. Diminutive M1 segment of the right middle cerebral artery likely  due to decreased inflow.  6. Moderate-severe atherosclerotic narrowing at the origins of the  vertebral arteries bilaterally.     I personally reviewed the above imaging and agree with the findings in the report.                Assessment and Plan:   Libertad Russell is a very pleasant 55 year old  female who presents today in follow up evaluation of left sided neurological symptoms.  She was having serial small strokes from her very stenosed R carotid previously.  This is what is demonstrated by chronic watershed infarcts in her [place] clinic report previously.   Her weakness is improved today, potentially because of working with OT, RA being under better control subjectively.      I reviewed my understanding of her recent [place] visits with Libertad.  I would continue maximum medical therapy via aspirin/plavix/statin as below until follow up with Dr. Freitas.  For running her blood pressure a little more permissively as noted in the [place] neurosurgery note I would target 140-160 or so SBP, which we discussed.  She has stopped norvasc and is just on losartan.  I would follow up with her PCP in this regard overall.   The higher BP will prevent further watershed infarcts hopefully.   Should she have symptoms suggested laying flat and drinking water and should symptoms persist to go to the ER.  Discussed it seems there is still some limited blood flow on her affected side at this time on the basis of the angiography but that the vessels do interconnect and can compensate to a degree.   She will continue her home OT exercises and was encouraged make an appointment with Dr. Freitas to check in overall given subsequent angiogram/post stent cares.   She will follow up with me PRN.  I am happy to see her back any time to continue to guide her complex neurological cares.              Bubba Casanova MD   of Neurology   [facility]      The total time of this encounter today amounted to 31 minutes. This time included time spent with the patient, prep work, ordering tests, and performing post visit documentation.        Again, thank you for allowing me to participate in the care of your patient.        Sincerely,        Gilebrto Casanova MD

## 2022-06-09 NOTE — PATIENT INSTRUCTIONS
Dx: Rheumatoid arthritis    1) Increase methotrexate to 20 mg (8 tablets) weekly    2) Start folic acid 1 mg daily (this will hopefully stop the hair loss)    3) Labs every 3-4 months (update this month)     4) Return in 3-4 months

## 2022-06-09 NOTE — PATIENT INSTRUCTIONS
Pressures in the 140s-150s is good target for being more permissive.   I would check back in with your vascular people here.    PT/OT is mainstay of treatment, would keep doing this.   ENT for dizziness, tinnitus  If you get weaker abruptly in L side, lay down, drink water go to ER if doesn't get better.    Continue aspirin and plavix until you see Dr. Jaimes--would ask them about this for the dentist.   I'm happy to see you back with any further concerns or to help you navigate your complex history.

## 2022-06-09 NOTE — NURSING NOTE
Chief Complaint   Patient presents with     RECHECK     Rheumatoid arthritis involving both hands with positive rheumatoid factor (H) +1 more       Vitals:    06/09/22 1347   BP: (!) 148/80   BP Location: Left arm   Patient Position: Sitting   Cuff Size: Adult Regular   Pulse: 73   SpO2: 100%   Weight: 61.4 kg (135 lb 6.4 oz)       Body mass index is 22.53 kg/m .     Katerine lAex, [facility]

## 2022-06-10 ENCOUNTER — TELEPHONE (OUTPATIENT)
Dept: RHEUMATOLOGY | Facility: CLINIC | Age: 57
End: 2022-06-10

## 2022-06-16 ENCOUNTER — MYC REFILL (OUTPATIENT)
Dept: FAMILY MEDICINE | Facility: CLINIC | Age: 57
End: 2022-06-16

## 2022-06-16 DIAGNOSIS — F98.8 ATTENTION DEFICIT DISORDER OF ADULT: ICD-10-CM

## 2022-06-17 RX ORDER — DEXTROAMPHETAMINE SACCHARATE, AMPHETAMINE ASPARTATE, DEXTROAMPHETAMINE SULFATE AND AMPHETAMINE SULFATE 5; 5; 5; 5 MG/1; MG/1; MG/1; MG/1
20 TABLET ORAL DAILY
Qty: 30 TABLET | Refills: 0 | OUTPATIENT
Start: 2022-06-17

## 2022-06-17 RX ORDER — DEXTROAMPHETAMINE SACCHARATE, AMPHETAMINE ASPARTATE MONOHYDRATE, DEXTROAMPHETAMINE SULFATE AND AMPHETAMINE SULFATE 5; 5; 5; 5 MG/1; MG/1; MG/1; MG/1
20 CAPSULE, EXTENDED RELEASE ORAL DAILY
Qty: 30 CAPSULE | Refills: 0 | OUTPATIENT
Start: 2022-06-17

## 2022-06-17 NOTE — TELEPHONE ENCOUNTER
Duplicate medication request. Medication was filled 6/17/22 and Receipt confirmed by pharmacy (6/17/2022  9:02 AM CDT).     [place] RN   [facility]

## 2022-06-29 ENCOUNTER — ONCOLOGY VISIT (OUTPATIENT)
Dept: ONCOLOGY | Facility: HOSPITAL | Age: 57
End: 2022-06-29
Attending: INTERNAL MEDICINE
Payer: COMMERCIAL

## 2022-06-29 ENCOUNTER — LAB (OUTPATIENT)
Dept: INFUSION THERAPY | Facility: HOSPITAL | Age: 57
End: 2022-06-29
Attending: INTERNAL MEDICINE
Payer: COMMERCIAL

## 2022-06-29 VITALS
RESPIRATION RATE: 16 BRPM | WEIGHT: 131.4 LBS | TEMPERATURE: 97.9 F | DIASTOLIC BLOOD PRESSURE: 75 MMHG | HEART RATE: 70 BPM | OXYGEN SATURATION: 100 % | BODY MASS INDEX: 21.87 KG/M2 | SYSTOLIC BLOOD PRESSURE: 147 MMHG

## 2022-06-29 DIAGNOSIS — D50.0 IRON DEFICIENCY ANEMIA DUE TO CHRONIC BLOOD LOSS: ICD-10-CM

## 2022-06-29 DIAGNOSIS — D50.0 IRON DEFICIENCY ANEMIA DUE TO CHRONIC BLOOD LOSS: Primary | ICD-10-CM

## 2022-06-29 LAB
ERYTHROCYTE [DISTWIDTH] IN BLOOD BY AUTOMATED COUNT: 19.1 % (ref 10–15)
FERRITIN SERPL-MCNC: 10 NG/ML (ref 11–328)
HCT VFR BLD AUTO: 34.7 % (ref 35–47)
HGB BLD-MCNC: 10.8 G/DL (ref 11.7–15.7)
MCH RBC QN AUTO: 26.7 PG (ref 26.5–33)
MCHC RBC AUTO-ENTMCNC: 31.1 G/DL (ref 31.5–36.5)
MCV RBC AUTO: 86 FL (ref 78–100)
PLATELET # BLD AUTO: 323 10E3/UL (ref 150–450)
RBC # BLD AUTO: 4.05 10E6/UL (ref 3.8–5.2)
WBC # BLD AUTO: 7 10E3/UL (ref 4–11)

## 2022-06-29 PROCEDURE — 36415 COLL VENOUS BLD VENIPUNCTURE: CPT

## 2022-06-29 PROCEDURE — 85027 COMPLETE CBC AUTOMATED: CPT

## 2022-06-29 PROCEDURE — G0463 HOSPITAL OUTPT CLINIC VISIT: HCPCS

## 2022-06-29 PROCEDURE — 82728 ASSAY OF FERRITIN: CPT

## 2022-06-29 PROCEDURE — 99213 OFFICE O/P EST LOW 20 MIN: CPT | Performed by: INTERNAL MEDICINE

## 2022-06-29 ASSESSMENT — PAIN SCALES - GENERAL: PAINLEVEL: NO PAIN (0)

## 2022-06-29 NOTE — PROGRESS NOTES
[facility] Hematology and Oncology Progress Note    Patient: Libertad Russell  MRN: [id number]  Date of Service: Jun 29, 2022        Assessment and Plan:    1.  Iron deficiency anemia: She started oral iron in April.  Today her hemoglobin is 10.8.  2 months ago was 9.2.  Mean cell volume is 86 which is also improved.  Ferritin is pending at time of dictation.  She is tolerating her iron quite well.  We will have her return in 4 months to recheck her indices.  At that time we can decide about stopping or continuing iron.  We will resend referral into GI for her.  Questions were answered.    ECOG Performance  0    Diagnosis:    1.  Iron deficiency anemia    Treatment:    Oral iron replacement initiated April 2022    Interim History:    Rozina returns today for follow-up visit.  I saw her last 2 months ago.  At that time she started oral iron.  She is tolerating it well.  No abdominal pain or nausea.  Overall she thinks she is feeling little better.  No acute complaints today.    Review of Systems:    As above in the history.     Review of Systems otherwise Negative for:  General: chills, fever or night sweats  Psychological: anxiety or depression  Ophthalmic: blurry vision, double vision or loss of vision, vision change  ENT: epistaxis, oral lesions, hearing changes  Hematological and Lymphatic: bleeding, bruising, jaundice, swollen lymph nodes  Endocrine: hot flashes, unexpected weight changes  Respiratory: cough, hemoptysis, orthopnea or shortness of breath/HALEY  Cardiovascular: chest pain, edema, palpitations or PND  Gastrointestinal: abdominal pain, blood in stools, change in bowel habits, constipation, diarrhea or nausea/vomiting  Genito-Urinary: change in urinary stream, incontinence, frequency/urgency  Musculoskeletal: joint pain, stiffness, swelling, muscle pain  Neurological: dizziness, headaches, numbness/tingling  Dermatological: lumps and rash    Past History:    Past Medical History:   Diagnosis  Date     ADHD (attention deficit hyperactivity disorder)      Cerebrovascular accident (CVA) due to stenosis of right carotid artery (H) 9/15/2021     Depressive disorder      HTN (hypertension)      RA (rheumatoid arthritis) (H)      Raynaud disease      Physical Exam:    BP (!) 147/75   Pulse 70   Temp 97.9  F (36.6  C) (Oral)   Resp 16   Wt 59.6 kg (131 lb 6.4 oz)   LMP 05/20/2021 (Approximate)   SpO2 100%   BMI 21.87 kg/m      General: patient appears stated age of 56 year old. Nontoxic and in no distress.   HEENT: Head: atraumatic, normocephalic. Sclerae anicteric.  Chest:  Normal respiratory effort  Cardiac:  No edema.   Abdomen: abdomen is non-distended  Extremities: normal tone and muscle bulk.  Skin: no lesions or rash on visible skin. Warm and dry.   CNS: alert and oriented. Grossly non-focal.   Psychiatric: normal mood and affect.     Lab Results:    Recent Results (from the past 168 hour(s))   CBC with platelets   Result Value Ref Range    WBC Count 7.0 4.0 - 11.0 10e3/uL    RBC Count 4.05 3.80 - 5.20 10e6/uL    Hemoglobin 10.8 (L) 11.7 - 15.7 g/dL    Hematocrit 34.7 (L) 35.0 - 47.0 %    MCV 86 78 - 100 fL    MCH 26.7 26.5 - 33.0 pg    MCHC 31.1 (L) 31.5 - 36.5 g/dL    RDW 19.1 (H) 10.0 - 15.0 %    Platelet Count 323 150 - 450 10e3/uL     Imaging:    No results found.      Signed by: Octavio Davies MD

## 2022-06-29 NOTE — LETTER
"    6/29/2022         RE: Libertad Russell  [address] [address]        Dear Colleague,    Thank you for referring your patient, Libertad Russell, to the [facility] [place]. Please see a copy of my visit note below.    Oncology Rooming Note    June 29, 2022 1:46 PM   Libertad Russell is a 56 year old female who presents for:    Chief Complaint   Patient presents with     Oncology Clinic Visit     Initial Vitals: BP (!) 147/75   Pulse 70   Temp 97.9  F (36.6  C) (Oral)   Resp 16   Wt 59.6 kg (131 lb 6.4 oz)   LMP 05/20/2021 (Approximate)   SpO2 100%   BMI 21.87 kg/m   Estimated body mass index is 21.87 kg/m  as calculated from the following:    Height as of 4/27/22: 1.651 m (5' 5\").    Weight as of this encounter: 59.6 kg (131 lb 6.4 oz). Body surface area is 1.65 meters squared.  No Pain (0) Comment: Data Unavailable   Patient's last menstrual period was 05/20/2021 (approximate).  Allergies reviewed: Yes  Medications reviewed: Yes    Medications: Medication refills not needed today.  Pharmacy name entered into Bap[facility]:    [facility] [place], [place] - [address] [facility] [place] [address] [facility] [address] [facility] [place]  WRITTEN PRESCRIPTION REQUESTED  [id number] [address]  AT [facility]    Clinical concerns: Libertad is here and feeling well.       Mindy Pedro RN                [facility] On[facility] Progress Note    Patient: Libertad Russell  MRN: [id number]  Date of Service: Jun 29, 2022        Assessment and Plan:    1.  Iron deficiency anemia: She started oral iron in April.  Today her hemoglobin is 10.8.  2 months ago was 9.2.  Mean cell volume is 86 which is also improved.  Ferritin is pending at time of dictation.  She is tolerating her iron quite well.  We will have her return in 4 months to recheck her indices.  At that " time we can decide about stopping or continuing iron.  We will resend referral into GI for her.  Questions were answered.    ECOG Performance  0    Diagnosis:    1.  Iron deficiency anemia    Treatment:    Oral iron replacement initiated April 2022    Interim History:    Rozina returns today for follow-up visit.  I saw her last 2 months ago.  At that time she started oral iron.  She is tolerating it well.  No abdominal pain or nausea.  Overall she thinks she is feeling little better.  No acute complaints today.    Review of Systems:    As above in the history.     Review of Systems otherwise Negative for:  General: chills, fever or night sweats  Psychological: anxiety or depression  Ophthalmic: blurry vision, double vision or loss of vision, vision change  ENT: epistaxis, oral lesions, hearing changes  Hematological and Lymphatic: bleeding, bruising, jaundice, swollen lymph nodes  Endocrine: hot flashes, unexpected weight changes  Respiratory: cough, hemoptysis, orthopnea or shortness of breath/HALEY  Cardiovascular: chest pain, edema, palpitations or PND  Gastrointestinal: abdominal pain, blood in stools, change in bowel habits, constipation, diarrhea or nausea/vomiting  Genito-Urinary: change in urinary stream, incontinence, frequency/urgency  Musculoskeletal: joint pain, stiffness, swelling, muscle pain  Neurological: dizziness, headaches, numbness/tingling  Dermatological: lumps and rash    Past History:    Past Medical History:   Diagnosis Date     ADHD (attention deficit hyperactivity disorder)      Cerebrovascular accident (CVA) due to stenosis of right carotid artery (H) 9/15/2021     Depressive disorder      HTN (hypertension)      RA (rheumatoid arthritis) (H)      Raynaud disease      Physical Exam:    BP (!) 147/75   Pulse 70   Temp 97.9  F (36.6  C) (Oral)   Resp 16   Wt 59.6 kg (131 lb 6.4 oz)   LMP 05/20/2021 (Approximate)   SpO2 100%   BMI 21.87 kg/m      General: patient appears stated age of  56 year old. Nontoxic and in no distress.   HEENT: Head: atraumatic, normocephalic. Sclerae anicteric.  Chest:  Normal respiratory effort  Cardiac:  No edema.   Abdomen: abdomen is non-distended  Extremities: normal tone and muscle bulk.  Skin: no lesions or rash on visible skin. Warm and dry.   CNS: alert and oriented. Grossly non-focal.   Psychiatric: normal mood and affect.     Lab Results:    Recent Results (from the past 168 hour(s))   CBC with platelets   Result Value Ref Range    WBC Count 7.0 4.0 - 11.0 10e3/uL    RBC Count 4.05 3.80 - 5.20 10e6/uL    Hemoglobin 10.8 (L) 11.7 - 15.7 g/dL    Hematocrit 34.7 (L) 35.0 - 47.0 %    MCV 86 78 - 100 fL    MCH 26.7 26.5 - 33.0 pg    MCHC 31.1 (L) 31.5 - 36.5 g/dL    RDW 19.1 (H) 10.0 - 15.0 %    Platelet Count 323 150 - 450 10e3/uL     Imaging:    No results found.      Signed by: Octavio Davies MD        Again, thank you for allowing me to participate in the care of your patient.        Sincerely,        Octavio Davies MD

## 2022-06-29 NOTE — PROGRESS NOTES
"Oncology Rooming Note    June 29, 2022 1:46 PM   Libertad Russell is a 56 year old female who presents for:    Chief Complaint   Patient presents with     Oncology Clinic Visit     Initial Vitals: BP (!) 147/75   Pulse 70   Temp 97.9  F (36.6  C) (Oral)   Resp 16   Wt 59.6 kg (131 lb 6.4 oz)   LMP 05/20/2021 (Approximate)   SpO2 100%   BMI 21.87 kg/m   Estimated body mass index is 21.87 kg/m  as calculated from the following:    Height as of 4/27/22: 1.651 m (5' 5\").    Weight as of this encounter: 59.6 kg (131 lb 6.4 oz). Body surface area is 1.65 meters squared.  No Pain (0) Comment: Data Unavailable   Patient's last menstrual period was 05/20/2021 (approximate).  Allergies reviewed: Yes  Medications reviewed: Yes    Medications: Medication refills not needed today.  Pharmacy name entered into [facility]:    [facility] [place], [place] - [address] [facility] [place] [place] [address] [facility]  WRITTEN PRESCRIPTION REQUESTED  [id number] - [facility] [address] [facility]  AT [facility]    Clinical concerns: Libertad is here and feeling well.       Mindy Pedro, RN              "

## 2022-07-06 ENCOUNTER — TELEPHONE (OUTPATIENT)
Dept: NEUROSURGERY | Facility: CLINIC | Age: 57
End: 2022-07-06

## 2022-07-06 ENCOUNTER — TELEPHONE (OUTPATIENT)
Dept: RADIOLOGY | Facility: CLINIC | Age: 57
End: 2022-07-06

## 2022-07-19 ENCOUNTER — OFFICE VISIT (OUTPATIENT)
Dept: FAMILY MEDICINE | Facility: CLINIC | Age: 57
End: 2022-07-19
Payer: COMMERCIAL

## 2022-07-19 VITALS
HEIGHT: 65 IN | HEART RATE: 74 BPM | OXYGEN SATURATION: 100 % | RESPIRATION RATE: 16 BRPM | BODY MASS INDEX: 22.16 KG/M2 | DIASTOLIC BLOOD PRESSURE: 74 MMHG | SYSTOLIC BLOOD PRESSURE: 124 MMHG | WEIGHT: 133 LBS | TEMPERATURE: 98.5 F

## 2022-07-19 DIAGNOSIS — I70.209 ATHEROSCLEROSIS OF ARTERIES OF EXTREMITIES (H): ICD-10-CM

## 2022-07-19 DIAGNOSIS — R10.31 ABDOMINAL PAIN, RIGHT LOWER QUADRANT: Primary | ICD-10-CM

## 2022-07-19 LAB
ALBUMIN SERPL-MCNC: 3.8 G/DL (ref 3.4–5)
ALBUMIN UR-MCNC: NEGATIVE MG/DL
ALP SERPL-CCNC: 87 U/L (ref 40–150)
ALT SERPL W P-5'-P-CCNC: 20 U/L (ref 0–50)
ANION GAP SERPL CALCULATED.3IONS-SCNC: 5 MMOL/L (ref 3–14)
APPEARANCE UR: CLEAR
AST SERPL W P-5'-P-CCNC: 7 U/L (ref 0–45)
BILIRUB SERPL-MCNC: 0.3 MG/DL (ref 0.2–1.3)
BILIRUB UR QL STRIP: NEGATIVE
BUN SERPL-MCNC: 12 MG/DL (ref 7–30)
CALCIUM SERPL-MCNC: 9.2 MG/DL (ref 8.5–10.1)
CHLORIDE BLD-SCNC: 107 MMOL/L (ref 94–109)
CO2 SERPL-SCNC: 29 MMOL/L (ref 20–32)
COLOR UR AUTO: YELLOW
CREAT SERPL-MCNC: 0.64 MG/DL (ref 0.52–1.04)
ERYTHROCYTE [DISTWIDTH] IN BLOOD BY AUTOMATED COUNT: 18.6 % (ref 10–15)
GFR SERPL CREATININE-BSD FRML MDRD: >90 ML/MIN/1.73M2
GLUCOSE BLD-MCNC: 99 MG/DL (ref 70–99)
GLUCOSE UR STRIP-MCNC: NEGATIVE MG/DL
HCT VFR BLD AUTO: 34.6 % (ref 35–47)
HGB BLD-MCNC: 11 G/DL (ref 11.7–15.7)
HGB UR QL STRIP: NEGATIVE
KETONES UR STRIP-MCNC: NEGATIVE MG/DL
LEUKOCYTE ESTERASE UR QL STRIP: NEGATIVE
LIPASE SERPL-CCNC: 77 U/L (ref 73–393)
MCH RBC QN AUTO: 27.3 PG (ref 26.5–33)
MCHC RBC AUTO-ENTMCNC: 31.8 G/DL (ref 31.5–36.5)
MCV RBC AUTO: 86 FL (ref 78–100)
NITRATE UR QL: NEGATIVE
PH UR STRIP: 7.5 [PH] (ref 5–7)
PLATELET # BLD AUTO: 329 10E3/UL (ref 150–450)
POTASSIUM BLD-SCNC: 4.3 MMOL/L (ref 3.4–5.3)
PROT SERPL-MCNC: 7.1 G/DL (ref 6.8–8.8)
RBC # BLD AUTO: 4.03 10E6/UL (ref 3.8–5.2)
SODIUM SERPL-SCNC: 141 MMOL/L (ref 133–144)
SP GR UR STRIP: 1.02 (ref 1–1.03)
UROBILINOGEN UR STRIP-ACNC: 0.2 E.U./DL
WBC # BLD AUTO: 5.8 10E3/UL (ref 4–11)

## 2022-07-19 PROCEDURE — 85027 COMPLETE CBC AUTOMATED: CPT | Performed by: STUDENT IN AN ORGANIZED HEALTH CARE EDUCATION/TRAINING PROGRAM

## 2022-07-19 PROCEDURE — 99214 OFFICE O/P EST MOD 30 MIN: CPT | Performed by: STUDENT IN AN ORGANIZED HEALTH CARE EDUCATION/TRAINING PROGRAM

## 2022-07-19 PROCEDURE — 83690 ASSAY OF LIPASE: CPT | Performed by: STUDENT IN AN ORGANIZED HEALTH CARE EDUCATION/TRAINING PROGRAM

## 2022-07-19 PROCEDURE — 81003 URINALYSIS AUTO W/O SCOPE: CPT | Performed by: STUDENT IN AN ORGANIZED HEALTH CARE EDUCATION/TRAINING PROGRAM

## 2022-07-19 PROCEDURE — 36415 COLL VENOUS BLD VENIPUNCTURE: CPT | Performed by: STUDENT IN AN ORGANIZED HEALTH CARE EDUCATION/TRAINING PROGRAM

## 2022-07-19 PROCEDURE — 80053 COMPREHEN METABOLIC PANEL: CPT | Performed by: STUDENT IN AN ORGANIZED HEALTH CARE EDUCATION/TRAINING PROGRAM

## 2022-07-19 ASSESSMENT — PAIN SCALES - GENERAL: PAINLEVEL: MODERATE PAIN (5)

## 2022-07-19 ASSESSMENT — ENCOUNTER SYMPTOMS: ABDOMINAL PAIN: 1

## 2022-07-19 NOTE — PROGRESS NOTES
Assessment & Plan   1. Abdominal pain, right lower quadrant  No peritoneal sign.  Exam as below.    - Lipase; Future  - Comprehensive metabolic panel (BMP + Alb, Alk Phos, ALT, AST, Total. Bili, TP); Future  - UA Macro with Reflex to Micro and Culture - lab collect; Future  - CBC with platelets; Future  - CT Abdomen Pelvis w Contrast; Future  - CBC with platelets  - UA Macro with Reflex to Micro and Culture - lab collect  - Comprehensive metabolic panel (BMP + Alb, Alk Phos, ALT, AST, Total. Bili, TP)  - Lipase     DDx and Dx discussed with and explained to the pt to their satisfaction.  All questions were answered at this time. Pt expressed understanding of and agreement with this dx, tx, and plan.I have given the patient a list of pertinent indications for re-evaluation. Will go to the Emergency Department if symptoms worsen or new concerning symptoms arise. Patient left in no apparent distress.     2. Atherosclerosis of arteries of extremities (H)  Managed by vascular and neuro IR      Return in about 2 weeks (around 8/2/2022) for Follow up, in person.    Margot Burt MD  [facility] [place] Maurer is a 56 year old, presenting for the following health issues:  Abdominal Pain           Abdominal Pain     History of Present Illness       Reason for visit:  Abdominal Pain  Symptom onset:  3-7 days ago  Symptoms include:  dull pain comes and goes but can be sharp once in a while  Symptom intensity:  Moderate  Symptom progression:  Staying the same  Had these symptoms before:  No  What makes it worse:  Moving  What makes it better: Lying still  Denies nausea, vomiting, blood in the stool, fever or chills  Patient has rheumatoid arthritis that was recently diagnosed last year and started on methotrexate.  She has atherosclerosis of vessels and stenosis of her carotid artery status post stent placement.    She eats 0-1 servings of fruits and vegetables daily.She consumes 1  "sweetened beverage(s) daily.She exercises with enough effort to increase her heart rate 10 to 19 minutes per day.  She exercises with enough effort to increase her heart rate 4 days per week.            Review of Systems   Gastrointestinal: Positive for abdominal pain.      Constitutional, HEENT, cardiovascular, pulmonary, gi and gu systems are negative, except as otherwise noted.      Objective    /74   Pulse 74   Temp 98.5  F (36.9  C) (Temporal)   Resp 16   Ht 1.651 m (5' 5\")   Wt 60.3 kg (133 lb)   LMP 05/20/2021 (Approximate)   SpO2 100%   BMI 22.13 kg/m    Body mass index is 22.13 kg/m .  Physical Exam   GENERAL: healthy, alert and no distress  NECK: no adenopathy, no asymmetry, masses, or scars and thyroid normal to palpation  RESP: lungs clear to auscultation - no rales, rhonchi or wheezes  CV: regular rate and rhythm, normal S1 S2, no S3 or S4, no murmur, click or rub, no peripheral edema and peripheral pulses strong  ABDOMEN: soft, right lower quadrant very close to the groin is midly tender to palpation, no hepatosplenomegaly, no masses and bowel sounds normal, no guarding or rebound tenderness  MS: no gross musculoskeletal defects noted, no edema  SKIN: no suspicious lesions or rashes    "

## 2022-07-19 NOTE — PATIENT INSTRUCTIONS
Brock Maurer,    Thank you for allowing [facility] to manage your care.    I ordered some blood work, please go to the laboratory to get your laboratory studies.    I ordered a CT , please call diagnostic imaging [phone] to schedule your test.    For your convenience, test results are released as soon as they are available  Please allow 1-2 business days for me to send you a comment about your results.  If not done so, I encourage you to login into [employer] ([email or web address] to review your results in real time.     If you have any questions or concerns, please feel free to call us at [phone].    Sincerely,    Dr. Burt    Did you know?      You can schedule a video visit for follow-up appointments as well as future appointments for certain conditions.  Please see the below link.     [email or web address]    If you have not already done so,  I encourage you to sign up for [employer] ([email or web address]).  This will allow you to review your results, securely communicate with a provider, and schedule virtual visits as well.

## 2022-07-22 ENCOUNTER — ANCILLARY PROCEDURE (OUTPATIENT)
Dept: CT IMAGING | Facility: CLINIC | Age: 57
End: 2022-07-22
Attending: STUDENT IN AN ORGANIZED HEALTH CARE EDUCATION/TRAINING PROGRAM
Payer: COMMERCIAL

## 2022-07-22 DIAGNOSIS — R10.31 ABDOMINAL PAIN, RIGHT LOWER QUADRANT: ICD-10-CM

## 2022-07-22 PROCEDURE — 74177 CT ABD & PELVIS W/CONTRAST: CPT | Mod: TC | Performed by: RADIOLOGY

## 2022-07-22 RX ORDER — IOPAMIDOL 755 MG/ML
72 INJECTION, SOLUTION INTRAVASCULAR ONCE
Status: COMPLETED | OUTPATIENT
Start: 2022-07-22 | End: 2022-07-22

## 2022-07-22 RX ADMIN — IOPAMIDOL 72 ML: 755 INJECTION, SOLUTION INTRAVASCULAR at 13:20

## 2022-07-26 ENCOUNTER — MYC MEDICAL ADVICE (OUTPATIENT)
Dept: FAMILY MEDICINE | Facility: CLINIC | Age: 57
End: 2022-07-26
Payer: COMMERCIAL

## 2022-07-26 DIAGNOSIS — Z87.891 PERSONAL HISTORY OF TOBACCO USE: Primary | ICD-10-CM

## 2022-07-26 PROCEDURE — G0296 VISIT TO DETERM LDCT ELIG: HCPCS | Performed by: STUDENT IN AN ORGANIZED HEALTH CARE EDUCATION/TRAINING PROGRAM

## 2022-07-26 NOTE — TELEPHONE ENCOUNTER
Lung Cancer Screening Shared Decision Making Visit     Libertad Russell, a 56 year old female, is eligible for lung cancer screening    History   Smoking Status     Former Smoker     Packs/day: 1.00     Years: 10.00     Types: Cigarettes     Quit date: 10/5/2021   Smokeless Tobacco     Never Used       I have discussed with patient the risks and benefits of screening for lung cancer with low-dose CT.     The risks include:    radiation exposure: one low dose chest CT has as much ionizing radiation as about 15 chest x-rays, or 6 months of background radiation living in [place]      false positives: most findings/nodules are NOT cancer, but some might still require additional diagnostic evaluation, including biopsy    over-diagnosis: some slow growing cancers that might never have been clinically significant will be detected and treated unnecessarily     The benefit of early detection of lung cancer is contingent upon adherence to annual screening or more frequent follow up if indicated.     Furthermore, to benefit from screening, Libertad must be willing and able to undergo diagnostic procedures, if indicated. Although no specific guide is available for determining severity of comorbidities, it is reasonable to withhold screening in patients who have greater mortality risk from other diseases.     We did discuss that the best way to prevent lung cancer is to not smoke.    Some patients may value a numeric estimation of lung cancer risk when evaluating if lung cancer screening is right for them, here is one calculator:    ShouldIScreen

## 2022-07-26 NOTE — PATIENT INSTRUCTIONS
Lung Cancer Screening   Frequently Asked Questions  If you are at high-risk for lung cancer, getting screened with low-dose computed tomography (LDCT) every year can help save your life. This handout offers answers to some of the most common questions about lung cancer screening. If you have other questions, please call [phone] ([phone]).     What is it?  Lung cancer screening uses special X-ray technology to create an image of your lung tissue. The exam is quick and easy and takes less than 10 seconds. We don t give you any medicine or use any needles. You can eat before and after the exam. You don t need to change your clothes as long as the clothing on your chest doesn t contain metal. But, you do need to be able to hold your breath for at least 6 seconds during the exam.    What is the goal of lung cancer screening?  The goal of lung cancer screening is to save lives. Many times, lung cancer is not found until a person starts having physical symptoms. Lung cancer screening can help detect lung cancer in the earliest stages when it may be easier to treat.    Who should be screened for lung cancer?  We suggest lung cancer screening for anyone who is at high-risk for lung cancer. You are in the high-risk group if you:      are between the ages of 55 and 79, and    have smoked at least 1 pack of cigarettes a day for 20 or more years, and    still smoke or have quit within the past 15 years.    However, if you have a new cough or shortness of breath, you should talk to your doctor before being screened.    Why does it matter if I have symptoms?  Certain symptoms can be a sign that you have a condition in your lungs that should be checked and treated by your doctor. These symptoms include fever, chest pain, a new or changing cough, shortness of breath that you have never felt before, coughing up blood or unexplained weight loss. Having any of these symptoms can greatly affect the results of lung  cancer screening.       Should all smokers get an LDCT lung cancer screening exam?  It depends. Lung cancer screening is for a very specific group of men and women who have a history of heavy smoking over a long period of time (see  Who should be screened for lung cancer  above).  I am in the high-risk group, but have been diagnosed with cancer in the past. Is LDCT lung cancer screening right for me?  In some cases, you should not have LDCT lung screening, such as when your doctor is already following your cancer with CT scan studies. Your doctor will help you decide if LDCT lung screening is right for you.  Do I need to have a screening exam every year?  Yes. If you are in the high-risk group described earlier, you should get an LDCT lung cancer screening exam every year until you are 79, or are no longer willing or able to undergo screening and possible procedures to diagnose and treat lung cancer.  How effective is LDCT at preventing death from lung cancer?  Studies have shown that LDCT lung cancer screening can lower the risk of death from lung cancer by 20 percent in people who are at high-risk.  What are the risks?  There are some risks and limitations of LDCT lung cancer screening. We want to make sure you understand the risks and benefits, so please let us know if you have any questions. Your doctor may want to talk with you more about these risks.    Radiation exposure: As with any exam that uses radiation, there is a very small increased risk of cancer. The amount of radiation in LDCT is small--about the same amount a person would get from a mammogram. Your doctor orders the exam when he or she feels the potential benefits outweigh the risks.    False negatives: No test is perfect, including LDCT. It is possible that you may have a medical condition, including lung cancer, that is not found during your exam. This is called a false negative result.    False positives and more testing: LDCT very often finds  something in the lung that could be cancer, but in fact is not. This is called a false positive result. False positive tests often cause anxiety. To make sure these findings are not cancer, you may need to have more tests. These tests will be done only if you give us permission. Sometimes patients need a treatment that can have side effects, such as a biopsy. For more information on false positives, see  What can I expect from the results?     Findings not related to lung cancer: Your LDCT exam also takes pictures of areas of your body next to your lungs. In a very small number of cases, the CT scan will show an abnormal finding in one of these areas, such as your kidneys, adrenal glands, liver or thyroid. This finding may not be serious, but you may need more tests. Your doctor can help you decide what other tests you may need, if any.  What can I expect from the results?  About 1 out of 4 LDCT exams will find something that may need more tests. Most of the time, these findings are lung nodules. Lung nodules are very small collections of tissue in the lung. These nodules are very common, and the vast majority--more than 97 percent--are not cancer (benign). Most are normal lymph nodes or small areas of scarring from past infections.  But, if a small lung nodule is found to be cancer, the cancer can be cured more than 90 percent of the time. To know if the nodule is cancer, we may need to get more images before your next yearly screening exam. If the nodule has suspicious features (for example, it is large, has an odd shape or grows over time), we will refer you to a specialist for further testing.  Will my doctor also get the results?  Yes. Your doctor will get a copy of your results.  Is it okay to keep smoking now that there s a cancer screening exam?  No. Tobacco is one of the strongest cancer-causing agents. It causes not only lung cancer, but other cancers and cardiovascular (heart) diseases as well. The damage  caused by smoking builds over time. This means that the longer you smoke, the higher your risk of disease. While it is never too late to quit, the sooner you quit, the better.  Where can I find help to quit smoking?  The best way to prevent lung cancer is to stop smoking. If you have already quit smoking, congratulations and keep it up! For help on quitting smoking, please call [facility] at 8-031-QUITNOW (1-802.492.1946) or the American Cancer Society at 1-775.382.6223 to find local resources near you.  One-on-one health coaching:  If you d prefer to work individually with a health care provider on tobacco cessation, we offer:      Medication Therapy Management:  Our specially trained pharmacists work closely with you and your doctor to help you quit smoking.  Call [phone] or [phone] (toll free).

## 2022-08-04 ENCOUNTER — ANCILLARY PROCEDURE (OUTPATIENT)
Dept: CT IMAGING | Facility: CLINIC | Age: 57
End: 2022-08-04
Attending: STUDENT IN AN ORGANIZED HEALTH CARE EDUCATION/TRAINING PROGRAM
Payer: COMMERCIAL

## 2022-08-04 ENCOUNTER — TELEPHONE (OUTPATIENT)
Dept: NEUROSURGERY | Facility: CLINIC | Age: 57
End: 2022-08-04

## 2022-08-04 DIAGNOSIS — Z87.891 PERSONAL HISTORY OF TOBACCO USE: ICD-10-CM

## 2022-08-04 PROCEDURE — 71271 CT THORAX LUNG CANCER SCR C-: CPT | Mod: TC | Performed by: RADIOLOGY

## 2022-08-05 ENCOUNTER — TELEPHONE (OUTPATIENT)
Dept: FAMILY MEDICINE | Facility: CLINIC | Age: 57
End: 2022-08-05

## 2022-08-05 ENCOUNTER — TELEPHONE (OUTPATIENT)
Dept: NEUROSURGERY | Facility: CLINIC | Age: 57
End: 2022-08-05

## 2022-08-05 NOTE — TELEPHONE ENCOUNTER
"Rosy with [facility] calling to alert provider of urgent incidental finding on CT chest lung cancer screening  yesterday  Call back to imaging is [phone] for questions.  See concerning result copied below:    2.  Significant Incidental Finding(s):  Category S: Yes. coronary  artery calcium moderate or severe       Download the \"LungRADS Assessment Categories\" table at this site:   http://www.acr.org/Quality-Safety/Resources/LungRADS     BANDAR SANCHEZ MD       Routed to Dr. Cleveland to address result.    Lucy Vazquez RN  [facility]        "

## 2022-08-08 ENCOUNTER — TELEPHONE (OUTPATIENT)
Dept: NEUROSURGERY | Facility: CLINIC | Age: 57
End: 2022-08-08

## 2022-08-09 ENCOUNTER — MYC REFILL (OUTPATIENT)
Dept: FAMILY MEDICINE | Facility: CLINIC | Age: 57
End: 2022-08-09

## 2022-08-09 DIAGNOSIS — F98.8 ATTENTION DEFICIT DISORDER OF ADULT: ICD-10-CM

## 2022-08-09 RX ORDER — DEXTROAMPHETAMINE SACCHARATE, AMPHETAMINE ASPARTATE, DEXTROAMPHETAMINE SULFATE AND AMPHETAMINE SULFATE 5; 5; 5; 5 MG/1; MG/1; MG/1; MG/1
20 TABLET ORAL DAILY
Qty: 30 TABLET | Refills: 0 | Status: CANCELLED | OUTPATIENT
Start: 2022-08-09

## 2022-08-09 RX ORDER — DEXTROAMPHETAMINE SACCHARATE, AMPHETAMINE ASPARTATE MONOHYDRATE, DEXTROAMPHETAMINE SULFATE AND AMPHETAMINE SULFATE 5; 5; 5; 5 MG/1; MG/1; MG/1; MG/1
20 CAPSULE, EXTENDED RELEASE ORAL DAILY
Qty: 30 CAPSULE | Refills: 0 | Status: CANCELLED | OUTPATIENT
Start: 2022-08-09

## 2022-08-10 NOTE — TELEPHONE ENCOUNTER
30 day supply with 0 refill sent 7/18/22. Refill request too soon. Refused.     Yuki Alcantara, RN, BSN, PHN  [facility] [place] [place]

## 2022-08-12 ENCOUNTER — HOSPITAL ENCOUNTER (OUTPATIENT)
Dept: CARDIOLOGY | Facility: CLINIC | Age: 57
Discharge: HOME | End: 2022-08-12
Attending: STUDENT IN AN ORGANIZED HEALTH CARE EDUCATION/TRAINING PROGRAM
Payer: COMMERCIAL

## 2022-08-12 ENCOUNTER — HOSPITAL ENCOUNTER (OUTPATIENT)
Dept: ULTRASOUND IMAGING | Facility: CLINIC | Age: 57
Discharge: HOME | End: 2022-08-12
Attending: STUDENT IN AN ORGANIZED HEALTH CARE EDUCATION/TRAINING PROGRAM
Payer: COMMERCIAL

## 2022-08-12 DIAGNOSIS — I65.21 CAROTID STENOSIS, RIGHT: ICD-10-CM

## 2022-08-12 DIAGNOSIS — R93.1 HIGH CORONARY ARTERY CALCIUM SCORE: ICD-10-CM

## 2022-08-12 LAB — LVEF ECHO: NORMAL

## 2022-08-12 PROCEDURE — 93321 DOPPLER ECHO F-UP/LMTD STD: CPT | Mod: 26 | Performed by: STUDENT IN AN ORGANIZED HEALTH CARE EDUCATION/TRAINING PROGRAM

## 2022-08-12 PROCEDURE — 93325 DOPPLER ECHO COLOR FLOW MAPG: CPT | Mod: 26 | Performed by: STUDENT IN AN ORGANIZED HEALTH CARE EDUCATION/TRAINING PROGRAM

## 2022-08-12 PROCEDURE — 93308 TTE F-UP OR LMTD: CPT | Mod: 26 | Performed by: STUDENT IN AN ORGANIZED HEALTH CARE EDUCATION/TRAINING PROGRAM

## 2022-08-12 PROCEDURE — 999N000208 ECHOCARDIOGRAM LIMITED

## 2022-08-12 PROCEDURE — 93880 EXTRACRANIAL BILAT STUDY: CPT | Mod: 26 | Performed by: RADIOLOGY

## 2022-08-12 PROCEDURE — C8924 2D TTE W OR W/O FOL W/CON,FU: HCPCS

## 2022-08-12 PROCEDURE — 93880 EXTRACRANIAL BILAT STUDY: CPT

## 2022-08-15 NOTE — PROGRESS NOTES
[facility]      Referring provider:Margot Burt MD    HPI: Ms. Libertad Russell is a 56 year old  female with PMH significant for    -Hypertension  -ADHD  -Rheumatoid arthritis  -History of CVA  -Carotid artery disease.  Spontaneous right carotid artery dissection(right internal carotid artery stent 10/2021)  -Former smoker (10 pack years, quit 2021)  -Iron deficiency anemia    Patient recently underwent CT lung cancer screening on 8/4/2022 which showed moderate to severe coronary artery calcifications.  Patient was scheduled for exercise stress echocardiogram on 8/12/2022 however the patient was found to be hypertensive at 180/80 mmHg.  Therefore stress test was canceled.  Baseline echocardiogram showed normal biventricular function with no valve disease.  She is following up with me.    Patient has no prior stroke cardiac disease.  She tells me that she is pretty active.  Denies chest pain.  In the wintertime she feels short of breath when shoveling snow but otherwise denies shortness of breath since then.  No shortness of breath with ADLs.  Denies dizziness, syncope or lower extremity edema.     She was seen in [facility] by neurosurgery and due to residual stenosis beyond the carotid artery stent she was recommended to keep blood pressure on the high side.  Therefore she stopped taking amlodipine and currently on losartan only.  She is also on Adderall, aspirin, clopidogrel, atorvastatin 40, losartan, folic acid and methotrexate.     Most recent carotid artery ultrasound on 8/12/2022 showed nonobstructive carotid artery disease.  Recent CT abdomen pelvis on 7/22/2022 showed moderate to advanced atherosclerosis and moderate proximal SMA stenosis.    Medications, personal, family, and social history reviewed with patient and revised.    PAST MEDICAL HISTORY:  Past Medical History:   Diagnosis Date     ADHD (attention  deficit hyperactivity disorder)      Cerebrovascular accident (CVA) due to stenosis of right carotid artery (H) 9/15/2021     Depressive disorder      HTN (hypertension)      RA (rheumatoid arthritis) (H)      Raynaud disease        CURRENT MEDICATIONS:  Current Outpatient Medications   Medication Sig Dispense Refill     amLODIPine (NORVASC) 5 MG tablet TAKE 1 TABLET(5 MG) BY MOUTH DAILY 90 tablet 1     amphetamine-dextroamphetamine (ADDERALL XR) 20 MG 24 hr capsule Take 1 capsule (20 mg) by mouth daily 30 capsule 0     amphetamine-dextroamphetamine (ADDERALL) 20 MG tablet Take 1 tablet (20 mg) by mouth daily Take in the early afternoon. Needs virtual/video visit with PCP prior to next refill. 30 tablet 0     aspirin (ASA) 325 MG tablet Take 1 tablet (325 mg) by mouth daily 90 tablet 3     atorvastatin (LIPITOR) 20 MG tablet Take 1 tablet (20 mg) by mouth daily 90 tablet 0     atorvastatin (LIPITOR) 40 MG tablet Take 1 tablet (40 mg) by mouth daily for 90 days 90 tablet 1     clopidogrel (PLAVIX) 75 MG tablet Begin taking 1 tab daily 7 days prior to procedure 30 tablet 3     ferrous sulfate (FE TABS) 325 (65 Fe) MG EC tablet Take 1 tablet (325 mg) by mouth daily Take with orange juice. 90 tablet 1     folic acid (FOLVITE) 1 MG tablet Take 1 tablet (1 mg) by mouth daily 90 tablet 3     losartan (COZAAR) 100 MG tablet TAKE 1 TABLET(100 MG) BY MOUTH DAILY 90 tablet 0     methotrexate 2.5 MG tablet Take 8 tablets (20 mg) by mouth every 7 days 24 tablet 2     triamcinolone (KENALOG) 0.1 % external cream Apply topically 2 times daily (Patient not taking: No sig reported) 45 g 1       PAST SURGICAL HISTORY:  Past Surgical History:   Procedure Laterality Date     BREAST BIOPSY, RT/LT  2000, 1998    breast biopsies; reported to be benign     EXCISE MASS AXILLA Right 06/08/2021    Excise right axillary lipoma;  Surgeon: Artis Domínguez MD;  Location: [facility] OR     EXCISE TUMOR, SOFT TISSUE, NECK/THORAX, SUBCUTANEOUS N/A  2021    EXCISION of lipoma from left occiput;  Surgeon: Artis Domínguez MD;  Location: [facility] OR     EYE SURGERY      Lasik for vision correction     IR CAROTID CEREBRAL ANGIOGRAM BILATERAL  10/22/2021     LYMPH NODE BIOPSY      cervical lymph node biopsy; told to be benign     [place] BREAST AUGMENTATION Bilateral     breast implants     TUBAL LIGATION       VASCULAR SURGERY      Stent       ALLERGIES:   No Known Allergies    FAMILY HISTORY:  Family History   Problem Relation Age of Onset     Hypertension Mother      Cancer Mother         lung cancer     Other Cancer Mother         Lung     Thyroid Disease Mother      C.A.D. Father         52 at first MI     Prostate Cancer Father         Bladder     Hypertension Other      Thyroid Disease Sister      Thyroid Disease Sister      Diabetes No family hx of      Cerebrovascular Disease No family hx of      Breast Cancer No family hx of      Cancer - colorectal No family hx of          SOCIAL HISTORY:  Social History     Tobacco Use     Smoking status: Former Smoker     Packs/day: 1.00     Years: 10.00     Pack years: 10.00     Types: Cigarettes     Quit date: 10/5/2021     Years since quittin.8     Smokeless tobacco: Never Used   Vaping Use     Vaping Use: Some days   Substance Use Topics     Alcohol use: Not Currently     Comment: occasional     Drug use: No       ROS:   Constitutional: No fever, chills, or sweats. Weight stable.   Cardiovascular: As per HPI.     Exam:  BP (!) 164/84 (BP Location: Left arm, Patient Position: Chair, Cuff Size: Adult Regular)   Pulse 78   Wt 59.9 kg (132 lb)   LMP 2021 (Approximate)   SpO2 99%   BMI 21.97 kg/m    GENERAL APPEARANCE: alert and no distress  HEENT: no icterus, no central cyanosis  LYMPH/NECK: no adenopathy, no asymmetry, JVP not elevated  RESPIRATORY: lungs clear to auscultation - no rales, rhonchi or wheezes, no use of accessory muscles, no retractions, respirations are unlabored, normal  respiratory rate  CARDIOVASCULAR: regular rhythm, normal S1, S2, no S3 or S4 and no murmur, click or rub, precordium quiet with normal PMI.  GI: soft, non tender  EXTREMITIES: no edema  NEURO: alert, normal speech,and affect  SKIN: no ecchymoses, no rashes     I have reviewed the labs and personally reviewed the imaging below and made my comment in the assessment and plan.    Labs:  CBC RESULTS:   Lab Results   Component Value Date    WBC 5.8 07/19/2022    WBC 6.2 05/26/2021    RBC 4.03 07/19/2022    RBC 4.38 05/26/2021    HGB 11.0 (L) 07/19/2022    HGB 12.2 05/26/2021    HCT 34.6 (L) 07/19/2022    HCT 37.1 05/26/2021    MCV 86 07/19/2022    MCV 85 05/26/2021    MCH 27.3 07/19/2022    MCH 27.9 05/26/2021    MCHC 31.8 07/19/2022    MCHC 32.9 05/26/2021    RDW 18.6 (H) 07/19/2022    RDW 14.1 05/26/2021     07/19/2022     05/26/2021       BMP RESULTS:  Lab Results   Component Value Date     07/19/2022     06/30/2021    POTASSIUM 4.3 07/19/2022    POTASSIUM 4.0 06/30/2021    CHLORIDE 107 07/19/2022    CHLORIDE 107 06/30/2021    CO2 29 07/19/2022    CO2 30 06/30/2021    ANIONGAP 5 07/19/2022    ANIONGAP 1 (L) 06/30/2021    GLC 99 07/19/2022     (H) 06/30/2021    BUN 12 07/19/2022    BUN 12 06/30/2021    CR 0.64 07/19/2022    CR 0.58 06/30/2021    GFRESTIMATED >90 07/19/2022    GFRESTIMATED >90 06/30/2021    GFRESTBLACK >90 06/30/2021    LAKESHIA 9.2 07/19/2022    LAKESHIA 9.0 06/30/2021      CT chest lung cancer screening 8/4/2022  1.  Pulmonary nodules measuring up to 4 mm. ACR Assessment Category:   Lung-RADS Category 2. Benign appearance or behavior. Recommendation:   continue annual screening..  2.  Significant Incidental Finding(s):  Category S: Yes. coronary  artery calcium moderate or severe       Echocardiogram 8/12/2022  Stress testing cancelled due to hypertension.  Left ventricular size, wall motion and function are normal. The ejection  fraction is 55-60%.  Right ventricular function,  chamber size, wall motion, and thickness are  normal.  No significant valvular abnormalities present.     This study was compared with the study from 10/14/2021. No significant changes  noted.    CT abdomen pelvis with contrast 7/22/2022  ADDITIONAL FINDINGS: Moderate to advanced diffuse atherosclerosis.  There is moderate narrowing of the proximal superior mesenteric artery  by mixed calcified and noncalcified plaque (series 2 image 61). No  lymphadenopathy. No ascites.    Duplex carotid ultrasound 8/12/2022  1. RIGHT ICA: Ends of the stent were not evaluated. Improved flow and  velocities in the stent from previous. Less than 50% diameter in stent  stenosis by sonographic velocity criteria and color Doppler imaging.     2. LEFT ICA:  Less than 50% diameter narrowing by grayscale imaging  and sonographic velocity criteria.    Assessment and Plan:   #Severe coronary artery calcifications recently detected by CT lung cancer screening  #Former smoker  -Currently asymptomatic from cardiac standpoint.  Unfortunately she was not able to complete the exercise stress echocardiogram on 8/12/2022 due to high blood pressure.  Patient tells me that she has seen neurosurgery at [facility] and she was recommended to keep the blood pressure on the high side due to further stenosis beyond the right carotid artery stent.  She is following her blood pressure at home and tells me that it usually runs at 140/80 mmHg.  We will give her a second try for exercise stress echocardiogram however if she has high blood pressure on presentation then I will cancel the stress test.  Since she is asymptomatic I will not pursue further testing at this time.  -Continue dual antiplatelet therapy and statin.    #Hypertension  #Right carotid artery stent with residual stenosis beyond the carotid artery stent  -She was recommended permissive hypertension therefore came off amlodipine and currently on losartan 100 mg daily only.  I am concerned that  running blood pressure on the high side will further accelerate atherosclerosis in the coronary arteries and abdominal aorta.  I will discuss blood pressure further management with neurology.  -Continue current treatment with dual antiplatelet therapy and statin.  -Stop occasional vaping.    No medication changes today.    Return to clinic as needed.    Total time spent today for this visit is 60 minutes including precharting, face-to-face clinic visit, review of labs/imaging and medical documentation.    Please donot hesitate to contact me if you have any questions or concerns. Again, thank you for allowing me to participate in the care of your patient.    Sima PATEL MD  [facility] Division of Cardiology  Pager [phone]

## 2022-08-16 ENCOUNTER — OFFICE VISIT (OUTPATIENT)
Dept: CARDIOLOGY | Facility: CLINIC | Age: 57
End: 2022-08-16
Attending: STUDENT IN AN ORGANIZED HEALTH CARE EDUCATION/TRAINING PROGRAM
Payer: COMMERCIAL

## 2022-08-16 VITALS
DIASTOLIC BLOOD PRESSURE: 83 MMHG | BODY MASS INDEX: 21.97 KG/M2 | HEART RATE: 73 BPM | SYSTOLIC BLOOD PRESSURE: 154 MMHG | WEIGHT: 132 LBS | OXYGEN SATURATION: 99 %

## 2022-08-16 DIAGNOSIS — Z87.891 PERSONAL HISTORY OF TOBACCO USE: ICD-10-CM

## 2022-08-16 DIAGNOSIS — M05.742 RHEUMATOID ARTHRITIS INVOLVING BOTH HANDS WITH POSITIVE RHEUMATOID FACTOR (H): ICD-10-CM

## 2022-08-16 DIAGNOSIS — Z95.828 INTERNAL CAROTID ARTERY STENT PRESENT: ICD-10-CM

## 2022-08-16 DIAGNOSIS — M05.741 RHEUMATOID ARTHRITIS INVOLVING BOTH HANDS WITH POSITIVE RHEUMATOID FACTOR (H): ICD-10-CM

## 2022-08-16 DIAGNOSIS — I70.209 ATHEROSCLEROSIS OF ARTERIES OF EXTREMITIES (H): ICD-10-CM

## 2022-08-16 DIAGNOSIS — I63.231 CEREBROVASCULAR ACCIDENT (CVA) DUE TO STENOSIS OF RIGHT CAROTID ARTERY (H): ICD-10-CM

## 2022-08-16 DIAGNOSIS — R93.1 HIGH CORONARY ARTERY CALCIUM SCORE: Primary | ICD-10-CM

## 2022-08-16 PROCEDURE — 99205 OFFICE O/P NEW HI 60 MIN: CPT | Performed by: INTERNAL MEDICINE

## 2022-08-16 NOTE — NURSING NOTE
"Chief Complaint   Patient presents with     Abnormal Cardiac Function Study     Dr Roy; new pt. Elevated calcium score (severe). Pt has hx of current smoker, iron def anemia, RA/raynaud's , HTN, ADHD, CVA- right carotid stenosis.       Initial BP (!) 164/84 (BP Location: Left arm, Patient Position: Chair, Cuff Size: Adult Regular)   Pulse 78   Wt 59.9 kg (132 lb)   LMP 05/20/2021 (Approximate)   SpO2 99%   BMI 21.97 kg/m   Estimated body mass index is 21.97 kg/m  as calculated from the following:    Height as of 7/19/22: 1.651 m (5' 5\").    Weight as of this encounter: 59.9 kg (132 lb)..  BP completed using cuff size: regular    JAMES Mireles  "

## 2022-08-16 NOTE — PATIENT INSTRUCTIONS
Thank you for coming to the [facility] [place]; please note the following instructions:    1. Exercise stress Echocardiogram    2. Dr. Roy is recommending to stop vaping    3. Cardiology follow up as needed        If you have any questions regarding your visit please contact your care team:     Cardiology  Telephone Number   Kimberley CORLEY, RN  Esther HERNANDEZ, RN   Sia BLANCA, RMA  Cielo URBINA, RMA  Mary HAQUE, Visit Facilitator   [phone] (option 1)   For scheduling appts:     [phone] (select option 1)       For the Device Clinic (Pacemakers and ICD's)  RN's :  Rosy Patel   During business hours: [phone]    *After business hours:  [phone] (select option 4)      Normal test result notifications will be released via [employer] or mailed within 7 business days.  All other test results, will be communicated via telephone once reviewed by your cardiologist.    If you need a medication refill please contact your pharmacy.  Please allow 3 business days for your refill to be completed.    As always, thank you for trusting us with your health care needs!

## 2022-08-16 NOTE — TELEPHONE ENCOUNTER
Patient has seen cardiology regarding this.    Lucy Vazquez RN  [facility]

## 2022-08-16 NOTE — LETTER
8/16/2022      RE: Libertad Russell  [address] [address]       Dear Colleague,    Thank you for the opportunity to participate in the care of your patient, Libertad Russell, at the [facility]. Please see a copy of my visit note below.                                                               General Cardiology Clinic-[place]      Referring provider:Margot Burt MD    HPI: Ms. Libertad Russell is a 56 year old  female with PMH significant for    -Hypertension  -ADHD  -Rheumatoid arthritis  -History of CVA  -Carotid artery disease.  Spontaneous right carotid artery dissection(right internal carotid artery stent 10/2021)  -Former smoker (10 pack years, quit 2021)  -Iron deficiency anemia    Patient recently underwent CT lung cancer screening on 8/4/2022 which showed moderate to severe coronary artery calcifications.  Patient was scheduled for exercise stress echocardiogram on 8/12/2022 however the patient was found to be hypertensive at 180/80 mmHg.  Therefore stress test was canceled.  Baseline echocardiogram showed normal biventricular function with no valve disease.  She is following up with me.    Patient has no prior stroke cardiac disease.  She tells me that she is pretty active.  Denies chest pain.  In the wintertime she feels short of breath when shoveling snow but otherwise denies shortness of breath since then.  No shortness of breath with ADLs.  Denies dizziness, syncope or lower extremity edema.     She was seen in [facility] by neurosurgery and due to residual stenosis beyond the carotid artery stent she was recommended to keep blood pressure on the high side.  Therefore she stopped taking amlodipine and currently on losartan only.  She is also on Adderall, aspirin, clopidogrel, atorvastatin 40, losartan, folic acid and methotrexate.     Most recent carotid artery ultrasound on  8/12/2022 showed nonobstructive carotid artery disease.  Recent CT abdomen pelvis on 7/22/2022 showed moderate to advanced atherosclerosis and moderate proximal SMA stenosis.    Medications, personal, family, and social history reviewed with patient and revised.    PAST MEDICAL HISTORY:  Past Medical History:   Diagnosis Date     ADHD (attention deficit hyperactivity disorder)      Cerebrovascular accident (CVA) due to stenosis of right carotid artery (H) 9/15/2021     Depressive disorder      HTN (hypertension)      RA (rheumatoid arthritis) (H)      Raynaud disease        CURRENT MEDICATIONS:  Current Outpatient Medications   Medication Sig Dispense Refill     amLODIPine (NORVASC) 5 MG tablet TAKE 1 TABLET(5 MG) BY MOUTH DAILY 90 tablet 1     amphetamine-dextroamphetamine (ADDERALL XR) 20 MG 24 hr capsule Take 1 capsule (20 mg) by mouth daily 30 capsule 0     amphetamine-dextroamphetamine (ADDERALL) 20 MG tablet Take 1 tablet (20 mg) by mouth daily Take in the early afternoon. Needs virtual/video visit with PCP prior to next refill. 30 tablet 0     aspirin (ASA) 325 MG tablet Take 1 tablet (325 mg) by mouth daily 90 tablet 3     atorvastatin (LIPITOR) 20 MG tablet Take 1 tablet (20 mg) by mouth daily 90 tablet 0     atorvastatin (LIPITOR) 40 MG tablet Take 1 tablet (40 mg) by mouth daily for 90 days 90 tablet 1     clopidogrel (PLAVIX) 75 MG tablet Begin taking 1 tab daily 7 days prior to procedure 30 tablet 3     ferrous sulfate (FE TABS) 325 (65 Fe) MG EC tablet Take 1 tablet (325 mg) by mouth daily Take with orange juice. 90 tablet 1     folic acid (FOLVITE) 1 MG tablet Take 1 tablet (1 mg) by mouth daily 90 tablet 3     losartan (COZAAR) 100 MG tablet TAKE 1 TABLET(100 MG) BY MOUTH DAILY 90 tablet 0     methotrexate 2.5 MG tablet Take 8 tablets (20 mg) by mouth every 7 days 24 tablet 2     triamcinolone (KENALOG) 0.1 % external cream Apply topically 2 times daily (Patient not taking: No sig reported) 45 g 1        PAST SURGICAL HISTORY:  Past Surgical History:   Procedure Laterality Date     BREAST BIOPSY, RT/LT  ,     breast biopsies; reported to be benign     EXCISE MASS AXILLA Right 2021    Excise right axillary lipoma;  Surgeon: Artis Domínguez MD;  Location: [facility] OR     EXCISE TUMOR, SOFT TISSUE, NECK/THORAX, SUBCUTANEOUS N/A 2021    EXCISION of lipoma from left occiput;  Surgeon: Artis Domínguez MD;  Location:  OR     EYE SURGERY      Lasik for vision correction     IR CAROTID CEREBRAL ANGIOGRAM BILATERAL  10/22/2021     LYMPH NODE BIOPSY      cervical lymph node biopsy; told to be benign     DC BREAST AUGMENTATION Bilateral     breast implants     TUBAL LIGATION       VASCULAR SURGERY      Stent       ALLERGIES:   No Known Allergies    FAMILY HISTORY:  Family History   Problem Relation Age of Onset     Hypertension Mother      Cancer Mother         lung cancer     Other Cancer Mother         Lung     Thyroid Disease Mother      C.A.D. Father         52 at first MI     Prostate Cancer Father         Bladder     Hypertension Other      Thyroid Disease Sister      Thyroid Disease Sister      Diabetes No family hx of      Cerebrovascular Disease No family hx of      Breast Cancer No family hx of      Cancer - colorectal No family hx of          SOCIAL HISTORY:  Social History     Tobacco Use     Smoking status: Former Smoker     Packs/day: 1.00     Years: 10.00     Pack years: 10.00     Types: Cigarettes     Quit date: 10/5/2021     Years since quittin.8     Smokeless tobacco: Never Used   Vaping Use     Vaping Use: Some days   Substance Use Topics     Alcohol use: Not Currently     Comment: occasional     Drug use: No       ROS:   Constitutional: No fever, chills, or sweats. Weight stable.   Cardiovascular: As per HPI.     Exam:  BP (!) 164/84 (BP Location: Left arm, Patient Position: Chair, Cuff Size: Adult Regular)   Pulse 78   Wt 59.9 kg (132 lb)   LMP  05/20/2021 (Approximate)   SpO2 99%   BMI 21.97 kg/m    GENERAL APPEARANCE: alert and no distress  HEENT: no icterus, no central cyanosis  LYMPH/NECK: no adenopathy, no asymmetry, JVP not elevated  RESPIRATORY: lungs clear to auscultation - no rales, rhonchi or wheezes, no use of accessory muscles, no retractions, respirations are unlabored, normal respiratory rate  CARDIOVASCULAR: regular rhythm, normal S1, S2, no S3 or S4 and no murmur, click or rub, precordium quiet with normal PMI.  GI: soft, non tender  EXTREMITIES: no edema  NEURO: alert, normal speech,and affect  SKIN: no ecchymoses, no rashes     I have reviewed the labs and personally reviewed the imaging below and made my comment in the assessment and plan.    Labs:  CBC RESULTS:   Lab Results   Component Value Date    WBC 5.8 07/19/2022    WBC 6.2 05/26/2021    RBC 4.03 07/19/2022    RBC 4.38 05/26/2021    HGB 11.0 (L) 07/19/2022    HGB 12.2 05/26/2021    HCT 34.6 (L) 07/19/2022    HCT 37.1 05/26/2021    MCV 86 07/19/2022    MCV 85 05/26/2021    MCH 27.3 07/19/2022    MCH 27.9 05/26/2021    MCHC 31.8 07/19/2022    MCHC 32.9 05/26/2021    RDW 18.6 (H) 07/19/2022    RDW 14.1 05/26/2021     07/19/2022     05/26/2021       BMP RESULTS:  Lab Results   Component Value Date     07/19/2022     06/30/2021    POTASSIUM 4.3 07/19/2022    POTASSIUM 4.0 06/30/2021    CHLORIDE 107 07/19/2022    CHLORIDE 107 06/30/2021    CO2 29 07/19/2022    CO2 30 06/30/2021    ANIONGAP 5 07/19/2022    ANIONGAP 1 (L) 06/30/2021    GLC 99 07/19/2022     (H) 06/30/2021    BUN 12 07/19/2022    BUN 12 06/30/2021    CR 0.64 07/19/2022    CR 0.58 06/30/2021    GFRESTIMATED >90 07/19/2022    GFRESTIMATED >90 06/30/2021    GFRESTBLACK >90 06/30/2021    LAKESHIA 9.2 07/19/2022    LAKESHAI 9.0 06/30/2021      CT chest lung cancer screening 8/4/2022  1.  Pulmonary nodules measuring up to 4 mm. ACR Assessment Category:   Lung-RADS Category 2. Benign appearance or  behavior. Recommendation:   continue annual screening..  2.  Significant Incidental Finding(s):  Category S: Yes. coronary  artery calcium moderate or severe       Echocardiogram 8/12/2022  Stress testing cancelled due to hypertension.  Left ventricular size, wall motion and function are normal. The ejection  fraction is 55-60%.  Right ventricular function, chamber size, wall motion, and thickness are  normal.  No significant valvular abnormalities present.     This study was compared with the study from 10/14/2021. No significant changes  noted.    CT abdomen pelvis with contrast 7/22/2022  ADDITIONAL FINDINGS: Moderate to advanced diffuse atherosclerosis.  There is moderate narrowing of the proximal superior mesenteric artery  by mixed calcified and noncalcified plaque (series 2 image 61). No  lymphadenopathy. No ascites.    Duplex carotid ultrasound 8/12/2022  1. RIGHT ICA: Ends of the stent were not evaluated. Improved flow and  velocities in the stent from previous. Less than 50% diameter in stent  stenosis by sonographic velocity criteria and color Doppler imaging.     2. LEFT ICA:  Less than 50% diameter narrowing by grayscale imaging  and sonographic velocity criteria.    Assessment and Plan:   #Severe coronary artery calcifications recently detected by CT lung cancer screening  #Former smoker  -Currently asymptomatic from cardiac standpoint.  Unfortunately she was not able to complete the exercise stress echocardiogram on 8/12/2022 due to high blood pressure.  Patient tells me that she has seen neurosurgery at [facility] and she was recommended to keep the blood pressure on the high side due to further stenosis beyond the right carotid artery stent.  She is following her blood pressure at home and tells me that it usually runs at 140/80 mmHg.  We will give her a second try for exercise stress echocardiogram however if she has high blood pressure on presentation then I will cancel the stress test.  Since  she is asymptomatic I will not pursue further testing at this time.  -Continue dual antiplatelet therapy and statin.    #Hypertension  #Right carotid artery stent with residual stenosis beyond the carotid artery stent  -She was recommended permissive hypertension therefore came off amlodipine and currently on losartan 100 mg daily only.  I am concerned that running blood pressure on the high side will further accelerate atherosclerosis in the coronary arteries and abdominal aorta.  I will discuss blood pressure further management with neurology.  -Continue current treatment with dual antiplatelet therapy and statin.  -Stop occasional vaping.    No medication changes today.    Return to clinic as needed.    Total time spent today for this visit is 60 minutes including precharting, face-to-face clinic visit, review of labs/imaging and medical documentation.    Please donot hesitate to contact me if you have any questions or concerns. Again, thank you for allowing me to participate in the care of your patient.    Sima PATEL MD  [facility] Division of Cardiology  Pager [phone]

## 2022-08-17 ENCOUNTER — MYC REFILL (OUTPATIENT)
Dept: FAMILY MEDICINE | Facility: CLINIC | Age: 57
End: 2022-08-17

## 2022-08-17 DIAGNOSIS — F98.8 ATTENTION DEFICIT DISORDER OF ADULT: ICD-10-CM

## 2022-08-18 RX ORDER — DEXTROAMPHETAMINE SACCHARATE, AMPHETAMINE ASPARTATE, DEXTROAMPHETAMINE SULFATE AND AMPHETAMINE SULFATE 5; 5; 5; 5 MG/1; MG/1; MG/1; MG/1
20 TABLET ORAL DAILY
Qty: 30 TABLET | Refills: 0 | Status: CANCELLED | OUTPATIENT
Start: 2022-08-18

## 2022-08-18 RX ORDER — DEXTROAMPHETAMINE SACCHARATE, AMPHETAMINE ASPARTATE MONOHYDRATE, DEXTROAMPHETAMINE SULFATE AND AMPHETAMINE SULFATE 5; 5; 5; 5 MG/1; MG/1; MG/1; MG/1
20 CAPSULE, EXTENDED RELEASE ORAL DAILY
Qty: 30 CAPSULE | Refills: 0 | Status: CANCELLED | OUTPATIENT
Start: 2022-08-18

## 2022-08-20 ENCOUNTER — MYC REFILL (OUTPATIENT)
Dept: FAMILY MEDICINE | Facility: CLINIC | Age: 57
End: 2022-08-20

## 2022-08-20 DIAGNOSIS — F98.8 ATTENTION DEFICIT DISORDER OF ADULT: ICD-10-CM

## 2022-08-21 ENCOUNTER — HEALTH MAINTENANCE LETTER (OUTPATIENT)
Age: 57
End: 2022-08-21

## 2022-08-22 ENCOUNTER — MYC REFILL (OUTPATIENT)
Dept: FAMILY MEDICINE | Facility: CLINIC | Age: 57
End: 2022-08-22

## 2022-08-22 DIAGNOSIS — F98.8 ATTENTION DEFICIT DISORDER OF ADULT: ICD-10-CM

## 2022-08-22 RX ORDER — DEXTROAMPHETAMINE SACCHARATE, AMPHETAMINE ASPARTATE MONOHYDRATE, DEXTROAMPHETAMINE SULFATE AND AMPHETAMINE SULFATE 5; 5; 5; 5 MG/1; MG/1; MG/1; MG/1
20 CAPSULE, EXTENDED RELEASE ORAL DAILY
Qty: 30 CAPSULE | Refills: 0 | Status: CANCELLED | OUTPATIENT
Start: 2022-08-22

## 2022-08-22 RX ORDER — DEXTROAMPHETAMINE SACCHARATE, AMPHETAMINE ASPARTATE, DEXTROAMPHETAMINE SULFATE AND AMPHETAMINE SULFATE 5; 5; 5; 5 MG/1; MG/1; MG/1; MG/1
20 TABLET ORAL DAILY
Qty: 30 TABLET | Refills: 0 | Status: CANCELLED | OUTPATIENT
Start: 2022-08-22

## 2022-08-22 NOTE — TELEPHONE ENCOUNTER
See patient's note, has appointment with PCP 8/29/22 so asking for another refill before then.    Requested Prescriptions   Pending Prescriptions Disp Refills     amphetamine-dextroamphetamine (ADDERALL XR) 20 MG 24 hr capsule 30 capsule 0     Sig: Take 1 capsule (20 mg) by mouth daily       There is no refill protocol information for this order        amphetamine-dextroamphetamine (ADDERALL) 20 MG tablet 30 tablet 0     Sig: Take 1 tablet (20 mg) by mouth daily Take in the early afternoon. Needs virtual/video visit with PCP prior to next refill.       There is no refill protocol information for this order        Routing refill request to provider for review/approval because:  Drug not on the G refill protocol     Lucy Vazquez RN  [facility]

## 2022-08-23 RX ORDER — DEXTROAMPHETAMINE SACCHARATE, AMPHETAMINE ASPARTATE MONOHYDRATE, DEXTROAMPHETAMINE SULFATE AND AMPHETAMINE SULFATE 5; 5; 5; 5 MG/1; MG/1; MG/1; MG/1
20 CAPSULE, EXTENDED RELEASE ORAL DAILY
Qty: 30 CAPSULE | Refills: 0 | Status: SHIPPED | OUTPATIENT
Start: 2022-08-23 | End: 2022-09-25

## 2022-08-23 RX ORDER — DEXTROAMPHETAMINE SACCHARATE, AMPHETAMINE ASPARTATE, DEXTROAMPHETAMINE SULFATE AND AMPHETAMINE SULFATE 5; 5; 5; 5 MG/1; MG/1; MG/1; MG/1
20 TABLET ORAL DAILY
Qty: 30 TABLET | Refills: 0 | Status: SHIPPED | OUTPATIENT
Start: 2022-08-23 | End: 2022-09-25

## 2022-08-24 NOTE — TELEPHONE ENCOUNTER
Prescription on file at Requesting Pharmacy.     Rosy Hutchins RN BSN  [facility] [place]

## 2022-08-29 ENCOUNTER — TELEPHONE (OUTPATIENT)
Dept: NEUROSURGERY | Facility: CLINIC | Age: 57
End: 2022-08-29

## 2022-09-06 ENCOUNTER — LAB (OUTPATIENT)
Dept: LAB | Facility: CLINIC | Age: 57
End: 2022-09-06
Payer: COMMERCIAL

## 2022-09-06 DIAGNOSIS — D64.9 ANEMIA, UNSPECIFIED TYPE: ICD-10-CM

## 2022-09-06 DIAGNOSIS — M05.741 RHEUMATOID ARTHRITIS INVOLVING BOTH HANDS WITH POSITIVE RHEUMATOID FACTOR (H): ICD-10-CM

## 2022-09-06 DIAGNOSIS — M05.742 RHEUMATOID ARTHRITIS INVOLVING BOTH HANDS WITH POSITIVE RHEUMATOID FACTOR (H): ICD-10-CM

## 2022-09-06 LAB
ALBUMIN SERPL-MCNC: 3.8 G/DL (ref 3.4–5)
ALP SERPL-CCNC: 87 U/L (ref 40–150)
ALT SERPL W P-5'-P-CCNC: 21 U/L (ref 0–50)
ANION GAP SERPL CALCULATED.3IONS-SCNC: 6 MMOL/L (ref 3–14)
AST SERPL W P-5'-P-CCNC: 10 U/L (ref 0–45)
BASOPHILS # BLD AUTO: 0.1 10E3/UL (ref 0–0.2)
BASOPHILS NFR BLD AUTO: 1 %
BILIRUB SERPL-MCNC: 0.3 MG/DL (ref 0.2–1.3)
BUN SERPL-MCNC: 11 MG/DL (ref 7–30)
CALCIUM SERPL-MCNC: 9.1 MG/DL (ref 8.5–10.1)
CHLORIDE BLD-SCNC: 108 MMOL/L (ref 94–109)
CO2 SERPL-SCNC: 29 MMOL/L (ref 20–32)
CREAT SERPL-MCNC: 0.54 MG/DL (ref 0.52–1.04)
EOSINOPHIL # BLD AUTO: 0.2 10E3/UL (ref 0–0.7)
EOSINOPHIL NFR BLD AUTO: 3 %
ERYTHROCYTE [DISTWIDTH] IN BLOOD BY AUTOMATED COUNT: 16.8 % (ref 10–15)
FOLATE SERPL-MCNC: 37.3 NG/ML (ref 4.6–34.8)
GFR SERPL CREATININE-BSD FRML MDRD: >90 ML/MIN/1.73M2
GLUCOSE BLD-MCNC: 86 MG/DL (ref 70–99)
HCT VFR BLD AUTO: 32.3 % (ref 35–47)
HGB BLD-MCNC: 10.4 G/DL (ref 11.7–15.7)
LYMPHOCYTES # BLD AUTO: 1.5 10E3/UL (ref 0.8–5.3)
LYMPHOCYTES NFR BLD AUTO: 27 %
MCH RBC QN AUTO: 27.7 PG (ref 26.5–33)
MCHC RBC AUTO-ENTMCNC: 32.2 G/DL (ref 31.5–36.5)
MCV RBC AUTO: 86 FL (ref 78–100)
MONOCYTES # BLD AUTO: 0.4 10E3/UL (ref 0–1.3)
MONOCYTES NFR BLD AUTO: 8 %
NEUTROPHILS # BLD AUTO: 3.4 10E3/UL (ref 1.6–8.3)
NEUTROPHILS NFR BLD AUTO: 61 %
PLATELET # BLD AUTO: 290 10E3/UL (ref 150–450)
POTASSIUM BLD-SCNC: 3.8 MMOL/L (ref 3.4–5.3)
PROT SERPL-MCNC: 6.8 G/DL (ref 6.8–8.8)
RBC # BLD AUTO: 3.75 10E6/UL (ref 3.8–5.2)
SODIUM SERPL-SCNC: 143 MMOL/L (ref 133–144)
WBC # BLD AUTO: 5.5 10E3/UL (ref 4–11)

## 2022-09-06 PROCEDURE — 82746 ASSAY OF FOLIC ACID SERUM: CPT

## 2022-09-06 PROCEDURE — 80053 COMPREHEN METABOLIC PANEL: CPT

## 2022-09-06 PROCEDURE — 36415 COLL VENOUS BLD VENIPUNCTURE: CPT

## 2022-09-06 PROCEDURE — 85025 COMPLETE CBC W/AUTO DIFF WBC: CPT

## 2022-09-11 NOTE — PROGRESS NOTES
History of Present Illness - Libertad Russell is a very pleasant 56 year old female here to see me in follow up from 2/7/22, and was seen for the first time due to being sent in consultation from Neurology at the [facility] for an incidentally found nasopharyngeal mass.    Outside records have been reviewed from the [place].  She was being followed for history of RIGHT carotid steonsis and history of CVA.  On a recent MRI done on 1/26/22 an incidental finding of a mass in the nasopharynx was found, as described:  3. 1.5 cm masslike/nodular configuration of the midline nasopharyngeal adenoidal tissue   is somewhat unusual for a patient of this age and may warrant direct endoscopic   evaluation.     She denies any nasal symptoms.  No recent changes in nasal airway, sinus health, or sense of smell.  No new symptoms of headache or visual disturbances.    ON exam, including endoscopy, all I could see was a midline benign appearing adenoid pad.  I asked her to follow up with me for another look to make sure it was not changing.  Image taken at initial consult visit is below on 2/7/2022        Past Medical History -   Patient Active Problem List   Diagnosis     Tobacco use disorder     Seasonal allergic rhinitis     Attention deficit disorder of adult     Rosacea     Raynaud phenomenon     Essential hypertension with goal blood pressure less than 140/90     Lipoma of right axilla     Lipoma of neck     Paresthesia     Nicotine abuse     Irregular periods/menstrual cycles     Cerebrovascular accident (CVA) due to stenosis of right carotid artery (H)     Cerebrovascular accident (CVA), unspecified mechanism (H)     Rheumatoid arthritis involving both hands with positive rheumatoid factor (H)     Stenosis of right carotid artery     Atherosclerosis of arteries of extremities (H)       Current Medications -   Current Outpatient Medications:      amLODIPine (NORVASC) 5 MG tablet, TAKE 1 TABLET(5 MG) BY MOUTH DAILY, Disp: 90  tablet, Rfl: 1     amphetamine-dextroamphetamine (ADDERALL XR) 20 MG 24 hr capsule, Take 1 capsule (20 mg) by mouth daily, Disp: 30 capsule, Rfl: 0     amphetamine-dextroamphetamine (ADDERALL) 20 MG tablet, Take 1 tablet (20 mg) by mouth daily Take in the early afternoon. Needs virtual/video visit with PCP prior to next refill., Disp: 30 tablet, Rfl: 0     aspirin (ASA) 325 MG tablet, Take 1 tablet (325 mg) by mouth daily, Disp: 90 tablet, Rfl: 3     atorvastatin (LIPITOR) 20 MG tablet, Take 1 tablet (20 mg) by mouth daily, Disp: 90 tablet, Rfl: 0     atorvastatin (LIPITOR) 40 MG tablet, Take 1 tablet (40 mg) by mouth daily for 90 days, Disp: 90 tablet, Rfl: 1     clopidogrel (PLAVIX) 75 MG tablet, Begin taking 1 tab daily 7 days prior to procedure, Disp: 30 tablet, Rfl: 3     ferrous sulfate (FE TABS) 325 (65 Fe) MG EC tablet, Take 1 tablet (325 mg) by mouth daily Take with orange juice., Disp: 90 tablet, Rfl: 1     folic acid (FOLVITE) 1 MG tablet, Take 1 tablet (1 mg) by mouth daily, Disp: 90 tablet, Rfl: 3     losartan (COZAAR) 100 MG tablet, TAKE 1 TABLET(100 MG) BY MOUTH DAILY, Disp: 90 tablet, Rfl: 0     methotrexate 2.5 MG tablet, Take 8 tablets (20 mg) by mouth every 7 days, Disp: 24 tablet, Rfl: 2     triamcinolone (KENALOG) 0.1 % external cream, Apply topically 2 times daily, Disp: 45 g, Rfl: 1  No current facility-administered medications for this visit.    Facility-Administered Medications Ordered in Other Visits:      sodium chloride (PF) 0.9% PF flush 72 mL, 72 mL, Intravenous, Once, Bartolo Garcia MD    Allergies - No Known Allergies    Social History -   Social History     Socioeconomic History     Marital status: Single     Spouse name: Not on file     Number of children: Not on file     Years of education: Not on file     Highest education level: Not on file   Occupational History     Not on file   Tobacco Use     Smoking status: Former Smoker     Packs/day: 1.00     Years: 10.00     Pack  years: 10.00     Types: Cigarettes     Quit date: 10/5/2021     Years since quittin.3     Smokeless tobacco: Never Used   Vaping Use     Vaping Use: Some days   Substance and Sexual Activity     Alcohol use: Not Currently     Comment: occasional     Drug use: No     Sexual activity: Yes     Partners: Male     Birth control/protection: Female Surgical     Comment: tubal   Other Topics Concern     Parent/sibling w/ CABG, MI or angioplasty before 65F 55M? No   Social History Narrative     Not on file     Social Determinants of Health     Financial Resource Strain: Not on file   Food Insecurity: Not on file   Transportation Needs: Not on file   Physical Activity: Not on file   Stress: Not on file   Social Connections: Not on file   Intimate Partner Violence: Not on file   Housing Stability: Not on file       Family History -   Family History   Problem Relation Age of Onset     Hypertension Mother      Cancer Mother         lung cancer     Other Cancer Mother         Lung     Thyroid Disease Mother      C.A.D. Father         52 at first MI     Prostate Cancer Father         Bladder     Hypertension Other      Thyroid Disease Sister      Thyroid Disease Sister      Diabetes No family hx of      Cerebrovascular Disease No family hx of      Breast Cancer No family hx of      Cancer - colorectal No family hx of        Review of Systems - As per HPI and PMHx, otherwise 10+ system review of the head and neck, and general constitution is negative.    Physical Exam  /79 (BP Location: Right arm, Patient Position: Sitting, Cuff Size: Adult Regular)   Pulse 92   Wt 59.9 kg (132 lb)   LMP 2021 (Approximate)   SpO2 100%   BMI 21.97 kg/m      General - The patient is well nourished and well developed, and appears to have good nutritional status.  Alert and oriented to person and place, answers questions and cooperates with examination appropriately.   Head and Face - Normocephalic and atraumatic, with no gross  asymmetry noted of the contour of the facial features.  The facial nerve is intact, with strong symmetric movements.  Voice and Breathing - The patient was breathing comfortably without the use of accessory muscles. There was no wheezing, stridor, or stertor.  The patients voice was clear and strong, and had appropriate pitch and quality.  Ears - The tympanic membranes are normal in appearance, bony landmarks are intact.  No retraction, perforation, or masses.  No fluid or purulence was seen in the external canal or the middle ear. No evidence of infection of the middle ear or external canal, cerumen was normal in appearance.  Eyes - Extraocular movements intact, and the pupils were reactive to light.  Sclera were not icteric or injected, conjunctiva were pink and moist.      To further evaluate the nasal cavity, I performed rigid nasal endoscopy, and color photographs were taken for the permanent medical record.  I first sprayed the nasal cavity bilaterally with a mix of lidocaine and neosynephrine.  I then began on the left side using a 2.7mm, 30 degree rigid nasal endoscope.  The septum was fairly straight and the nasal airway was open.  No abnormal secretions, purulence, or polyps were noted. The left middle turbinate and middle meatus were clearly visualized and normal in appearance.  Looking up, the olfactory cleft was unobstructed.  Going further back, the sphenoethmoid recess was normal in appearance, with healthy appearing mucosa on the face of the sphenoid.  The nasopharynx was unremarkable, and the eustachian tube opening on this side was unobstructed.  I had a clear view of the mass in question, and it appears to be unchanged, and appears to be a normal adenoid pad.  No ulceration, normal mucosa.        I then turned my attention to the right side.  Once again, the septum was fairly straight, and the airway was open.  No abnormal secretions, purulence, polyps were noted.  The right middle turbinate and  middle meatus were clearly visualized and normal in appearance.  Looking up, the olfactory cleft was unobstructed.  Going further back the right sphenoethmoid recess was normal in appearance, and eustachian tube opening was unobstructed.      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    Follow-up as needed

## 2022-09-15 ENCOUNTER — TELEPHONE (OUTPATIENT)
Dept: RADIOLOGY | Facility: CLINIC | Age: 57
End: 2022-09-15

## 2022-09-15 NOTE — TELEPHONE ENCOUNTER
Writer LVM for pt explaining that Dr Freitas had to cancel clinic on 9/27. Writer explained that pt has been scheduled for the next available visit on 10/11 at 9:15am. Writer asked pt to call back and confirm visit    Please help pt to reschedule if needed. Pt should have a return, in person or video visit, with Dr. Freitas for next available    Dahiana Lopez

## 2022-09-16 ENCOUNTER — OFFICE VISIT (OUTPATIENT)
Dept: OTOLARYNGOLOGY | Facility: CLINIC | Age: 57
End: 2022-09-16
Payer: COMMERCIAL

## 2022-09-16 VITALS
DIASTOLIC BLOOD PRESSURE: 79 MMHG | BODY MASS INDEX: 21.97 KG/M2 | SYSTOLIC BLOOD PRESSURE: 127 MMHG | OXYGEN SATURATION: 100 % | WEIGHT: 132 LBS | HEART RATE: 92 BPM

## 2022-09-16 DIAGNOSIS — J39.2 NASOPHARYNGEAL MASS: Primary | ICD-10-CM

## 2022-09-16 PROCEDURE — 31231 NASAL ENDOSCOPY DX: CPT | Performed by: OTOLARYNGOLOGY

## 2022-09-16 NOTE — LETTER
9/16/2022         RE: Libertad Russell  [address] [address]        Dear Colleague,    Thank you for referring your patient, Libertad Russell, to the [facility]. Please see a copy of my visit note below.    History of Present Illness - Libertad Russell is a very pleasant 56 year old female here to see me in follow up from 2/7/22, and was seen for the first time due to being sent in consultation from Neurology at the [facility] for an incidentally found nasopharyngeal mass.    Outside records have been reviewed from the [place].  She was being followed for history of RIGHT carotid steonsis and history of CVA.  On a recent MRI done on 1/26/22 an incidental finding of a mass in the nasopharynx was found, as described:  3. 1.5 cm masslike/nodular configuration of the midline nasopharyngeal adenoidal tissue   is somewhat unusual for a patient of this age and may warrant direct endoscopic   evaluation.     She denies any nasal symptoms.  No recent changes in nasal airway, sinus health, or sense of smell.  No new symptoms of headache or visual disturbances.    ON exam, including endoscopy, all I could see was a midline benign appearing adenoid pad.  I asked her to follow up with me for another look to make sure it was not changing.  Image taken at initial consult visit is below on 2/7/2022        Past Medical History -   Patient Active Problem List   Diagnosis     Tobacco use disorder     Seasonal allergic rhinitis     Attention deficit disorder of adult     Rosacea     Raynaud phenomenon     Essential hypertension with goal blood pressure less than 140/90     Lipoma of right axilla     Lipoma of neck     Paresthesia     Nicotine abuse     Irregular periods/menstrual cycles     Cerebrovascular accident (CVA) due to stenosis of right carotid artery (H)     Cerebrovascular accident (CVA), unspecified mechanism (H)     Rheumatoid arthritis involving both hands with positive  rheumatoid factor (H)     Stenosis of right carotid artery     Atherosclerosis of arteries of extremities (H)       Current Medications -   Current Outpatient Medications:      amLODIPine (NORVASC) 5 MG tablet, TAKE 1 TABLET(5 MG) BY MOUTH DAILY, Disp: 90 tablet, Rfl: 1     amphetamine-dextroamphetamine (ADDERALL XR) 20 MG 24 hr capsule, Take 1 capsule (20 mg) by mouth daily, Disp: 30 capsule, Rfl: 0     amphetamine-dextroamphetamine (ADDERALL) 20 MG tablet, Take 1 tablet (20 mg) by mouth daily Take in the early afternoon. Needs virtual/video visit with PCP prior to next refill., Disp: 30 tablet, Rfl: 0     aspirin (ASA) 325 MG tablet, Take 1 tablet (325 mg) by mouth daily, Disp: 90 tablet, Rfl: 3     atorvastatin (LIPITOR) 20 MG tablet, Take 1 tablet (20 mg) by mouth daily, Disp: 90 tablet, Rfl: 0     atorvastatin (LIPITOR) 40 MG tablet, Take 1 tablet (40 mg) by mouth daily for 90 days, Disp: 90 tablet, Rfl: 1     clopidogrel (PLAVIX) 75 MG tablet, Begin taking 1 tab daily 7 days prior to procedure, Disp: 30 tablet, Rfl: 3     ferrous sulfate (FE TABS) 325 (65 Fe) MG EC tablet, Take 1 tablet (325 mg) by mouth daily Take with orange juice., Disp: 90 tablet, Rfl: 1     folic acid (FOLVITE) 1 MG tablet, Take 1 tablet (1 mg) by mouth daily, Disp: 90 tablet, Rfl: 3     losartan (COZAAR) 100 MG tablet, TAKE 1 TABLET(100 MG) BY MOUTH DAILY, Disp: 90 tablet, Rfl: 0     methotrexate 2.5 MG tablet, Take 8 tablets (20 mg) by mouth every 7 days, Disp: 24 tablet, Rfl: 2     triamcinolone (KENALOG) 0.1 % external cream, Apply topically 2 times daily, Disp: 45 g, Rfl: 1  No current facility-administered medications for this visit.    Facility-Administered Medications Ordered in Other Visits:      sodium chloride (PF) 0.9% PF flush 72 mL, 72 mL, Intravenous, Once, Bartolo Garcia MD    Allergies - No Known Allergies    Social History -   Social History     Socioeconomic History     Marital status: Single     Spouse name: Not  on file     Number of children: Not on file     Years of education: Not on file     Highest education level: Not on file   Occupational History     Not on file   Tobacco Use     Smoking status: Former Smoker     Packs/day: 1.00     Years: 10.00     Pack years: 10.00     Types: Cigarettes     Quit date: 10/5/2021     Years since quittin.3     Smokeless tobacco: Never Used   Vaping Use     Vaping Use: Some days   Substance and Sexual Activity     Alcohol use: Not Currently     Comment: occasional     Drug use: No     Sexual activity: Yes     Partners: Male     Birth control/protection: Female Surgical     Comment: tubal   Other Topics Concern     Parent/sibling w/ CABG, MI or angioplasty before 65F 55M? No   Social History Narrative     Not on file     Social Determinants of Health     Financial Resource Strain: Not on file   Food Insecurity: Not on file   Transportation Needs: Not on file   Physical Activity: Not on file   Stress: Not on file   Social Connections: Not on file   Intimate Partner Violence: Not on file   Housing Stability: Not on file       Family History -   Family History   Problem Relation Age of Onset     Hypertension Mother      Cancer Mother         lung cancer     Other Cancer Mother         Lung     Thyroid Disease Mother      C.A.D. Father         52 at first MI     Prostate Cancer Father         Bladder     Hypertension Other      Thyroid Disease Sister      Thyroid Disease Sister      Diabetes No family hx of      Cerebrovascular Disease No family hx of      Breast Cancer No family hx of      Cancer - colorectal No family hx of        Review of Systems - As per HPI and PMHx, otherwise 10+ system review of the head and neck, and general constitution is negative.    Physical Exam  /79 (BP Location: Right arm, Patient Position: Sitting, Cuff Size: Adult Regular)   Pulse 92   Wt 59.9 kg (132 lb)   LMP 2021 (Approximate)   SpO2 100%   BMI 21.97 kg/m      General - The patient  is well nourished and well developed, and appears to have good nutritional status.  Alert and oriented to person and place, answers questions and cooperates with examination appropriately.   Head and Face - Normocephalic and atraumatic, with no gross asymmetry noted of the contour of the facial features.  The facial nerve is intact, with strong symmetric movements.  Voice and Breathing - The patient was breathing comfortably without the use of accessory muscles. There was no wheezing, stridor, or stertor.  The patients voice was clear and strong, and had appropriate pitch and quality.  Ears - The tympanic membranes are normal in appearance, bony landmarks are intact.  No retraction, perforation, or masses.  No fluid or purulence was seen in the external canal or the middle ear. No evidence of infection of the middle ear or external canal, cerumen was normal in appearance.  Eyes - Extraocular movements intact, and the pupils were reactive to light.  Sclera were not icteric or injected, conjunctiva were pink and moist.      To further evaluate the nasal cavity, I performed rigid nasal endoscopy, and color photographs were taken for the permanent medical record.  I first sprayed the nasal cavity bilaterally with a mix of lidocaine and neosynephrine.  I then began on the left side using a 2.7mm, 30 degree rigid nasal endoscope.  The septum was fairly straight and the nasal airway was open.  No abnormal secretions, purulence, or polyps were noted. The left middle turbinate and middle meatus were clearly visualized and normal in appearance.  Looking up, the olfactory cleft was unobstructed.  Going further back, the sphenoethmoid recess was normal in appearance, with healthy appearing mucosa on the face of the sphenoid.  The nasopharynx was unremarkable, and the eustachian tube opening on this side was unobstructed.  I had a clear view of the mass in question, and it appears to be unchanged, and appears to be a normal  adenoid pad.  No ulceration, normal mucosa.        I then turned my attention to the right side.  Once again, the septum was fairly straight, and the airway was open.  No abnormal secretions, purulence, polyps were noted.  The right middle turbinate and middle meatus were clearly visualized and normal in appearance.  Looking up, the olfactory cleft was unobstructed.  Going further back the right sphenoethmoid recess was normal in appearance, and eustachian tube opening was unobstructed.      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    Follow-up as needed        Again, thank you for allowing me to participate in the care of your patient.        Sincerely,        Pastor Pickreing MD

## 2022-09-16 NOTE — NURSING NOTE
"Chief Complaint   Patient presents with     RECHECK     throat       Vitals:    09/16/22 1154   BP: 127/79   BP Location: Right arm   Patient Position: Sitting   Cuff Size: Adult Regular   Pulse: 92   SpO2: 100%   Weight: 59.9 kg (132 lb)     Wt Readings from Last 1 Encounters:   09/16/22 59.9 kg (132 lb)     Ht Readings from Last 1 Encounters:   07/19/22 1.651 m (5' 5\")       Alka Conti [facility], 9/16/2022 11:55 AM    "

## 2022-09-25 ENCOUNTER — MYC REFILL (OUTPATIENT)
Dept: FAMILY MEDICINE | Facility: CLINIC | Age: 57
End: 2022-09-25

## 2022-09-25 DIAGNOSIS — F98.8 ATTENTION DEFICIT DISORDER OF ADULT: ICD-10-CM

## 2022-09-26 ENCOUNTER — MYC REFILL (OUTPATIENT)
Dept: FAMILY MEDICINE | Facility: CLINIC | Age: 57
End: 2022-09-26

## 2022-09-26 DIAGNOSIS — F98.8 ATTENTION DEFICIT DISORDER OF ADULT: ICD-10-CM

## 2022-09-26 DIAGNOSIS — I10 ESSENTIAL HYPERTENSION WITH GOAL BLOOD PRESSURE LESS THAN 140/90: ICD-10-CM

## 2022-09-26 DIAGNOSIS — I65.29 STENOSIS OF CAROTID ARTERY, UNSPECIFIED LATERALITY: ICD-10-CM

## 2022-09-26 RX ORDER — LOSARTAN POTASSIUM 100 MG/1
100 TABLET ORAL DAILY
Qty: 30 TABLET | Refills: 0 | Status: CANCELLED | OUTPATIENT
Start: 2022-09-26

## 2022-09-26 RX ORDER — DEXTROAMPHETAMINE SACCHARATE, AMPHETAMINE ASPARTATE MONOHYDRATE, DEXTROAMPHETAMINE SULFATE AND AMPHETAMINE SULFATE 5; 5; 5; 5 MG/1; MG/1; MG/1; MG/1
20 CAPSULE, EXTENDED RELEASE ORAL DAILY
Qty: 30 CAPSULE | Refills: 0 | Status: SHIPPED | OUTPATIENT
Start: 2022-09-26 | End: 2022-09-28

## 2022-09-26 RX ORDER — DEXTROAMPHETAMINE SACCHARATE, AMPHETAMINE ASPARTATE, DEXTROAMPHETAMINE SULFATE AND AMPHETAMINE SULFATE 5; 5; 5; 5 MG/1; MG/1; MG/1; MG/1
20 TABLET ORAL DAILY
Qty: 30 TABLET | Refills: 0 | Status: SHIPPED | OUTPATIENT
Start: 2022-09-26 | End: 2022-09-28

## 2022-09-26 RX ORDER — DEXTROAMPHETAMINE SACCHARATE, AMPHETAMINE ASPARTATE MONOHYDRATE, DEXTROAMPHETAMINE SULFATE AND AMPHETAMINE SULFATE 5; 5; 5; 5 MG/1; MG/1; MG/1; MG/1
20 CAPSULE, EXTENDED RELEASE ORAL DAILY
Qty: 30 CAPSULE | Refills: 0 | Status: CANCELLED | OUTPATIENT
Start: 2022-09-26

## 2022-09-26 RX ORDER — ATORVASTATIN CALCIUM 20 MG/1
20 TABLET, FILM COATED ORAL DAILY
Qty: 90 TABLET | Refills: 0 | Status: CANCELLED | OUTPATIENT
Start: 2022-09-26

## 2022-09-26 RX ORDER — DEXTROAMPHETAMINE SACCHARATE, AMPHETAMINE ASPARTATE, DEXTROAMPHETAMINE SULFATE AND AMPHETAMINE SULFATE 5; 5; 5; 5 MG/1; MG/1; MG/1; MG/1
20 TABLET ORAL DAILY
Qty: 30 TABLET | Refills: 0 | Status: CANCELLED | OUTPATIENT
Start: 2022-09-26

## 2022-09-27 DIAGNOSIS — M05.742 RHEUMATOID ARTHRITIS INVOLVING BOTH HANDS WITH POSITIVE RHEUMATOID FACTOR (H): ICD-10-CM

## 2022-09-27 DIAGNOSIS — M05.741 RHEUMATOID ARTHRITIS INVOLVING BOTH HANDS WITH POSITIVE RHEUMATOID FACTOR (H): ICD-10-CM

## 2022-09-28 ENCOUNTER — OFFICE VISIT (OUTPATIENT)
Dept: FAMILY MEDICINE | Facility: CLINIC | Age: 57
End: 2022-09-28
Payer: COMMERCIAL

## 2022-09-28 VITALS
RESPIRATION RATE: 18 BRPM | BODY MASS INDEX: 21.99 KG/M2 | HEART RATE: 73 BPM | TEMPERATURE: 97.9 F | WEIGHT: 132 LBS | HEIGHT: 65 IN | DIASTOLIC BLOOD PRESSURE: 60 MMHG | OXYGEN SATURATION: 97 % | SYSTOLIC BLOOD PRESSURE: 114 MMHG

## 2022-09-28 DIAGNOSIS — I70.209 ATHEROSCLEROSIS OF ARTERIES OF EXTREMITIES (H): ICD-10-CM

## 2022-09-28 DIAGNOSIS — Z00.00 ROUTINE GENERAL MEDICAL EXAMINATION AT A HEALTH CARE FACILITY: Primary | ICD-10-CM

## 2022-09-28 DIAGNOSIS — F98.8 ATTENTION DEFICIT DISORDER OF ADULT: ICD-10-CM

## 2022-09-28 DIAGNOSIS — I63.231 CEREBROVASCULAR ACCIDENT (CVA) DUE TO STENOSIS OF RIGHT CAROTID ARTERY (H): ICD-10-CM

## 2022-09-28 DIAGNOSIS — I65.29 STENOSIS OF CAROTID ARTERY, UNSPECIFIED LATERALITY: ICD-10-CM

## 2022-09-28 DIAGNOSIS — H10.13 ALLERGIC CONJUNCTIVITIS, BILATERAL: ICD-10-CM

## 2022-09-28 DIAGNOSIS — I10 ESSENTIAL HYPERTENSION WITH GOAL BLOOD PRESSURE LESS THAN 140/90: ICD-10-CM

## 2022-09-28 LAB
AMPHETAMINES UR QL: DETECTED
BARBITURATES UR QL SCN: NOT DETECTED
BENZODIAZ UR QL SCN: NOT DETECTED
BUPRENORPHINE UR QL: NOT DETECTED
CANNABINOIDS UR QL: NOT DETECTED
COCAINE UR QL SCN: NOT DETECTED
D-METHAMPHET UR QL: NOT DETECTED
METHADONE UR QL SCN: NOT DETECTED
OPIATES UR QL SCN: NOT DETECTED
OXYCODONE UR QL SCN: NOT DETECTED
PCP UR QL SCN: NOT DETECTED
PROPOXYPH UR QL: NOT DETECTED
TRICYCLICS UR QL SCN: NOT DETECTED

## 2022-09-28 PROCEDURE — 99396 PREV VISIT EST AGE 40-64: CPT | Performed by: NURSE PRACTITIONER

## 2022-09-28 PROCEDURE — 80306 DRUG TEST PRSMV INSTRMNT: CPT | Performed by: NURSE PRACTITIONER

## 2022-09-28 PROCEDURE — 82043 UR ALBUMIN QUANTITATIVE: CPT | Performed by: NURSE PRACTITIONER

## 2022-09-28 PROCEDURE — 99214 OFFICE O/P EST MOD 30 MIN: CPT | Mod: 25 | Performed by: NURSE PRACTITIONER

## 2022-09-28 RX ORDER — ASPIRIN 325 MG
325 TABLET ORAL DAILY
Qty: 90 TABLET | Refills: 3 | Status: SHIPPED | OUTPATIENT
Start: 2022-09-28 | End: 2024-01-15

## 2022-09-28 RX ORDER — LOSARTAN POTASSIUM 100 MG/1
100 TABLET ORAL DAILY
Qty: 90 TABLET | Refills: 3 | Status: SHIPPED | OUTPATIENT
Start: 2022-09-28 | End: 2023-10-08

## 2022-09-28 RX ORDER — DEXTROAMPHETAMINE SACCHARATE, AMPHETAMINE ASPARTATE, DEXTROAMPHETAMINE SULFATE AND AMPHETAMINE SULFATE 5; 5; 5; 5 MG/1; MG/1; MG/1; MG/1
20 TABLET ORAL DAILY
Qty: 90 TABLET | Refills: 0 | Status: SHIPPED | OUTPATIENT
Start: 2022-09-28 | End: 2022-11-11

## 2022-09-28 RX ORDER — DEXTROAMPHETAMINE SACCHARATE, AMPHETAMINE ASPARTATE MONOHYDRATE, DEXTROAMPHETAMINE SULFATE AND AMPHETAMINE SULFATE 5; 5; 5; 5 MG/1; MG/1; MG/1; MG/1
20 CAPSULE, EXTENDED RELEASE ORAL DAILY
Qty: 90 CAPSULE | Refills: 0 | Status: SHIPPED | OUTPATIENT
Start: 2022-09-28 | End: 2022-11-11

## 2022-09-28 RX ORDER — ATORVASTATIN CALCIUM 40 MG/1
40 TABLET, FILM COATED ORAL DAILY
Qty: 90 TABLET | Refills: 3 | Status: SHIPPED | OUTPATIENT
Start: 2022-09-28 | End: 2023-10-08

## 2022-09-28 RX ORDER — CLOPIDOGREL BISULFATE 75 MG/1
75 TABLET ORAL DAILY
Qty: 90 TABLET | Refills: 3 | Status: SHIPPED | OUTPATIENT
Start: 2022-09-28 | End: 2023-10-25

## 2022-09-28 RX ORDER — OLOPATADINE HYDROCHLORIDE 2 MG/ML
1 SOLUTION/ DROPS OPHTHALMIC DAILY
Qty: 2.5 ML | Refills: 1 | Status: SHIPPED | OUTPATIENT
Start: 2022-09-28

## 2022-09-28 ASSESSMENT — ENCOUNTER SYMPTOMS
CHILLS: 0
SORE THROAT: 0
FEVER: 0
PARESTHESIAS: 0
CONSTIPATION: 0
DYSURIA: 0
NERVOUS/ANXIOUS: 0
MYALGIAS: 0
WEAKNESS: 0
DIZZINESS: 0
EYE PAIN: 0
HEMATURIA: 0
PALPITATIONS: 0
HEADACHES: 0
FATIGUE: 1
FREQUENCY: 0
NAUSEA: 0
HEMATOCHEZIA: 0
HEARTBURN: 0
COUGH: 0
SHORTNESS OF BREATH: 0
DIARRHEA: 0
BREAST MASS: 0
ARTHRALGIAS: 1
JOINT SWELLING: 1
ABDOMINAL PAIN: 0

## 2022-09-28 ASSESSMENT — PAIN SCALES - GENERAL: PAINLEVEL: NO PAIN (0)

## 2022-09-28 NOTE — PROGRESS NOTES
SUBJECTIVE:   CC: Libertad is an 56 year old who presents for preventive health visit.     Patient has been advised of split billing requirements and indicates understanding: Yes  Healthy Habits:     Getting at least 3 servings of Calcium per day:  Yes    Bi-annual eye exam:  Yes    Dental care twice a year:  Yes    Sleep apnea or symptoms of sleep apnea:  None    Diet:  Regular (no restrictions)    Frequency of exercise:  4-5 days/week    Duration of exercise:  15-30 minutes    Taking medications regularly:  Yes    Medication side effects:  Muscle aches    PHQ-2 Total Score: 0    Additional concerns today:  No      Today's PHQ-2 Score:   PHQ-2 (  [employer]) 2022   Q1: Little interest or pleasure in doing things 0   Q2: Feeling down, depressed or hopeless 0   PHQ-2 Score 0   PHQ-2 Total Score (12-17 Years)- Positive if 3 or more points; Administer PHQ-A if positive -   Q1: Little interest or pleasure in doing things Not at all   Q2: Feeling down, depressed or hopeless Not at all   PHQ-2 Score 0     Abuse: Current or Past (Physical, Sexual or Emotional) - No  Do you feel safe in your environment? Yes    Social History     Tobacco Use     Smoking status: Former Smoker     Packs/day: 1.00     Years: 10.00     Pack years: 10.00     Types: Cigarettes     Quit date: 10/5/2021     Years since quittin.9     Smokeless tobacco: Never Used   Substance Use Topics     Alcohol use: Not Currently     Comment: occasional     If you drink alcohol do you typically have >3 drinks per day or >7 drinks per week? No    Alcohol Use 2022   Prescreen: >3 drinks/day or >7 drinks/week? No   Prescreen: >3 drinks/day or >7 drinks/week? -       Reviewed orders with patient.  Reviewed health maintenance and updated orders accordingly - Yes  BP Readings from Last 3 Encounters:   22 114/60   22 127/79   22 (!) 154/83    Wt Readings from Last 3 Encounters:   22 59.9 kg (132 lb)   22 59.9 kg (132 lb)    08/16/22 59.9 kg (132 lb)             Breast Cancer Screening:    Breast CA Risk Assessment (FHS-7) 6/23/2021   Do you have a family history of breast, colon, or ovarian cancer? No / Unknown         Mammogram Screening: Recommended annual mammography  Pertinent mammograms are reviewed under the imaging tab.    History of abnormal Pap smear: NO - age 30-65 PAP every 5 years with negative HPV co-testing recommended  PAP / HPV Latest Ref Rng & Units 6/23/2021 6/2/2009 6/25/2008   PAP (Historical) - NIL WARNING! - Addended NIL   HPV16 NEG:Negative Negative - -   HPV18 NEG:Negative Negative - -   HRHPV NEG:Negative Negative - -     Reviewed and updated as needed this visit by clinical staff   Fran Reddy CMA    Reviewed and updated as needed this visit by Provider   Tobacco                    Here today for physical and medication check.  Is not fasting.  Continues to take Adderall.  Last dose this morning.  Feels like it is working well to control symptoms.  No negative side effects.  No fluttering in chest, no sleep difficulty, no movements that cannot control.  No effect on appetite.    Has been feeling very tired and fatigue.  Has been sleeping okay at night. Is taking over-the-counter iron.  Continues to follow closely with neurology and neurosurgeon due to stenosis of right carotid artery.  Has appointment next month with a surgeon that put in original stent.  Really is not feeling much better since having that stent placed.  Uncertain if it is going to need surgery.  Continues to work from home.  Activity tolerance is very poor.  Goes to work once per week.  Is very tired and fatigued on those days.  Had CT scan of chest to screen for lung cancer.  Did show severe coronary calcifications.  Did meet with cardiology.  Was to have stress test.  Was not able to have it done due to blood pressure being elevated.  It was rescheduled for mid October.    Continues to follow closely with  rheumatology for rheumatoid arthritis.  Continues on methotrexate.  Feels like it is working very well to control joint aches.    Has been checking blood pressure at home and is typically getting 140s to 150s.  Neurology would like for blood pressure to run on higher and of normal.  Thinks may have accidentally taken 2 blood pressure medications today.  No dizziness or lightheadedness.  Hearing whooshing noise in ears, and now is hearing clicking noise in ear.  No chest pain, palpitations or shortness of breath.    Last Pap: 6/21-normal. Never an abnormal.   Last mammogram: 12/21. No family history of breast cancer. Had one at work 11/20-showed dense tissue.   Last BMD: N/A  Last Colonoscopy:  4/22-cologuard. Normal. Never, no family history of colon cancer.   Last eye exam: 2 mo ago-normal.  Last dental exam: Every 6 mo  Last tetanus vaccine: 2018  Last influenza vaccine: Declines   Last shingles vaccine: Plans to get at the [facility].   Last pneumonia vaccine: Agreeable to getting today  Last COVID vaccine: Declines wanting.   Last COVID Booster: Not applicable  Hep C screen: We will check with routine labs  HIV screen: We will check with routine labs  AAA screen (age 65-78 with smoking hx): Not applicable  IVD (HTN, Hyperlipid, Smoking):  325 daily  Lung CA screening (55-80, 30 pk smoking hx): Quit smoking 1 year ago. Vaping a couple of puffs per day.     Review of Systems   Constitutional: Positive for fatigue. Negative for chills and fever.   HENT: Negative for congestion, ear pain, hearing loss and sore throat.         Whooshing noise, clicking in ears   Eyes: Negative for pain and visual disturbance.   Respiratory: Negative for cough and shortness of breath.    Cardiovascular: Negative for chest pain, palpitations and peripheral edema.   Gastrointestinal: Negative for abdominal pain, constipation, diarrhea, heartburn, hematochezia and nausea.   Breasts:  Negative for tenderness, breast mass and discharge.  "  Genitourinary: Negative for dysuria, frequency, genital sores, hematuria, pelvic pain, vaginal bleeding and vaginal discharge.   Musculoskeletal: Positive for arthralgias and joint swelling. Negative for myalgias.   Skin: Negative for rash.   Neurological: Negative for dizziness, weakness, headaches and paresthesias.   Psychiatric/Behavioral: Negative for mood changes. The patient is not nervous/anxious.         OBJECTIVE:   /60   Pulse 73   Temp 97.9  F (36.6  C) (Tympanic)   Resp 18   Ht 1.651 m (5' 5\")   Wt 59.9 kg (132 lb)   LMP 05/20/2021 (Approximate)   SpO2 97%   Breastfeeding No   BMI 21.97 kg/m    Physical Exam  Constitutional:       Appearance: Normal appearance. She is well-developed.   HENT:      Head: Normocephalic and atraumatic.      Right Ear: Tympanic membrane and external ear normal. No middle ear effusion.      Left Ear: Tympanic membrane and external ear normal.  No middle ear effusion.      Nose: No mucosal edema.      Mouth/Throat:      Pharynx: Uvula midline.   Eyes:      Pupils: Pupils are equal, round, and reactive to light.   Neck:      Thyroid: No thyromegaly.      Vascular: No carotid bruit.   Cardiovascular:      Rate and Rhythm: Normal rate and regular rhythm.      Pulses:           Femoral pulses are 2+ on the right side and 2+ on the left side.     Heart sounds: Normal heart sounds.   Pulmonary:      Effort: Pulmonary effort is normal.      Breath sounds: Normal breath sounds.   Abdominal:      General: Bowel sounds are normal.      Palpations: Abdomen is soft.      Tenderness: There is no abdominal tenderness.   Musculoskeletal:         General: Normal range of motion.      Cervical back: Normal range of motion.   Skin:     General: Skin is warm and dry.      Findings: No rash.   Neurological:      Mental Status: She is alert.   Psychiatric:         Behavior: Behavior normal.           ASSESSMENT/PLAN:   Routine general medical examination at a [facility]" facility  Screening guidelines reviewed.   Declines influenza, COVID, pneumonia and zoster vaccine.    Attention deficit disorder of adult  Controlled substance agreement obtained.  Will obtain urine drug screen.  Current medication regimen working well.  Refill sent.  Plan to follow-up in 6 months  - Urine Drugs of Abuse Screen Panel 13; Future  - amphetamine-dextroamphetamine (ADDERALL XR) 20 MG 24 hr capsule; Take 1 capsule (20 mg) by mouth daily  - amphetamine-dextroamphetamine (ADDERALL) 20 MG tablet; Take 1 tablet (20 mg) by mouth daily Take in the early afternoon.  - Urine Drugs of Abuse Screen Panel 13    Stenosis of carotid artery, unspecified laterality  Most recent cardiology, neurology and neurosurgery notes reviewed.  Plan to continue dual antiplatelet therapy-aspirin and Plavix.  Continue atorvastatin.  Reinforced importance of discontinuing nicotine use.  Continue to follow routinely with specialty services.  Educated regarding warning signs to watch for and when would need to seek immediate medical attention.  - aspirin (ASA) 325 MG tablet; Take 1 tablet (325 mg) by mouth daily    Atherosclerosis of arteries of extremities (H)  Most recent cardiology, neurology and neurosurgery notes reviewed.  Plan to continue dual antiplatelet therapy-aspirin and Plavix.  Continue atorvastatin.  Reinforced importance of discontinuing nicotine use.  Continue to follow routinely with specialty services.  Educated regarding warning signs to watch for and when would need to seek immediate medical attention.  - atorvastatin (LIPITOR) 40 MG tablet; Take 1 tablet (40 mg) by mouth daily    Cerebrovascular accident (CVA) due to stenosis of right carotid artery (H)  Most recent cardiology, neurology and neurosurgery notes reviewed.  Plan to continue dual antiplatelet therapy-aspirin and Plavix.  Continue atorvastatin.  Reinforced importance of discontinuing nicotine use.  Continue to follow routinely with specialty  services.  Educated regarding warning signs to watch for and when would need to seek immediate medical attention.  - atorvastatin (LIPITOR) 40 MG tablet; Take 1 tablet (40 mg) by mouth daily  - clopidogrel (PLAVIX) 75 MG tablet; Take 1 tablet (75 mg) by mouth daily    Essential hypertension with goal blood pressure less than 140/90  Blood pressure on lower end today.  Per neurology needs to have above normal blood pressure to prevent further infarct.  Rozina thinks accidentally took 2 losartan today.  We will plan to continue current medication and dosage.  Will update labs.  Refill sent.  - Albumin Random Urine Quantitative with Creat Ratio; Future  - Basic metabolic panel; Future  - Albumin Random Urine Quantitative with Creat Ratio; Future  - CBC with Platelets & Differential; Future  - Iron & Iron Binding Capacity; Future  - TSH with free T4 reflex; Future  - Vitamin D Deficiency; Future  - Lipid panel reflex to direct LDL Fasting; Future  - losartan (COZAAR) 100 MG tablet; Take 1 tablet (100 mg) by mouth daily  - Albumin Random Urine Quantitative with Creat Ratio    Allergic conjunctivitis, bilateral  Educated on use and possible side effects.  Notify if no improvement.  - olopatadine (PATADAY) 0.2 % ophthalmic solution; Place 0.05 mLs (1 drop) into both eyes daily    Patient has been advised of split billing requirements and indicates understanding: Yes    COUNSELING:  Reviewed preventive health counseling, as reflected in patient instructions       Regular exercise       Healthy diet/nutrition       Vision screening       Immunizations    Declined: Influenza, COVID and Zoster due to Conscientious objector               Aspirin prophylaxis       Osteoporosis prevention/bone health       Colorectal Cancer Screening       Consider Hep C screening for all patients one time for ages 18-79 years       Consider lung cancer screening for ages 55-80 years (77 for Medicare) and 20 pack-year smoking history  "    Estimated body mass index is 21.97 kg/m  as calculated from the following:    Height as of this encounter: 1.651 m (5' 5\").    Weight as of this encounter: 59.9 kg (132 lb).        She reports that she quit smoking about a year ago. Her smoking use included cigarettes. She has a 10.00 pack-year smoking history. She has never used smokeless tobacco.      Counseling Resources:  ATP IV Guidelines  Pooled Cohorts Equation Calculator  Breast Cancer Risk Calculator  BRCA-Related Cancer Risk Assessment: FHS-7 Tool  FRAX Risk Assessment  ICSI Preventive Guidelines  Dietary Guidelines for Americans, 2010  USDA's MyPlate  ASA Prophylaxis  Lung CA Screening    Symone Acosta, SULY CNP  M [facility] KARLIE  "

## 2022-09-28 NOTE — LETTER
[facility]  -- Controlled Medication Agreement    9/28/2022   Libertad Russell   1965   [id number]       I understand that my provider is prescribing controlled medications to assist me in managing my ADHD.  The risks, benefits, and side effects of these medications have been explained to me and I agree to the following conditions for this type of treatment.    Stimulant Medication Prescribed: Adderall     1.  I will take my medications exactly as prescribed and will not change the medication dosage or schedule without my provider's approval.  Refills will not be given if I  runs out early.     2.  I will keep all regular appointments at this clinic.  If there are three or more missed appointments or appointments canceled less than 2 hours before the scheduled time, my medication may be discontinued.    3.  I understand that prescriptions may only be written for one month at a time, and a written prescription is required each month.  Prescriptions cannot be called in or faxed to the pharmacy.    4.  If the prescription is lost or stolen, replacement is at the discretion of my provider.  I understand that this may mean the prescription might not be replaced.    5.  If I am late for scheduled follow up, I understand that I must make an appointment and that another refill is at the discretion of my provider.  This may mean a prescription for only the amount required until the appointment, regardless of prescription co-pay.  For example, if an appointment is made in 1 week, a prescription might only be written for 7 pills.      I understand that if I violate any of the above conditions, my prescription medications and/or treatment may be terminated.  If the violation includes providing controlled substances to anyone other than to whom the medication is prescribed, a report may be made to my child's physician, pharmacy, and other authorities, including the police.    I have read this contract and  it has been explained to me.  I fully understand the consequences of violating this agreement.    _________________________________/______________/____________________________    Patient signature/Date/Witness

## 2022-09-29 ENCOUNTER — MYC MEDICAL ADVICE (OUTPATIENT)
Dept: RHEUMATOLOGY | Facility: CLINIC | Age: 57
End: 2022-09-29

## 2022-09-29 ENCOUNTER — MYC MEDICAL ADVICE (OUTPATIENT)
Dept: FAMILY MEDICINE | Facility: CLINIC | Age: 57
End: 2022-09-29

## 2022-09-29 DIAGNOSIS — M05.741 RHEUMATOID ARTHRITIS INVOLVING BOTH HANDS WITH POSITIVE RHEUMATOID FACTOR (H): ICD-10-CM

## 2022-09-29 DIAGNOSIS — M05.742 RHEUMATOID ARTHRITIS INVOLVING BOTH HANDS WITH POSITIVE RHEUMATOID FACTOR (H): ICD-10-CM

## 2022-09-29 NOTE — TELEPHONE ENCOUNTER
Last Written Prescription Date:  6/9/22  Last Fill Quantity: 24,  # refills: 2   Last office visit: 6/9/2022 with prescribing provider: Dr. Odom   Future Office Visit:  2/16/23        Requested Prescriptions   Pending Prescriptions Disp Refills     methotrexate 2.5 MG tablet 24 tablet 2     Sig: Take 8 tablets (20 mg) by mouth every 7 days       There is no refill protocol information for this order        Refill sent  Savannah Riley RN    
FEVER

## 2022-09-30 ENCOUNTER — E-VISIT (OUTPATIENT)
Dept: FAMILY MEDICINE | Facility: CLINIC | Age: 57
End: 2022-09-30
Payer: COMMERCIAL

## 2022-09-30 DIAGNOSIS — N30.90 BLADDER INFECTION: Primary | ICD-10-CM

## 2022-09-30 LAB
CREAT UR-MCNC: 117 MG/DL
MICROALBUMIN UR-MCNC: 94 MG/L
MICROALBUMIN/CREAT UR: 80.34 MG/G CR (ref 0–25)

## 2022-09-30 PROCEDURE — 99421 OL DIG E/M SVC 5-10 MIN: CPT | Performed by: PHYSICIAN ASSISTANT

## 2022-09-30 RX ORDER — NITROFURANTOIN 25; 75 MG/1; MG/1
100 CAPSULE ORAL 2 TIMES DAILY
Qty: 10 CAPSULE | Refills: 0 | Status: SHIPPED | OUTPATIENT
Start: 2022-09-30 | End: 2022-10-05

## 2022-09-30 NOTE — TELEPHONE ENCOUNTER
Routed to covering providers to please advise.  Sarah RN,BSN  Triage Nurse  [facility]: [facility]  Ph: [phone]

## 2022-09-30 NOTE — PATIENT INSTRUCTIONS
Dear Libertad Russell,    After reviewing your responses, I've been able to diagnose you with a urinary tract infection, which is a common infection of the bladder with bacteria.  This is not a sexually transmitted infection, though urinating immediately after intercourse can help prevent infections.  Drinking lots of fluids is also helpful to clear your current infection and prevent the next one.      Symone, your primary care provider, is currently out of the office.  As we are heading into the weekend, I have gone ahead and prescribed antibiotics, Macrobid, as we may not be able to get labs completed in time.  If your symptoms are worsening over the weekend, I would recommend an urgent care visit.  If your symptoms are persisting into next week, you can schedule a follow-up in clinic.    It is important that you take all of your prescribed medication even if your symptoms are improving after a few doses.  Taking all of your medicine helps prevent the symptoms from returning.     If your symptoms worsen, you develop pain in your back or stomach, develop fevers, or are not improving in 5 days, please contact your primary care provider for an appointment or visit any of our convenient Walk-in or Urgent Care Centers to be seen, which can be found on our website here.    Thanks again for choosing us as your health care partner,    Yamileth Wheeler PA-C    Urinary Tract Infections in Women  Urinary tract infections (UTIs) are most often caused by bacteria. These bacteria enter the urinary tract. The bacteria may come from inside the body. Or they may travel from the skin outside the rectum or vagina into the urethra. Female anatomy makes it easy for bacteria from the bowel to enter a woman s urinary tract, which is the most common source of UTI. This means women develop UTIs more often than men. Pain in or around the urinary tract is a common UTI symptom. But the only way to know for sure if you have a UTI for the  healthcare provider to test your urine. The two tests that may be done are the urinalysis and urine culture.     Types of UTIs    Cystitis. A bladder infection (cystitis) is the most common UTI in women. You may have urgent or frequent need to pee. You may also have pain, burning when you pee, and bloody urine.    Urethritis. This is an inflamed urethra, which is the tube that carries urine from the bladder to outside the body. You may have lower stomach or back pain. You may also have urgent or frequent need to pee.    Pyelonephritis. This is a kidney infection. If not treated, it can be serious and damage your kidneys. In severe cases, you may need to stay in the hospital. You may have a fever and lower back pain.    Medicines to treat a UTI  Most UTIs are treated with antibiotics. These kill the bacteria. The length of time you need to take them depends on the type of infection. It may be as short as 3 days. If you have repeated UTIs, you may need a low-dose antibiotic for several months. Take antibiotics exactly as directed. Don t stop taking them until all of the medicine is gone. If you stop taking the antibiotic too soon, the infection may not go away. You may also develop a resistance to the antibiotic. This can make it much harder to treat.   Lifestyle changes to treat and prevent UTIs   The lifestyle changes below will help get rid of your UTI. They may also help prevent future UTIs.     Drink plenty of fluids. This includes water, juice, or other caffeine-free drinks. Fluids help flush bacteria out of your body.    Empty your bladder. Always empty your bladder when you feel the urge to pee. And always pee before going to sleep. Urine that stays in your bladder can lead to infection. Try to pee before and after sex as well.    Practice good personal hygiene. Wipe yourself from front to back after using the toilet. This helps keep bacteria from getting into the urethra.    Use condoms during sex. These help  prevent UTIs caused by sexually transmitted bacteria. Also don't use spermicides during sex. These can increase the risk for UTIs. Choose other forms of birth control instead. For women who tend to get UTIs after sex, a low-dose of a preventive antibiotic may be used. Be sure to discuss this option with your healthcare provider.    Follow up with your healthcare provider as directed. He or she may test to make sure the infection has cleared. If needed, more treatment may be started.  [employer]ravani last reviewed this educational content on 7/1/2019 2000-2021 [employer]. All rights reserved. This information is not intended as a substitute for professional medical care. Always follow your healthcare professional's instructions.

## 2022-10-04 ENCOUNTER — TELEPHONE (OUTPATIENT)
Dept: ONCOLOGY | Facility: HOSPITAL | Age: 57
End: 2022-10-04

## 2022-10-11 ENCOUNTER — OFFICE VISIT (OUTPATIENT)
Dept: NEUROSURGERY | Facility: CLINIC | Age: 57
End: 2022-10-11
Payer: COMMERCIAL

## 2022-10-11 VITALS
RESPIRATION RATE: 16 BRPM | DIASTOLIC BLOOD PRESSURE: 83 MMHG | HEART RATE: 70 BPM | SYSTOLIC BLOOD PRESSURE: 152 MMHG | OXYGEN SATURATION: 99 %

## 2022-10-11 DIAGNOSIS — I65.21 CAROTID STENOSIS, RIGHT: Primary | ICD-10-CM

## 2022-10-11 PROCEDURE — 99213 OFFICE O/P EST LOW 20 MIN: CPT | Mod: GC | Performed by: RADIOLOGY

## 2022-10-11 ASSESSMENT — PAIN SCALES - GENERAL: PAINLEVEL: NO PAIN (0)

## 2022-10-11 NOTE — LETTER
10/11/2022       RE: Libertad Russell  [address] [address]     Dear Colleague,    Thank you for referring your patient, Libertad Russell, to the [facility] [place] at [facility]. Please see a copy of my visit note below.                                                                         [facility] [place]  [address]  [address]RD [address]LO[address]  [facility] [address]  Phone: [phone]  Fax: [phone]    Neuro-interventional clinic progress note:    Reason for clinic visit: Carotid stenosis s/p stent      History of present Illness: 56 F hx smoking, HTN, and RA presents for follow up of her R ICA stent. 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 Angiogram demonstrated R ICA pseudo-occlusion. She was treated with a 8x40 mm Precise stent. There was post-stent R ICA collapse distal to the stent at the communicating segment. She denies recurrent stroke symptoms but does note pulsatile tinnitus in the right ear when she is trying to sleep. She quit smoking a year prior but continues to vape, but is working on [employer] back.      Past Medical History:  Past Medical History:   Diagnosis Date     ADHD (attention deficit hyperactivity disorder)      Cerebrovascular accident (CVA) due to stenosis of right carotid artery (H) 9/15/2021     Depressive disorder      HTN (hypertension)      RA (rheumatoid arthritis) (H)      Raynaud disease        Past Surgical History:  Past Surgical History:   Procedure Laterality Date     BREAST BIOPSY, RT/LT  2000, 1998    breast biopsies; reported to be benign     EXCISE MASS AXILLA Right 06/08/2021    Excise right axillary lipoma;  Surgeon: Artis Domínguez MD;  Location: [facility] OR     EXCISE TUMOR, SOFT TISSUE, NECK/THORAX, SUBCUTANEOUS N/A 06/08/2021    EXCISION of lipoma from left  occiput;  Surgeon: Artis Domínguez MD;  Location: [facility] OR     EYE SURGERY      Lasik for vision correction     IR CAROTID CEREBRAL ANGIOGRAM BILATERAL  10/22/2021     LYMPH NODE BIOPSY      cervical lymph node biopsy; told to be benign     [place] BREAST AUGMENTATION Bilateral     breast implants     TUBAL LIGATION       VASCULAR SURGERY      Stent       Social History:  Social History     Socioeconomic History     Marital status: Single     Spouse name: Not on file     Number of children: Not on file     Years of education: Not on file     Highest education level: Not on file   Occupational History     Not on file   Tobacco Use     Smoking status: Former     Packs/day: 1.00     Years: 10.00     Pack years: 10.00     Types: Cigarettes     Quit date: 10/5/2021     Years since quittin.0     Smokeless tobacco: Never   Vaping Use     Vaping Use: Some days   Substance and Sexual Activity     Alcohol use: Not Currently     Comment: occasional     Drug use: No     Sexual activity: Yes     Partners: Male     Birth control/protection: Female Surgical     Comment: tubal   Other Topics Concern     Parent/sibling w/ CABG, MI or angioplasty before 65F 55M? No   Social History Narrative     Not on file     Social Determinants of Health     Financial Resource Strain: Not on file   Food Insecurity: Not on file   Transportation Needs: Not on file   Physical Activity: Not on file   Stress: Not on file   Social Connections: Not on file   Intimate Partner Violence: Not on file   Housing Stability: Not on file       Family History:  Family History   Problem Relation Age of Onset     Hypertension Mother      Cancer Mother         lung cancer     Other Cancer Mother         Lung     Thyroid Disease Mother      C.A.D. Father         52 at first MI     Prostate Cancer Father         Bladder     Hypertension Other      Thyroid Disease Sister      Thyroid Disease Sister      Diabetes No family hx of      Cerebrovascular  Disease No family hx of      Breast Cancer No family hx of      Cancer - colorectal No family hx of        Home Medications:  Current Outpatient Medications   Medication     amphetamine-dextroamphetamine (ADDERALL XR) 20 MG 24 hr capsule     ferrous sulfate (FE TABS) 325 (65 Fe) MG EC tablet     folic acid (FOLVITE) 1 MG tablet     olopatadine (PATADAY) 0.2 % ophthalmic solution     triamcinolone (KENALOG) 0.1 % external cream     amphetamine-dextroamphetamine (ADDERALL) 20 MG tablet     aspirin (ASA) 325 MG tablet     atorvastatin (LIPITOR) 40 MG tablet     clopidogrel (PLAVIX) 75 MG tablet     losartan (COZAAR) 100 MG tablet     methotrexate 2.5 MG tablet     No current facility-administered medications for this visit.     Facility-Administered Medications Ordered in Other Visits   Medication     sodium chloride (PF) 0.9% PF flush 72 mL       Allergies:  No Known Allergies    Physical Examination:  BP (!) 152/83   Pulse 70   Resp 16   LMP 05/20/2021 (Approximate)   SpO2 99%   General: Awake, alert, no acute distress  CVS: Regular rate & rhythm  RS: CTAB  Abdomen: Soft, non-tender  Neurological:  Awake, alert and oriented x3  Cranial Nerves: PERRLA, VFF, EOMI, Facial sensations intact, Face symmetric, hearing normal B/L, palate elevates to midline, shoulder shrug strong B/L, tongue movements intact   Motor: Tone normal. Strenght 5/5 throughout  Sensations: Intact B/L to light touch  Cerebellar signs: FTN/HST intact B/L  Gait: Normal.     Laboratory findings:  Reviewed    Imaging findings:  Carotid ultrasound 8/12/22 stent is patent, improved flow and velocities compared to 10/2021 study    Impression:  R ICA stenosis secondary to presumed dissection s/p stent    Plan:  Repeat carotid ultrasound in 1 year and follow up after  Continue  mg daily from our standpoint. If plavix is needed per other providers for PAD or CAD, it can be continued, however there is not need to continue DAPT for the stent this  long since placement.    Patient seen and discussed with the attending, Dr. Freitas.    Monique Cunha MD  Endovascular Surgical Neuroradiology Fellow, PGY-6  [phone]      Sincerely,    Juan Carlos Freitas MD

## 2022-10-11 NOTE — PATIENT INSTRUCTIONS
Follow up with Dr. Freitas in 1 year with carotid ultrasound    prior    Continue Aspirin     If you have any questions please contact us at [phone], option 3.    Mindy Pal, RN, CNRN, SCRN / Gorge Churchill RN  Stroke & Endovascular Care Coordinators    Thank you for choosing [facility] for your health care needs.

## 2022-10-11 NOTE — PROGRESS NOTES
[facility] NEUROSURGERY CLINIC [address] [address]  Phone: [phone]  Fax: [phone]    Neuro-interventional clinic progress note:    Reason for clinic visit: Carotid stenosis s/p stent      History of present Illness: 56 F hx smoking, HTN, and RA presents for follow up of her R ICA stent. 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 Angiogram demonstrated R ICA pseudo-occlusion. She was treated with a 8x40 mm Precise stent. There was post-stent R ICA collapse distal to the stent at the communicating segment. She denies recurrent stroke symptoms but does note pulsatile tinnitus in the right ear when she is trying to sleep. She quit smoking a year prior but continues to vape, but is working on [employer] back.      Past Medical History:  Past Medical History:   Diagnosis Date     ADHD (attention deficit hyperactivity disorder)      Cerebrovascular accident (CVA) due to stenosis of right carotid artery (H) 9/15/2021     Depressive disorder      HTN (hypertension)      RA (rheumatoid arthritis) (H)      Raynaud disease        Past Surgical History:  Past Surgical History:   Procedure Laterality Date     BREAST BIOPSY, RT/LT  2000, 1998    breast biopsies; reported to be benign     EXCISE MASS AXILLA Right 06/08/2021    Excise right axillary lipoma;  Surgeon: Artis Domínguez MD;  Location:  OR     EXCISE TUMOR, SOFT TISSUE, NECK/THORAX, SUBCUTANEOUS N/A 06/08/2021    EXCISION of lipoma from left occiput;  Surgeon: Artis Domínguez MD;  Location:  OR     EYE SURGERY  2015    Lasik for vision correction     IR CAROTID CEREBRAL ANGIOGRAM BILATERAL  10/22/2021     LYMPH NODE BIOPSY  2001    cervical lymph node biopsy; told to be benign     [place] BREAST AUGMENTATION Bilateral     breast implants     TUBAL LIGATION        VASCULAR SURGERY      Stent       Social History:  Social History     Socioeconomic History     Marital status: Single     Spouse name: Not on file     Number of children: Not on file     Years of education: Not on file     Highest education level: Not on file   Occupational History     Not on file   Tobacco Use     Smoking status: Former     Packs/day: 1.00     Years: 10.00     Pack years: 10.00     Types: Cigarettes     Quit date: 10/5/2021     Years since quittin.0     Smokeless tobacco: Never   Vaping Use     Vaping Use: Some days   Substance and Sexual Activity     Alcohol use: Not Currently     Comment: occasional     Drug use: No     Sexual activity: Yes     Partners: Male     Birth control/protection: Female Surgical     Comment: tubal   Other Topics Concern     Parent/sibling w/ CABG, MI or angioplasty before 65F 55M? No   Social History Narrative     Not on file     Social Determinants of Health     Financial Resource Strain: Not on file   Food Insecurity: Not on file   Transportation Needs: Not on file   Physical Activity: Not on file   Stress: Not on file   Social Connections: Not on file   Intimate Partner Violence: Not on file   Housing Stability: Not on file       Family History:  Family History   Problem Relation Age of Onset     Hypertension Mother      Cancer Mother         lung cancer     Other Cancer Mother         Lung     Thyroid Disease Mother      C.A.D. Father         52 at first MI     Prostate Cancer Father         Bladder     Hypertension Other      Thyroid Disease Sister      Thyroid Disease Sister      Diabetes No family hx of      Cerebrovascular Disease No family hx of      Breast Cancer No family hx of      Cancer - colorectal No family hx of        Home Medications:  Current Outpatient Medications   Medication     amphetamine-dextroamphetamine (ADDERALL XR) 20 MG 24 hr capsule     ferrous sulfate (FE TABS) 325 (65 Fe) MG EC tablet     folic acid (FOLVITE) 1 MG  tablet     olopatadine (PATADAY) 0.2 % ophthalmic solution     triamcinolone (KENALOG) 0.1 % external cream     amphetamine-dextroamphetamine (ADDERALL) 20 MG tablet     aspirin (ASA) 325 MG tablet     atorvastatin (LIPITOR) 40 MG tablet     clopidogrel (PLAVIX) 75 MG tablet     losartan (COZAAR) 100 MG tablet     methotrexate 2.5 MG tablet     No current facility-administered medications for this visit.     Facility-Administered Medications Ordered in Other Visits   Medication     sodium chloride (PF) 0.9% PF flush 72 mL       Allergies:  No Known Allergies    Physical Examination:  BP (!) 152/83   Pulse 70   Resp 16   LMP 05/20/2021 (Approximate)   SpO2 99%   General: Awake, alert, no acute distress  CVS: Regular rate & rhythm  RS: CTAB  Abdomen: Soft, non-tender  Neurological:  Awake, alert and oriented x3  Cranial Nerves: PERRLA, VFF, EOMI, Facial sensations intact, Face symmetric, hearing normal B/L, palate elevates to midline, shoulder shrug strong B/L, tongue movements intact   Motor: Tone normal. Strenght 5/5 throughout  Sensations: Intact B/L to light touch  Cerebellar signs: FTN/HST intact B/L  Gait: Normal.     Laboratory findings:  Reviewed    Imaging findings:  Carotid ultrasound 8/12/22 stent is patent, improved flow and velocities compared to 10/2021 study    Impression:  R ICA stenosis secondary to presumed dissection s/p stent    Plan:  Repeat carotid ultrasound in 1 year and follow up after  Continue  mg daily from our standpoint. If plavix is needed per other providers for PAD or CAD, it can be continued, however there is not need to continue DAPT for the stent this long since placement.    Patient seen and discussed with the attending, Dr. Freitas.    Monique Cunha MD  Endovascular Surgical Neuroradiology Fellow, PGY-6  [phone]

## 2022-11-07 ENCOUNTER — TELEPHONE (OUTPATIENT)
Dept: ONCOLOGY | Facility: HOSPITAL | Age: 57
End: 2022-11-07

## 2022-11-10 ENCOUNTER — MYC MEDICAL ADVICE (OUTPATIENT)
Dept: FAMILY MEDICINE | Facility: CLINIC | Age: 57
End: 2022-11-10

## 2022-11-10 ENCOUNTER — TELEPHONE (OUTPATIENT)
Dept: ONCOLOGY | Facility: HOSPITAL | Age: 57
End: 2022-11-10

## 2022-11-10 DIAGNOSIS — F98.8 ATTENTION DEFICIT DISORDER OF ADULT: ICD-10-CM

## 2022-11-10 NOTE — TELEPHONE ENCOUNTER
ZOILAI  LMTCB & Schedule based on patients Check Out Note.  This will be the final attempt to call the patient. A letter has been mailed out today as well.  The Appt Request order will be removed from the patients appointment desk

## 2022-11-11 RX ORDER — DEXTROAMPHETAMINE SACCHARATE, AMPHETAMINE ASPARTATE, DEXTROAMPHETAMINE SULFATE AND AMPHETAMINE SULFATE 5; 5; 5; 5 MG/1; MG/1; MG/1; MG/1
20 TABLET ORAL DAILY
Qty: 90 TABLET | Refills: 0 | Status: SHIPPED | OUTPATIENT
Start: 2022-11-11 | End: 2022-11-23

## 2022-11-11 RX ORDER — DEXTROAMPHETAMINE SACCHARATE, AMPHETAMINE ASPARTATE MONOHYDRATE, DEXTROAMPHETAMINE SULFATE AND AMPHETAMINE SULFATE 5; 5; 5; 5 MG/1; MG/1; MG/1; MG/1
20 CAPSULE, EXTENDED RELEASE ORAL DAILY
Qty: 90 CAPSULE | Refills: 0 | Status: SHIPPED | OUTPATIENT
Start: 2022-11-11 | End: 2022-12-14

## 2022-11-22 DIAGNOSIS — F98.8 ATTENTION DEFICIT DISORDER OF ADULT: ICD-10-CM

## 2022-11-22 NOTE — TELEPHONE ENCOUNTER
FYI:  Patients insurance only allow a 30 day supply, the remaining 60 will be void.  Patient will need a new RX to get more

## 2022-11-23 DIAGNOSIS — D50.0 IRON DEFICIENCY ANEMIA DUE TO CHRONIC BLOOD LOSS: ICD-10-CM

## 2022-11-23 RX ORDER — DEXTROAMPHETAMINE SACCHARATE, AMPHETAMINE ASPARTATE, DEXTROAMPHETAMINE SULFATE AND AMPHETAMINE SULFATE 5; 5; 5; 5 MG/1; MG/1; MG/1; MG/1
20 TABLET ORAL DAILY
Qty: 30 TABLET | Refills: 0 | Status: SHIPPED | OUTPATIENT
Start: 2022-11-23 | End: 2022-12-13

## 2022-11-23 RX ORDER — FERROUS SULFATE 325(65) MG
TABLET, DELAYED RELEASE (ENTERIC COATED) ORAL
Qty: 90 TABLET | Refills: 1 | Status: SHIPPED | OUTPATIENT
Start: 2022-11-23 | End: 2024-07-17

## 2022-11-28 DIAGNOSIS — M05.741 RHEUMATOID ARTHRITIS INVOLVING BOTH HANDS WITH POSITIVE RHEUMATOID FACTOR (H): ICD-10-CM

## 2022-11-28 DIAGNOSIS — M05.742 RHEUMATOID ARTHRITIS INVOLVING BOTH HANDS WITH POSITIVE RHEUMATOID FACTOR (H): ICD-10-CM

## 2022-11-28 NOTE — TELEPHONE ENCOUNTER
LM for patient.  She should have 2 refills on the RX.        Last Written Prescription Date:  9/29/22  Last Fill Quantity: 24,  # refills: 2  Last office visit: 6/9/2022 with prescribing provider:  Dr Odom   Future Office Visit:   Next 5 appointments (look out 90 days)    Feb 16, 2023  3:00 PM  (Arrive by 2:45 PM)  Return Visit with Butch Odom MD  [facility] ([facility] ) [address] [place] [id number] [phone]         Last labs 9/6/22.    AST 10  ALT 21  Creat 0.54  Albumin 3.8  WBC 5.5  HGB 10.4  hematocrit 32.3

## 2022-11-30 NOTE — TELEPHONE ENCOUNTER
No new care gaps identified.  [facility] Embedded Care Gaps. Reference number: [id number]. 11/30/2022   8:03:50 AM CST   Routing to  to review request for Medical Records.    Rosy Hutchins RN BSN  [facility]

## 2022-12-01 ENCOUNTER — MYC MEDICAL ADVICE (OUTPATIENT)
Dept: RHEUMATOLOGY | Facility: CLINIC | Age: 57
End: 2022-12-01

## 2022-12-01 DIAGNOSIS — M05.741 RHEUMATOID ARTHRITIS INVOLVING BOTH HANDS WITH POSITIVE RHEUMATOID FACTOR (H): ICD-10-CM

## 2022-12-01 DIAGNOSIS — M05.742 RHEUMATOID ARTHRITIS INVOLVING BOTH HANDS WITH POSITIVE RHEUMATOID FACTOR (H): ICD-10-CM

## 2022-12-01 NOTE — TELEPHONE ENCOUNTER
Attempt made to call the patient, unable to leave a message as the mailbox is full.  Will respond via [employer].      Jenniffer Costello RN

## 2022-12-01 NOTE — TELEPHONE ENCOUNTER
Last Written Prescription Date:  9/24/22  Last Fill Quantity: 24,  # refills: 2   Last office visit: 6/9/2022 with prescribing provider:  Dr Odom   Future Office Visit:   Next 5 appointments (look out 90 days)    Feb 16, 2023  3:00 PM  (Arrive by 2:45 PM)  Return Visit with Butch Odom MD  [facility] ([facility] ) [address] [place] [id number] [phone]               Requested Prescriptions   Pending Prescriptions Disp Refills     methotrexate 2.5 MG tablet 24 tablet 2     Sig: Take 8 tablets (20 mg) by mouth every 7 days       There is no refill protocol information for this order          Last labs 9/6/22 CMP, CBC.  RBC 3.75, hgb 10.4, hematocrit 32.3, RDW 16.8.  All other results within normal limits.     Jenniffer Costello RN

## 2022-12-14 DIAGNOSIS — F98.8 ATTENTION DEFICIT DISORDER OF ADULT: ICD-10-CM

## 2022-12-14 RX ORDER — DEXTROAMPHETAMINE SACCHARATE, AMPHETAMINE ASPARTATE MONOHYDRATE, DEXTROAMPHETAMINE SULFATE AND AMPHETAMINE SULFATE 5; 5; 5; 5 MG/1; MG/1; MG/1; MG/1
20 CAPSULE, EXTENDED RELEASE ORAL DAILY
Qty: 30 CAPSULE | Refills: 0 | Status: SHIPPED | OUTPATIENT
Start: 2022-12-14 | End: 2023-01-12

## 2022-12-26 ENCOUNTER — HEALTH MAINTENANCE LETTER (OUTPATIENT)
Age: 57
End: 2022-12-26

## 2023-01-11 ENCOUNTER — MYC MEDICAL ADVICE (OUTPATIENT)
Dept: FAMILY MEDICINE | Facility: CLINIC | Age: 58
End: 2023-01-11

## 2023-01-11 DIAGNOSIS — F98.8 ATTENTION DEFICIT DISORDER OF ADULT: ICD-10-CM

## 2023-01-11 RX ORDER — DEXTROAMPHETAMINE SACCHARATE, AMPHETAMINE ASPARTATE MONOHYDRATE, DEXTROAMPHETAMINE SULFATE AND AMPHETAMINE SULFATE 5; 5; 5; 5 MG/1; MG/1; MG/1; MG/1
20 CAPSULE, EXTENDED RELEASE ORAL DAILY
Qty: 30 CAPSULE | Refills: 0 | Status: CANCELLED | OUTPATIENT
Start: 2023-01-11

## 2023-01-11 NOTE — TELEPHONE ENCOUNTER
Several myCharts on this request.   Followed up in other encounter and requested from provider.   RN closing this encounter.     Lani Agudelo RN on 1/11/2023 at 5:04 PM

## 2023-01-12 ENCOUNTER — MYC MEDICAL ADVICE (OUTPATIENT)
Dept: FAMILY MEDICINE | Facility: CLINIC | Age: 58
End: 2023-01-12

## 2023-01-12 NOTE — TELEPHONE ENCOUNTER
Routed to PCP.   Please see other messages.    Sarah RN,BSN  Triage Nurse  [facility]: [facility]  Ph: [phone]

## 2023-01-13 NOTE — TELEPHONE ENCOUNTER
Per  last filled XR 12/20/22 and IR 12/21/22. Both prescription sent will fill date of 1/20/23    Symone IBRAHIM

## 2023-02-16 ENCOUNTER — OFFICE VISIT (OUTPATIENT)
Dept: RHEUMATOLOGY | Facility: CLINIC | Age: 58
End: 2023-02-16
Payer: COMMERCIAL

## 2023-02-16 VITALS
WEIGHT: 136.5 LBS | HEART RATE: 73 BPM | SYSTOLIC BLOOD PRESSURE: 128 MMHG | OXYGEN SATURATION: 99 % | DIASTOLIC BLOOD PRESSURE: 84 MMHG | BODY MASS INDEX: 22.71 KG/M2

## 2023-02-16 DIAGNOSIS — M05.742 RHEUMATOID ARTHRITIS INVOLVING BOTH HANDS WITH POSITIVE RHEUMATOID FACTOR (H): ICD-10-CM

## 2023-02-16 DIAGNOSIS — M05.741 RHEUMATOID ARTHRITIS INVOLVING BOTH HANDS WITH POSITIVE RHEUMATOID FACTOR (H): ICD-10-CM

## 2023-02-16 PROCEDURE — 99214 OFFICE O/P EST MOD 30 MIN: CPT | Performed by: INTERNAL MEDICINE

## 2023-02-16 RX ORDER — FOLIC ACID 1 MG/1
1 TABLET ORAL DAILY
Qty: 90 TABLET | Refills: 3 | Status: SHIPPED | OUTPATIENT
Start: 2023-02-16 | End: 2023-11-10

## 2023-02-16 ASSESSMENT — PAIN SCALES - GENERAL: PAINLEVEL: NO PAIN (0)

## 2023-02-16 NOTE — PROGRESS NOTES
Rheumatology Clinic Visit     Libertad Russell MRN# [id number]   YOB: 1965 Age: 57 year old     Date of Visit: 02/16/2023  Primary care provider: Symone Acosta          Assessment and Plan:     # Rheumatoid arthritis (RF+, CCP+, dx 8/21) with concomitant hand OA and hx Raynaud's (MOY negative):  Current tx Methotrexate 20 mg weekly (1/2022).  Exam shows left wrist synovial thickening, and bilateral MTP thickening with splaying of the forefoot.    Data:  April 27, 2022 showed ferritin of 6; hemoglobin 9.2 with an MCV of 79.  In February 2022, ALT, AST, and albumin were normal.  Rheumatoid factor was elevated at 58, and cyclic citrullinated peptide antibodies were greater than 340 units.  MOY was negative.  CRP was normal at 4.2 left wrist x-ray done on June 23, 2021 showed severe joint space narrowing with erosions and cysts in the radiocarpal joint.    Discussion: Rheumatoid arthritis with adverse prognostic factors (positive rheumatoid factor, CCP) remains incompletely controlled on methotrexate monotherapy.  Key symptoms include prolonged morning stiffness and bilateral MTP pain that is likely inflammatory in origin.  Seropositivity with rheumatoid factor and ACPA portend a difficult to treat and destructive course.  Methotrexate has helped significantly, but I think that monotherapy is inadequate for the purposes of fully suppressing inflammatory symptoms and optimally reducing the risk of long-term joint damage.  I recommend blood work, and consideration of triple therapy with added hydroxychloroquine and sulfasalazine versus addition of tumor necrosis factor inhibitor in combination with methotrexate.    # Tobacco use: s/p 30 pack-years. smoking cessation reported mid-2021.    Plan:    1) Continue methotrexate 20 mg (8 tablets) weekly    2) folic acid 1 mg daily (this will hopefully stop the hair loss)    3) Labs every 3-4 months (update now)     4) Consider participation in research  trial-- will contact you.     RTC 6 mos    Butch Odom M.D.  Staff Rheumatologist,  Health  Pager [phone]                Active Problem List:     Patient Active Problem List    Diagnosis Date Noted     Atherosclerosis of arteries of extremities (H) 07/19/2022     Priority: Medium     Rheumatoid arthritis involving both hands with positive rheumatoid factor (H) 12/01/2021     Priority: Medium     Stenosis of right carotid artery 12/01/2021     Priority: Medium     Cerebrovascular accident (CVA), unspecified mechanism (H) 10/22/2021     Priority: Medium     Cerebrovascular accident (CVA) due to stenosis of right carotid artery (H) 09/15/2021     Priority: Medium     Paresthesia 06/23/2021     Priority: Medium     Nicotine abuse 06/23/2021     Priority: Medium     Irregular periods/menstrual cycles 06/23/2021     Priority: Medium     Lipoma of right axilla 05/12/2021     Priority: Medium     Added automatically from request for surgery [id number]       Lipoma of neck 05/12/2021     Priority: Medium     Added automatically from request for surgery [id number]       Essential hypertension with goal blood pressure less than 140/90 07/26/2016     Priority: Medium     Raynaud phenomenon 09/24/2012     Priority: Medium     Rosacea 10/18/2011     Priority: Medium     June 22, 2013 improves with metrogel. One year refill.        Attention deficit disorder of adult 09/09/2010     Priority: Medium     June 22, 2013 appears be doing well, benefits from the medication with minimal side effects.  July 2, 2013 patient requesting increased dosage. At her next prescription will try Adderall XR 20 mg am, Adderall, short acting 10 mg, in the afternoon. Available July 10, 2013. Update in one month.   January 16, 2014 Needs appointment for further refills.    September 22, 2019 patient was seen September 18, 2019.  ADD addressed, okay with refills for 6 months.       Seasonal allergic rhinitis 07/11/2008     Priority:  "Medium     Tobacco use disorder 04/11/2007     Priority: Medium     June 22, 2013 will try Chantix. Reviewed side effects including increased depressive symptoms or anxiety, nausea. Reviewed smoking cessation.              History of Present Illness:   Libertad Russell presents for folllowup of inflammatory arthritis.  She was last seen in June 2022.  At that time, improved symptoms of inflammatory arthritis were noted.  Recommendation was to increase methotrexate to a maximum dose of 25 mg weekly, and consideration of additional therapy if arthritis suppression was incomplete.    Interval history February 15, 2023    She notes L > r wrist pain/swelling, often associated with use.    She had a stent placed in a neck artery; workup showed stenosis in cerbrovascular vessels. She had several CVA in summer 2021.  Bottoms of her feet hurt, worse with walking, balls of the feet.   Early morning stiffness, especially in the feet and the wrist, often lasts until 2 p.m. It helps to use a L wrist splint/brace.  There is \"clicking\" in the R shoulder; she had and episode of prolonged shoulder pain.   Continues methotrexate 8 tabs weekly, has not increased to 25 mg weekly as discussed at last visit.    Interval history June 9, 2022    Pt was originally seen in Rheumatology 8/2021 and dx with seropositive erosive RA (dx 8/21, +RF, + CCP at 340, left wrist x-ray showed severe joint space narrowing with erosions and cysts in the radiocarpal joint). Current treatment: Methotrexate 15 mg weekly (1/2022).   Presented with hand, wrist feet pain, swelling and stiffness with synovitis and TTP on exam.  Pt waited to start methotrexate as she had R carotid artery stent placed, father passed and has been seeing neurology for CVA. Of note, pt reports up until 1 yr ago she was in excellent health.  Since starting Methotrexate she has 50% improvement in pain, stiffness and swelling of wrists and feet. Has L wrist/ arm weakness that she " "believes is related to her stroke as it has not changed since then. Is tolerating Methotrexate with mild hair loss. Of note pt quite smoking 10/21 :)     ROS: Has mild diffuse hair loss. Is fatigued in the setting of anemia that seems to be improving with iron. Denies mouth sores, N/V.  Will be having a colonoscopy/ endoscopy in near future to investigate source of blood loss. Denies infections or fevers, blood in stool or diarrhea. Continues to have \"wooshing\" sound in bilateral ears, this has improved 30% since starting methotrexate shes unsure if this is why the improvement or r/t to stent, of note this symptom is worse with laying down.  Has rash on arms, legs, feet that started 10 days ago and after walking through the woods, was seen and dx poison ivy, started topical therapy and is improving. Has poor dentition with broken teeth, plan was to have these removed but now on blood thinner and so not an option currently.     Patient was seen on April 27 by Dr. Davies for iron deficiency anemia.  History of rheumatoid arthritis and started methotrexate therapy 4 months prior to visit was noted.  Iron deficiency anemia with low ferritin was in the impression, and recommendation was to start oral iron replacement for 6 months with follow-up in 2 months for hemoglobin measurement, and recommendation for colonoscopy and upper endoscopy if colonoscopy was negative.  No other signs or symptoms of ulcer disease or active malignancy were noted.    Patient was seen in neurology on February 1, 2022 in follow-up of symptomatic right carotid disease.  History of cerebrovascular accident and stent placement in the right carotid artery was reviewed.  Impression was an left sided neurologic symptoms, likely due to serial small strokes from high-grade obstruction in the right carotid artery.  Left-sided weakness was continuing to improve.  Recommendation was to continue antiplatelet agents (aspirin and Plavix).  Recommendation was " "to keep the stent patent, continue Lipitor, and referral to ENT for opinion on tinnitus.  Referral to occupational therapy for hand strength and fine movement of the hands was recommended.    Hx: 8-2021    Patient was seen by provider Karla in June 2021 for general medical checkup.  History of wrist swelling and pain and constitutional symptoms was elicited.  Swelling at the left wrist was observed, and patient was referred to rheumatology.    Today, she reports that she first had symptoms 18 months ago, when both feet became painful. There was mild visible swelling in the toes. It was hard to walk. Pain receded, but recurred in afew months, and has been \"coming and going\" since. Pain is in the ball of the feet In the mornings it is \"super hard\" to get moving. Pain remits during the day.    Approximately 1 year ago (during pandemic), she noted \"on and off\" wrist pain. She developed a lump on the outside of the left wrist, associated with swelling and warmth. Since then, there has been swelling in both wrists. Pain is most noticeable in morning. Early morning stiffness is ~ 3 hours.    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    There were episodes of L arm and L leg numbness and weakness, starting several weeks ago. She has an MRI brain scheduled.    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           Review of Systems:       Per above          Past Medical History:     Past Medical History:   Diagnosis Date     ADHD (attention deficit hyperactivity disorder)      Cerebrovascular accident (CVA) due to stenosis of right carotid artery (H) 9/15/2021     Depressive disorder      HTN (hypertension)      RA (rheumatoid arthritis) (H)      Raynaud disease      Past Surgical History:   Procedure Laterality Date     BREAST BIOPSY, RT/LT  2000, 1998    breast biopsies; reported to be benign     EXCISE " MASS AXILLA Right 2021    Excise right axillary lipoma;  Surgeon: Artis Domínguez MD;  Location: [facility] OR     EXCISE TUMOR, SOFT TISSUE, NECK/THORAX, SUBCUTANEOUS N/A 2021    EXCISION of lipoma from left occiput;  Surgeon: Artis Domínguez MD;  Location: [facility] OR     EYE SURGERY      Lasik for vision correction     IR CAROTID CEREBRAL ANGIOGRAM BILATERAL  10/22/2021     LYMPH NODE BIOPSY      cervical lymph node biopsy; told to be benign     CT BREAST AUGMENTATION Bilateral     breast implants     TUBAL LIGATION  2003     VASCULAR SURGERY      Stent            Social History:     Social History     Occupational History     Not on file   Tobacco Use     Smoking status: Some Days     Packs/day: 1.00     Years: 10.00     Pack years: 10.00     Types: Cigarettes, Other     Last attempt to quit: 10/5/2021     Years since quittin.3     Smokeless tobacco: Never     Tobacco comments:     Pt vapes 1 per day without nicotine    Vaping Use     Vaping Use: Some days   Substance and Sexual Activity     Alcohol use: Not Currently     Comment: occasional     Drug use: No     Sexual activity: Yes     Partners: Male     Birth control/protection: Female Surgical     Comment: tubal     3 children; all healthy except ADD  Single  No EtOH for about a year.  Smoked 1 ppd X 30 years. Quit 10/2021       Family History:     Family History   Problem Relation Age of Onset     Hypertension Mother      Cancer Mother         lung cancer     Other Cancer Mother         Lung     Thyroid Disease Mother      C.A.D. Father         52 at first MI     Prostate Cancer Father         Bladder     Hypertension Other      Thyroid Disease Sister      Thyroid Disease Sister      Diabetes No family hx of      Cerebrovascular Disease No family hx of      Breast Cancer No family hx of      Cancer - colorectal No family hx of    Mother had wrist pain/swelling; probably diagnosed with RA.         Allergies:   No Known  Allergies         Medications:     Current Outpatient Medications   Medication Sig Dispense Refill     [START ON 2/20/2023] amphetamine-dextroamphetamine (ADDERALL XR) 20 MG 24 hr capsule Take 1 capsule (20 mg) by mouth daily 30 capsule 0     [START ON 2/20/2023] amphetamine-dextroamphetamine (ADDERALL) 20 MG tablet Take 1 tablet (20 mg) by mouth daily Take in the early afternoon. 30 tablet 0     aspirin (ASA) 325 MG tablet Take 1 tablet (325 mg) by mouth daily 90 tablet 3     atorvastatin (LIPITOR) 40 MG tablet Take 1 tablet (40 mg) by mouth daily 90 tablet 3     clopidogrel (PLAVIX) 75 MG tablet Take 1 tablet (75 mg) by mouth daily 90 tablet 3     ferrous sulfate (FE TABS) 325 (65 Fe) MG EC tablet TAKE 1 TABLET(325 MG) BY MOUTH DAILY WITH ORANGE JUICE 90 tablet 1     folic acid (FOLVITE) 1 MG tablet Take 1 tablet (1 mg) by mouth daily 90 tablet 3     losartan (COZAAR) 100 MG tablet Take 1 tablet (100 mg) by mouth daily 90 tablet 3     methotrexate 2.5 MG tablet Take 8 tablets (20 mg) by mouth every 7 days 96 tablet 3     olopatadine (PATADAY) 0.2 % ophthalmic solution Place 0.05 mLs (1 drop) into both eyes daily 2.5 mL 1     triamcinolone (KENALOG) 0.1 % external cream Apply topically 2 times daily 45 g 1   Using adderall for ~ 8 years; ADD symptoms are much improved.         Physical Exam:   Blood pressure 128/84, pulse 73, weight 61.9 kg (136 lb 8 oz), last menstrual period 05/20/2021, SpO2 99 %, not currently breastfeeding.  Wt Readings from Last 6 Encounters:   02/16/23 61.9 kg (136 lb 8 oz)   09/28/22 59.9 kg (132 lb)   09/16/22 59.9 kg (132 lb)   08/16/22 59.9 kg (132 lb)   07/19/22 60.3 kg (133 lb)   06/29/22 59.6 kg (131 lb 6.4 oz)       Constitutional: well-developed, appearing stated age; cooperative  Eyes: nl EOM, PERRLA, vision, conjunctiva, sclera  ENT: nl external ears, nose, hearing, lips, throat.  Multiple back teeth with poor dentition.  No mucous membrane lesions, normal saliva pool  Neck: no  mass or thyroid enlargement  Resp: lungs clear to auscultation with mildly reduced airflow intensity  Lymph: no cervical, supraclavicular  MS:Mild  tenderness at the left wrist with markedly restricted range of motion and swelling at ulnar wrist. Subtle bony enlargement of CMC's and mutliple DIP's suggestive of OA.There is valgus angulation at the left second toe with synovial thickening palpable at several MTPs and aman appearance of MTP's. L 5th MTP thickened/swollen. R 2-3rd MTPs with swelling, -TTP,  plantar displacement of the metatarsal heads.  Skin:   Neuro: strength.   Psych: nl judgement, orientation, memory, affect.         Data:     @  RHEUM RESULTS Latest Ref Rng & Units 6/29/2022 7/19/2022 9/6/2022   ALBUMIN 3.4 - 5.0 g/dL - 3.8 3.8   ALT 0 - 50 U/L - 20 21   AST 0 - 45 U/L - 7 10   MOY INTERPRETATION NEG:Negative - - -   CREATININE 0.52 - 1.04 mg/dL - 0.64 0.54   CRP 0.0 - 8.0 mg/L - - -   ANDREY 0 - 1.0 - - -   GFR ESTIMATE, IF BLACK >60 mL/min/[1.73:m2] - - -   GFR ESTIMATE >60 mL/min/1.73m2 - >90 >90   HEMATOCRIT 35.0 - 47.0 % 34.7(L) 34.6(L) 32.3(L)   HEMOGLOBIN 11.7 - 15.7 g/dL 10.8(L) 11.0(L) 10.4(L)   HCVAB NR:Nonreactive - - -   WBC 4.0 - 11.0 10e3/uL 7.0 5.8 5.5   RBC 3.80 - 5.20 10e6/uL 4.05 4.03 3.75(L)   RDW 10.0 - 15.0 % 19.1(H) 18.6(H) 16.8(H)   MCHC 31.5 - 36.5 g/dL 31.1(L) 31.8 32.2   MCV 78 - 100 fL 86 86 86   PLATELET COUNT 150 - 450 10e3/uL 323 329 290   RHEUMATOID FACTOR <12 IU/mL - - -   ESR 0 - 30 mm/h - - -       Rheumatoid Factor   Date Value Ref Range Status   06/30/2021 58 (H) <12 IU/mL Final   ,   Cyclic Citrullinated Peptide Antibody, IgG   Date Value Ref Range Status   06/30/2021 >340 (H) <7 U/mL Final   ,  ,  ,  ,  ,   MOY interpretation   Date Value Ref Range Status   06/30/2021 Negative NEG^Negative Final     Comment:                                        Reference range:  <1:40  NEGATIVE  1:40 - 1:80  BORDERLINE POSITIVE  >1:80 POSITIVE     ,   MOY Screen by EIA   Date  Value Ref Range Status   07/30/2010 <1.0  Interpretation:  Negative 0 - 1.0 Final   ,  ,  ,  ,  ,  ,  ,  ,  ,  ,  ,  ,  ,  ,  ,  ,  ,  ,  ,  ,  ,  ,  ,  ,  ,  ,  ,  ,  ,  ,  ,  ,  ,  ,  ,  ,  ,       Reviewed Rheumatology lab flowsheet

## 2023-02-16 NOTE — PATIENT INSTRUCTIONS
Dx: Rheumatoid arthritis is improved, but still active with methotrexate    1) Continue methotrexate 20 mg (8 tablets) weekly    2) folic acid 1 mg daily (this will hopefully stop the hair loss)    3) Labs every 3-4 months (update now)     4) Consider participation in research trial-- will contact you.

## 2023-03-02 ENCOUNTER — MYC MEDICAL ADVICE (OUTPATIENT)
Dept: FAMILY MEDICINE | Facility: CLINIC | Age: 58
End: 2023-03-02
Payer: COMMERCIAL

## 2023-03-02 DIAGNOSIS — F98.8 ATTENTION DEFICIT DISORDER OF ADULT: ICD-10-CM

## 2023-03-03 RX ORDER — DEXTROAMPHETAMINE SACCHARATE, AMPHETAMINE ASPARTATE, DEXTROAMPHETAMINE SULFATE AND AMPHETAMINE SULFATE 5; 5; 5; 5 MG/1; MG/1; MG/1; MG/1
20 TABLET ORAL DAILY
Qty: 30 TABLET | Refills: 0 | Status: SHIPPED | OUTPATIENT
Start: 2023-03-03 | End: 2023-04-16

## 2023-03-03 RX ORDER — DEXTROAMPHETAMINE SACCHARATE, AMPHETAMINE ASPARTATE MONOHYDRATE, DEXTROAMPHETAMINE SULFATE AND AMPHETAMINE SULFATE 5; 5; 5; 5 MG/1; MG/1; MG/1; MG/1
20 CAPSULE, EXTENDED RELEASE ORAL DAILY
Qty: 30 CAPSULE | Refills: 0 | Status: SHIPPED | OUTPATIENT
Start: 2023-03-03 | End: 2023-04-16

## 2023-03-03 NOTE — TELEPHONE ENCOUNTER
See MyChart messages from patient, she says she has not had adderall filled for 2 months as it was out of stock.    I see both long and short acting Rx were sent to [facility] 2/20/23.      I called pharmacy.   Per :    1/20/23 was last fill of the XR 20.    12/21/22 was last fill of the short acting.    [facility] does not have this in stock.    Routed to PCP/covering provider pool, patient is requesting Rx's be re-sent to [facility] in [place]; lillian Vazquez, RN  [facility]

## 2023-03-14 ENCOUNTER — OFFICE VISIT (OUTPATIENT)
Dept: OPTOMETRY | Facility: CLINIC | Age: 58
End: 2023-03-14
Payer: COMMERCIAL

## 2023-03-14 DIAGNOSIS — H52.4 PRESBYOPIA: ICD-10-CM

## 2023-03-14 DIAGNOSIS — Z98.890 S/P LASIK SURGERY: ICD-10-CM

## 2023-03-14 DIAGNOSIS — H52.223 REGULAR ASTIGMATISM OF BOTH EYES: ICD-10-CM

## 2023-03-14 DIAGNOSIS — H52.13 MYOPIA OF BOTH EYES: ICD-10-CM

## 2023-03-14 DIAGNOSIS — Z46.0 CONTACT LENS/GLASSES FITTING: ICD-10-CM

## 2023-03-14 DIAGNOSIS — Z01.01 ENCOUNTER FOR EXAMINATION OF EYES AND VISION WITH ABNORMAL FINDINGS: Primary | ICD-10-CM

## 2023-03-14 PROCEDURE — 92310 CONTACT LENS FITTING OU: CPT | Mod: GA | Performed by: OPTOMETRIST

## 2023-03-14 PROCEDURE — 92004 COMPRE OPH EXAM NEW PT 1/>: CPT | Performed by: OPTOMETRIST

## 2023-03-14 PROCEDURE — 92015 DETERMINE REFRACTIVE STATE: CPT | Performed by: OPTOMETRIST

## 2023-03-14 ASSESSMENT — REFRACTION_MANIFEST
OD_CYLINDER: +1.25
OS_SPHERE: -2.00
OD_ADD: +2.25
OS_AXIS: 085
OS_SPHERE: -2.00
OD_CYLINDER: +1.25
OD_SPHERE: -0.75
OD_SPHERE: -1.00
OS_ADD: +2.25
OD_AXIS: 087
OS_AXIS: 095
METHOD_AUTOREFRACTION: 1
OS_CYLINDER: +0.75
OS_CYLINDER: +0.75
OD_AXIS: 078

## 2023-03-14 ASSESSMENT — VISUAL ACUITY
METHOD: SNELLEN - LINEAR
OS_SC+: -1
OS_SC: 20/80
OS_SC: 20/30-1
OD_SC: 20/30
OD_SC+: +2
OD_SC: 20/120

## 2023-03-14 ASSESSMENT — REFRACTION_CURRENTRX
OD_BRAND: J&J 1-DAY ACUVUE MOIST FOR ASTIGMATISM BC 8.5, D 14.5
OD_CYLINDER: -1.25
OS_CYLINDER: -0.75
OD_BRAND: B&L BIOTRUE ONE DAY FOR ASTIGMATISM BC 8.4,  D 14.5
OS_AXIS: 180
OD_AXIS: 180
OS_BRAND: B&L BIOTRUE ONE DAY FOR ASTIGMATISM BC 8.4,  D 14.5
OD_SPHERE: PLANO
OD_SPHERE: PLANO
OD_BRAND: ALCON AIR OPTIX PLUS HYDRAGLYDE FOR ASTIGMATISM BC 8.7, D 14.5
OD_AXIS: 180
OS_SPHERE: +1.00
OS_BRAND: J&J 1-DAY ACUVUE MOIST FOR ASTIGMATISM BC 8.5, D 14.5
OD_CYLINDER: -1.25
OD_AXIS: 180
OS_SPHERE: +1.00
OS_BRAND: ALCON AIR OPTIX PLUS HYDRAGLYDE FOR ASTIGMATISM BC 8.7, D 14.5
OS_AXIS: 180
OS_CYLINDER: -0.75
OD_CYLINDER: -1.25
OS_AXIS: 180
OS_CYLINDER: -0.75
OS_SPHERE: +1.00
OD_SPHERE: PLANO

## 2023-03-14 ASSESSMENT — KERATOMETRY
OS_AXISANGLE_DEGREES: 083
OD_AXISANGLE_DEGREES: 080
OD_K1POWER_DIOPTERS: 42.00
OS_AXISANGLE2_DEGREES: 173
OS_K1POWER_DIOPTERS: 43.25
OD_K2POWER_DIOPTERS: 43.25
OS_K2POWER_DIOPTERS: 44.25
OD_AXISANGLE2_DEGREES: 170

## 2023-03-14 ASSESSMENT — CONF VISUAL FIELD
OD_SUPERIOR_TEMPORAL_RESTRICTION: 0
OS_SUPERIOR_NASAL_RESTRICTION: 0
OS_NORMAL: 1
OS_INFERIOR_NASAL_RESTRICTION: 0
OD_SUPERIOR_NASAL_RESTRICTION: 0
OD_NORMAL: 1
OD_INFERIOR_NASAL_RESTRICTION: 0
METHOD: COUNTING FINGERS
OS_INFERIOR_TEMPORAL_RESTRICTION: 0
OD_INFERIOR_TEMPORAL_RESTRICTION: 0
OS_SUPERIOR_TEMPORAL_RESTRICTION: 0

## 2023-03-14 ASSESSMENT — TONOMETRY
IOP_METHOD: APPLANATION
OS_IOP_MMHG: 12
OD_IOP_MMHG: 11

## 2023-03-14 ASSESSMENT — EXTERNAL EXAM - LEFT EYE: OS_EXAM: NORMAL

## 2023-03-14 ASSESSMENT — EXTERNAL EXAM - RIGHT EYE: OD_EXAM: NORMAL

## 2023-03-14 ASSESSMENT — CUP TO DISC RATIO
OS_RATIO: 0.2
OD_RATIO: 0.2

## 2023-03-14 ASSESSMENT — SLIT LAMP EXAM - LIDS
COMMENTS: NORMAL
COMMENTS: NORMAL

## 2023-03-14 NOTE — PROGRESS NOTES
Chief Complaint   Patient presents with     Annual Eye Exam      -S/P Lasik each eye -monovision ~2015 - Dr. Auguste    -Had 2 strokes in 2021 - July and August - noticing some lingering effects from those - doctors were very concerned about location of strokes - she states they must have been very close to affecting her vision      Last Eye Exam: 2022 [facility]   Dilated Previously: Yes, side effects of dilation explained today    What are you currently using to see?  does not use glasses or contacts - got a pair of PAL's from [facility] after last eye exam but she reports she can not see anything out of them - forgot to bring them with her today        Distance Vision Acuity: Noticed gradual change in both eyes - ongoing for 6-12 mos    Near Vision Acuity: Not satisfied - has never struggled since lasik but is now noticing she has a harder time on computer     Eye Comfort: good - occasional allergy symptoms  Do you use eye drops? : No  Occupation or Hobbies:  - lots of screen time     [facility]        Medical, surgical and family histories reviewed and updated 3/14/2023.       OBJECTIVE: See Ophthalmology exam    ASSESSMENT:    ICD-10-CM    1. Encounter for examination of eyes and vision with abnormal findings  Z01.01       2. S/P LASIK surgery  Z98.890       3. Myopia of both eyes  H52.13       4. Regular astigmatism of both eyes  H52.223       5. Presbyopia  H52.4       6. Contact lens/glasses fitting  Z46.0           PLAN:     Patient Instructions   Separate distance and computer glasses prescriptions provided today.     Begin trial of Air Optix Astigmatism contact lenses.   Maximum wear-time of 12 hours/day of the contact lenses.   Clean the lenses nightly in contact lens solution.   No sleeping or swimming in the contact lenses.     We will order in some daily trials for you, as well. Once the lenses arrive to our clinic we will call you to set up a follow-up appointment.       The  effects of the dilating drops last for 4- 6 hours.  You will be more sensitive to light and vision will be blurry up close.  Mydriatic sunglasses were given if needed.     Johnathan Guardado, AIDAN  [address]  [place]  [address]    [phone]

## 2023-03-14 NOTE — PATIENT INSTRUCTIONS
Separate distance and computer glasses prescriptions provided today.     Begin trial of Air Optix Astigmatism contact lenses.   Maximum wear-time of 12 hours/day of the contact lenses.   Clean the lenses nightly in contact lens solution.   No sleeping or swimming in the contact lenses.     We will order in some daily trials for you, as well. Once the lenses arrive to our clinic we will call you to set up a follow-up appointment.       The effects of the dilating drops last for 4- 6 hours.  You will be more sensitive to light and vision will be blurry up close.  Mydriatic sunglasses were given if needed.     Johnathan Guardado, OD  [address] [place]  [address] [phone]

## 2023-03-14 NOTE — LETTER
3/14/2023         RE: Libertad Russell  [address] [address]        Dear Colleague,    Thank you for referring your patient, Libertad Russell, to the [facility]. Please see a copy of my visit note below.    Chief Complaint   Patient presents with     Annual Eye Exam      -S/P Lasik each eye -monovision ~2015 - Dr. Auguste    -Had 2 strokes in 2021 - July and August - noticing some lingering effects from those - doctors were very concerned about location of strokes - she states they must have been very close to affecting her vision      Last Eye Exam: 2022 [facility]   Dilated Previously: Yes, side effects of dilation explained today    What are you currently using to see?  does not use glasses or contacts - got a pair of PAL's from [facility] after last eye exam but she reports she can not see anything out of them - forgot to bring them with her today        Distance Vision Acuity: Noticed gradual change in both eyes - ongoing for 6-12 mos    Near Vision Acuity: Not satisfied - has never struggled since lasik but is now noticing she has a harder time on computer     Eye Comfort: good - occasional allergy symptoms  Do you use eye drops? : No  Occupation or Hobbies:  - lots of screen time     Nataliia Mo        Medical, surgical and family histories reviewed and updated 3/14/2023.       OBJECTIVE: See Ophthalmology exam    ASSESSMENT:    ICD-10-CM    1. Encounter for examination of eyes and vision with abnormal findings  Z01.01       2. S/P LASIK surgery  Z98.890       3. Myopia of both eyes  H52.13       4. Regular astigmatism of both eyes  H52.223       5. Presbyopia  H52.4       6. Contact lens/glasses fitting  Z46.0           PLAN:     Patient Instructions   Separate distance and computer glasses prescriptions provided today.     Begin trial of Air Optix Astigmatism contact lenses.   Maximum wear-time of 12 hours/day of the contact lenses.   Clean  the lenses nightly in contact lens solution.   No sleeping or swimming in the contact lenses.     We will order in some daily trials for you, as well. Once the lenses arrive to our clinic we will call you to set up a follow-up appointment.       The effects of the dilating drops last for 4- 6 hours.  You will be more sensitive to light and vision will be blurry up close.  Mydriatic sunglasses were given if needed.     Johnathan Guardado OD  [address] [place]  [address]    [phone]            Again, thank you for allowing me to participate in the care of your patient.        Sincerely,        Johnathan Guarddao OD

## 2023-03-28 ENCOUNTER — TELEPHONE (OUTPATIENT)
Dept: OPTOMETRY | Facility: CLINIC | Age: 58
End: 2023-03-28
Payer: COMMERCIAL

## 2023-03-28 NOTE — TELEPHONE ENCOUNTER
Left message for patient to schedule CL dispense with Dr. Guardado.     Both pairs of trials he ordered for her came in 3/28/23.     Schedule at pt convenience.     Nataliia  Optometry Assistant  [phone]

## 2023-04-25 ENCOUNTER — LAB (OUTPATIENT)
Dept: LAB | Facility: CLINIC | Age: 58
End: 2023-04-25
Payer: COMMERCIAL

## 2023-04-25 DIAGNOSIS — M05.742 RHEUMATOID ARTHRITIS INVOLVING BOTH HANDS WITH POSITIVE RHEUMATOID FACTOR (H): ICD-10-CM

## 2023-04-25 DIAGNOSIS — M05.741 RHEUMATOID ARTHRITIS INVOLVING BOTH HANDS WITH POSITIVE RHEUMATOID FACTOR (H): ICD-10-CM

## 2023-04-25 LAB
BASOPHILS # BLD AUTO: 0.1 10E3/UL (ref 0–0.2)
BASOPHILS NFR BLD AUTO: 1 %
EOSINOPHIL # BLD AUTO: 0.3 10E3/UL (ref 0–0.7)
EOSINOPHIL NFR BLD AUTO: 4 %
ERYTHROCYTE [DISTWIDTH] IN BLOOD BY AUTOMATED COUNT: 14.8 % (ref 10–15)
HCT VFR BLD AUTO: 36.5 % (ref 35–47)
HGB BLD-MCNC: 12.8 G/DL (ref 11.7–15.7)
LYMPHOCYTES # BLD AUTO: 1.4 10E3/UL (ref 0.8–5.3)
LYMPHOCYTES NFR BLD AUTO: 22 %
MCH RBC QN AUTO: 31.4 PG (ref 26.5–33)
MCHC RBC AUTO-ENTMCNC: 35.1 G/DL (ref 31.5–36.5)
MCV RBC AUTO: 90 FL (ref 78–100)
MONOCYTES # BLD AUTO: 0.4 10E3/UL (ref 0–1.3)
MONOCYTES NFR BLD AUTO: 6 %
NEUTROPHILS # BLD AUTO: 4.3 10E3/UL (ref 1.6–8.3)
NEUTROPHILS NFR BLD AUTO: 66 %
PLATELET # BLD AUTO: 376 10E3/UL (ref 150–450)
RBC # BLD AUTO: 4.07 10E6/UL (ref 3.8–5.2)
WBC # BLD AUTO: 6.4 10E3/UL (ref 4–11)

## 2023-04-25 PROCEDURE — 36415 COLL VENOUS BLD VENIPUNCTURE: CPT

## 2023-04-25 PROCEDURE — 86140 C-REACTIVE PROTEIN: CPT

## 2023-04-25 PROCEDURE — 85025 COMPLETE CBC W/AUTO DIFF WBC: CPT

## 2023-04-25 PROCEDURE — 80053 COMPREHEN METABOLIC PANEL: CPT

## 2023-04-26 LAB
ALBUMIN SERPL-MCNC: 3.8 G/DL (ref 3.4–5)
ALP SERPL-CCNC: 95 U/L (ref 40–150)
ALT SERPL W P-5'-P-CCNC: 33 U/L (ref 0–50)
ANION GAP SERPL CALCULATED.3IONS-SCNC: 2 MMOL/L (ref 3–14)
AST SERPL W P-5'-P-CCNC: 20 U/L (ref 0–45)
BILIRUB SERPL-MCNC: 0.3 MG/DL (ref 0.2–1.3)
BUN SERPL-MCNC: 17 MG/DL (ref 7–30)
CALCIUM SERPL-MCNC: 8.9 MG/DL (ref 8.5–10.1)
CHLORIDE BLD-SCNC: 108 MMOL/L (ref 94–109)
CO2 SERPL-SCNC: 30 MMOL/L (ref 20–32)
CREAT SERPL-MCNC: 0.56 MG/DL (ref 0.52–1.04)
CRP SERPL-MCNC: <2.9 MG/L (ref 0–8)
GFR SERPL CREATININE-BSD FRML MDRD: >90 ML/MIN/1.73M2
GLUCOSE BLD-MCNC: 101 MG/DL (ref 70–99)
POTASSIUM BLD-SCNC: 4.4 MMOL/L (ref 3.4–5.3)
PROT SERPL-MCNC: 7.3 G/DL (ref 6.8–8.8)
SODIUM SERPL-SCNC: 140 MMOL/L (ref 133–144)

## 2023-04-27 ENCOUNTER — TELEPHONE (OUTPATIENT)
Dept: RHEUMATOLOGY | Facility: CLINIC | Age: 58
End: 2023-04-27
Payer: COMMERCIAL

## 2023-04-27 DIAGNOSIS — M05.742 RHEUMATOID ARTHRITIS INVOLVING BOTH HANDS WITH POSITIVE RHEUMATOID FACTOR (H): ICD-10-CM

## 2023-04-27 DIAGNOSIS — M05.741 RHEUMATOID ARTHRITIS INVOLVING BOTH HANDS WITH POSITIVE RHEUMATOID FACTOR (H): ICD-10-CM

## 2023-04-27 NOTE — TELEPHONE ENCOUNTER
[facility] Call Center    Phone Message    May a detailed message be left on voicemail: yes     Reason for Call: Medication Question or concern regarding medication   Prescription Clarification  Name of Medication: methotrexate  Prescribing Provider: Dr. Odom   Pharmacy: [facility]   What on the order needs clarification? Calling to clarify the directions and dose.          Action Taken: Other: CS Rheum    Travel Screening: Not Applicable

## 2023-04-27 NOTE — TELEPHONE ENCOUNTER
"[facility]    Phone Message    May a detailed message be left on voicemail: yes     Reason for Call: Medication Question or concern regarding medication   Prescription Clarification  Name of Medication: Methotrexate  Prescribing Provider: Dr. Butch Odom   [facility]   What on the order needs clarification? Call received from pharmacy because they received a refill request from pt (says she is completely out), but it is too soon to fill. Pharmacy reports that when pt picked her last refill up at the beginning of April, they had confirmed with her that her Rx is for 8 tablets weekly--pt has responded that she was actually taking 10 tablets weekly, and that she would be contacting our office regarding that discrepancy.     Per 6/9/22 OV notes from Dr. Odom:   \"Will first attempt to max out methotrexate will increase to 20 mg weekly and plan to increase to 25 mg weekly at next visit if active dz present.\"    Per 2/16/23 OV notes from Dr. Odom:   \"Continues methotrexate 8 tabs weekly, has not increased to 25 mg weekly as discussed at last visit.\"     Pharmacist believes pt was under the impression she was supposed to increase this after her last appt. Pt not responded to multiple calls/messages from pharmacy.       Action Taken: Message routed to:  Other: [facility] RHEUMATOLOGY    Travel Screening: Not Applicable  "

## 2023-05-02 NOTE — TELEPHONE ENCOUNTER
Patient states she is taking 10 tablets of methotrexate weekly, not 8 as the note on 2/16/23 states.  She is requesting a new RX.  RX is pended.     Jenniffer Costello RN

## 2023-05-15 ENCOUNTER — VIRTUAL VISIT (OUTPATIENT)
Dept: FAMILY MEDICINE | Facility: CLINIC | Age: 58
End: 2023-05-15
Payer: COMMERCIAL

## 2023-05-15 DIAGNOSIS — F98.8 ATTENTION DEFICIT DISORDER OF ADULT: ICD-10-CM

## 2023-05-15 DIAGNOSIS — F41.9 ANXIETY: Primary | ICD-10-CM

## 2023-05-15 PROCEDURE — 99214 OFFICE O/P EST MOD 30 MIN: CPT | Mod: VID | Performed by: NURSE PRACTITIONER

## 2023-05-15 RX ORDER — DEXTROAMPHETAMINE SACCHARATE, AMPHETAMINE ASPARTATE MONOHYDRATE, DEXTROAMPHETAMINE SULFATE AND AMPHETAMINE SULFATE 5; 5; 5; 5 MG/1; MG/1; MG/1; MG/1
20 CAPSULE, EXTENDED RELEASE ORAL DAILY
Qty: 30 CAPSULE | Refills: 0 | Status: SHIPPED | OUTPATIENT
Start: 2023-05-15 | End: 2023-06-11

## 2023-05-15 RX ORDER — DEXTROAMPHETAMINE SACCHARATE, AMPHETAMINE ASPARTATE, DEXTROAMPHETAMINE SULFATE AND AMPHETAMINE SULFATE 5; 5; 5; 5 MG/1; MG/1; MG/1; MG/1
TABLET ORAL
Qty: 30 TABLET | Refills: 0 | Status: SHIPPED | OUTPATIENT
Start: 2023-05-15 | End: 2023-06-11

## 2023-05-15 RX ORDER — BUPROPION HYDROCHLORIDE 100 MG/1
TABLET, EXTENDED RELEASE ORAL
Qty: 194 TABLET | Refills: 0 | Status: SHIPPED | OUTPATIENT
Start: 2023-05-15 | End: 2023-08-08

## 2023-05-15 ASSESSMENT — ENCOUNTER SYMPTOMS
NERVOUS/ANXIOUS: 1
DECREASED CONCENTRATION: 1

## 2023-05-15 NOTE — PATIENT INSTRUCTIONS
I have placed your prescriptions at the  for you you may pick them up at your convenience.    Please send me a [employer] message in 2 months with an update on how you are doing.

## 2023-05-15 NOTE — PROGRESS NOTES
Libertad is a 57 year old who is being evaluated via a billable video visit.      How would you like to obtain your AVS? [employer]  If the video visit is dropped, the invitation should be resent by: Text to cell phone: [phone]  Will anyone else be joining your video visit? No        Assessment & Plan     Attention deficit disorder of adult   reviewed.  Discussion held-increase Adderall or add additional medication to help with concentration.  We will plan to continue Adderall at current dose.  We will plan to start bupropion.  Educated on use and possible side effects.  To send [employer] message in 2 months with update.  - buPROPion (WELLBUTRIN SR) 100 MG 12 hr tablet; Take 1 tablet (100 mg) by mouth At Bedtime for 14 days, THEN 1 tablet (100 mg) 2 times daily for 90 days.  - amphetamine-dextroamphetamine (ADDERALL XR) 20 MG 24 hr capsule; Take 1 capsule (20 mg) by mouth daily  - amphetamine-dextroamphetamine (ADDERALL) 20 MG tablet; Take 1 tab daily in the PM    Anxiety  Treatment plan and medications reviewed and understood by the patient   Risks, benefits and potential side effects (including sexual dysfunction and suicidal thoughs) of antidepressant/antianxiety medication reviewed with patient  Reviewed the importance of medication compliance  Instructed to call or return with:  Worsening symptoms  Suicidal/homicidial ideation  Hallucinations or Delusions  Medication side effects   Plan to follow up in 2 months  - buPROPion (WELLBUTRIN SR) 100 MG 12 hr tablet; Take 1 tablet (100 mg) by mouth At Bedtime for 14 days, THEN 1 tablet (100 mg) 2 times daily for 90 days.      33 minutes spent by me on the date of the encounter doing chart review, history and exam, documentation and further activities per the note       SULY Melvin [facility] KARLIE Maurer is a 57 year old, presenting for the following health issues:  Video Visit      History of Present Illness        Reason for visit:  Medication review    She eats 0-1 servings of fruits and vegetables daily.She consumes 1 sweetened beverage(s) daily.She exercises with enough effort to increase her heart rate 10 to 19 minutes per day.  She exercises with enough effort to increase her heart rate 4 days per week.   She is taking medications regularly.       Medication Followup of Adderall 20mg XR and Adderall 20mg    Taking Medication as prescribed: yes    Side Effects:  None    Medication Helping Symptoms:                                                                                                           Some-                                                                        Never   Rarely      times       Often  Very Often  1. How often do you have trouble wrapping up the final details of a project,  once the challenging parts have been done?     x       2. How often do you have difficulty getting things in order when you have to do a task that requires organization?     x       3. How often do you have problems remembering appointments or obligations?   x         4. When you have a task that requires a lot of thought, how often do you avoid or delay getting started?     x       5. How often do you fidget or squirm with your hands or feet when you have to sit down for a long time? x           6. How often do you feel overly active and compelled to do things, like you were driven by a motor? x             Part A                                                             7. How often do you make careless mistakes when you have to work on a boring or difficult project?     x       8. How often do you have difficulty keeping your attention when you are doing boring or repetitive work?     x       9. How often do you have difficulty concentrating on what people say to you, even when they are speaking to you directly?   x         10. How often do you misplace or have difficulty finding things at home or at work?      x       11. How often are you distracted by activity or noise around you?     x       12. How often do you leave your seat in meetings or other situations in which you are expected to remain seated?    x           13. How often do you feel restless or fidgety?    x           14. How often do you have difficulty unwinding and relaxing when you have time to yourself? x           15. How often do you find yourself talking too much when you are in social situations? x           16. When you're in a conversation, how often do you find yourself finishing the sentences of the people you are talking to, before they can finish them themselves?   x         17. How often do you have difficulty waiting your turn in situations when turn-taking is required? x           18. How often do you interrupt others when they are busy?   x                         Video visit completed.     Needs refills of adderall. Feels like is not working as well as used to. Focus is just not as good. 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 Has hard time staying focused. When is at work is very chaotic. Feels like focus is some related to stroke as well. Getting out of the house is really hard. Today when went to work ended up turning around and coming back home 3 times. Forgets to turn off curling iron, lock door etc.  This has been ongoing for the last 1 to 2 months.  Has upcoming appointment with neurologist.  Is hearing whooshing sound again.  Is feeling more anxious especially on days that has to leave the house to go to work.    Did make appointment due to hearing whooshing sound again.       Review of Systems   Psychiatric/Behavioral: Positive for decreased concentration. The patient is nervous/anxious.           Objective           Vitals:  No vitals were obtained today due to virtual visit.    Physical Exam  Constitutional:       Appearance: Normal appearance.   HENT:      Nose: No congestion.   Eyes:      General: Lids are  normal.   Pulmonary:      Effort: No tachypnea, bradypnea or respiratory distress.   Skin:     Coloration: Skin is not ashen, cyanotic, jaundiced or pale.   Neurological:      Mental Status: She is alert.   Psychiatric:         Mood and Affect: Mood normal.         Speech: Speech normal.         Behavior: Behavior normal.              Video-Visit Details    Type of service:  Video Visit     Originating Location (pt. Location): Home  Distant Location (provider location):  On-site  Platform used for Video Visit: [employer]

## 2023-06-11 ENCOUNTER — MYC REFILL (OUTPATIENT)
Dept: FAMILY MEDICINE | Facility: CLINIC | Age: 58
End: 2023-06-11
Payer: COMMERCIAL

## 2023-06-11 DIAGNOSIS — F98.8 ATTENTION DEFICIT DISORDER OF ADULT: ICD-10-CM

## 2023-06-12 RX ORDER — DEXTROAMPHETAMINE SACCHARATE, AMPHETAMINE ASPARTATE, DEXTROAMPHETAMINE SULFATE AND AMPHETAMINE SULFATE 5; 5; 5; 5 MG/1; MG/1; MG/1; MG/1
TABLET ORAL
Qty: 30 TABLET | Refills: 0 | Status: SHIPPED | OUTPATIENT
Start: 2023-06-12 | End: 2023-07-17

## 2023-06-12 RX ORDER — DEXTROAMPHETAMINE SACCHARATE, AMPHETAMINE ASPARTATE MONOHYDRATE, DEXTROAMPHETAMINE SULFATE AND AMPHETAMINE SULFATE 5; 5; 5; 5 MG/1; MG/1; MG/1; MG/1
20 CAPSULE, EXTENDED RELEASE ORAL DAILY
Qty: 30 CAPSULE | Refills: 0 | Status: SHIPPED | OUTPATIENT
Start: 2023-06-12 | End: 2023-07-17

## 2023-06-28 NOTE — TELEPHONE ENCOUNTER
Duplicate medication request. Medication was filled 3/21/22 and Receipt confirmed by pharmacy (3/21/2022  1:25 PM CDT).     [place] Pierce RN   [facility]     
DISPLAY PLAN FREE TEXT

## 2023-07-17 ENCOUNTER — MYC REFILL (OUTPATIENT)
Dept: FAMILY MEDICINE | Facility: CLINIC | Age: 58
End: 2023-07-17
Payer: COMMERCIAL

## 2023-07-17 DIAGNOSIS — F98.8 ATTENTION DEFICIT DISORDER OF ADULT: ICD-10-CM

## 2023-07-17 RX ORDER — DEXTROAMPHETAMINE SACCHARATE, AMPHETAMINE ASPARTATE, DEXTROAMPHETAMINE SULFATE AND AMPHETAMINE SULFATE 5; 5; 5; 5 MG/1; MG/1; MG/1; MG/1
TABLET ORAL
Qty: 30 TABLET | Refills: 0 | Status: SHIPPED | OUTPATIENT
Start: 2023-07-17 | End: 2023-08-08

## 2023-07-17 RX ORDER — DEXTROAMPHETAMINE SACCHARATE, AMPHETAMINE ASPARTATE MONOHYDRATE, DEXTROAMPHETAMINE SULFATE AND AMPHETAMINE SULFATE 5; 5; 5; 5 MG/1; MG/1; MG/1; MG/1
20 CAPSULE, EXTENDED RELEASE ORAL DAILY
Qty: 30 CAPSULE | Refills: 0 | Status: SHIPPED | OUTPATIENT
Start: 2023-07-17 | End: 2023-08-08

## 2023-08-07 NOTE — PROGRESS NOTES
[facility]  Section of General Neurology  Return Patient Visit    Libertad Russell MRN# [id number]   Age: 57 year old YOB: 1965            Assessment and Plan:   Libertad Russell is a very pleasant 57 year old female who presents today in follow up evaluation of left sided neurological symptoms.  She was having serial small strokes from her very stenosed R carotid previously with resultant MRI findings, reviewed with the patient.  Chronic watershed infarcts were noted in her [facility] report previously as well, discussed that these are one in the same.   Her weakness is stable compared to previously but she continues to fatigue easily, likely of multifactorial origin.       Discussed rationale for continuing aspirin, statin (goal LDL <70) and stopping plavix as she has done.  I would continue serial imaging as instructed by Dr. Freitas.  I ordered a repeat CUS so that they can review this at their visit this fall.   Overall I can continue to follow with Rozina from a general neurology perspective long term yearly or PRN.  She would like our next visit to be in 6 months but I think we can again space out from there given her interval stability.   All questions answered.         Bubba Casanova MD   of Neurology   [facility]      Interval history:     Is off of Plavix   Reviewed her new data since our last visit   Is on aspirin, lipitor   Ideal LDL <70.    No new left sided weakness.  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     Some rare finger tingling, likely not consequential to our discussion.        Most recent neuro interventional note reviewed (9/22)  Imaging findings:  Carotid ultrasound 8/12/22 stent is patent, improved flow and velocities compared to 10/2021 study     Impression:  R  ICA stenosis secondary to presumed dissection s/p stent     Plan:  Repeat carotid ultrasound in 1 year and follow up after  Continue  mg daily from our standpoint. If plavix is needed per other providers for PAD or CAD, it can be continued, however there is not need to continue DAPT for the stent this long since placement.     Patient seen and discussed with the attending, Dr. Freitas.     Monique Cunha MD  Endovascular Surgical Neuroradiology Fellow, PGY-6        Past Medical History:     Patient Active Problem List   Diagnosis    Tobacco use disorder    Seasonal allergic rhinitis    Attention deficit disorder of adult    Rosacea    Raynaud phenomenon    Essential hypertension with goal blood pressure less than 140/90    Lipoma of right axilla    Lipoma of neck    Paresthesia    Nicotine abuse    Irregular periods/menstrual cycles    Cerebrovascular accident (CVA) due to stenosis of right carotid artery (H)    Cerebrovascular accident (CVA), unspecified mechanism (H)    Rheumatoid arthritis involving both hands with positive rheumatoid factor (H)    Stenosis of right carotid artery    Atherosclerosis of arteries of extremities (H)     Past Medical History:   Diagnosis Date    ADHD (attention deficit hyperactivity disorder)     Anemia 2022    Cerebrovascular accident (CVA) due to stenosis of right carotid artery (H) 09/15/2021    Depressive disorder     HTN (hypertension)     RA (rheumatoid arthritis) (H)     Raynaud disease         Past Surgical History:     Past Surgical History:   Procedure Laterality Date    BREAST BIOPSY, RT/LT  2000, 1998    breast biopsies; reported to be benign    EXCISE MASS AXILLA Right 06/08/2021    Excise right axillary lipoma;  Surgeon: Artis Domínguez MD;  Location:  OR    EXCISE TUMOR, SOFT TISSUE, NECK/THORAX, SUBCUTANEOUS N/A 06/08/2021    EXCISION of lipoma from left occiput;  Surgeon: Artis Domínguez MD;  Location:  OR    EYE SURGERY  2015    Lasik for  vision correction    IR CAROTID CEREBRAL ANGIOGRAM BILATERAL  10/22/2021    LYMPH NODE BIOPSY      cervical lymph node biopsy; told to be benign    NC BREAST AUGMENTATION Bilateral     breast implants    TUBAL LIGATION      VASCULAR SURGERY      Stent        Social History:     Social History     Tobacco Use    Smoking status: Former     Packs/day: 1.00     Years: 25.00     Pack years: 25.00     Types: Cigarettes     Quit date: 10/5/2021     Years since quittin.8    Smokeless tobacco: Current    Tobacco comments:     Pt vapes 1 per day without nicotine    Vaping Use    Vaping Use: Some days   Substance Use Topics    Alcohol use: Not Currently     Comment: occasional    Drug use: No        Family History:     Family History   Problem Relation Age of Onset    Hypertension Mother     Cancer Mother         Lung. Cancer    Other Cancer Mother         Lung    Thyroid Disease Mother     C.A.D. Father         52 at first MI    Prostate Cancer Father         Bladder    Cancer Father         Lung -bladder cancer    Thyroid Disease Sister     Thyroid Disease Sister     Hypertension Other     Diabetes No family hx of     Cerebrovascular Disease No family hx of     Breast Cancer No family hx of     Cancer - colorectal No family hx of     Glaucoma No family hx of     Macular Degeneration No family hx of         Medications:     Current Outpatient Medications   Medication Sig    amphetamine-dextroamphetamine (ADDERALL XR) 20 MG 24 hr capsule Take 1 capsule (20 mg) by mouth daily    amphetamine-dextroamphetamine (ADDERALL) 20 MG tablet Take 1 tab daily in the PM    aspirin (ASA) 325 MG tablet Take 1 tablet (325 mg) by mouth daily    atorvastatin (LIPITOR) 40 MG tablet Take 1 tablet (40 mg) by mouth daily    buPROPion (WELLBUTRIN SR) 100 MG 12 hr tablet Take 1 tablet (100 mg) by mouth At Bedtime for 14 days, THEN 1 tablet (100 mg) 2 times daily for 90 days.    clopidogrel (PLAVIX) 75 MG tablet Take 1 tablet (75 mg)  "by mouth daily    ferrous sulfate (FE TABS) 325 (65 Fe) MG EC tablet TAKE 1 TABLET(325 MG) BY MOUTH DAILY WITH ORANGE JUICE    folic acid (FOLVITE) 1 MG tablet Take 1 tablet (1 mg) by mouth daily    losartan (COZAAR) 100 MG tablet Take 1 tablet (100 mg) by mouth daily    methotrexate 2.5 MG tablet Take 10 tablets (25 mg) by mouth every 7 days Labs every 8 - 12 weeks for refills.    methotrexate 2.5 MG tablet Take 8 tablets (20 mg) by mouth every 7 days    olopatadine (PATADAY) 0.2 % ophthalmic solution Place 0.05 mLs (1 drop) into both eyes daily    triamcinolone (KENALOG) 0.1 % external cream Apply topically 2 times daily     No current facility-administered medications for this visit.     Facility-Administered Medications Ordered in Other Visits   Medication    sodium chloride (PF) 0.9% PF flush 72 mL        Allergies:     Allergies   Allergen Reactions    Seasonal Allergies           Physical Exam:   Vitals: BP (!) 161/88   Pulse 73   Ht 1.676 m (5' 6\")   Wt 61.7 kg (136 lb)   LMP 05/20/2021 (Approximate)   BMI 21.95 kg/m     Mental Status: Alert and oriented to person, place, and time. Speech fluent and comprehension intact. No dysarthria.      Cranial Nerves:   II: Visual fields: normal  III: Pupils: 3 mm, equal, round, reactive to light   III,IV,VI: Extraocular Movements: intact   V: Facial sensation: intact to light touch  VII: Facial strength: intact without asymmetry  XII: Tongue movement: normal     Motor Exam:   Upper Extremities  Deltoid  Bicep  Tricep  Wrist Extensors  strength Intrinsic Muscles    Right  5  5  5  5 5 5   Left  5  5  5 5 5 4+   L  strength equivocally worse than R/slightly  5/5 in b/l LE as well .     Reflexes: biceps, triceps, brachioradialis, patellar, and ankle jerks 2+ and symmetric.      Gait: normal casual gait, normal stride length         Data: Pertinent prior to visit   Imaging:              CUS 2022                                                    "   IMPRESSION:     1. RIGHT ICA: Ends of the stent were not evaluated. Improved flow and  velocities in the stent from previous. Less than 50% diameter in stent  stenosis by sonographic velocity criteria and color Doppler imaging.     2. LEFT ICA:  Less than 50% diameter narrowing by grayscale imaging  and sonographic velocity criteria           MRI brain MRA head/neck 8/17/21     IMPRESSION:   1.  Abnormal right internal carotid artery flow void consistent with  diminished or absent flow in this vessel. Suspect that this is related  to subacute to chronic right internal carotid artery dissection.  Please see the separately reported neck MRA for additional details.  2.  T2/FLAIR hyperintensity within the right greater than left deep  cerebral white matter presumably reflects gliosis related to chronic  ischemic injury. Suspect that there is a component of underlying  border zone/watershed hypoperfusion injury given the abnormal right  internal carotid artery.  3.  No acute infarct, hemorrhage or mass.                    Neck:                                                       IMPRESSION:   1.  Diminutive right internal carotid artery demonstrates abnormal  signal. 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 Chronic flow-limiting atherosclerotic  stenosis is the other primary consideration.  2.  The status of the right internal carotid intracranially is not  well evaluated on this study.  3.  Consider further evaluation of these findings with head and neck  CTA.  4.  Mild atherosclerosis of the left carotid artery without  hemodynamically significant narrowing by NASCET criteria.  5.  Patent vertebral arteries.     Procedures:     CTA neck 8/18  IMPRESSION:  1. Severe stenosis (likely greater than 80% luminal diameter stenosis)  at the proximal right internal carotid artery. The exact degree of  stenosis cannot be measured as there is swallowing motion while  scanning  through the carotid bifurcations.  2. Two areas of severe stenosis of the intracranial distal right  internal carotid artery at the petrous cavernous junction and of the  entire supraclinoid segment.  3. Combination of inflow and outflow restriction of the right internal  carotid artery resulting in a diminutive right internal carotid artery  in the neck.  4. Plaque without stenosis of the proximal and distal internal carotid  artery on the left.  5. Diminutive M1 segment of the right middle cerebral artery likely  due to decreased inflow.  6. Moderate-severe atherosclerotic narrowing at the origins of the  vertebral arteries bilaterall  Procedures:      Laboratory:  LDL Cholesterol Calculated   Date Value Ref Range Status   10/22/2021 49 <=100 mg/dL Final   06/30/2021 104 (H) <100 mg/dL Final     Comment:     Above desirable:  100-129 mg/dl  Borderline High:  130-159 mg/dL  High:             160-189 mg/dL  Very high:       >189 mg/dl                  The total time of this encounter today amounted to  35 minutes. This time included time spent with the patient, prep work, ordering tests, and performing post visit documentation.

## 2023-08-08 ENCOUNTER — MYC REFILL (OUTPATIENT)
Dept: FAMILY MEDICINE | Facility: CLINIC | Age: 58
End: 2023-08-08
Payer: COMMERCIAL

## 2023-08-08 DIAGNOSIS — F98.8 ATTENTION DEFICIT DISORDER OF ADULT: ICD-10-CM

## 2023-08-08 DIAGNOSIS — F41.9 ANXIETY: ICD-10-CM

## 2023-08-08 RX ORDER — DEXTROAMPHETAMINE SACCHARATE, AMPHETAMINE ASPARTATE MONOHYDRATE, DEXTROAMPHETAMINE SULFATE AND AMPHETAMINE SULFATE 5; 5; 5; 5 MG/1; MG/1; MG/1; MG/1
20 CAPSULE, EXTENDED RELEASE ORAL DAILY
Qty: 30 CAPSULE | Refills: 0 | Status: SHIPPED | OUTPATIENT
Start: 2023-08-17 | End: 2023-09-13

## 2023-08-08 RX ORDER — DEXTROAMPHETAMINE SACCHARATE, AMPHETAMINE ASPARTATE, DEXTROAMPHETAMINE SULFATE AND AMPHETAMINE SULFATE 5; 5; 5; 5 MG/1; MG/1; MG/1; MG/1
TABLET ORAL
Qty: 30 TABLET | Refills: 0 | Status: SHIPPED | OUTPATIENT
Start: 2023-08-17 | End: 2023-09-13

## 2023-08-08 RX ORDER — BUPROPION HYDROCHLORIDE 100 MG/1
100 TABLET, EXTENDED RELEASE ORAL 2 TIMES DAILY
Qty: 120 TABLET | Refills: 0 | Status: SHIPPED | OUTPATIENT
Start: 2023-08-08 | End: 2024-07-17

## 2023-08-09 ENCOUNTER — OFFICE VISIT (OUTPATIENT)
Dept: NEUROLOGY | Facility: CLINIC | Age: 58
End: 2023-08-09
Payer: COMMERCIAL

## 2023-08-09 VITALS
DIASTOLIC BLOOD PRESSURE: 88 MMHG | WEIGHT: 136 LBS | BODY MASS INDEX: 21.86 KG/M2 | SYSTOLIC BLOOD PRESSURE: 161 MMHG | HEART RATE: 73 BPM | HEIGHT: 66 IN

## 2023-08-09 DIAGNOSIS — I63.231 CEREBROVASCULAR ACCIDENT (CVA) DUE TO STENOSIS OF RIGHT CAROTID ARTERY (H): ICD-10-CM

## 2023-08-09 DIAGNOSIS — I65.21 STENOSIS OF RIGHT CAROTID ARTERY: Primary | ICD-10-CM

## 2023-08-09 PROCEDURE — 99214 OFFICE O/P EST MOD 30 MIN: CPT | Performed by: STUDENT IN AN ORGANIZED HEALTH CARE EDUCATION/TRAINING PROGRAM

## 2023-08-09 NOTE — LETTER
8/9/2023         RE: Libertad Russell  [address] [address]        Dear Colleague,    Thank you for referring your patient, Libertad Russell, to the [facility] [place]. Please see a copy of my visit note below.    [facility]  Section of General Neurology  Return Patient Visit    Libertad Russell MRN# [id number]   Age: 57 year old YOB: 1965            Assessment and Plan:   Libertad Russell is a very pleasant 57 year old female who presents today in follow up evaluation of left sided neurological symptoms.  She was having serial small strokes from her very stenosed R carotid previously with resultant MRI findings, reviewed with the patient.  Chronic watershed infarcts were noted in her [facility] report previously as well, discussed that these are one in the same.   Her weakness is stable compared to previously but she continues to fatigue easily, likely of multifactorial origin.       Discussed rationale for continuing aspirin, statin (goal LDL <70) and stopping plavix as she has done.  I would continue serial imaging as instructed by Dr. Freitas.  I ordered a repeat CUS so that they can review this at their visit this fall.   Overall I can continue to follow with R[facility] from a general neurology perspective long term yearly or PRN.  She would like our next visit to be in 6 months but I think we can again space out from there given her interval stability.   All questions answered.         Bubba Casanova MD   of Neurology   [facility]      Interval history:     Is off of Plavix   Reviewed her new data since our last visit   Is on aspirin, lipitor   Ideal LDL <70.    No new left sided weakness.  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     Some  rare finger tingling, likely not consequential to our discussion.        Most recent neuro interventional note reviewed (9/22)  Imaging findings:  Carotid ultrasound 8/12/22 stent is patent, improved flow and velocities compared to 10/2021 study     Impression:  R ICA stenosis secondary to presumed dissection s/p stent     Plan:  Repeat carotid ultrasound in 1 year and follow up after  Continue  mg daily from our standpoint. If plavix is needed per other providers for PAD or CAD, it can be continued, however there is not need to continue DAPT for the stent this long since placement.     Patient seen and discussed with the attending, Dr. Freitas.     Monique Cunha MD  Endovascular Surgical Neuroradiology Fellow, PGY-6        Past Medical History:     Patient Active Problem List   Diagnosis     Tobacco use disorder     Seasonal allergic rhinitis     Attention deficit disorder of adult     Rosacea     Raynaud phenomenon     Essential hypertension with goal blood pressure less than 140/90     Lipoma of right axilla     Lipoma of neck     Paresthesia     Nicotine abuse     Irregular periods/menstrual cycles     Cerebrovascular accident (CVA) due to stenosis of right carotid artery (H)     Cerebrovascular accident (CVA), unspecified mechanism (H)     Rheumatoid arthritis involving both hands with positive rheumatoid factor (H)     Stenosis of right carotid artery     Atherosclerosis of arteries of extremities (H)     Past Medical History:   Diagnosis Date     ADHD (attention deficit hyperactivity disorder)      Anemia 2022     Cerebrovascular accident (CVA) due to stenosis of right carotid artery (H) 09/15/2021     Depressive disorder      HTN (hypertension)      RA (rheumatoid arthritis) (H)      Raynaud disease         Past Surgical History:     Past Surgical History:   Procedure Laterality Date     BREAST BIOPSY, RT/LT  2000, 1998    breast biopsies; reported to be benign     EXCISE MASS AXILLA Right  2021    Excise right axillary lipoma;  Surgeon: Artis Domínguez MD;  Location: [facility] OR     EXCISE TUMOR, SOFT TISSUE, NECK/THORAX, SUBCUTANEOUS N/A 2021    EXCISION of lipoma from left occiput;  Surgeon: Artis Domínguez MD;  Location: [facility] OR     EYE SURGERY      Lasik for vision correction     IR CAROTID CEREBRAL ANGIOGRAM BILATERAL  10/22/2021     LYMPH NODE BIOPSY      cervical lymph node biopsy; told to be benign     HI BREAST AUGMENTATION Bilateral     breast implants     TUBAL LIGATION       VASCULAR SURGERY      Stent        Social History:     Social History     Tobacco Use     Smoking status: Former     Packs/day: 1.00     Years: 25.00     Pack years: 25.00     Types: Cigarettes     Quit date: 10/5/2021     Years since quittin.8     Smokeless tobacco: Current     Tobacco comments:     Pt vapes 1 per day without nicotine    Vaping Use     Vaping Use: Some days   Substance Use Topics     Alcohol use: Not Currently     Comment: occasional     Drug use: No        Family History:     Family History   Problem Relation Age of Onset     Hypertension Mother      Cancer Mother         Lung. Cancer     Other Cancer Mother         Lung     Thyroid Disease Mother      C.A.D. Father         52 at first MI     Prostate Cancer Father         Bladder     Cancer Father         Lung -bladder cancer     Thyroid Disease Sister      Thyroid Disease Sister      Hypertension Other      Diabetes No family hx of      Cerebrovascular Disease No family hx of      Breast Cancer No family hx of      Cancer - colorectal No family hx of      Glaucoma No family hx of      Macular Degeneration No family hx of         Medications:     Current Outpatient Medications   Medication Sig     amphetamine-dextroamphetamine (ADDERALL XR) 20 MG 24 hr capsule Take 1 capsule (20 mg) by mouth daily     amphetamine-dextroamphetamine (ADDERALL) 20 MG tablet Take 1 tab daily in the PM     aspirin (ASA) 325 MG tablet  "Take 1 tablet (325 mg) by mouth daily     atorvastatin (LIPITOR) 40 MG tablet Take 1 tablet (40 mg) by mouth daily     buPROPion (WELLBUTRIN SR) 100 MG 12 hr tablet Take 1 tablet (100 mg) by mouth At Bedtime for 14 days, THEN 1 tablet (100 mg) 2 times daily for 90 days.     clopidogrel (PLAVIX) 75 MG tablet Take 1 tablet (75 mg) by mouth daily     ferrous sulfate (FE TABS) 325 (65 Fe) MG EC tablet TAKE 1 TABLET(325 MG) BY MOUTH DAILY WITH ORANGE JUICE     folic acid (FOLVITE) 1 MG tablet Take 1 tablet (1 mg) by mouth daily     losartan (COZAAR) 100 MG tablet Take 1 tablet (100 mg) by mouth daily     methotrexate 2.5 MG tablet Take 10 tablets (25 mg) by mouth every 7 days Labs every 8 - 12 weeks for refills.     methotrexate 2.5 MG tablet Take 8 tablets (20 mg) by mouth every 7 days     olopatadine (PATADAY) 0.2 % ophthalmic solution Place 0.05 mLs (1 drop) into both eyes daily     triamcinolone (KENALOG) 0.1 % external cream Apply topically 2 times daily     No current facility-administered medications for this visit.     Facility-Administered Medications Ordered in Other Visits   Medication     sodium chloride (PF) 0.9% PF flush 72 mL        Allergies:     Allergies   Allergen Reactions     Seasonal Allergies           Physical Exam:   Vitals: BP (!) 161/88   Pulse 73   Ht 1.676 m (5' 6\")   Wt 61.7 kg (136 lb)   LMP 05/20/2021 (Approximate)   BMI 21.95 kg/m     Mental Status: Alert and oriented to person, place, and time. Speech fluent and comprehension intact. No dysarthria.      Cranial Nerves:   II: Visual fields: normal  III: Pupils: 3 mm, equal, round, reactive to light   III,IV,VI: Extraocular Movements: intact   V: Facial sensation: intact to light touch  VII: Facial strength: intact without asymmetry  XII: Tongue movement: normal     Motor Exam:   Upper Extremities  Deltoid  Bicep  Tricep  Wrist Extensors  strength Intrinsic Muscles    Right  5  5  5  5 5 5   Left  5  5  5 5 5 4+   L  strength " equivocally worse than R/slightly  5/5 in b/l LE as well .     Reflexes: biceps, triceps, brachioradialis, patellar, and ankle jerks 2+ and symmetric.      Gait: normal casual gait, normal stride length         Data: Pertinent prior to visit   Imaging:              [facility] 2022                                                      IMPRESSION:     1. RIGHT ICA: Ends of the stent were not evaluated. Improved flow and  velocities in the stent from previous. Less than 50% diameter in stent  stenosis by sonographic velocity criteria and color Doppler imaging.     2. LEFT ICA:  Less than 50% diameter narrowing by grayscale imaging  and sonographic velocity criteria           MRI brain MRA head/neck 8/17/21     IMPRESSION:   1.  Abnormal right internal carotid artery flow void consistent with  diminished or absent flow in this vessel. Suspect that this is related  to subacute to chronic right internal carotid artery dissection.  Please see the separately reported neck MRA for additional details.  2.  T2/FLAIR hyperintensity within the right greater than left deep  cerebral white matter presumably reflects gliosis related to chronic  ischemic injury. Suspect that there is a component of underlying  border zone/watershed hypoperfusion injury given the abnormal right  internal carotid artery.  3.  No acute infarct, hemorrhage or mass.                    Neck:                                                       IMPRESSION:   1.  Diminutive right internal carotid artery demonstrates abnormal  signal. 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 Chronic flow-limiting atherosclerotic  stenosis is the other primary consideration.  2.  The status of the right internal carotid intracranially is not  well evaluated on this study.  3.  Consider further evaluation of these findings with head and neck  CTA.  4.  Mild atherosclerosis of the left carotid artery without  hemodynamically  significant narrowing by NASCET criteria.  5.  Patent vertebral arteries.     Procedures:     CTA neck 8/18  IMPRESSION:  1. Severe stenosis (likely greater than 80% luminal diameter stenosis)  at the proximal right internal carotid artery. The exact degree of  stenosis cannot be measured as there is swallowing motion while  scanning through the carotid bifurcations.  2. Two areas of severe stenosis of the intracranial distal right  internal carotid artery at the petrous cavernous junction and of the  entire supraclinoid segment.  3. Combination of inflow and outflow restriction of the right internal  carotid artery resulting in a diminutive right internal carotid artery  in the neck.  4. Plaque without stenosis of the proximal and distal internal carotid  artery on the left.  5. Diminutive M1 segment of the right middle cerebral artery likely  due to decreased inflow.  6. Moderate-severe atherosclerotic narrowing at the origins of the  vertebral arteries bilaterall  Procedures:      Laboratory:  LDL Cholesterol Calculated   Date Value Ref Range Status   10/22/2021 49 <=100 mg/dL Final   06/30/2021 104 (H) <100 mg/dL Final     Comment:     Above desirable:  100-129 mg/dl  Borderline High:  130-159 mg/dL  High:             160-189 mg/dL  Very high:       >189 mg/dl                  The total time of this encounter today amounted to  35 minutes. This time included time spent with the patient, prep work, ordering tests, and performing post visit documentation.      Again, thank you for allowing me to participate in the care of your patient.        Sincerely,        Gilberto Casanova MD

## 2023-08-09 NOTE — LETTER
August 9, 2023      Libertad Russell  [address] [address]        To whom it may concern,      Ms. Russell is a patient of mine who has a complicated medical history.  She currently is able to work from home but I am in agreement that it would be extremely difficult for her to work in person right now given her limitations in terms of weakness, fatigue.       Thank you very much for your support of my patient.        Sincerely,                 Bubba Casanova MD   of Neurology  [facility]

## 2023-08-09 NOTE — PATIENT INSTRUCTIONS
See Dr. Freitas again this fall with follow up carotid ultrasound  Continue aspirin 325 mg daily, continue lipitor goal LDL <70  Follow up in 6 months from there yearly

## 2023-08-09 NOTE — NURSING NOTE
"Libertad Russell's goals for this visit include:   Chief Complaint   Patient presents with    RECHECK      return stroke, recs in epic, scheduled per Pt       She requests these members of her care team be copied on today's visit information: yes    PCP: Symone Acosta    Referring Provider:  No referring provider defined for this encounter.    BP (!) 161/88   Pulse 73   Ht 1.676 m (5' 6\")   Wt 61.7 kg (136 lb)   LMP 05/20/2021 (Approximate)   BMI 21.95 kg/m      Do you need any medication refills at today's visit? No  CLINTON Ang, CMA ([facility])      "

## 2023-08-17 ENCOUNTER — MYC MEDICAL ADVICE (OUTPATIENT)
Dept: FAMILY MEDICINE | Facility: CLINIC | Age: 58
End: 2023-08-17
Payer: COMMERCIAL

## 2023-08-17 DIAGNOSIS — Z12.2 SCREENING FOR LUNG CANCER: Primary | ICD-10-CM

## 2023-09-06 ENCOUNTER — LAB (OUTPATIENT)
Dept: LAB | Facility: CLINIC | Age: 58
End: 2023-09-06
Payer: COMMERCIAL

## 2023-09-06 DIAGNOSIS — M05.741 RHEUMATOID ARTHRITIS INVOLVING BOTH HANDS WITH POSITIVE RHEUMATOID FACTOR (H): ICD-10-CM

## 2023-09-06 DIAGNOSIS — M05.742 RHEUMATOID ARTHRITIS INVOLVING BOTH HANDS WITH POSITIVE RHEUMATOID FACTOR (H): ICD-10-CM

## 2023-09-06 LAB
ERYTHROCYTE [DISTWIDTH] IN BLOOD BY AUTOMATED COUNT: 14.2 % (ref 10–15)
HCT VFR BLD AUTO: 34.6 % (ref 35–47)
HGB BLD-MCNC: 11.5 G/DL (ref 11.7–15.7)
MCH RBC QN AUTO: 30.3 PG (ref 26.5–33)
MCHC RBC AUTO-ENTMCNC: 33.2 G/DL (ref 31.5–36.5)
MCV RBC AUTO: 91 FL (ref 78–100)
PLATELET # BLD AUTO: 226 10E3/UL (ref 150–450)
RBC # BLD AUTO: 3.79 10E6/UL (ref 3.8–5.2)
WBC # BLD AUTO: 6.2 10E3/UL (ref 4–11)

## 2023-09-06 PROCEDURE — 86140 C-REACTIVE PROTEIN: CPT

## 2023-09-06 PROCEDURE — 82040 ASSAY OF SERUM ALBUMIN: CPT

## 2023-09-06 PROCEDURE — 84460 ALANINE AMINO (ALT) (SGPT): CPT

## 2023-09-06 PROCEDURE — 82565 ASSAY OF CREATININE: CPT

## 2023-09-06 PROCEDURE — 85027 COMPLETE CBC AUTOMATED: CPT

## 2023-09-06 PROCEDURE — 84450 TRANSFERASE (AST) (SGOT): CPT

## 2023-09-06 PROCEDURE — 36415 COLL VENOUS BLD VENIPUNCTURE: CPT

## 2023-09-07 LAB
ALBUMIN SERPL BCG-MCNC: 4.5 G/DL (ref 3.5–5.2)
ALT SERPL W P-5'-P-CCNC: 19 U/L (ref 0–50)
AST SERPL W P-5'-P-CCNC: 20 U/L (ref 0–45)
CREAT SERPL-MCNC: 0.69 MG/DL (ref 0.51–0.95)
CRP SERPL-MCNC: <3 MG/L
EGFRCR SERPLBLD CKD-EPI 2021: >90 ML/MIN/1.73M2

## 2023-09-13 ENCOUNTER — MYC REFILL (OUTPATIENT)
Dept: FAMILY MEDICINE | Facility: CLINIC | Age: 58
End: 2023-09-13
Payer: COMMERCIAL

## 2023-09-13 DIAGNOSIS — F98.8 ATTENTION DEFICIT DISORDER OF ADULT: ICD-10-CM

## 2023-09-14 RX ORDER — DEXTROAMPHETAMINE SACCHARATE, AMPHETAMINE ASPARTATE, DEXTROAMPHETAMINE SULFATE AND AMPHETAMINE SULFATE 5; 5; 5; 5 MG/1; MG/1; MG/1; MG/1
TABLET ORAL
Qty: 30 TABLET | Refills: 0 | Status: SHIPPED | OUTPATIENT
Start: 2023-09-14 | End: 2023-10-05

## 2023-09-14 RX ORDER — DEXTROAMPHETAMINE SACCHARATE, AMPHETAMINE ASPARTATE MONOHYDRATE, DEXTROAMPHETAMINE SULFATE AND AMPHETAMINE SULFATE 5; 5; 5; 5 MG/1; MG/1; MG/1; MG/1
20 CAPSULE, EXTENDED RELEASE ORAL DAILY
Qty: 30 CAPSULE | Refills: 0 | Status: SHIPPED | OUTPATIENT
Start: 2023-09-14 | End: 2023-10-30

## 2023-10-02 ENCOUNTER — MYC MEDICAL ADVICE (OUTPATIENT)
Dept: FAMILY MEDICINE | Facility: CLINIC | Age: 58
End: 2023-10-02
Payer: COMMERCIAL

## 2023-10-02 DIAGNOSIS — F98.8 ATTENTION DEFICIT DISORDER OF ADULT: ICD-10-CM

## 2023-10-04 RX ORDER — DEXTROAMPHETAMINE SACCHARATE, AMPHETAMINE ASPARTATE, DEXTROAMPHETAMINE SULFATE AND AMPHETAMINE SULFATE 5; 5; 5; 5 MG/1; MG/1; MG/1; MG/1
TABLET ORAL
Qty: 30 TABLET | Refills: 0 | OUTPATIENT
Start: 2023-10-04

## 2023-10-05 RX ORDER — DEXTROAMPHETAMINE SACCHARATE, AMPHETAMINE ASPARTATE, DEXTROAMPHETAMINE SULFATE AND AMPHETAMINE SULFATE 2.5; 2.5; 2.5; 2.5 MG/1; MG/1; MG/1; MG/1
20 TABLET ORAL DAILY
Qty: 60 TABLET | Refills: 0 | Status: SHIPPED | OUTPATIENT
Start: 2023-10-05 | End: 2023-11-01

## 2023-10-06 DIAGNOSIS — I63.231 CEREBROVASCULAR ACCIDENT (CVA) DUE TO STENOSIS OF RIGHT CAROTID ARTERY (H): ICD-10-CM

## 2023-10-06 DIAGNOSIS — I10 ESSENTIAL HYPERTENSION WITH GOAL BLOOD PRESSURE LESS THAN 140/90: ICD-10-CM

## 2023-10-06 DIAGNOSIS — I70.209 ATHEROSCLEROSIS OF ARTERIES OF EXTREMITIES (H): ICD-10-CM

## 2023-10-08 RX ORDER — ATORVASTATIN CALCIUM 40 MG/1
40 TABLET, FILM COATED ORAL DAILY
Qty: 30 TABLET | Refills: 0 | Status: SHIPPED | OUTPATIENT
Start: 2023-10-08 | End: 2023-10-25

## 2023-10-08 RX ORDER — LOSARTAN POTASSIUM 100 MG/1
100 TABLET ORAL DAILY
Qty: 30 TABLET | Refills: 0 | Status: SHIPPED | OUTPATIENT
Start: 2023-10-08 | End: 2023-10-25

## 2023-10-10 ENCOUNTER — TELEPHONE (OUTPATIENT)
Dept: OPTOMETRY | Facility: CLINIC | Age: 58
End: 2023-10-10

## 2023-10-10 NOTE — TELEPHONE ENCOUNTER
JIMBOM for re: CL Trials.     We still have the daily lenses Dr. Guardado ordered for pt at exam 3/14/2023. We gave her a monthly lens to trial at that appt and were waiting to hear back from pt on if she liked the lenses dispensed or if she wanted to trial the dailies.     No response from pt.     I will save the remaining daily trials until 10/17/2023.     Nataliia  Optometry Assistant

## 2023-10-11 ENCOUNTER — ANCILLARY PROCEDURE (OUTPATIENT)
Dept: ULTRASOUND IMAGING | Facility: CLINIC | Age: 58
End: 2023-10-11
Attending: RADIOLOGY
Payer: COMMERCIAL

## 2023-10-11 DIAGNOSIS — I65.21 CAROTID STENOSIS, RIGHT: ICD-10-CM

## 2023-10-11 LAB — RADIOLOGIST FLAGS: ABNORMAL

## 2023-10-11 PROCEDURE — 93880 EXTRACRANIAL BILAT STUDY: CPT | Performed by: RADIOLOGY

## 2023-10-12 ENCOUNTER — TELEPHONE (OUTPATIENT)
Dept: NEUROLOGY | Facility: CLINIC | Age: 58
End: 2023-10-12
Payer: COMMERCIAL

## 2023-10-12 ENCOUNTER — CARE COORDINATION (OUTPATIENT)
Dept: NEUROSURGERY | Facility: CLINIC | Age: 58
End: 2023-10-12
Payer: COMMERCIAL

## 2023-10-12 NOTE — PROGRESS NOTES
Writer receive vocera call.   *Urgent findings 10/11/2023  Carotid Bilateral   Routed to Stroke/Neurovascular Nurse Pool   RNCC notified     Jenniffer Vega LPN  Neurosurgery

## 2023-10-12 NOTE — TELEPHONE ENCOUNTER
Called patient to reschedule her appt to this Tuesday, 10/17/23 per Dr. Freitas due to carotid ultrasound findings.   Ok then we can have her added for next Tuesday.  MM for patient and let her know I would send a [place] message.  Marlin Leon RN 10/12/2023 1:07 PM

## 2023-10-15 ENCOUNTER — TELEPHONE (OUTPATIENT)
Dept: FAMILY MEDICINE | Facility: CLINIC | Age: 58
End: 2023-10-15
Payer: COMMERCIAL

## 2023-10-15 ENCOUNTER — MYC MEDICAL ADVICE (OUTPATIENT)
Dept: FAMILY MEDICINE | Facility: CLINIC | Age: 58
End: 2023-10-15
Payer: COMMERCIAL

## 2023-10-15 NOTE — TELEPHONE ENCOUNTER
FYI - Status Update    Who is Calling: patient    Update: Pt got a call to reschedule appt on 10/16 due to provider being out, next available is out in December & pt needs to see provider soon since they need forms signed.     Does caller want a call/response back: Yes     Could we send this information to you in [employer] or would you prefer to receive a phone call?:   No preference   Okay to leave a detailed message?: Yes at Cell number on file:    Telephone Information:   Mobile [phone]

## 2023-10-16 NOTE — TELEPHONE ENCOUNTER
I think sheri would allow one of her same day slots to be used if there's a form needing to be addressed/completed

## 2023-10-17 ENCOUNTER — VIRTUAL VISIT (OUTPATIENT)
Dept: NEUROSURGERY | Facility: CLINIC | Age: 58
End: 2023-10-17
Payer: COMMERCIAL

## 2023-10-17 ENCOUNTER — MYC REFILL (OUTPATIENT)
Dept: FAMILY MEDICINE | Facility: CLINIC | Age: 58
End: 2023-10-17

## 2023-10-17 VITALS — WEIGHT: 136 LBS | BODY MASS INDEX: 21.86 KG/M2 | HEIGHT: 66 IN

## 2023-10-17 DIAGNOSIS — I65.21 CAROTID STENOSIS, RIGHT: Primary | ICD-10-CM

## 2023-10-17 DIAGNOSIS — Z21 ASYMPTOMATIC HIV INFECTION (H): ICD-10-CM

## 2023-10-17 DIAGNOSIS — B20 HUMAN IMMUNODEFICIENCY VIRUS (HIV) DISEASE (H): ICD-10-CM

## 2023-10-17 DIAGNOSIS — F98.8 ATTENTION DEFICIT DISORDER OF ADULT: ICD-10-CM

## 2023-10-17 PROCEDURE — 99214 OFFICE O/P EST MOD 30 MIN: CPT | Mod: VID | Performed by: RADIOLOGY

## 2023-10-17 RX ORDER — DEXTROAMPHETAMINE SACCHARATE, AMPHETAMINE ASPARTATE MONOHYDRATE, DEXTROAMPHETAMINE SULFATE AND AMPHETAMINE SULFATE 5; 5; 5; 5 MG/1; MG/1; MG/1; MG/1
20 CAPSULE, EXTENDED RELEASE ORAL DAILY
Qty: 30 CAPSULE | Refills: 0 | OUTPATIENT
Start: 2023-10-17

## 2023-10-17 ASSESSMENT — PAIN SCALES - GENERAL: PAINLEVEL: MILD PAIN (3)

## 2023-10-17 NOTE — PATIENT INSTRUCTIONS
Obtain CT angiogram of the head and neck at [facility] in the next couple of days. Continue aspirin 325mg and Plavix 75mg. Continue maximum medical management.    Stroke & Endovascular RN Care Coordinators:    Marlin Leon, RN, BSN  Mindy Pal, RN, CNRN, SCRN    If you have any questions please contact the RN Care Coordinators at [phone], option 1.     After business hours call the  at [phone] and have the Neuro-Interventional Fellow paged.    Thank you for choosing [facility] for your health care needs.

## 2023-10-17 NOTE — PROGRESS NOTES
"Virtual Visit Details    Type of service:  Video Visit     Originating Location (pt. Location): {video visit patient location:626855::\"Home\"}  {PROVIDER LOCATION On-site should be selected for visits conducted from your clinic location or adjoining [facility] hospital, academic office, or other nearby [facility] building. Off-site should be selected for all other provider locations, including home:[id number]}  Distant Location (provider location):  {virtual location provider:585911}  Platform used for Video Visit: {Virtual Visit Platforms:134815::\"Solar Site Design\"}    "

## 2023-10-17 NOTE — NURSING NOTE
Is the patient currently in the state of [place]? YES    Visit mode:VIDEO    If the visit is dropped, the patient can be reconnected by: VIDEO VISIT: Text to cell phone:   Telephone Information:   Mobile [phone]       Will anyone else be joining the visit? NO  (If patient encounters technical issues they should call [phone])    How would you like to obtain your AVS? MyChart    Are changes needed to the allergy or medication list? No    Reason for visit: Follow Up    Madie TORRE

## 2023-10-17 NOTE — PROGRESS NOTES
Subjective:: Libertad Russell is a 57 year old female with a past medical history of HTN, smoking and RA. 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 Angiogram demonstrated R ICA pseudo-occlusion. She was treated with a 8x40 mm Precise stent in 2021. There was post-stent R ICA collapse distal to the stent at the communicating segment. .  The patient subsequently also underwent noninvasive imaging as well as a repeat cerebral angiography at [facility], which showed that the distal right internal carotid artery, about the level of the stent continues to be small in caliber and ended in the ophthalmic artery.    Her most recent ultrasound of the neck raised concerns about in-stent stenosis, with possible in-stent stenosis of about 70%.    On today's clinic visit, she seems well-groomed and answers questions appropriately.  There were no obvious cranial nerve deficits that I could make out.  With respect to new symptoms, she states that occasionally she has left hand numbness, and her vision has deteriorated over the last few months.  With respect to her vision, her history is significant for Lasix surgery about 5 years ago.  She has not had evaluation or surgeries for cataracts.    I did not feel that any of the symptoms was necessarily related to her left internal carotid artery stent narrowing, especially given her history of rheumatoid arthritis.  Therefore I informed her that it would probably not be ideal to start out with a cerebral angiogram.  We will instead obtain a CT angiogram of the head and neck.  If there is significant interval narrowing of the stented segment of the right internal carotid artery, then we can think about an intervention such as angioplasty for this carotid artery.  On the contrary, if the stent appears in the same as in prior instances, I think we will continue to follow her with noninvasive imaging, probably repeated  CT angiography rather than ultrasound to avoid this issue in the future.          Past medical history:   Past Medical History:   Diagnosis Date    ADHD (attention deficit hyperactivity disorder)     Anemia 2022    Cerebrovascular accident (CVA) due to stenosis of right carotid artery (H) 09/15/2021    Depressive disorder     HTN (hypertension)     RA (rheumatoid arthritis) (H)     Raynaud disease         Current outpatient Medication list:   Current Outpatient Medications:     amphetamine-dextroamphetamine (ADDERALL XR) 20 MG 24 hr capsule, Take 1 capsule (20 mg) by mouth daily, Disp: 30 capsule, Rfl: 0    amphetamine-dextroamphetamine (ADDERALL) 10 MG tablet, Take 2 tablets (20 mg) by mouth daily, Disp: 60 tablet, Rfl: 0    aspirin (ASA) 325 MG tablet, Take 1 tablet (325 mg) by mouth daily, Disp: 90 tablet, Rfl: 3    atorvastatin (LIPITOR) 40 MG tablet, TAKE 1 TABLET(40 MG) BY MOUTH DAILY, Disp: 30 tablet, Rfl: 0    buPROPion (WELLBUTRIN SR) 100 MG 12 hr tablet, Take 1 tablet (100 mg) by mouth 2 times daily for 60 days, Disp: 120 tablet, Rfl: 0    clopidogrel (PLAVIX) 75 MG tablet, Take 1 tablet (75 mg) by mouth daily, Disp: 90 tablet, Rfl: 3    ferrous sulfate (FE TABS) 325 (65 Fe) MG EC tablet, TAKE 1 TABLET(325 MG) BY MOUTH DAILY WITH ORANGE JUICE, Disp: 90 tablet, Rfl: 1    folic acid (FOLVITE) 1 MG tablet, Take 1 tablet (1 mg) by mouth daily, Disp: 90 tablet, Rfl: 3    losartan (COZAAR) 100 MG tablet, TAKE 1 TABLET(100 MG) BY MOUTH DAILY, Disp: 30 tablet, Rfl: 0    methotrexate 2.5 MG tablet, Take 10 tablets (25 mg) by mouth every 7 days Labs every 8 - 12 weeks for refills., Disp: 120 tablet, Rfl: 5    methotrexate 2.5 MG tablet, Take 8 tablets (20 mg) by mouth every 7 days, Disp: 96 tablet, Rfl: 3    olopatadine (PATADAY) 0.2 % ophthalmic solution, Place 0.05 mLs (1 drop) into both eyes daily, Disp: 2.5 mL, Rfl: 1    triamcinolone (KENALOG) 0.1 % external cream, Apply topically 2 times daily, Disp: 45 g,  Rfl: 1  No current facility-administered medications for this visit.    Facility-Administered Medications Ordered in Other Visits:     sodium chloride (PF) 0.9% PF flush 72 mL, 72 mL, Intravenous, Once, Bartolo Garcia MD      Family history:   Family History   Problem Relation Age of Onset    Hypertension Mother     Cancer Mother         Lung. Cancer    Other Cancer Mother         Lung    Thyroid Disease Mother     C.A.D. Father         52 at first MI    Prostate Cancer Father         Bladder    Cancer Father         Lung -bladder cancer    Thyroid Disease Sister     Thyroid Disease Sister     Hypertension Other     Diabetes No family hx of     Cerebrovascular Disease No family hx of     Breast Cancer No family hx of     Cancer - colorectal No family hx of     Glaucoma No family hx of     Macular Degeneration No family hx of        Social history:   Social History     Tobacco Use    Smoking status: Former     Packs/day: 1.00     Years: 25.00     Additional pack years: 0.00     Total pack years: 25.00     Types: Cigarettes     Quit date: 10/5/2021     Years since quittin.0    Smokeless tobacco: Current    Tobacco comments:     Pt vapes 1 per day without nicotine    Vaping Use    Vaping Use: Some days   Substance Use Topics    Alcohol use: Not Currently     Comment: occasional    Drug use: No       Allergies:    Allergies   Allergen Reactions    Seasonal Allergies        Review of Systems: 5 point ROS performed and negative except for detailed above    Objective:    Physical exam:  LMP 2021 (Approximate)   Constitutional: Awake, no acute distress  HENT: normcephalic,   Respiratory: normal rate, no acute distress  Neuro:  Mental status: awake, oriented   CN: EOMI, face movements symmetric, no dysarthria, equal shoulder shrug, tongue midline  Motor: 5/5 strength in upper and lower extremities bilaterally  Sensory: equal to light touch bilaterally in upper and lower extremities  Coordination: Normal finger  to nose in upper extremities bilaterally, normal heel-to-shin in lower extremities bilaterally    Imaging Reviewed:      Carotid ultrasound: 1. RIGHT carotid stent: Although velocities never exceed 150 cm/s,  color Doppler image suggests greater than 70% diameter in-stent  stenosis and velocity in the stent in the proximal internal carotid  artery drops to 16 cm/s, a 3.5 times drop from the stent in the  bifurcation. 7.56 velocity ratio in the stent from the proximal to mid  internal carotid artery. Further evaluation with CTA or catheter  angiography could be performed.     2. LEFT ICA:  Less than 50% diameter narrowing by grayscale imaging  and sonographic velocity criteria.      Assessment:  Libertad Russell  is a 57 year old female who presents for a previously placed right ICA stent in 2021 on ultrasound there is 70% in stent stenosis of the right ICA stent. The left demonstrates less than 50% stenosis . Given the in stent stenosis would recommend angioplasty  Discussed the risk and benefits of the diagnostic cerebral angiogram procedure which included the risk of groin hematoma, bruising, bleeding, limb ischemia, stroke, arteriovenous malformation or fistula formation. The procedure was detailed to the patient with a description of the procedure, conscious sedation and closure device.The patient's questions were answered and all concerns were addressed. The patient agreed to proceed with the procedure.     Thank you for this referral, for any questions or concerns please don't hesitate to contact us.     Plan:  Obtain CT angiogram of the head and neck at [facility] in the next couple of days.  Call the patient back with results.  - Continue aspirin 325mg ad Plavix 75mg  -Continue maximum medical management.         Aminah Waters MD  Endovascular Surgical Neuroradiology Fellow  [facility]  [phone]    Endovascular Surgical Neuroradiology staff is Dr. Freitas.    I personally  interacted with the patient via video and spent approximately 20 minutes on the video and 10 minutes reviewing her medical records.

## 2023-10-17 NOTE — LETTER
10/17/2023       RE: Libertad Russell  [address] [id number]     Dear Colleague,    Thank you for referring your patient, Libertad Russell, to the [facility]. Please see a copy of my visit note below.    Subjective:: Libertad Russell is a 57 year old female with a past medical history of HTN, smoking and RA. 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 Angiogram demonstrated R ICA pseudo-occlusion. She was treated with a 8x40 mm Precise stent in 2021. There was post-stent R ICA collapse distal to the stent at the communicating segment. .  The patient subsequently also underwent noninvasive imaging as well as a repeat cerebral angiography at [facility], which showed that the distal right internal carotid artery, about the level of the stent continues to be small in caliber and ended in the ophthalmic artery.    Her most recent ultrasound of the neck raised concerns about in-stent stenosis, with possible in-stent stenosis of about 70%.    On today's clinic visit, she seems well-groomed and answers questions appropriately.  There were no obvious cranial nerve deficits that I could make out.  With respect to new symptoms, she states that occasionally she has left hand numbness, and her vision has deteriorated over the last few months.  With respect to her vision, her history is significant for Lasix surgery about 5 years ago.  She has not had evaluation or surgeries for cataracts.    I did not feel that any of the symptoms was necessarily related to her left internal carotid artery stent narrowing, especially given her history of rheumatoid arthritis.  Therefore I informed her that it would probably not be ideal to start out with a cerebral angiogram.  We will instead obtain a CT angiogram of the head and neck.  If there is  significant interval narrowing of the stented segment of the right internal carotid artery, then we can think about an intervention such as angioplasty for this carotid artery.  On the contrary, if the stent appears in the same as in prior instances, I think we will continue to follow her with noninvasive imaging, probably repeated CT angiography rather than ultrasound to avoid this issue in the future.          Past medical history:   Past Medical History:   Diagnosis Date    ADHD (attention deficit hyperactivity disorder)     Anemia 2022    Cerebrovascular accident (CVA) due to stenosis of right carotid artery (H) 09/15/2021    Depressive disorder     HTN (hypertension)     RA (rheumatoid arthritis) (H)     Raynaud disease         Current outpatient Medication list:   Current Outpatient Medications:     amphetamine-dextroamphetamine (ADDERALL XR) 20 MG 24 hr capsule, Take 1 capsule (20 mg) by mouth daily, Disp: 30 capsule, Rfl: 0    amphetamine-dextroamphetamine (ADDERALL) 10 MG tablet, Take 2 tablets (20 mg) by mouth daily, Disp: 60 tablet, Rfl: 0    aspirin (ASA) 325 MG tablet, Take 1 tablet (325 mg) by mouth daily, Disp: 90 tablet, Rfl: 3    atorvastatin (LIPITOR) 40 MG tablet, TAKE 1 TABLET(40 MG) BY MOUTH DAILY, Disp: 30 tablet, Rfl: 0    buPROPion (WELLBUTRIN SR) 100 MG 12 hr tablet, Take 1 tablet (100 mg) by mouth 2 times daily for 60 days, Disp: 120 tablet, Rfl: 0    clopidogrel (PLAVIX) 75 MG tablet, Take 1 tablet (75 mg) by mouth daily, Disp: 90 tablet, Rfl: 3    ferrous sulfate (FE TABS) 325 (65 Fe) MG EC tablet, TAKE 1 TABLET(325 MG) BY MOUTH DAILY WITH ORANGE JUICE, Disp: 90 tablet, Rfl: 1    folic acid (FOLVITE) 1 MG tablet, Take 1 tablet (1 mg) by mouth daily, Disp: 90 tablet, Rfl: 3    losartan (COZAAR) 100 MG tablet, TAKE 1 TABLET(100 MG) BY MOUTH DAILY, Disp: 30 tablet, Rfl: 0    methotrexate 2.5 MG tablet, Take 10 tablets (25 mg) by mouth every 7 days Labs every 8 - 12 weeks for refills., Disp:  120 tablet, Rfl: 5    methotrexate 2.5 MG tablet, Take 8 tablets (20 mg) by mouth every 7 days, Disp: 96 tablet, Rfl: 3    olopatadine (PATADAY) 0.2 % ophthalmic solution, Place 0.05 mLs (1 drop) into both eyes daily, Disp: 2.5 mL, Rfl: 1    triamcinolone (KENALOG) 0.1 % external cream, Apply topically 2 times daily, Disp: 45 g, Rfl: 1  No current facility-administered medications for this visit.    Facility-Administered Medications Ordered in Other Visits:     sodium chloride (PF) 0.9% PF flush 72 mL, 72 mL, Intravenous, Once, Bartolo Garcia MD      Family history:   Family History   Problem Relation Age of Onset    Hypertension Mother     Cancer Mother         Lung. Cancer    Other Cancer Mother         Lung    Thyroid Disease Mother     C.A.D. Father         52 at first MI    Prostate Cancer Father         Bladder    Cancer Father         Lung -bladder cancer    Thyroid Disease Sister     Thyroid Disease Sister     Hypertension Other     Diabetes No family hx of     Cerebrovascular Disease No family hx of     Breast Cancer No family hx of     Cancer - colorectal No family hx of     Glaucoma No family hx of     Macular Degeneration No family hx of        Social history:   Social History     Tobacco Use    Smoking status: Former     Packs/day: 1.00     Years: 25.00     Additional pack years: 0.00     Total pack years: 25.00     Types: Cigarettes     Quit date: 10/5/2021     Years since quittin.0    Smokeless tobacco: Current    Tobacco comments:     Pt vapes 1 per day without nicotine    Vaping Use    Vaping Use: Some days   Substance Use Topics    Alcohol use: Not Currently     Comment: occasional    Drug use: No       Allergies:    Allergies   Allergen Reactions    Seasonal Allergies        Review of Systems: 5 point ROS performed and negative except for detailed above    Objective:    Physical exam:  LMP 2021 (Approximate)   Constitutional: Awake, no acute distress  HENT: normcephalic,    Respiratory: normal rate, no acute distress  Neuro:  Mental status: awake, oriented   CN: EOMI, face movements symmetric, no dysarthria, equal shoulder shrug, tongue midline  Motor: 5/5 strength in upper and lower extremities bilaterally  Sensory: equal to light touch bilaterally in upper and lower extremities  Coordination: Normal finger to nose in upper extremities bilaterally, normal heel-to-shin in lower extremities bilaterally    Imaging Reviewed:  Carotid ultrasound: 1. RIGHT carotid stent: Although velocities never exceed 150 cm/s,  color Doppler image suggests greater than 70% diameter in-stent  stenosis and velocity in the stent in the proximal internal carotid  artery drops to 16 cm/s, a 3.5 times drop from the stent in the  bifurcation. 7.56 velocity ratio in the stent from the proximal to mid  internal carotid artery. Further evaluation with CTA or catheter  angiography could be performed.     2. LEFT ICA:  Less than 50% diameter narrowing by grayscale imaging  and sonographic velocity criteria.      Assessment:  Libertad Russell  is a 57 year old female who presents for a previously placed right ICA stent in 2021 on ultrasound there is 70% in stent stenosis of the right ICA stent. The left demonstrates less than 50% stenosis . Given the in stent stenosis would recommend angioplasty  Discussed the risk and benefits of the diagnostic cerebral angiogram procedure which included the risk of groin hematoma, bruising, bleeding, limb ischemia, stroke, arteriovenous malformation or fistula formation. The procedure was detailed to the patient with a description of the procedure, conscious sedation and closure device.The patient's questions were answered and all concerns were addressed. The patient agreed to proceed with the procedure.     Thank you for this referral, for any questions or concerns please don't hesitate to contact us.     Plan:  Obtain CT angiogram of the head and neck at [facility] in  the next couple of days.  Call the patient back with results.  - Continue aspirin 325mg ad Plavix 75mg  -Continue maximum medical management.     Endovascular Surgical Neuroradiology staff is Dr. Freitas.    I personally interacted with the patient via video and spent approximately 20 minutes on the video and 10 minutes reviewing her medical records.      Again, thank you for allowing me to participate in the care of your patient.      Sincerely,    Juan Carlos Freitas MD

## 2023-10-19 ENCOUNTER — TELEPHONE (OUTPATIENT)
Dept: NEUROSURGERY | Facility: CLINIC | Age: 58
End: 2023-10-19
Payer: COMMERCIAL

## 2023-10-19 ENCOUNTER — MYC MEDICAL ADVICE (OUTPATIENT)
Dept: FAMILY MEDICINE | Facility: CLINIC | Age: 58
End: 2023-10-19
Payer: COMMERCIAL

## 2023-10-24 ENCOUNTER — ANCILLARY PROCEDURE (OUTPATIENT)
Dept: CT IMAGING | Facility: CLINIC | Age: 58
End: 2023-10-24
Attending: RADIOLOGY
Payer: COMMERCIAL

## 2023-10-24 DIAGNOSIS — I65.21 CAROTID STENOSIS, RIGHT: ICD-10-CM

## 2023-10-24 PROCEDURE — 70496 CT ANGIOGRAPHY HEAD: CPT | Mod: TC | Performed by: RADIOLOGY

## 2023-10-24 PROCEDURE — 70498 CT ANGIOGRAPHY NECK: CPT | Mod: TC | Performed by: RADIOLOGY

## 2023-10-24 RX ORDER — IOPAMIDOL 755 MG/ML
500 INJECTION, SOLUTION INTRAVASCULAR ONCE
Status: COMPLETED | OUTPATIENT
Start: 2023-10-24 | End: 2023-10-24

## 2023-10-24 RX ADMIN — IOPAMIDOL 67 ML: 755 INJECTION, SOLUTION INTRAVASCULAR at 16:26

## 2023-10-25 ENCOUNTER — OFFICE VISIT (OUTPATIENT)
Dept: FAMILY MEDICINE | Facility: CLINIC | Age: 58
End: 2023-10-25
Payer: COMMERCIAL

## 2023-10-25 VITALS
BODY MASS INDEX: 21.38 KG/M2 | DIASTOLIC BLOOD PRESSURE: 84 MMHG | WEIGHT: 133 LBS | SYSTOLIC BLOOD PRESSURE: 138 MMHG | TEMPERATURE: 97.8 F | OXYGEN SATURATION: 100 % | HEART RATE: 80 BPM | HEIGHT: 66 IN | RESPIRATION RATE: 12 BRPM

## 2023-10-25 DIAGNOSIS — F98.8 ATTENTION DEFICIT DISORDER OF ADULT: ICD-10-CM

## 2023-10-25 DIAGNOSIS — I10 ESSENTIAL HYPERTENSION WITH GOAL BLOOD PRESSURE LESS THAN 140/90: ICD-10-CM

## 2023-10-25 DIAGNOSIS — M05.742 RHEUMATOID ARTHRITIS INVOLVING BOTH HANDS WITH POSITIVE RHEUMATOID FACTOR (H): ICD-10-CM

## 2023-10-25 DIAGNOSIS — Z79.899 HIGH RISK MEDICATION USE: ICD-10-CM

## 2023-10-25 DIAGNOSIS — M05.741 RHEUMATOID ARTHRITIS INVOLVING BOTH HANDS WITH POSITIVE RHEUMATOID FACTOR (H): ICD-10-CM

## 2023-10-25 DIAGNOSIS — I63.231 CEREBROVASCULAR ACCIDENT (CVA) DUE TO STENOSIS OF RIGHT CAROTID ARTERY (H): ICD-10-CM

## 2023-10-25 DIAGNOSIS — I70.209 ATHEROSCLEROSIS OF ARTERIES OF EXTREMITIES (H): ICD-10-CM

## 2023-10-25 DIAGNOSIS — Z12.31 ENCOUNTER FOR SCREENING MAMMOGRAM FOR BREAST CANCER: ICD-10-CM

## 2023-10-25 DIAGNOSIS — Z00.00 ROUTINE GENERAL MEDICAL EXAMINATION AT A HEALTH CARE FACILITY: Primary | ICD-10-CM

## 2023-10-25 DIAGNOSIS — Z12.2 SCREENING FOR LUNG CANCER: ICD-10-CM

## 2023-10-25 DIAGNOSIS — I65.21 STENOSIS OF RIGHT CAROTID ARTERY: ICD-10-CM

## 2023-10-25 PROBLEM — Z21 ASYMPTOMATIC HIV INFECTION (H): Status: RESOLVED | Noted: 2023-10-17 | Resolved: 2023-10-25

## 2023-10-25 LAB
AMPHETAMINES UR QL: DETECTED
BARBITURATES UR QL SCN: NOT DETECTED
BASOPHILS # BLD AUTO: 0.1 10E3/UL (ref 0–0.2)
BASOPHILS NFR BLD AUTO: 1 %
BENZODIAZ UR QL SCN: NOT DETECTED
BUPRENORPHINE UR QL: NOT DETECTED
CANNABINOIDS UR QL: NOT DETECTED
COCAINE UR QL SCN: NOT DETECTED
D-METHAMPHET UR QL: NOT DETECTED
EOSINOPHIL # BLD AUTO: 0.2 10E3/UL (ref 0–0.7)
EOSINOPHIL NFR BLD AUTO: 4 %
ERYTHROCYTE [DISTWIDTH] IN BLOOD BY AUTOMATED COUNT: 14 % (ref 10–15)
HBA1C MFR BLD: 5.6 % (ref 0–5.6)
HCT VFR BLD AUTO: 36.4 % (ref 35–47)
HGB BLD-MCNC: 11.7 G/DL (ref 11.7–15.7)
IMM GRANULOCYTES # BLD: 0 10E3/UL
IMM GRANULOCYTES NFR BLD: 0 %
LYMPHOCYTES # BLD AUTO: 1.2 10E3/UL (ref 0.8–5.3)
LYMPHOCYTES NFR BLD AUTO: 24 %
MCH RBC QN AUTO: 29.7 PG (ref 26.5–33)
MCHC RBC AUTO-ENTMCNC: 32.1 G/DL (ref 31.5–36.5)
MCV RBC AUTO: 92 FL (ref 78–100)
METHADONE UR QL SCN: NOT DETECTED
MONOCYTES # BLD AUTO: 0.4 10E3/UL (ref 0–1.3)
MONOCYTES NFR BLD AUTO: 8 %
NEUTROPHILS # BLD AUTO: 3 10E3/UL (ref 1.6–8.3)
NEUTROPHILS NFR BLD AUTO: 63 %
OPIATES UR QL SCN: NOT DETECTED
OXYCODONE UR QL SCN: NOT DETECTED
PCP UR QL SCN: NOT DETECTED
PLATELET # BLD AUTO: 279 10E3/UL (ref 150–450)
RBC # BLD AUTO: 3.94 10E6/UL (ref 3.8–5.2)
TRICYCLICS UR QL SCN: NOT DETECTED
WBC # BLD AUTO: 4.8 10E3/UL (ref 4–11)

## 2023-10-25 PROCEDURE — 82728 ASSAY OF FERRITIN: CPT | Performed by: NURSE PRACTITIONER

## 2023-10-25 PROCEDURE — 83550 IRON BINDING TEST: CPT | Performed by: NURSE PRACTITIONER

## 2023-10-25 PROCEDURE — 85025 COMPLETE CBC W/AUTO DIFF WBC: CPT | Performed by: NURSE PRACTITIONER

## 2023-10-25 PROCEDURE — 80053 COMPREHEN METABOLIC PANEL: CPT | Performed by: NURSE PRACTITIONER

## 2023-10-25 PROCEDURE — 99396 PREV VISIT EST AGE 40-64: CPT | Performed by: NURSE PRACTITIONER

## 2023-10-25 PROCEDURE — 82306 VITAMIN D 25 HYDROXY: CPT | Performed by: NURSE PRACTITIONER

## 2023-10-25 PROCEDURE — 82043 UR ALBUMIN QUANTITATIVE: CPT | Performed by: NURSE PRACTITIONER

## 2023-10-25 PROCEDURE — 36415 COLL VENOUS BLD VENIPUNCTURE: CPT | Performed by: NURSE PRACTITIONER

## 2023-10-25 PROCEDURE — 80306 DRUG TEST PRSMV INSTRMNT: CPT | Performed by: NURSE PRACTITIONER

## 2023-10-25 PROCEDURE — 83540 ASSAY OF IRON: CPT | Performed by: NURSE PRACTITIONER

## 2023-10-25 PROCEDURE — 82570 ASSAY OF URINE CREATININE: CPT | Performed by: NURSE PRACTITIONER

## 2023-10-25 PROCEDURE — 80061 LIPID PANEL: CPT | Performed by: NURSE PRACTITIONER

## 2023-10-25 PROCEDURE — 83036 HEMOGLOBIN GLYCOSYLATED A1C: CPT | Performed by: NURSE PRACTITIONER

## 2023-10-25 PROCEDURE — 99214 OFFICE O/P EST MOD 30 MIN: CPT | Mod: 25 | Performed by: NURSE PRACTITIONER

## 2023-10-25 RX ORDER — CLOPIDOGREL BISULFATE 75 MG/1
75 TABLET ORAL DAILY
Qty: 90 TABLET | Refills: 3 | Status: SHIPPED | OUTPATIENT
Start: 2023-10-25 | End: 2024-07-17

## 2023-10-25 RX ORDER — ATORVASTATIN CALCIUM 40 MG/1
40 TABLET, FILM COATED ORAL DAILY
Qty: 90 TABLET | Refills: 3 | Status: SHIPPED | OUTPATIENT
Start: 2023-10-25

## 2023-10-25 RX ORDER — LOSARTAN POTASSIUM 100 MG/1
100 TABLET ORAL DAILY
Qty: 90 TABLET | Refills: 3 | Status: SHIPPED | OUTPATIENT
Start: 2023-10-25

## 2023-10-25 ASSESSMENT — ENCOUNTER SYMPTOMS
HEMATOCHEZIA: 0
DIARRHEA: 0
HEARTBURN: 0
COUGH: 0
PALPITATIONS: 0
BREAST MASS: 0
EYE PAIN: 0
SHORTNESS OF BREATH: 0
NERVOUS/ANXIOUS: 1
DYSURIA: 0
DIZZINESS: 1
CONSTIPATION: 0
NAUSEA: 0
PARESTHESIAS: 0
HEADACHES: 0
ARTHRALGIAS: 0
CHILLS: 0
FEVER: 0
ABDOMINAL PAIN: 0
FATIGUE: 1
FREQUENCY: 0
WEAKNESS: 1
MYALGIAS: 0
HEMATURIA: 0
SORE THROAT: 0
JOINT SWELLING: 0

## 2023-10-25 ASSESSMENT — PAIN SCALES - GENERAL: PAINLEVEL: NO PAIN (0)

## 2023-10-25 NOTE — PROGRESS NOTES
SUBJECTIVE:   CC: Libertad is an 57 year old who presents for preventive health visit.       10/25/2023     1:23 PM   Additional Questions   Roomed by Duaney   Accompanied by Self         10/25/2023     1:23 PM   Patient Reported Additional Medications   Patient reports taking the following new medications NA       Healthy Habits:     Getting at least 3 servings of Calcium per day:  Yes    Bi-annual eye exam:  Yes    Dental care twice a year:  Yes    Sleep apnea or symptoms of sleep apnea:  Daytime drowsiness    Diet:  Low fat/cholesterol    Frequency of exercise:  2-3 days/week    Duration of exercise:  15-30 minutes    Taking medications regularly:  Yes    Medication side effects:  None    Additional concerns today:  Yes    *Needs FMLA paperwork completed for employer      Social History     Tobacco Use    Smoking status: Former     Packs/day: 1.00     Years: 25.00     Additional pack years: 0.00     Total pack years: 25.00     Types: Cigarettes     Quit date: 10/5/2021     Years since quittin.0     Passive exposure: Past    Smokeless tobacco: Current    Tobacco comments:     Pt vapes 1 per day without nicotine    Substance Use Topics    Alcohol use: Not Currently     Comment: occasional           10/25/2023    11:43 AM   Alcohol Use   Prescreen: >3 drinks/day or >7 drinks/week? No     Reviewed orders with patient.  Reviewed health maintenance and updated orders accordingly - Yes      Breast Cancer Screenin/23/2021     9:29 AM   Breast CA Risk Assessment (FHS-7)   Do you have a family history of breast, colon, or ovarian cancer? No / Unknown       Mammogram Screening: Recommended mammography every 1-2 years with patient discussion and risk factor consideration  Pertinent mammograms are reviewed under the imaging tab.    History of abnormal Pap smear: NO - age 30-65 PAP every 5 years with negative HPV co-testing recommended      Latest Ref Rng & Units 2021    11:38 AM 2021    11:15 AM  6/2/2009    12:00 AM   PAP / HPV   PAP (Historical)  NIL   WARNING! - Addended    HPV 16 DNA NEG^Negative  Negative     HPV 18 DNA NEG^Negative  Negative     Other HR HPV NEG^Negative  Negative       Reviewed and updated as needed this visit by clinical staff   Tobacco  Allergies               Reviewed and updated as needed this visit by Provider                   Here for physical.  Is fasting for labs.  Is going to be having first grandchild next month.  Is very excited.  Never thought would be a grandmom.    Quit smoking in 2021.  Is very rarely vaping.    Currently taking 20 mg of extended release Adderall a.m. and 20 mg of immediate release in the p.m.  Last dose of Adderall was this AM.  Still feels like he is not able to concentrate.  Loses focus easily.  Flooded helps.  Turn bathtub on and then forgot that did it.  Went outside and came back in sometime later and water was all over the floor, wound the ceiling in the basement.  Previously given prescription for Wellbutrin.  Started taking it after this incident.  Has been taking it for about 3 weeks.    Is on forced medical leave at work. Manager will not continue to accommodate working at home.  Has been working from home since had stroke. Was going to the office and working Mon and Fri in office. Then was working from home Tuesday, Wednesday and Thursday.  Aug 31st was first day out of work has not been working at all. 12/1/23 is and 90 days - 12 weeks.  Is looking into short-term disability and long-term disability that has with current employer.  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  Does not feel like can do this any longer.    Continues to follow with neurologist.  Has known blockage.  Had CT scan yesterday.  Uncertain of the results or what the plan is.    Feels very tired and fatigued all the time.  Continues to follow closely with rheumatologist for rheumatoid arthritis.  Is taking methotrexate daily.   If misses a day will notice that joints are very swollen and painful and will be very fatigued.    Last Pap: 6/21-normal. Never an abnormal.   Last mammogram: 12/21. No family history of breast cancer. Had one at work 11/20-showed dense tissue.   Last BMD: N/A  Last Colonoscopy:  4/22-cologuard. Normal. Never, no family history of colon cancer.   Last eye exam: Plans to make appointment  Last dental exam: Every 6 mo  Last tetanus vaccine: 2018  Last influenza vaccine: Declines   Last shingles vaccine: Plans to get at the [facility].   Last pneumonia vaccine: Agreeable to getting today  Last COVID vaccine: Declines wanting.   Last COVID Booster: N/A  Hep C screen: 2021  HIV screen: June 2021-reactive however, confirmatory test negative  AAA screen (age 65-78 with smoking hx): Not applicable  IVD (HTN, Hyperlipid, Smoking): Aspirin 325 daily  Lung CA screening (55-80, 30 pk smoking hx): Smoked 1 ppd for 25 years, Quit smoking 2 year ago.  Occasional vaping    Review of Systems   Constitutional:  Positive for fatigue. Negative for chills and fever.   HENT:  Negative for congestion, ear pain, hearing loss and sore throat.         Whooshing noise in ears that is gradually improved.    Eyes:  Positive for visual disturbance. Negative for pain.   Respiratory:  Negative for cough and shortness of breath.    Cardiovascular:  Negative for chest pain, palpitations and peripheral edema.   Gastrointestinal:  Negative for abdominal pain, constipation, diarrhea, heartburn, hematochezia and nausea.   Breasts:  Negative for tenderness, breast mass and discharge.   Genitourinary:  Negative for dysuria, frequency, genital sores, hematuria, pelvic pain, urgency, vaginal bleeding and vaginal discharge.   Musculoskeletal:  Negative for arthralgias, joint swelling and myalgias.   Skin:  Negative for rash.   Neurological:  Positive for dizziness and weakness. Negative for headaches and paresthesias.   Psychiatric/Behavioral:  Negative for  "mood changes. The patient is nervous/anxious.         OBJECTIVE:   BP (!) 158/88   Pulse 80   Temp 97.8  F (36.6  C) (Tympanic)   Resp 12   Ht 1.676 m (5' 6\")   Wt 60.3 kg (133 lb)   LMP 05/20/2021 (Approximate)   SpO2 100%   BMI 21.47 kg/m    Physical Exam  Constitutional:       Appearance: Normal appearance. She is well-developed.   HENT:      Head: Normocephalic and atraumatic.      Right Ear: Tympanic membrane and external ear normal. No middle ear effusion.      Left Ear: Tympanic membrane and external ear normal.  No middle ear effusion.      Nose: No mucosal edema.   Neck:      Thyroid: No thyromegaly.      Vascular: No carotid bruit.   Cardiovascular:      Rate and Rhythm: Normal rate and regular rhythm.      Heart sounds: Normal heart sounds.   Pulmonary:      Effort: Pulmonary effort is normal.      Breath sounds: Normal breath sounds.   Abdominal:      General: Bowel sounds are normal.      Palpations: Abdomen is soft.   Skin:     General: Skin is warm and dry.   Neurological:      Mental Status: She is alert.   Psychiatric:         Behavior: Behavior normal.         ASSESSMENT/PLAN:   Routine general medical examination at a health care facility  Screening guidelines reviewed.     Atherosclerosis of arteries of extremities (H24)  Tolerating atorvastatin well.  Most recent neurology notes reviewed.  We will recheck lipids.  Refill sent.  Plan to follow-up in 1 year  - Lipid panel reflex to direct LDL Fasting; Future  - atorvastatin (LIPITOR) 40 MG tablet; Take 1 tablet (40 mg) by mouth daily  - Lipid panel reflex to direct LDL Fasting    Cerebrovascular accident (CVA) due to stenosis of right carotid artery (H)  Most recent neurology notes reviewed.  Plan to continue aspirin, Plavix and atorvastatin indefinitely.  Refill sent.  Follow-up in 1 year  - atorvastatin (LIPITOR) 40 MG tablet; Take 1 tablet (40 mg) by mouth daily  - clopidogrel (PLAVIX) 75 MG tablet; Take 1 tablet (75 mg) by mouth " daily    Essential hypertension with goal blood pressure less than 140/90  Stable-Target blood pressure is 140-160 due to having a 100% blockage of the right internal carotid with collateral supply.  Need to have above normal blood pressure and to stay well hydrated to promote perfusion.  Update labs.  Refill sent.  Follow-up in 1 year  - Albumin Random Urine Quantitative with Creat Ratio; Future  - Comprehensive metabolic panel; Future  - CBC with Platelets & Differential; Future  - Hemoglobin A1c; Future  - Iron & Iron Binding Capacity; Future  - Ferritin; Future  - losartan (COZAAR) 100 MG tablet; Take 1 tablet (100 mg) by mouth daily  - Albumin Random Urine Quantitative with Creat Ratio  - Comprehensive metabolic panel  - CBC with Platelets & Differential  - Hemoglobin A1c  - Iron & Iron Binding Capacity  - Ferritin    Attention deficit disorder of adult   reviewed.  Controlled substance agreement updated.  We will obtain urine drug screen.  Current medications and dosages working well.  Plan to continue bupropion.  We will plan virtual appointment in 4 to 6 weeks for recheck.  - Urine Drug Screen Clinic; Future  - Urine Drug Screen Clinic    Stenosis of right carotid artery  Most recent neurology notes reviewed.  Continue with neurologist.    Rheumatoid arthritis involving both hands with positive rheumatoid factor (H)  Most recent rheumatology notes reviewed.  Continue to follow with rheumatologist routinely.    Encounter for screening mammogram for breast cancer  Order placed, plans to call per self and set up  - *MA Screening Digital Bilateral; Future    Screening for lung cancer  Order placed, plans to call per self and set up  - CT Chest Lung Cancer Scrn Low Dose wo; Future    High risk medication use  - Vitamin D Deficiency; Future  - Vitamin D Deficiency         COUNSELING:  Reviewed preventive health counseling, as reflected in patient instructions        She reports that she quit smoking about 2  years ago. Her smoking use included cigarettes. She has a 25.00 pack-year smoking history. She has been exposed to tobacco smoke. She uses smokeless tobacco.        SULY Melvin CNP [facility]

## 2023-10-25 NOTE — LETTER
[facility]  -- Controlled Medication Agreement    10/25/2023   Libertad Russell   1965   [id number]       I understand that my provider is prescribing controlled medications to assist me in managing my ADHD.  The risks, benefits, and side effects of these medications have been explained to me and I agree to the following conditions for this type of treatment.    Stimulant Medication Prescribed: Adderall     1.  I will take my medications exactly as prescribed and will not change the medication dosage or schedule without my provider's approval.  Refills will not be given if I  runs out early.     2.  I will keep all regular appointments at this clinic.  If there are three or more missed appointments or appointments canceled less than 2 hours before the scheduled time, my medication may be discontinued.    3.  I understand that prescriptions may only be written for one month at a time, and a written prescription is required each month.  Prescriptions cannot be called in or faxed to the pharmacy.    4.  If the prescription is lost or stolen, replacement is at the discretion of my provider.  I understand that this may mean the prescription might not be replaced.    5.  If I am late for scheduled follow up, I understand that I must make an appointment and that another refill is at the discretion of my provider.  This may mean a prescription for only the amount required until the appointment, regardless of prescription co-pay.  For example, if an appointment is made in 1 week, a prescription might only be written for 7 pills.      I understand that if I violate any of the above conditions, my prescription medications and/or treatment may be terminated.  If the violation includes providing controlled substances to anyone other than to whom the medication is prescribed, a report may be made to my child's physician, pharmacy, and other authorities, including the police.    I have read this contract and  it has been explained to me.  I fully understand the consequences of violating this agreement.    _________________________________/______________/___________________  Patient signature/Date/Witness

## 2023-10-25 NOTE — PATIENT INSTRUCTIONS
You can call imaging scheduling to set up appointment date, time, and location that works best for you to have mammogram and CT scan of chest [phone]        Preventive Health Recommendations  Female Ages 50 - 64    Yearly exam: See your health care provider every year in order to  Review health changes.   Discuss preventive care.    Review your medicines if your doctor has prescribed any.    Get a Pap test every three years (unless you have an abnormal result and your provider advises testing more often).  If you get Pap tests with HPV test, you only need to test every 5 years, unless you have an abnormal result.   You do not need a Pap test if your uterus was removed (hysterectomy) and you have not had cancer.  You should be tested each year for STDs (sexually transmitted diseases) if you're at risk.   Have a mammogram every 1 to 2 years.  Have a colonoscopy at age 45, or have a yearly FIT test (stool test). These exams screen for colon cancer.    Have a cholesterol test every 5 years, or more often if advised.  Have a diabetes test (fasting glucose) every three years. If you are at risk for diabetes, you should have this test more often.   If you are at risk for osteoporosis (brittle bone disease), think about having a bone density scan (DEXA).    Shots: Get a flu shot each year. Get a tetanus shot every 10 years.    Nutrition:   Eat at least 5 servings of fruits and vegetables each day.  Eat whole-grain bread, whole-wheat pasta and brown rice instead of white grains and rice.  Get adequate Calcium and Vitamin D.     Lifestyle  Exercise at least 150 minutes a week (30 minutes a day, 5 days a week). This will help you control your weight and prevent disease.  Limit alcohol to one drink per day.  No smoking.   Wear sunscreen to prevent skin cancer.   See your dentist every six months for an exam and cleaning.  See your eye doctor every 1 to 2 years.

## 2023-10-26 LAB
ALBUMIN SERPL BCG-MCNC: 4.5 G/DL (ref 3.5–5.2)
ALP SERPL-CCNC: 88 U/L (ref 35–104)
ALT SERPL W P-5'-P-CCNC: 19 U/L (ref 0–50)
ANION GAP SERPL CALCULATED.3IONS-SCNC: 10 MMOL/L (ref 7–15)
AST SERPL W P-5'-P-CCNC: 21 U/L (ref 0–45)
BILIRUB SERPL-MCNC: 0.3 MG/DL
BUN SERPL-MCNC: 10 MG/DL (ref 6–20)
CALCIUM SERPL-MCNC: 9.4 MG/DL (ref 8.6–10)
CHLORIDE SERPL-SCNC: 102 MMOL/L (ref 98–107)
CHOLEST SERPL-MCNC: 121 MG/DL
CREAT SERPL-MCNC: 0.64 MG/DL (ref 0.51–0.95)
CREAT UR-MCNC: 163 MG/DL
DEPRECATED HCO3 PLAS-SCNC: 27 MMOL/L (ref 22–29)
EGFRCR SERPLBLD CKD-EPI 2021: >90 ML/MIN/1.73M2
FERRITIN SERPL-MCNC: 22 NG/ML (ref 11–328)
GLUCOSE SERPL-MCNC: 101 MG/DL (ref 70–99)
HDLC SERPL-MCNC: 56 MG/DL
IRON BINDING CAPACITY (ROCHE): 334 UG/DL (ref 240–430)
IRON SATN MFR SERPL: 35 % (ref 15–46)
IRON SERPL-MCNC: 117 UG/DL (ref 37–145)
LDLC SERPL CALC-MCNC: 55 MG/DL
MICROALBUMIN UR-MCNC: <12 MG/L
MICROALBUMIN/CREAT UR: NORMAL MG/G{CREAT}
NONHDLC SERPL-MCNC: 65 MG/DL
POTASSIUM SERPL-SCNC: 4.6 MMOL/L (ref 3.4–5.3)
PROT SERPL-MCNC: 6.8 G/DL (ref 6.4–8.3)
SODIUM SERPL-SCNC: 139 MMOL/L (ref 135–145)
TRIGL SERPL-MCNC: 48 MG/DL
VIT D+METAB SERPL-MCNC: 42 NG/ML (ref 20–50)

## 2023-10-30 ENCOUNTER — MYC REFILL (OUTPATIENT)
Dept: FAMILY MEDICINE | Facility: CLINIC | Age: 58
End: 2023-10-30
Payer: COMMERCIAL

## 2023-10-30 DIAGNOSIS — F98.8 ATTENTION DEFICIT DISORDER OF ADULT: ICD-10-CM

## 2023-10-30 RX ORDER — DEXTROAMPHETAMINE SACCHARATE, AMPHETAMINE ASPARTATE MONOHYDRATE, DEXTROAMPHETAMINE SULFATE AND AMPHETAMINE SULFATE 5; 5; 5; 5 MG/1; MG/1; MG/1; MG/1
20 CAPSULE, EXTENDED RELEASE ORAL DAILY
Qty: 30 CAPSULE | Refills: 0 | Status: SHIPPED | OUTPATIENT
Start: 2023-11-05 | End: 2023-12-08

## 2023-10-31 ENCOUNTER — TELEPHONE (OUTPATIENT)
Dept: NEUROSURGERY | Facility: CLINIC | Age: 58
End: 2023-10-31

## 2023-10-31 ENCOUNTER — VIRTUAL VISIT (OUTPATIENT)
Dept: NEUROSURGERY | Facility: CLINIC | Age: 58
End: 2023-10-31
Payer: COMMERCIAL

## 2023-10-31 DIAGNOSIS — I65.21 CAROTID STENOSIS, RIGHT: Primary | ICD-10-CM

## 2023-10-31 PROCEDURE — 99207 PR NO BILLABLE SERVICE THIS VISIT: CPT | Mod: VID | Performed by: RADIOLOGY

## 2023-10-31 ASSESSMENT — PAIN SCALES - GENERAL: PAINLEVEL: NO PAIN (0)

## 2023-10-31 NOTE — NURSING NOTE
Is the patient currently in the state of [place]? YES    Visit mode:VIDEO    If the visit is dropped, the patient can be reconnected by: VIDEO VISIT: Text to cell phone:   Telephone Information:   Mobile [phone]       Will anyone else be joining the visit? NO  (If patient encounters technical issues they should call [phone]    How would you like to obtain your AVS? MyChart    Are changes needed to the allergy or medication list? Pt stated no med changes    Reason for visit: RECHECK (2 week follow up- carotid stenosis, right/ CTA results )    Keena LARIOSF

## 2023-10-31 NOTE — LETTER
10/31/2023       RE: Libertad Russell  [address] [address]     Dear Colleague,    Thank you for referring your patient, Libertad Russell, to the [facility]UROSURGERY [facility]INIC [facility]. Please see a copy of my visit note below.    Neuroendovascular surgery clinic note.    Rozina Curiel is a 57-year-old female with past medical history of right internal carotid artery stenosis status post stent placement complicated by in-stent stenosis of greater than 90%.  The patient also has high-grade distal cavernous ICA stenosis.  We attempted to call the patient for this visit however she was unreachable.  Given tandem stenosis, we recommend angioplasty of the in-stent stenosis in the proximal right internal carotid artery.  If the patient is agreeable, we will proceed with scheduling of this procedure.    Case was discussed with Dr. Freitas.     Benjamin Alas MD, MPH  Neuroendovascular Surgery Fellow  [facility]  Pager: [phone]      Again, thank you for allowing me to participate in the care of your patient.      Sincerely,    Juan Carlos Freitas MD

## 2023-11-01 ENCOUNTER — MYC REFILL (OUTPATIENT)
Dept: FAMILY MEDICINE | Facility: CLINIC | Age: 58
End: 2023-11-01
Payer: COMMERCIAL

## 2023-11-01 DIAGNOSIS — F98.8 ATTENTION DEFICIT DISORDER OF ADULT: ICD-10-CM

## 2023-11-02 RX ORDER — DEXTROAMPHETAMINE SACCHARATE, AMPHETAMINE ASPARTATE, DEXTROAMPHETAMINE SULFATE AND AMPHETAMINE SULFATE 2.5; 2.5; 2.5; 2.5 MG/1; MG/1; MG/1; MG/1
20 TABLET ORAL DAILY
Qty: 60 TABLET | Refills: 0 | Status: SHIPPED | OUTPATIENT
Start: 2023-11-02 | End: 2023-12-08

## 2023-11-03 NOTE — PROGRESS NOTES
Neuroendovascular surgery clinic note.    Rozina Curiel is a 57-year-old female with past medical history of right internal carotid artery stenosis status post stent placement complicated by in-stent stenosis of greater than 90%.  The patient also has high-grade distal cavernous ICA stenosis.  We attempted to call the patient for this visit however she was unreachable.  Given tandem stenosis, we recommend angioplasty of the in-stent stenosis in the proximal right internal carotid artery.  If the patient is agreeable, we will proceed with scheduling of this procedure.    Case was discussed with Dr. Freitas.     Benjamin Alas MD, MPH  Neuroendovascular Surgery Fellow  [facility]  Pager: [phone]

## 2023-11-07 ENCOUNTER — VIRTUAL VISIT (OUTPATIENT)
Dept: NEUROSURGERY | Facility: CLINIC | Age: 58
End: 2023-11-07
Payer: COMMERCIAL

## 2023-11-07 DIAGNOSIS — I65.21 ASYMPTOMATIC STENOSIS OF RIGHT CAROTID ARTERY: Primary | ICD-10-CM

## 2023-11-07 PROCEDURE — 99442 PR PHYSICIAN TELEPHONE EVALUATION 11-20 MIN: CPT | Mod: GC | Performed by: RADIOLOGY

## 2023-11-07 ASSESSMENT — PAIN SCALES - GENERAL: PAINLEVEL: NO PAIN (0)

## 2023-11-07 NOTE — LETTER
11/7/2023       RE: Libertad Russell  [address] [address]     Dear Colleague,    Thank you for referring your patient, Libertad Russell, to the [facility] NEUROSURGERY CLINIC [facility]. Please see a copy of my visit note below.    Subjective:: Libertad Russell is a 57 year old female with a past medical history of HTN, smoking and RA. 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 Angiogram demonstrated R ICA pseudo-occlusion. She was treated with a 8x40 mm Precise stent in 2021. There was post-stent R ICA collapse distal to the stent at the communicating segment. .  The patient subsequently also underwent noninvasive imaging as well as a repeat cerebral angiography at [facility], which showed that the distal right internal carotid artery, about the level of the stent continues to be small in caliber and ended in the ophthalmic artery.    Her most recent ultrasound of the neck raised concerns about in-stent stenosis, with possible in-stent stenosis of about 70%.   With respect to new symptoms, she states that occasionally she has left hand numbness, and her vision has deteriorated over the last few months.  With respect to her vision, her history is significant for Lasix surgery about 5 years ago.  She has not had evaluation or surgeries for cataracts.      On today's visit: She has been doing well, she has whooshing sound in her head. She has been out on FMLA leave, she hasn't been doing well. The whooshing sound is intermittent, she can hear it at night while laying down at night. She is able to sleep and it doesn't bother her. It is pulsatile but she isn't sure. She notes that her vision is getting worse over the past  6 months. It is both of her eyes, it is progressively more blurry. She notices it with focusing on close up. No vision loss. She denies any  weakness, numbness, speech changes. She reports numbness in her hands bilaterally that is intermittent and doesn't occur frequently, she can't recall the last time it happened.       Past medical history:   Past Medical History:   Diagnosis Date    ADHD (attention deficit hyperactivity disorder)     Anemia 2022    Cerebrovascular accident (CVA) due to stenosis of right carotid artery (H) 09/15/2021    Depressive disorder     HTN (hypertension)     RA (rheumatoid arthritis) (H)     Raynaud disease         Current outpatient Medication list:   Current Outpatient Medications:     amphetamine-dextroamphetamine (ADDERALL XR) 20 MG 24 hr capsule, Take 1 capsule (20 mg) by mouth daily, Disp: 30 capsule, Rfl: 0    amphetamine-dextroamphetamine (ADDERALL) 10 MG tablet, Take 2 tablets (20 mg) by mouth daily, Disp: 60 tablet, Rfl: 0    aspirin (ASA) 325 MG tablet, Take 1 tablet (325 mg) by mouth daily, Disp: 90 tablet, Rfl: 3    atorvastatin (LIPITOR) 40 MG tablet, Take 1 tablet (40 mg) by mouth daily, Disp: 90 tablet, Rfl: 3    buPROPion (WELLBUTRIN SR) 100 MG 12 hr tablet, Take 1 tablet (100 mg) by mouth 2 times daily for 60 days, Disp: 120 tablet, Rfl: 0    clopidogrel (PLAVIX) 75 MG tablet, Take 1 tablet (75 mg) by mouth daily, Disp: 90 tablet, Rfl: 3    ferrous sulfate (FE TABS) 325 (65 Fe) MG EC tablet, TAKE 1 TABLET(325 MG) BY MOUTH DAILY WITH ORANGE JUICE, Disp: 90 tablet, Rfl: 1    folic acid (FOLVITE) 1 MG tablet, Take 1 tablet (1 mg) by mouth daily, Disp: 90 tablet, Rfl: 3    losartan (COZAAR) 100 MG tablet, Take 1 tablet (100 mg) by mouth daily, Disp: 90 tablet, Rfl: 3    methotrexate 2.5 MG tablet, Take 10 tablets (25 mg) by mouth every 7 days Labs every 8 - 12 weeks for refills., Disp: 120 tablet, Rfl: 5    methotrexate 2.5 MG tablet, Take 8 tablets (20 mg) by mouth every 7 days, Disp: 96 tablet, Rfl: 3    olopatadine (PATADAY) 0.2 % ophthalmic solution, Place 0.05 mLs (1 drop) into both eyes daily, Disp: 2.5 mL,  Rfl: 1    triamcinolone (KENALOG) 0.1 % external cream, Apply topically 2 times daily, Disp: 45 g, Rfl: 1  No current facility-administered medications for this visit.    Facility-Administered Medications Ordered in Other Visits:     sodium chloride (PF) 0.9% PF flush 72 mL, 72 mL, Intravenous, Once, Bartolo Garcia MD      Family history:   Family History   Problem Relation Age of Onset    Hypertension Mother     Cancer Mother         Lung. Cancer    Other Cancer Mother         Lung    Thyroid Disease Mother     C.A.D. Father         52 at first MI    Prostate Cancer Father         Bladder/ lung    Cancer Father         Lung -bladder cancer    Thyroid Disease Sister     Thyroid Disease Sister     Hypertension Other     Diabetes No family hx of     Cerebrovascular Disease No family hx of     Breast Cancer No family hx of     Cancer - colorectal No family hx of     Glaucoma No family hx of     Macular Degeneration No family hx of        Social history:   Social History     Tobacco Use    Smoking status: Former     Packs/day: 1.00     Years: 25.00     Additional pack years: 0.00     Total pack years: 25.00     Types: Cigarettes     Quit date: 10/5/2021     Years since quittin.0     Passive exposure: Past    Smokeless tobacco: Current    Tobacco comments:     Pt vapes 1 per day without nicotine    Vaping Use    Vaping Use: Some days   Substance Use Topics    Alcohol use: Not Currently     Comment: occasional    Drug use: No       Allergies:    Allergies   Allergen Reactions    Seasonal Allergies        Review of Systems: 5 point ROS performed and negative except for detailed above    Objective:    Telephone visit: Physical is limited due to telephone visit, patient is awake, conversational and has had no additional complaints or concerns.       Imaging Reviewed:    10/24: HEAD CTA:  1. High-grade stenosis involving the distal cavernous ICA, which ultimately occludes at its supraclinoid portion. The right middle  cerebral and anterior cerebral arteries are diminutive but opacified via collateral circulation from a patent anterior   communicating artery.     NECK CTA:  1. Right internal carotid artery stent with distal in-stent stenosis greater than 90%.      Assessment:  Libertad Russell  is a 57 year old female who presents for a previously placed right ICA stent in 2021 on ultrasound there is 70% in stent stenosis of the right ICA stent. The left demonstrates 50% stenosis . She has 70% in stent stenosis.   Thank you for this referral, for any questions or concerns please don't hesitate to contact us.     Plan:  -Carotid ultrasound in 1 year  -Return to clinic after repeat imaging  -Continue aspirin therapy      Aminah Waters MD  Endovascular Surgical Neuroradiology Fellow  [facility]  [phone]    Endovascular Surgical Neuroradiology staff is Dr. Freitas.    I personally interacted with the patient via video and spent approximately 20 minutes on the video and 10 minutes reviewing her medical records.        Again, thank you for allowing me to participate in the care of your patient.      Sincerely,    Juan Carlos Freitas MD

## 2023-11-07 NOTE — PROGRESS NOTES
Subjective:: Libertad Russell is a 57 year old female with a past medical history of HTN, smoking and RA. 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 Angiogram demonstrated R ICA pseudo-occlusion. She was treated with a 8x40 mm Precise stent in 2021. There was post-stent R ICA collapse distal to the stent at the communicating segment. .  The patient subsequently also underwent noninvasive imaging as well as a repeat cerebral angiography at [facility], which showed that the distal right internal carotid artery, about the level of the stent continues to be small in caliber and ended in the ophthalmic artery.    Her most recent ultrasound of the neck raised concerns about in-stent stenosis, with possible in-stent stenosis of about 70%.   With respect to new symptoms, she states that occasionally she has left hand numbness, and her vision has deteriorated over the last few months.  With respect to her vision, her history is significant for Lasix surgery about 5 years ago.  She has not had evaluation or surgeries for cataracts.      On today's visit: She has been doing well, she has whooshing sound in her head. She has been out on FMLA leave, she hasn't been doing well. The whooshing sound is intermittent, she can hear it at night while laying down at night. She is able to sleep and it doesn't bother her. It is pulsatile but she isn't sure. She notes that her vision is getting worse over the past  6 months. It is both of her eyes, it is progressively more blurry. She notices it with focusing on close up. No vision loss. She denies any weakness, numbness, speech changes. She reports numbness in her hands bilaterally that is intermittent and doesn't occur frequently, she can't recall the last time it happened.       Past medical history:   Past Medical History:   Diagnosis Date    ADHD (attention deficit hyperactivity disorder)     Anemia 2022     Cerebrovascular accident (CVA) due to stenosis of right carotid artery (H) 09/15/2021    Depressive disorder     HTN (hypertension)     RA (rheumatoid arthritis) (H)     Raynaud disease         Current outpatient Medication list:   Current Outpatient Medications:     amphetamine-dextroamphetamine (ADDERALL XR) 20 MG 24 hr capsule, Take 1 capsule (20 mg) by mouth daily, Disp: 30 capsule, Rfl: 0    amphetamine-dextroamphetamine (ADDERALL) 10 MG tablet, Take 2 tablets (20 mg) by mouth daily, Disp: 60 tablet, Rfl: 0    aspirin (ASA) 325 MG tablet, Take 1 tablet (325 mg) by mouth daily, Disp: 90 tablet, Rfl: 3    atorvastatin (LIPITOR) 40 MG tablet, Take 1 tablet (40 mg) by mouth daily, Disp: 90 tablet, Rfl: 3    buPROPion (WELLBUTRIN SR) 100 MG 12 hr tablet, Take 1 tablet (100 mg) by mouth 2 times daily for 60 days, Disp: 120 tablet, Rfl: 0    clopidogrel (PLAVIX) 75 MG tablet, Take 1 tablet (75 mg) by mouth daily, Disp: 90 tablet, Rfl: 3    ferrous sulfate (FE TABS) 325 (65 Fe) MG EC tablet, TAKE 1 TABLET(325 MG) BY MOUTH DAILY WITH ORANGE JUICE, Disp: 90 tablet, Rfl: 1    folic acid (FOLVITE) 1 MG tablet, Take 1 tablet (1 mg) by mouth daily, Disp: 90 tablet, Rfl: 3    losartan (COZAAR) 100 MG tablet, Take 1 tablet (100 mg) by mouth daily, Disp: 90 tablet, Rfl: 3    methotrexate 2.5 MG tablet, Take 10 tablets (25 mg) by mouth every 7 days Labs every 8 - 12 weeks for refills., Disp: 120 tablet, Rfl: 5    methotrexate 2.5 MG tablet, Take 8 tablets (20 mg) by mouth every 7 days, Disp: 96 tablet, Rfl: 3    olopatadine (PATADAY) 0.2 % ophthalmic solution, Place 0.05 mLs (1 drop) into both eyes daily, Disp: 2.5 mL, Rfl: 1    triamcinolone (KENALOG) 0.1 % external cream, Apply topically 2 times daily, Disp: 45 g, Rfl: 1  No current facility-administered medications for this visit.    Facility-Administered Medications Ordered in Other Visits:     sodium chloride (PF) 0.9% PF flush 72 mL, 72 mL, Intravenous, Once, Radha,  MD Bartolo      Family history:   Family History   Problem Relation Age of Onset    Hypertension Mother     Cancer Mother         Lung. Cancer    Other Cancer Mother         Lung    Thyroid Disease Mother     C.A.D. Father         52 at first MI    Prostate Cancer Father         Bladder/ lung    Cancer Father         Lung -bladder cancer    Thyroid Disease Sister     Thyroid Disease Sister     Hypertension Other     Diabetes No family hx of     Cerebrovascular Disease No family hx of     Breast Cancer No family hx of     Cancer - colorectal No family hx of     Glaucoma No family hx of     Macular Degeneration No family hx of        Social history:   Social History     Tobacco Use    Smoking status: Former     Packs/day: 1.00     Years: 25.00     Additional pack years: 0.00     Total pack years: 25.00     Types: Cigarettes     Quit date: 10/5/2021     Years since quittin.0     Passive exposure: Past    Smokeless tobacco: Current    Tobacco comments:     Pt vapes 1 per day without nicotine    Vaping Use    Vaping Use: Some days   Substance Use Topics    Alcohol use: Not Currently     Comment: occasional    Drug use: No       Allergies:    Allergies   Allergen Reactions    Seasonal Allergies        Review of Systems: 5 point ROS performed and negative except for detailed above    Objective:    Telephone visit: Physical is limited due to telephone visit, patient is awake, conversational and has had no additional complaints or concerns.       Imaging Reviewed:    10/24: HEAD CTA:  1. High-grade stenosis involving the distal cavernous ICA, which ultimately occludes at its supraclinoid portion. The right middle cerebral and anterior cerebral arteries are diminutive but opacified via collateral circulation from a patent anterior   communicating artery.     NECK CTA:  1. Right internal carotid artery stent with distal in-stent stenosis greater than 90%.      Assessment:  Libertad Russell  is a 57 year old female who  presents for a previously placed right ICA stent in 2021 on ultrasound there is 70% in stent stenosis of the right ICA stent. The left demonstrates 50% stenosis . She has 70% in stent stenosis.   Thank you for this referral, for any questions or concerns please don't hesitate to contact us.     Plan:  -Carotid ultrasound in 1 year  -Return to clinic after repeat imaging  -Continue aspirin therapy      Aminah Waters MD  Endovascular Surgical Neuroradiology Fellow  [facility]  [phone]    Endovascular Surgical Neuroradiology staff is Dr. Freitas.    I personally interacted with the patient via video and spent approximately 20 minutes on the video and 10 minutes reviewing her medical records.

## 2023-11-07 NOTE — NURSING NOTE
Is the patient currently in the state of [place]? YES    Visit mode:TELEPHONE    If the visit is dropped, the patient can be reconnected by: VIDEO VISIT: Text to cell phone:   Telephone Information:   Mobile [phone]       Will anyone else be joining the visit? NO  (If patient encounters technical issues they should call [phone]    How would you like to obtain your AVS? MyChart    Are changes needed to the allergy or medication list? No    Reason for visit: Follow Up    Madie TORRE

## 2023-11-07 NOTE — PATIENT INSTRUCTIONS
Please follow up with Dr. Freitas in 1 year with Carotid ultrasound prior.     Stroke & Endovascular RN Care Coordinators:    Marlin Leon, RN, BSN  Mindy Pal, RN, CNRN, SCRN    If you have any questions please contact the RN Care Coordinators at [phone], option 1.     After business hours call the  at [phone] and have the Neuro-Interventional Fellow paged.    Thank you for choosing [facility] for your health care needs.

## 2023-11-08 NOTE — PATIENT INSTRUCTIONS
Please follow up with Dr. Freitas in 1 year with carotid ultrasound prior. Please continue taking Aspirin daily.     Stroke & Endovascular RN Care Coordinators:    Marlin Leon, RN, BSN  Mindy Pal, RN, CNRN, SCRN    If you have any questions please contact the RN Care Coordinators at [phone], option 1.     After business hours call the  at [phone] and have the Neuro-Interventional Fellow paged.    Thank you for choosing [facility] for your health care needs.

## 2023-11-10 ENCOUNTER — OFFICE VISIT (OUTPATIENT)
Dept: RHEUMATOLOGY | Facility: CLINIC | Age: 58
End: 2023-11-10
Attending: INTERNAL MEDICINE
Payer: COMMERCIAL

## 2023-11-10 VITALS
BODY MASS INDEX: 21.53 KG/M2 | SYSTOLIC BLOOD PRESSURE: 151 MMHG | DIASTOLIC BLOOD PRESSURE: 90 MMHG | HEART RATE: 71 BPM | HEIGHT: 66 IN | WEIGHT: 134 LBS

## 2023-11-10 DIAGNOSIS — M05.741 RHEUMATOID ARTHRITIS INVOLVING BOTH HANDS WITH POSITIVE RHEUMATOID FACTOR (H): ICD-10-CM

## 2023-11-10 DIAGNOSIS — M05.742 RHEUMATOID ARTHRITIS INVOLVING BOTH HANDS WITH POSITIVE RHEUMATOID FACTOR (H): ICD-10-CM

## 2023-11-10 PROCEDURE — 99213 OFFICE O/P EST LOW 20 MIN: CPT | Performed by: INTERNAL MEDICINE

## 2023-11-10 PROCEDURE — 99214 OFFICE O/P EST MOD 30 MIN: CPT | Performed by: INTERNAL MEDICINE

## 2023-11-10 RX ORDER — METHOTREXATE 2.5 MG/1
25 TABLET ORAL
Qty: 40 TABLET | Refills: 6 | Status: SHIPPED | OUTPATIENT
Start: 2023-11-10 | End: 2024-08-21

## 2023-11-10 RX ORDER — FOLIC ACID 1 MG/1
1 TABLET ORAL DAILY
Qty: 90 TABLET | Refills: 3 | Status: SHIPPED | OUTPATIENT
Start: 2023-11-10

## 2023-11-10 RX ORDER — PREDNISONE 5 MG/1
TABLET ORAL
Qty: 42 TABLET | Refills: 1 | Status: SHIPPED | OUTPATIENT
Start: 2023-11-10 | End: 2023-12-01

## 2023-11-10 ASSESSMENT — PAIN SCALES - GENERAL: PAINLEVEL: MODERATE PAIN (5)

## 2023-11-10 NOTE — PATIENT INSTRUCTIONS
Dx: Rheumatoid arthritis    Plan:  1.  Continue methotrexate 10 tablets (25 mg) once weekly.While on methotrexate:   -- Check blood tests every 3 months (AST/ALT, Albumin, CBC with platelets)   -- Limit alcohol intake to 2 drinks weekly; use folate 1 mg daily.  --Tylenol 500-1000 mg can be used as needed up to three times daily for nausea/headache associated with dosing.    2.  Humira 40 mg subcu every other week; expect benefit within 4 to 6 weeks of starting.  3.  While awaiting onset of Humira action, use prednisone 15 mg daily for 1 week, 10 mg daily for 1 week, then 5 mg daily for 1 week.  4.  Blood work for hepatitis, tuberculosis, and inflammation today.  5.  Medication therapy monitoring referral for initiation of Humira.

## 2023-11-10 NOTE — LETTER
11/10/2023       RE: Libertad Russell  [address] [facility] [address]     Dear Colleague,    Thank you for referring your patient, Libertad Russell, to the [facility] RHEUMATOLOGY [facility] [place] at [facility]. Please see a copy of my visit note below.    Rheumatology Clinic Visit     Libertad Russell MRN# [id number]   YOB: 1965 Age: 57 year old     Date of Visit: 11/10/2023  Primary care provider: Symone Acosta          Assessment and Plan:     # Rheumatoid arthritis (RF+, CCP+, dx 8/21) with concomitant hand OA and hx Raynaud's (OMY negative): Patient relates increasing bilateral foot pain left greater than right wrist pain in recent months.  Exam shows markedly restricted left wrist range of motion and tenderness at multiple MCPs.    Data: In October 2023, comprehensive metabolic panel and CBC were normal.  In April 2022, rheumatoid factor was elevated at 58, and cyclic citrullinated peptide antibodies were greater than 340 units.  MOY was negative.   left wrist x-ray done on June 23, 2021 showed severe joint space narrowing with erosions and cysts in the radiocarpal joint.    Discussion: Rheumatoid arthritis with adverse prognostic factors (positive rheumatoid factor, CCP) remains incompletely controlled on methotrexate monotherapy.  Key symptoms include prolonged morning stiffness and bilateral MTP pain that is likely inflammatory in origin.  Seropositivity with rheumatoid factor and ACPA portend a difficult to treat and destructive course.  Methotrexate has helped significantly, but I think that monotherapy is inadequate for the purposes of fully suppressing inflammatory symptoms and optimally reducing the risk of long-term joint damage.  I recommend addition of tumor necrosis factor inhibitor therapy.  We had a good discussion about the risks and benefits of Humira.  If Humira is not beneficial within 6 to 8 weeks, I  recommend consideration of abatacept.    # Peripheral vascular disease with carotid stenosis; status post CVA 2021.    Plan:  1.  Continue methotrexate 10 tablets (25 mg) once weekly.While on methotrexate:   -- Check blood tests every 3 months (AST/ALT, Albumin, CBC with platelets)   -- Limit alcohol intake to 2 drinks weekly; use folate 1 mg daily.  --Tylenol 500-1000 mg can be used as needed up to three times daily for nausea/headache associated with dosing.    2.  Humira 40 mg subcu every other week; expect benefit within 4 to 6 weeks of starting.  3.  While awaiting onset of Humira action, use prednisone 15 mg daily for 1 week, 10 mg daily for 1 week, then 5 mg daily for 1 week.  4.  Blood work for hepatitis, tuberculosis, and inflammation today.  5.  Medication therapy monitoring referral for initiation of Humira.    RTC 4 [facility]    Butch Odom M.D.  Staff Rheumatologist,  Health  Pager [phone]                Active Problem List:     Patient Active Problem List    Diagnosis Date Noted    Human immunodeficiency virus (HIV) disease (H) 10/17/2023     Priority: Medium    Atherosclerosis of arteries of extremities (H24) 07/19/2022     Priority: Medium    Rheumatoid arthritis involving both hands with positive rheumatoid factor (H) 12/01/2021     Priority: Medium    Stenosis of right carotid artery 12/01/2021     Priority: Medium    Cerebrovascular accident (CVA), unspecified mechanism (H) 10/22/2021     Priority: Medium    Cerebrovascular accident (CVA) due to stenosis of right carotid artery (H) 09/15/2021     Priority: Medium    Paresthesia 06/23/2021     Priority: Medium    Nicotine abuse 06/23/2021     Priority: Medium    Irregular periods/menstrual cycles 06/23/2021     Priority: Medium    Lipoma of right axilla 05/12/2021     Priority: Medium     Added automatically from request for surgery [id number]      Lipoma of neck 05/12/2021     Priority: Medium     Added automatically from request for surgery  "[id number]      Essential hypertension with goal blood pressure less than 140/90 07/26/2016     Priority: Medium    Raynaud phenomenon 09/24/2012     Priority: Medium    Rosacea 10/18/2011     Priority: Medium     June 22, 2013 improves with metrogel. One year refill.       Attention deficit disorder of adult 09/09/2010     Priority: Medium     June 22, 2013 appears be doing well, benefits from the medication with minimal side effects.  July 2, 2013 patient requesting increased dosage. At her next prescription will try Adderall XR 20 mg am, Adderall, short acting 10 mg, in the afternoon. Available July 10, 2013. Update in one month.   January 16, 2014 Needs appointment for further refills.    September 22, 2019 patient was seen September 18, 2019.  ADD addressed, okay with refills for 6 months.      Seasonal allergic rhinitis 07/11/2008     Priority: Medium    Tobacco use disorder 04/11/2007     Priority: Medium     June 22, 2013 will try Chantix. Reviewed side effects including increased depressive symptoms or anxiety, nausea. Reviewed smoking cessation.              History of Present Illness:   Libertad Russell presents for folllowup of inflammatory arthritis.  She was last seen in 2-2023  At that time, active symptoms of inflammatory arthritis were noted.  Recommendation was to increase methotrexate to a maximum dose of 25  mg weekly, and consideration of additional therapy if arthritis suppression was incomplete.      Interval history November 10, 2023    Joint pain is flaring. She is exhausted, and she has bilateral hand and foot pain. Especially in the mornings, she has base of toe pain. As she moves around, pain improves. Wrist pain has been increased with swelling and pain on both volar wrists. She has trouble buttoning pants.  Early morning stiffness = 2hours    She is stressed with a \"forced medical leave\" from work     She continues methotrexate 10 tabs once weekly.  Joint symptoms increase if she delays " "the dose by only a day.    She is off blood thinner medication temporarily.  She has taken rare prednisone \"one or two\" to get through pain crises.    Interval history February 15, 2023    She notes L > r wrist pain/swelling, often associated with use.    She had a stent placed in a neck artery; workup showed stenosis in cerbrovascular vessels. She had several CVA in summer 2021.  Bottoms of her feet hurt, worse with walking, balls of the feet.   Early morning stiffness, especially in the feet and the wrist, often lasts until 2 p.m. It helps to use a L wrist splint/brace.  There is \"clicking\" in the R shoulder; she had and episode of prolonged shoulder pain.   Continues methotrexate 8 tabs weekly, has not increased to 25 mg weekly as discussed at last visit.    Interval history June 9, 2022    Pt was originally seen in Rheumatology 8/2021 and dx with seropositive erosive RA (dx 8/21, +RF, + CCP at 340, left wrist x-ray showed severe joint space narrowing with erosions and cysts in the radiocarpal joint). Current treatment: Methotrexate 15 mg weekly (1/2022).   Presented with hand, wrist feet pain, swelling and stiffness with synovitis and TTP on exam.  Pt waited to start methotrexate as she had R carotid artery stent placed, father passed and has been seeing neurology for CVA. Of note, pt reports up until 1 yr ago she was in excellent health.  Since starting Methotrexate she has 50% improvement in pain, stiffness and swelling of wrists and feet. Has L wrist/ arm weakness that she believes is related to her stroke as it has not changed since then. Is tolerating Methotrexate with mild hair loss. Of note pt quite smoking 10/21 :)     ROS: Has mild diffuse hair loss. Is fatigued in the setting of anemia that seems to be improving with iron. Denies mouth sores, N/V.  Will be having a colonoscopy/ endoscopy in near future to investigate source of blood loss. Denies infections or fevers, blood in stool or diarrhea. " "Continues to have \"wooshing\" sound in bilateral ears, this has improved 30% since starting methotrexate shes unsure if this is why the improvement or r/t to stent, of note this symptom is worse with laying down.  Has rash on arms, legs, feet that started 10 days ago and after walking through the woods, was seen and dx poison ivy, started topical therapy and is improving. Has poor dentition with broken teeth, plan was to have these removed but now on blood thinner and so not an option currently.     Patient was seen on April 27 by Dr. Davies for iron deficiency anemia.  History of rheumatoid arthritis and started methotrexate therapy 4 months prior to visit was noted.  Iron deficiency anemia with low ferritin was in the impression, and recommendation was to start oral iron replacement for 6 months with follow-up in 2 months for hemoglobin measurement, and recommendation for colonoscopy and upper endoscopy if colonoscopy was negative.  No other signs or symptoms of ulcer disease or active malignancy were noted.    Patient was seen in neurology on February 1, 2022 in follow-up of symptomatic right carotid disease.  History of cerebrovascular accident and stent placement in the right carotid artery was reviewed.  Impression was an left sided neurologic symptoms, likely due to serial small strokes from high-grade obstruction in the right carotid artery.  Left-sided weakness was continuing to improve.  Recommendation was to continue antiplatelet agents (aspirin and Plavix).  Recommendation was to keep the stent patent, continue Lipitor, and referral to ENT for opinion on tinnitus.  Referral to occupational therapy for hand strength and fine movement of the hands was recommended.    Hx: 8-2021    Patient was seen by provider Karla in June 2021 for general medical checkup.  History of wrist swelling and pain and constitutional symptoms was elicited.  Swelling at the left wrist was observed, and patient was referred to " "rheumatology.    Today, she reports that she first had symptoms 18 months ago, when both feet became painful. There was mild visible swelling in the toes. It was hard to walk. Pain receded, but recurred in afew months, and has been \"coming and going\" since. Pain is in the ball of the feet In the mornings it is \"super hard\" to get moving. Pain remits during the day.    Approximately 1 year ago (during pandemic), she noted \"on and off\" wrist pain. She developed a lump on the outside of the left wrist, associated with swelling and warmth. Since then, there has been swelling in both wrists. Pain is most noticeable in morning. Early morning stiffness is ~ 3 hours.    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    There were episodes of L arm and L leg numbness and weakness, starting several weeks ago. She has an MRI brain scheduled.    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           Review of Systems:       Per above          Past Medical History:     Past Medical History:   Diagnosis Date    ADHD (attention deficit hyperactivity disorder)     Anemia 2022    Cerebrovascular accident (CVA) due to stenosis of right carotid artery (H) 09/15/2021    Depressive disorder     HTN (hypertension)     RA (rheumatoid arthritis) (H)     Raynaud disease      Past Surgical History:   Procedure Laterality Date    BREAST BIOPSY, RT/LT  2000, 1998    breast biopsies; reported to be benign    EXCISE MASS AXILLA Right 06/08/2021    Excise right axillary lipoma;  Surgeon: Artis Domínugez MD;  Location:  OR    EXCISE TUMOR, SOFT TISSUE, NECK/THORAX, SUBCUTANEOUS N/A 06/08/2021    EXCISION of lipoma from left occiput;  Surgeon: Artis Domínguez MD;  Location: [facility] OR    EYE SURGERY  2015    Lasik for vision correction    IR CAROTID CEREBRAL ANGIOGRAM BILATERAL  10/22/2021    LYMPH NODE BIOPSY  2001    " cervical lymph node biopsy; told to be benign    CA BREAST AUGMENTATION Bilateral     breast implants    TUBAL LIGATION      VASCULAR SURGERY      Stent            Social History:     Social History     Occupational History    Not on file   Tobacco Use    Smoking status: Former     Packs/day: 1.00     Years: 25.00     Additional pack years: 0.00     Total pack years: 25.00     Types: Cigarettes     Quit date: 10/5/2021     Years since quittin.0     Passive exposure: Past    Smokeless tobacco: Current    Tobacco comments:     Pt vapes 1 per day without nicotine    Vaping Use    Vaping Use: Some days   Substance and Sexual Activity    Alcohol use: Not Currently     Comment: occasional    Drug use: No    Sexual activity: Yes     Partners: Male     Birth control/protection: Female Surgical     Comment: tubal     3 children; all healthy except ADD  Single  No EtOH for about a year.  Smoked 1 ppd X 30 years. Quit 10/2021       Family History:     Family History   Problem Relation Age of Onset    Hypertension Mother     Cancer Mother         Lung. Cancer    Other Cancer Mother         Lung    Thyroid Disease Mother     C.A.D. Father         52 at first MI    Prostate Cancer Father         Bladder/ lung    Cancer Father         Lung -bladder cancer    Thyroid Disease Sister     Thyroid Disease Sister     Hypertension Other     Diabetes No family hx of     Cerebrovascular Disease No family hx of     Breast Cancer No family hx of     Cancer - colorectal No family hx of     Glaucoma No family hx of     Macular Degeneration No family hx of    Mother had wrist pain/swelling; probably diagnosed with RA.         Allergies:     Allergies   Allergen Reactions    Seasonal Allergies             Medications:     Current Outpatient Medications   Medication Sig Dispense Refill    adalimumab (HUMIRA *CF*) 40 MG/0.4ML pen kit Inject 0.4 mLs (40 mg) Subcutaneous every 14 days Hold for signs of infection, then seek medical  "attention. 1 each 5    amphetamine-dextroamphetamine (ADDERALL XR) 20 MG 24 hr capsule Take 1 capsule (20 mg) by mouth daily 30 capsule 0    amphetamine-dextroamphetamine (ADDERALL) 10 MG tablet Take 2 tablets (20 mg) by mouth daily 60 tablet 0    aspirin (ASA) 325 MG tablet Take 1 tablet (325 mg) by mouth daily 90 tablet 3    atorvastatin (LIPITOR) 40 MG tablet Take 1 tablet (40 mg) by mouth daily 90 tablet 3    clopidogrel (PLAVIX) 75 MG tablet Take 1 tablet (75 mg) by mouth daily 90 tablet 3    ferrous sulfate (FE TABS) 325 (65 Fe) MG EC tablet TAKE 1 TABLET(325 MG) BY MOUTH DAILY WITH ORANGE JUICE 90 tablet 1    folic acid (FOLVITE) 1 MG tablet Take 1 tablet (1 mg) by mouth daily 90 tablet 3    losartan (COZAAR) 100 MG tablet Take 1 tablet (100 mg) by mouth daily 90 tablet 3    methotrexate 2.5 MG tablet Take 10 tablets (25 mg) by mouth every 7 days Labs every 8 - 12 weeks for refills. 40 tablet 6    methotrexate 2.5 MG tablet Take 8 tablets (20 mg) by mouth every 7 days 96 tablet 3    olopatadine (PATADAY) 0.2 % ophthalmic solution Place 0.05 mLs (1 drop) into both eyes daily 2.5 mL 1    predniSONE (DELTASONE) 5 MG tablet Take 3 tablets (15 mg) by mouth daily for 7 days, THEN 2 tablets (10 mg) daily for 7 days, THEN 1 tablet (5 mg) daily for 7 days. 42 tablet 1    triamcinolone (KENALOG) 0.1 % external cream Apply topically 2 times daily 45 g 1    buPROPion (WELLBUTRIN SR) 100 MG 12 hr tablet Take 1 tablet (100 mg) by mouth 2 times daily for 60 days 120 tablet 0   Using adderall for ~ 8 years; ADD symptoms are much improved.         Physical Exam:   Blood pressure (!) 151/90, pulse 71, height 1.676 m (5' 6\"), weight 60.8 kg (134 lb), last menstrual period 05/20/2021, not currently breastfeeding.  Wt Readings from Last 6 Encounters:   11/10/23 60.8 kg (134 lb)   10/25/23 60.3 kg (133 lb)   10/17/23 61.7 kg (136 lb)   08/09/23 61.7 kg (136 lb)   02/16/23 61.9 kg (136 lb 8 oz)   09/28/22 59.9 kg (132 lb) "       Constitutional: well-developed, appearing stated age; cooperative  Eyes: nl EOM, PERRLA, vision, conjunctiva, sclera  ENT: nl external ears, nose, hearing, lips, throat.  Multiple back teeth with poor dentition.  No mucous membrane lesions, normal saliva pool  Neck: no mass or thyroid enlargement  Resp: lungs clear to auscultation with mildly reduced airflow intensity  Lymph: no cervical, supraclavicular  MS:Mild  tenderness at the left wrist with markedly restricted range of motion and swelling at ulnar wrist. Subtle bony enlargement of CMC's and mutliple DIP's suggestive of OA.There is valgus angulation at the left second toe with synovial thickening palpable at several MTPs and aman appearance of MTP's. L 5th MTP thickened/swollen. R 2-3rd MTPs with swelling, -TTP,  plantar displacement of the metatarsal heads.  Skin:   Neuro: strength.   Psych: nl judgement, orientation, memory, affect.         Data:     @      Latest Ref Rng & Units 4/25/2023     3:00 PM 9/6/2023     3:38 PM 10/25/2023     2:25 PM   RHEUM RESULTS   Albumin 3.4 - 5.0 g/dL 3.8      Albumin 3.5 - 5.2 g/dL  4.5  4.5    ALT 0 - 50 U/L 33  19  19    AST 0 - 45 U/L 20  20  21    Creatinine 0.51 - 0.95 mg/dL 0.56  0.69  0.64    CRP Inflammation 0.0 - 8.0 mg/L <2.9      CRP Inflammation <5.00 mg/L  <3.00     GFR Estimate >60 mL/min/1.73m2 >90  >90  >90    Hematocrit 35.0 - 47.0 % 36.5  34.6  36.4    Hemoglobin 11.7 - 15.7 g/dL 12.8  11.5  11.7    WBC 4.0 - 11.0 10e3/uL 6.4  6.2  4.8    RBC Count 3.80 - 5.20 10e6/uL 4.07  3.79  3.94    RDW 10.0 - 15.0 % 14.8  14.2  14.0    MCHC 31.5 - 36.5 g/dL 35.1  33.2  32.1    MCV 78 - 100 fL 90  91  92    Platelet Count 150 - 450 10e3/uL 376  226  279        Rheumatoid Factor   Date Value Ref Range Status   06/30/2021 58 (H) <12 IU/mL Final   ,   Cyclic Citrullinated Peptide Antibody, IgG   Date Value Ref Range Status   06/30/2021 >340 (H) <7 U/mL Final   ,  ,  ,  ,  ,   MOY interpretation   Date Value Ref  Range Status   06/30/2021 Negative NEG^Negative Final     Comment:                                        Reference range:  <1:40  NEGATIVE  1:40 - 1:80  BORDERLINE POSITIVE  >1:80 POSITIVE     ,   MOY Screen by EIA   Date Value Ref Range Status   07/30/2010 <1.0  Interpretation:  Negative 0 - 1.0 Final   ,  ,  ,  ,  ,  ,  ,  ,  ,  ,  ,  ,  ,  ,  ,  ,  ,  ,  ,  ,  ,  ,  ,  ,  ,  ,  ,  ,  ,  ,  ,  ,  ,  ,  ,  ,  ,       Reviewed Rheumatology lab flowsheet      Again, thank you for allowing me to participate in the care of your patient.      Sincerely,    Butch Odom MD

## 2023-11-10 NOTE — PROGRESS NOTES
Rheumatology Clinic Visit     Libertad Russell MRN# [id number]   YOB: 1965 Age: 57 year old     Date of Visit: 11/10/2023  Primary care provider: Symone Acosta          Assessment and Plan:     # Rheumatoid arthritis (RF+, CCP+, dx 8/21) with concomitant hand OA and hx Raynaud's (MOY negative): Patient relates increasing bilateral foot pain left greater than right wrist pain in recent months.  Exam shows markedly restricted left wrist range of motion and tenderness at multiple MCPs.    Data: In October 2023, comprehensive metabolic panel and CBC were normal.  In April 2022, rheumatoid factor was elevated at 58, and cyclic citrullinated peptide antibodies were greater than 340 units.  MOY was negative.   left wrist x-ray done on June 23, 2021 showed severe joint space narrowing with erosions and cysts in the radiocarpal joint.    Discussion: Rheumatoid arthritis with adverse prognostic factors (positive rheumatoid factor, CCP) remains incompletely controlled on methotrexate monotherapy.  Key symptoms include prolonged morning stiffness and bilateral MTP pain that is likely inflammatory in origin.  Seropositivity with rheumatoid factor and ACPA portend a difficult to treat and destructive course.  Methotrexate has helped significantly, but I think that monotherapy is inadequate for the purposes of fully suppressing inflammatory symptoms and optimally reducing the risk of long-term joint damage.  I recommend addition of tumor necrosis factor inhibitor therapy.  We had a good discussion about the risks and benefits of Humira.  If Humira is not beneficial within 6 to 8 weeks, I recommend consideration of abatacept.    # Peripheral vascular disease with carotid stenosis; status post CVA 2021.    Plan:  1.  Continue methotrexate 10 tablets (25 mg) once weekly.While on methotrexate:   -- Check blood tests every 3 months (AST/ALT, Albumin, CBC with platelets)   -- Limit alcohol intake to 2 drinks  weekly; use folate 1 mg daily.  --Tylenol 500-1000 mg can be used as needed up to three times daily for nausea/headache associated with dosing.    2.  Humira 40 mg subcu every other week; expect benefit within 4 to 6 weeks of starting.  3.  While awaiting onset of Humira action, use prednisone 15 mg daily for 1 week, 10 mg daily for 1 week, then 5 mg daily for 1 week.  4.  Blood work for hepatitis, tuberculosis, and inflammation today.  5.  Medication therapy monitoring referral for initiation of Humira.    RTC 4 mos    Butch Odom M.D.  Staff Rheumatologist,  Health  Pager [phone]                Active Problem List:     Patient Active Problem List    Diagnosis Date Noted    Human immunodeficiency virus (HIV) disease (H) 10/17/2023     Priority: Medium    Atherosclerosis of arteries of extremities (H24) 07/19/2022     Priority: Medium    Rheumatoid arthritis involving both hands with positive rheumatoid factor (H) 12/01/2021     Priority: Medium    Stenosis of right carotid artery 12/01/2021     Priority: Medium    Cerebrovascular accident (CVA), unspecified mechanism (H) 10/22/2021     Priority: Medium    Cerebrovascular accident (CVA) due to stenosis of right carotid artery (H) 09/15/2021     Priority: Medium    Paresthesia 06/23/2021     Priority: Medium    Nicotine abuse 06/23/2021     Priority: Medium    Irregular periods/menstrual cycles 06/23/2021     Priority: Medium    Lipoma of right axilla 05/12/2021     Priority: Medium     Added automatically from request for surgery [id number]      Lipoma of neck 05/12/2021     Priority: Medium     Added automatically from request for surgery [id number]      Essential hypertension with goal blood pressure less than 140/90 07/26/2016     Priority: Medium    Raynaud phenomenon 09/24/2012     Priority: Medium    Rosacea 10/18/2011     Priority: Medium     June 22, 2013 improves with metrogel. One year refill.       Attention deficit disorder of adult 09/09/2010      "Priority: Medium     June 22, 2013 appears be doing well, benefits from the medication with minimal side effects.  July 2, 2013 patient requesting increased dosage. At her next prescription will try Adderall XR 20 mg am, Adderall, short acting 10 mg, in the afternoon. Available July 10, 2013. Update in one month.   January 16, 2014 Needs appointment for further refills.    September 22, 2019 patient was seen September 18, 2019.  ADD addressed, okay with refills for 6 months.      Seasonal allergic rhinitis 07/11/2008     Priority: Medium    Tobacco use disorder 04/11/2007     Priority: Medium     June 22, 2013 will try Chantix. Reviewed side effects including increased depressive symptoms or anxiety, nausea. Reviewed smoking cessation.              History of Present Illness:   Libertad Russell presents for folllowup of inflammatory arthritis.  She was last seen in 2-2023  At that time, active symptoms of inflammatory arthritis were noted.  Recommendation was to increase methotrexate to a maximum dose of 25  mg weekly, and consideration of additional therapy if arthritis suppression was incomplete.      Interval history November 10, 2023    Joint pain is flaring. She is exhausted, and she has bilateral hand and foot pain. Especially in the mornings, she has base of toe pain. As she moves around, pain improves. Wrist pain has been increased with swelling and pain on both volar wrists. She has trouble buttoning pants.  Early morning stiffness = 2hours    She is stressed with a \"forced medical leave\" from work     She continues methotrexate 10 tabs once weekly.  Joint symptoms increase if she delays the dose by only a day.    She is off blood thinner medication temporarily.  She has taken rare prednisone \"one or two\" to get through pain crises.    Interval history February 15, 2023    She notes L > r wrist pain/swelling, often associated with use.    She had a stent placed in a neck artery; workup showed stenosis in " "cerbrovascular vessels. She had several CVA in summer 2021.  Bottoms of her feet hurt, worse with walking, balls of the feet.   Early morning stiffness, especially in the feet and the wrist, often lasts until 2 p.m. It helps to use a L wrist splint/brace.  There is \"clicking\" in the R shoulder; she had and episode of prolonged shoulder pain.   Continues methotrexate 8 tabs weekly, has not increased to 25 mg weekly as discussed at last visit.    Interval history June 9, 2022    Pt was originally seen in Rheumatology 8/2021 and dx with seropositive erosive RA (dx 8/21, +RF, + CCP at 340, left wrist x-ray showed severe joint space narrowing with erosions and cysts in the radiocarpal joint). Current treatment: Methotrexate 15 mg weekly (1/2022).   Presented with hand, wrist feet pain, swelling and stiffness with synovitis and TTP on exam.  Pt waited to start methotrexate as she had R carotid artery stent placed, father passed and has been seeing neurology for CVA. Of note, pt reports up until 1 yr ago she was in excellent health.  Since starting Methotrexate she has 50% improvement in pain, stiffness and swelling of wrists and feet. Has L wrist/ arm weakness that she believes is related to her stroke as it has not changed since then. Is tolerating Methotrexate with mild hair loss. Of note pt quite smoking 10/21 :)     ROS: Has mild diffuse hair loss. Is fatigued in the setting of anemia that seems to be improving with iron. Denies mouth sores, N/V.  Will be having a colonoscopy/ endoscopy in near future to investigate source of blood loss. Denies infections or fevers, blood in stool or diarrhea. Continues to have \"wooshing\" sound in bilateral ears, this has improved 30% since starting methotrexate shes unsure if this is why the improvement or r/t to stent, of note this symptom is worse with laying down.  Has rash on arms, legs, feet that started 10 days ago and after walking through the woods, was seen and dx poison " "ivy, started topical therapy and is improving. Has poor dentition with broken teeth, plan was to have these removed but now on blood thinner and so not an option currently.     Patient was seen on April 27 by Dr. Davies for iron deficiency anemia.  History of rheumatoid arthritis and started methotrexate therapy 4 months prior to visit was noted.  Iron deficiency anemia with low ferritin was in the impression, and recommendation was to start oral iron replacement for 6 months with follow-up in 2 months for hemoglobin measurement, and recommendation for colonoscopy and upper endoscopy if colonoscopy was negative.  No other signs or symptoms of ulcer disease or active malignancy were noted.    Patient was seen in neurology on February 1, 2022 in follow-up of symptomatic right carotid disease.  History of cerebrovascular accident and stent placement in the right carotid artery was reviewed.  Impression was an left sided neurologic symptoms, likely due to serial small strokes from high-grade obstruction in the right carotid artery.  Left-sided weakness was continuing to improve.  Recommendation was to continue antiplatelet agents (aspirin and Plavix).  Recommendation was to keep the stent patent, continue Lipitor, and referral to ENT for opinion on tinnitus.  Referral to occupational therapy for hand strength and fine movement of the hands was recommended.    Hx: 8-2021    Patient was seen by provider Karla in June 2021 for general medical checkup.  History of wrist swelling and pain and constitutional symptoms was elicited.  Swelling at the left wrist was observed, and patient was referred to rheumatology.    Today, she reports that she first had symptoms 18 months ago, when both feet became painful. There was mild visible swelling in the toes. It was hard to walk. Pain receded, but recurred in afew months, and has been \"coming and going\" since. Pain is in the ball of the feet In the mornings it is \"super hard\" to get " "moving. Pain remits during the day.    Approximately 1 year ago (during pandemic), she noted \"on and off\" wrist pain. She developed a lump on the outside of the left wrist, associated with swelling and warmth. Since then, there has been swelling in both wrists. Pain is most noticeable in morning. Early morning stiffness is ~ 3 hours.    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    There were episodes of L arm and L leg numbness and weakness, starting several weeks ago. She has an MRI brain scheduled.    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           Review of Systems:       Per above          Past Medical History:     Past Medical History:   Diagnosis Date    ADHD (attention deficit hyperactivity disorder)     Anemia 2022    Cerebrovascular accident (CVA) due to stenosis of right carotid artery (H) 09/15/2021    Depressive disorder     HTN (hypertension)     RA (rheumatoid arthritis) (H)     Raynaud disease      Past Surgical History:   Procedure Laterality Date    BREAST BIOPSY, RT/LT  2000, 1998    breast biopsies; reported to be benign    EXCISE MASS AXILLA Right 06/08/2021    Excise right axillary lipoma;  Surgeon: Artis Domínguez MD;  Location: [facility] OR    EXCISE TUMOR, SOFT TISSUE, NECK/THORAX, SUBCUTANEOUS N/A 06/08/2021    EXCISION of lipoma from left occiput;  Surgeon: Artis Domínguez MD;  Location: [facility] OR    EYE SURGERY  2015    Lasik for vision correction    IR CAROTID CEREBRAL ANGIOGRAM BILATERAL  10/22/2021    LYMPH NODE BIOPSY  2001    cervical lymph node biopsy; told to be benign    VA BREAST AUGMENTATION Bilateral     breast implants    TUBAL LIGATION  2003    VASCULAR SURGERY  2021    Stent            Social History:     Social History     Occupational History    Not on file   Tobacco Use    Smoking status: Former     Packs/day: 1.00     Years: 25.00     Additional " pack years: 0.00     Total pack years: 25.00     Types: Cigarettes     Quit date: 10/5/2021     Years since quittin.0     Passive exposure: Past    Smokeless tobacco: Current    Tobacco comments:     Pt vapes 1 per day without nicotine    Vaping Use    Vaping Use: Some days   Substance and Sexual Activity    Alcohol use: Not Currently     Comment: occasional    Drug use: No    Sexual activity: Yes     Partners: Male     Birth control/protection: Female Surgical     Comment: tubal     3 children; all healthy except ADD  Single  No EtOH for about a year.  Smoked 1 ppd X 30 years. Quit 10/2021       Family History:     Family History   Problem Relation Age of Onset    Hypertension Mother     Cancer Mother         Lung. Cancer    Other Cancer Mother         Lung    Thyroid Disease Mother     C.A.D. Father         52 at first MI    Prostate Cancer Father         Bladder/ lung    Cancer Father         Lung -bladder cancer    Thyroid Disease Sister     Thyroid Disease Sister     Hypertension Other     Diabetes No family hx of     Cerebrovascular Disease No family hx of     Breast Cancer No family hx of     Cancer - colorectal No family hx of     Glaucoma No family hx of     Macular Degeneration No family hx of    Mother had wrist pain/swelling; probably diagnosed with RA.         Allergies:     Allergies   Allergen Reactions    Seasonal Allergies             Medications:     Current Outpatient Medications   Medication Sig Dispense Refill    adalimumab (HUMIRA *CF*) 40 MG/0.4ML pen kit Inject 0.4 mLs (40 mg) Subcutaneous every 14 days Hold for signs of infection, then seek medical attention. 1 each 5    amphetamine-dextroamphetamine (ADDERALL XR) 20 MG 24 hr capsule Take 1 capsule (20 mg) by mouth daily 30 capsule 0    amphetamine-dextroamphetamine (ADDERALL) 10 MG tablet Take 2 tablets (20 mg) by mouth daily 60 tablet 0    aspirin (ASA) 325 MG tablet Take 1 tablet (325 mg) by mouth daily 90 tablet 3    atorvastatin  "(LIPITOR) 40 MG tablet Take 1 tablet (40 mg) by mouth daily 90 tablet 3    clopidogrel (PLAVIX) 75 MG tablet Take 1 tablet (75 mg) by mouth daily 90 tablet 3    ferrous sulfate (FE TABS) 325 (65 Fe) MG EC tablet TAKE 1 TABLET(325 MG) BY MOUTH DAILY WITH ORANGE JUICE 90 tablet 1    folic acid (FOLVITE) 1 MG tablet Take 1 tablet (1 mg) by mouth daily 90 tablet 3    losartan (COZAAR) 100 MG tablet Take 1 tablet (100 mg) by mouth daily 90 tablet 3    methotrexate 2.5 MG tablet Take 10 tablets (25 mg) by mouth every 7 days Labs every 8 - 12 weeks for refills. 40 tablet 6    methotrexate 2.5 MG tablet Take 8 tablets (20 mg) by mouth every 7 days 96 tablet 3    olopatadine (PATADAY) 0.2 % ophthalmic solution Place 0.05 mLs (1 drop) into both eyes daily 2.5 mL 1    predniSONE (DELTASONE) 5 MG tablet Take 3 tablets (15 mg) by mouth daily for 7 days, THEN 2 tablets (10 mg) daily for 7 days, THEN 1 tablet (5 mg) daily for 7 days. 42 tablet 1    triamcinolone (KENALOG) 0.1 % external cream Apply topically 2 times daily 45 g 1    buPROPion (WELLBUTRIN SR) 100 MG 12 hr tablet Take 1 tablet (100 mg) by mouth 2 times daily for 60 days 120 tablet 0   Using adderall for ~ 8 years; ADD symptoms are much improved.         Physical Exam:   Blood pressure (!) 151/90, pulse 71, height 1.676 m (5' 6\"), weight 60.8 kg (134 lb), last menstrual period 05/20/2021, not currently breastfeeding.  Wt Readings from Last 6 Encounters:   11/10/23 60.8 kg (134 lb)   10/25/23 60.3 kg (133 lb)   10/17/23 61.7 kg (136 lb)   08/09/23 61.7 kg (136 lb)   02/16/23 61.9 kg (136 lb 8 oz)   09/28/22 59.9 kg (132 lb)       Constitutional: well-developed, appearing stated age; cooperative  Eyes: nl EOM, PERRLA, vision, conjunctiva, sclera  ENT: nl external ears, nose, hearing, lips, throat.  Multiple back teeth with poor dentition.  No mucous membrane lesions, normal saliva pool  Neck: no mass or thyroid enlargement  Resp: lungs clear to auscultation with mildly " reduced airflow intensity  Lymph: no cervical, supraclavicular  MS:Mild  tenderness at the left wrist with markedly restricted range of motion and swelling at ulnar wrist. Subtle bony enlargement of CMC's and mutliple DIP's suggestive of OA.There is valgus angulation at the left second toe with synovial thickening palpable at several MTPs and aman appearance of MTP's. L 5th MTP thickened/swollen. R 2-3rd MTPs with swelling, -TTP,  plantar displacement of the metatarsal heads.  Skin:   Neuro: strength.   Psych: nl judgement, orientation, memory, affect.         Data:     @      Latest Ref Rng & Units 4/25/2023     3:00 PM 9/6/2023     3:38 PM 10/25/2023     2:25 PM   RHEUM RESULTS   Albumin 3.4 - 5.0 g/dL 3.8      Albumin 3.5 - 5.2 g/dL  4.5  4.5    ALT 0 - 50 U/L 33  19  19    AST 0 - 45 U/L 20  20  21    Creatinine 0.51 - 0.95 mg/dL 0.56  0.69  0.64    CRP Inflammation 0.0 - 8.0 mg/L <2.9      CRP Inflammation <5.00 mg/L  <3.00     GFR Estimate >60 mL/min/1.73m2 >90  >90  >90    Hematocrit 35.0 - 47.0 % 36.5  34.6  36.4    Hemoglobin 11.7 - 15.7 g/dL 12.8  11.5  11.7    WBC 4.0 - 11.0 10e3/uL 6.4  6.2  4.8    RBC Count 3.80 - 5.20 10e6/uL 4.07  3.79  3.94    RDW 10.0 - 15.0 % 14.8  14.2  14.0    MCHC 31.5 - 36.5 g/dL 35.1  33.2  32.1    MCV 78 - 100 fL 90  91  92    Platelet Count 150 - 450 10e3/uL 376  226  279        Rheumatoid Factor   Date Value Ref Range Status   06/30/2021 58 (H) <12 IU/mL Final   ,   Cyclic Citrullinated Peptide Antibody, IgG   Date Value Ref Range Status   06/30/2021 >340 (H) <7 U/mL Final   ,  ,  ,  ,  ,   MOY interpretation   Date Value Ref Range Status   06/30/2021 Negative NEG^Negative Final     Comment:                                        Reference range:  <1:40  NEGATIVE  1:40 - 1:80  BORDERLINE POSITIVE  >1:80 POSITIVE     ,   MOY Screen by EIA   Date Value Ref Range Status   07/30/2010 <1.0  Interpretation:  Negative 0 - 1.0 Final   ,  ,  ,  ,  ,  ,  ,  ,  ,  ,  ,  ,  ,  ,  ,  ,   ,  ,  ,  ,  ,  ,  ,  ,  ,  ,  ,  ,  ,  ,  ,  ,  ,  ,  ,  ,  ,       Reviewed Rheumatology lab flowsheet

## 2023-11-10 NOTE — NURSING NOTE
"Chief Complaint   Patient presents with    Follow Up     BP (!) 151/90   Pulse 71   Ht 1.676 m (5' 6\")   Wt 60.8 kg (134 lb)   LMP 05/20/2021 (Approximate)   BMI 21.63 kg/m    Rosy Colin CMA on 11/10/2023 at 1:52 PM    "

## 2023-11-13 ENCOUNTER — TELEPHONE (OUTPATIENT)
Dept: RHEUMATOLOGY | Facility: CLINIC | Age: 58
End: 2023-11-13
Payer: COMMERCIAL

## 2023-11-14 ENCOUNTER — TELEPHONE (OUTPATIENT)
Dept: RHEUMATOLOGY | Facility: CLINIC | Age: 58
End: 2023-11-14
Payer: COMMERCIAL

## 2023-11-14 NOTE — TELEPHONE ENCOUNTER
MTM referral from: [facility] visit (referral by provider)    MTM referral outreach attempt #2 on November 14, 2023 at 9:09 AM      Outcome: Patient not reachable after several attempts, will route to MT Pharmacist/Provider as an FYI.  [facility] scheduling number is [phone].  Thank you for the referral.    Use hbc for the carrier/Plan on the flowsheet      [place] Message Sent    Oriana Palacio CPhT  [facility]

## 2023-11-16 ENCOUNTER — TELEPHONE (OUTPATIENT)
Dept: RHEUMATOLOGY | Facility: CLINIC | Age: 58
End: 2023-11-16
Payer: COMMERCIAL

## 2023-11-16 ENCOUNTER — MYC MEDICAL ADVICE (OUTPATIENT)
Dept: RHEUMATOLOGY | Facility: CLINIC | Age: 58
End: 2023-11-16
Payer: COMMERCIAL

## 2023-11-16 NOTE — TELEPHONE ENCOUNTER
PA Initiation    Medication: HUMIRA *CF* PEN 40 MG/0.4ML SC PNKT  Insurance Company: [employer] - Phone [phone] Fax [phone]  Pharmacy Filling the Rx: [facility] - [place], [place] - [address]  Filling Pharmacy Phone:    Filling Pharmacy Fax:    Start Date: 11/16/2023    [id number]

## 2023-11-16 NOTE — LETTER
2023    INSURER: [employer]  ATTN: [employer]  Re: Prior Authorization Request  Patient: Libertad Russell  Policy ID#: [id number]   : 1965     To Whom it May Concern:    I am writing to formally request prior authorization for the use of Humira (adalimumab) 40 mg subcutaneous injections every 14 days for my patient Libertad Russell for the treatment of Rheumatoid Arthritis with positive rheumatoid factor and cyclic citrullinated peptide antibodies.    Given rationale for denial of initial prior authorization for the use of Humira is lack of documented history of a conventional synthetic drug including hydroxychloroquine and/or sulfasalazine in combination with Methotrexate for at least 3 months.     Due to adverse prognostic factors, specifically presence of positive rheumatoid factor and cyclic citrullinated peptide antibodies, it is imperative that assertive and timely therapy be implemented to mitigate potential joint damage and adequately suppress inflammatory symptoms.     Use of TNF inhibitor therapy adalimumab preferentially over the requested agents aligns with American College of Rheumatology 202 guidelines and recommendations who give preference to addition of biologic DMARD therapy in patients at maximally tolerated methotrexate who are not at treatment target, with primary rationale given shorter time to symptom response.     I firmly believe that Humira 40 mg subcutaneous injections every 14 days is clinically appropriate and imperative to adequate disease control and risk mitigation for long term joint damage.  Please contact me at [phone] if you require additional information to ensure the prompt approval for coverage.      Sincerely,    Butch Odom MD  Staff Rheumatologist, [facility]             Alexus WORTHY, Nawaf MATHIAS, Rainer BR, [facility] EW, Cyndi T, Hoa K, Stanford KD, Meek LOZADA, Nupur HUTTON, Jorge G, Maynor J, Vadim GALLAGHER, Driss HANNAH, Frandy SUTTON, Harvinder MARES,  Lucila JA, Nakia S, Nathalie ME, Al-Danny M, Makayla JF, Jonas KE, Andrea JL, Raza L, Gladis F, Malick M, Mendez SR, Troy L, Rajinder BS, Scott AM, Dima I, Tin R, Schwab P, Frandy N, Sumit M, Jose Guadalupe AS, Albert S, Magui EA. 2021 American College of Rheumatology Guideline for the Treatment of Rheumatoid Arthritis. Arthritis Rheumatol. 2021 Jul;73(7):2204-5705. doi: 10.1002/art.62518. Epub 2021 Jun 8. PMID: 27703619.

## 2023-11-17 ENCOUNTER — TELEPHONE (OUTPATIENT)
Dept: RHEUMATOLOGY | Facility: CLINIC | Age: 58
End: 2023-11-17
Payer: COMMERCIAL

## 2023-11-17 NOTE — TELEPHONE ENCOUNTER
Patient Contacted for the patient to call back and schedule the following:    Appointment type: Return visit  Provider: Dr. Odom  Return date: March 22nd 204  Specialty phone number: [phone]  Additional appointment(s) needed: NA  Additonal Notes: NA

## 2023-11-27 ENCOUNTER — VIRTUAL VISIT (OUTPATIENT)
Dept: RHEUMATOLOGY | Facility: CLINIC | Age: 58
End: 2023-11-27
Attending: INTERNAL MEDICINE
Payer: COMMERCIAL

## 2023-11-27 ENCOUNTER — TELEPHONE (OUTPATIENT)
Dept: RHEUMATOLOGY | Facility: CLINIC | Age: 58
End: 2023-11-27

## 2023-11-27 DIAGNOSIS — M05.741 RHEUMATOID ARTHRITIS INVOLVING BOTH HANDS WITH POSITIVE RHEUMATOID FACTOR (H): ICD-10-CM

## 2023-11-27 DIAGNOSIS — I63.231 CEREBROVASCULAR ACCIDENT (CVA) DUE TO STENOSIS OF RIGHT CAROTID ARTERY (H): Primary | ICD-10-CM

## 2023-11-27 DIAGNOSIS — M05.742 RHEUMATOID ARTHRITIS INVOLVING BOTH HANDS WITH POSITIVE RHEUMATOID FACTOR (H): ICD-10-CM

## 2023-11-27 DIAGNOSIS — F98.8 ATTENTION DEFICIT DISORDER OF ADULT: ICD-10-CM

## 2023-11-27 DIAGNOSIS — I10 HYPERTENSION: ICD-10-CM

## 2023-11-27 NOTE — PATIENT INSTRUCTIONS
"Recommendations from today's MTM visit:                                                    Today we reviewed what your medicines are for, how to know if they are working, that your medicines are safe and how to make your medicine regimen as easy as possible.      1.)  As discussed with Dr. Odom, initiate Humira 40 mg given via subcutaneous injection every 2 weeks.     2.)  We did also discuss having seasonal influenza and COVID vaccinations.  I would also recommend proceeding with Shingrix vaccine.     3.)  I will plan to call you for follow-up on Monday, January 8 at 10 AM.  Please contact me with any questions or concerns before then.      It was great speaking with you today.  I value your experience and would be very thankful for your time in providing feedback in our clinic survey. In the next few days, you may receive an email or text message from [employer] with a link to a survey related to your  clinical pharmacist.\"     To schedule another MTM appointment, please call the clinic directly or you may call the MTM scheduling line at [phone] or toll-free at [phone].     My Clinical Pharmacist's contact information:                                                      Please feel free to contact me with any questions or concerns you have.      Luke Cm, PharmD, BCACP  Medication Therapy Management Pharmacist  [facility]    "

## 2023-11-27 NOTE — Clinical Note
11/27/2023       RE: Libertad Russell  [address] [address]     Dear Colleague,    Thank you for referring your patient, Libertad Russell, to the [facility] RHEUMATOLOGY CLINIC [place] at [facility]. Please see a copy of my visit note below.    Medication Therapy Management (MTM) Encounter    ASSESSMENT:                            Medication Adherence/Access: No issues identified    Rheumatoid arthritis: General symptom improvement with recent prednisone therapy following visit with Dr. Odom, relative symptom worsening with snow over the weekend.  Otherwise no new or changed complaints or concerns.  Discussed mechanism, safety, monitoring, adverse effects and administration with Humira which was advised to be initiated in addition to methotrexate therapy at recent visit with Dr. Odom.  Reviewed vaccinations, advised patient proceed with seasonal influenza, COVID boosters as well as discussed administration of Shingrix vaccine.    Historical CVA of Carotid Artery: Continues to follow with neurology, had recent ultrasound.  Discontinued clopidogrel roughly 6 months ago.    Hypertension: Stable    ADHD: Stable.  Had not initiated previously recommended bupropion as was prescribed, symptoms remain stable.      PLAN:                            1.)  As discussed with Dr. Odom, initiate Humira 40 mg given via subcutaneous injection every 2 weeks.    2.)  We did also discuss having seasonal influenza and COVID vaccinations.  I would also recommend proceeding with Shingrix vaccine.    3.)  I will plan to call you for follow-up on Monday, January 8 at 10 AM.  Please contact me with any questions or concerns before then.    SUBJECTIVE/OBJECTIVE:                          Libertad Russell is a 57 year old female called for an initial visit. She was referred to me from Dr. Odom.      Reason for visit: Humira start.    Allergies/ADRs:  Reviewed in chart  Past Medical History: Reviewed in chart  Tobacco: She reports that she quit smoking about 2 years ago. Her smoking use included cigarettes. She has a 25.00 pack-year smoking history. She has been exposed to tobacco smoke. She uses smokeless tobacco.    Medication Adherence/Access: no issues reported    Rheumatoid arthritis:   Methotrexate 2.5 mg tablets, 10 tablets once weekly  Folic acid 1 mg every day   Prednisone 5 mg every day x 1 week     Patient seen recently by Dr. Odom and advised to initiate Humira given incomplete symptom control with use of methotrexate.  Patient notes that she feels she is still relatively new to rheumatoid arthritis and comments that recent snow and cold over the course of the weekend caused a worsening of overall symptom control in the setting of significant improvement of symptoms with use of prednisone now at 5 mg daily.  She denies any GI adverse effects associated with oral methotrexate therapy.    Liver Function Studies -   Recent Labs   Lab Test 10/25/23  1425   PROTTOTAL 6.8   ALBUMIN 4.5   BILITOTAL 0.3   ALKPHOS 88   AST 21   ALT 19      CBC RESULTS:   Recent Labs   Lab Test 10/25/23  1425   WBC 4.8   RBC 3.94   HGB 11.7   HCT 36.4   MCV 92   MCH 29.7   MCHC 32.1   RDW 14.0         Creatinine   Date Value Ref Range Status   10/25/2023 0.64 0.51 - 0.95 mg/dL Final   06/30/2021 0.58 0.52 - 1.04 mg/dL Final      Reviewed baseline pre-biologic screening. - results pending    Most Recent Immunizations   Administered Date(s) Administered    Influenza Intranasal Vaccine 10/18/2011    Pneumococcal 23 valent 06/23/2021    TD,PF 7+ (Tenivac) 09/04/2018    TDAP Vaccine (Adacel) 11/25/2013        CVA of Carotid Artery:  Aspirin 325 mg every day   Atorvastatin 40 mg every day     Noted CVA of carotid artery roughly around the time of diagnosis of rheumatoid arthritis.  Has been working with neurology.  Discontinued clopidogrel roughly 6 months  prior.    Hypertension:   Losartan 100 mg every day     ADHD:   Adderall 20 mg XR every day   Adderall 10 mg every day      Notes overall symptom control is unchanged.  She had been prescribed bupropion however never filled/started this, commenting that she wanted to avoid additional medication with recent health concerns recently.  She comments that she will not use the 10 mg immediate release Adderall if she is not working that day.    Today's Vitals: LMP 05/20/2021 (Approximate)   ----------------      I spent 30 minutes with this patient today. All changes were made via collaborative practice agreement with * No referring provider recorded for this case *. A copy of the visit note was provided to the patient's provider(s).    A summary of these recommendations was sent via [employer].    Luke Cm, PharmD, BCACP  Medication Therapy Management Pharmacist  [facility]     Telemedicine Visit Details  Type of service:  Telephone visit  Start Time:  1002am  End Time:  1032am     Medication Therapy Recommendations  No medication therapy recommendations to display         Again, thank you for allowing me to participate in the care of your patient.      Sincerely,    LUKE CM [facility]

## 2023-11-27 NOTE — TELEPHONE ENCOUNTER
Message left for patient that lab work must be done prior to securing prior auth approval for humira.  Will also send MyChart message.    Lucy Madden, ZACARIASN, RN  RN Care Coordinator Rheumatology

## 2023-11-27 NOTE — PROGRESS NOTES
Medication Therapy Management (MTM) Encounter    ASSESSMENT:                            Medication Adherence/Access: No issues identified    Rheumatoid arthritis: General symptom improvement with recent prednisone therapy following visit with Dr. Odom, relative symptom worsening with snow over the weekend.  Otherwise no new or changed complaints or concerns.  Discussed mechanism, safety, monitoring, adverse effects and administration with Humira which was advised to be initiated in addition to methotrexate therapy at recent visit with Dr. Odom.  Reviewed vaccinations, advised patient proceed with seasonal influenza, COVID boosters as well as discussed administration of Shingrix vaccine.    Historical CVA of Carotid Artery: Continues to follow with neurology, had recent ultrasound.  Discontinued clopidogrel roughly 6 months ago.    Hypertension: Stable    ADHD: Stable.  Had not initiated previously recommended bupropion as was prescribed, symptoms remain stable.      PLAN:                            1.)  As discussed with Dr. Odom, initiate Humira 40 mg given via subcutaneous injection every 2 weeks.    2.)  We did also discuss having seasonal influenza and COVID vaccinations.  I would also recommend proceeding with Shingrix vaccine.    3.)  I will plan to call you for follow-up on Monday, January 8 at 10 AM.  Please contact me with any questions or concerns before then.    SUBJECTIVE/OBJECTIVE:                          Libertad Russell is a 57 year old female called for an initial visit. She was referred to me from Dr. Odom.      Reason for visit: Humira start.    Allergies/ADRs: Reviewed in chart  Past Medical History: Reviewed in chart  Tobacco: She reports that she quit smoking about 2 years ago. Her smoking use included cigarettes. She has a 25.00 pack-year smoking history. She has been exposed to tobacco smoke. She uses smokeless tobacco.    Medication Adherence/Access: no issues  reported    Rheumatoid arthritis:   Methotrexate 2.5 mg tablets, 10 tablets once weekly  Folic acid 1 mg every day   Prednisone 5 mg every day x 1 week     Patient seen recently by Dr. Odom and advised to initiate Humira given incomplete symptom control with use of methotrexate.  Patient notes that she feels she is still relatively new to rheumatoid arthritis and comments that recent snow and cold over the course of the weekend caused a worsening of overall symptom control in the setting of significant improvement of symptoms with use of prednisone now at 5 mg daily.  She denies any GI adverse effects associated with oral methotrexate therapy.    Liver Function Studies -   Recent Labs   Lab Test 10/25/23  1425   PROTTOTAL 6.8   ALBUMIN 4.5   BILITOTAL 0.3   ALKPHOS 88   AST 21   ALT 19      CBC RESULTS:   Recent Labs   Lab Test 10/25/23  1425   WBC 4.8   RBC 3.94   HGB 11.7   HCT 36.4   MCV 92   MCH 29.7   MCHC 32.1   RDW 14.0         Creatinine   Date Value Ref Range Status   10/25/2023 0.64 0.51 - 0.95 mg/dL Final   06/30/2021 0.58 0.52 - 1.04 mg/dL Final      Reviewed baseline pre-biologic screening. - results pending    Most Recent Immunizations   Administered Date(s) Administered    Influenza Intranasal Vaccine 10/18/2011    Pneumococcal 23 valent 06/23/2021    TD,PF 7+ (Tenivac) 09/04/2018    TDAP Vaccine (Adacel) 11/25/2013        CVA of Carotid Artery:  Aspirin 325 mg every day   Atorvastatin 40 mg every day     Noted CVA of carotid artery roughly around the time of diagnosis of rheumatoid arthritis.  Has been working with neurology.  Discontinued clopidogrel roughly 6 months prior.    Hypertension:   Losartan 100 mg every day     ADHD:   Adderall 20 mg XR every day   Adderall 10 mg every day      Notes overall symptom control is unchanged.  She had been prescribed bupropion however never filled/started this, commenting that she wanted to avoid additional medication with recent health concerns  recently.  She comments that she will not use the 10 mg immediate release Adderall if she is not working that day.    Today's Vitals: LMP 05/20/2021 (Approximate)   ----------------      I spent 30 minutes with this patient today. All changes were made via collaborative practice agreement with * No referring provider recorded for this case *. A copy of the visit note was provided to the patient's provider(s).    A summary of these recommendations was sent via [employer].    Luke Cm, PharmD, BCACP  Medication Therapy Management Pharmacist  [facility]     Telemedicine Visit Details  Type of service:  Telephone visit  Start Time:  1002am  End Time:  1032am     Medication Therapy Recommendations  No medication therapy recommendations to display

## 2023-11-28 ENCOUNTER — DOCUMENTATION ONLY (OUTPATIENT)
Dept: NEUROLOGY | Facility: CLINIC | Age: 58
End: 2023-11-28
Payer: COMMERCIAL

## 2023-11-28 NOTE — TELEPHONE ENCOUNTER
"Requested Prescriptions   Pending Prescriptions Disp Refills     metroNIDAZOLE (METROGEL) 0.75 % topical gel [Pharmacy Med Name: METRONIDAZOLE 0.75% GEL] 45 g 0    Last Written Prescription Date:  09/20/2017 #45g x 0  Last filled 09/20/2017  Last office visit: 9/21/2017 SHANDA Juarez   Future Office Visit:  None   Sig: APPLY TO AFFECTED AREA(S) TWO TIMES A DAY    Topical Acne Medications Protocol Passed    5/16/2018  5:10 PM       Passed - Patient is 12 years of age or older       Passed - Recent (12 mo) or future (30 days) visit within the authorizing provider's specialty    Patient had office visit in the last 12 months or has a visit in the next 30 days with authorizing provider or within the authorizing provider's specialty.  See \"Patient Info\" tab in inbasket, or \"Choose Columns\" in Meds & Orders section of the refill encounter.              "
Prescription approved per [facility] Refill Protocol.    Pauly REDMOND RN      
No

## 2023-11-28 NOTE — CONFIDENTIAL NOTE
emailed forms FMLA & Attending physicians statement to: [email or web address] and copy [email or web address], and sent paperwork to scan to chart per DARIANA POON

## 2023-12-04 ENCOUNTER — PATIENT OUTREACH (OUTPATIENT)
Dept: CARE COORDINATION | Facility: CLINIC | Age: 58
End: 2023-12-04
Payer: COMMERCIAL

## 2023-12-08 ENCOUNTER — MYC REFILL (OUTPATIENT)
Dept: FAMILY MEDICINE | Facility: CLINIC | Age: 58
End: 2023-12-08
Payer: COMMERCIAL

## 2023-12-08 ENCOUNTER — MYC MEDICAL ADVICE (OUTPATIENT)
Dept: FAMILY MEDICINE | Facility: CLINIC | Age: 58
End: 2023-12-08
Payer: COMMERCIAL

## 2023-12-08 ENCOUNTER — MYC MEDICAL ADVICE (OUTPATIENT)
Dept: NEUROSURGERY | Facility: CLINIC | Age: 58
End: 2023-12-08
Payer: COMMERCIAL

## 2023-12-08 DIAGNOSIS — F98.8 ATTENTION DEFICIT DISORDER OF ADULT: ICD-10-CM

## 2023-12-10 RX ORDER — DEXTROAMPHETAMINE SACCHARATE, AMPHETAMINE ASPARTATE, DEXTROAMPHETAMINE SULFATE AND AMPHETAMINE SULFATE 2.5; 2.5; 2.5; 2.5 MG/1; MG/1; MG/1; MG/1
20 TABLET ORAL DAILY
Qty: 60 TABLET | Refills: 0 | Status: SHIPPED | OUTPATIENT
Start: 2023-12-10 | End: 2023-12-26

## 2023-12-10 RX ORDER — DEXTROAMPHETAMINE SACCHARATE, AMPHETAMINE ASPARTATE MONOHYDRATE, DEXTROAMPHETAMINE SULFATE AND AMPHETAMINE SULFATE 5; 5; 5; 5 MG/1; MG/1; MG/1; MG/1
20 CAPSULE, EXTENDED RELEASE ORAL DAILY
Qty: 30 CAPSULE | Refills: 0 | Status: SHIPPED | OUTPATIENT
Start: 2023-12-10 | End: 2024-01-13

## 2023-12-11 DIAGNOSIS — F98.8 ATTENTION DEFICIT DISORDER OF ADULT: ICD-10-CM

## 2023-12-11 RX ORDER — DEXTROAMPHETAMINE SACCHARATE, AMPHETAMINE ASPARTATE MONOHYDRATE, DEXTROAMPHETAMINE SULFATE AND AMPHETAMINE SULFATE 5; 5; 5; 5 MG/1; MG/1; MG/1; MG/1
20 CAPSULE, EXTENDED RELEASE ORAL DAILY
Qty: 30 CAPSULE | Refills: 0 | OUTPATIENT
Start: 2023-12-11

## 2023-12-11 NOTE — TELEPHONE ENCOUNTER
Lilibetht sent.    Donnie Asher, RN  Triage Nurse  [facility], [facility]

## 2023-12-14 ENCOUNTER — LAB (OUTPATIENT)
Dept: LAB | Facility: CLINIC | Age: 58
End: 2023-12-14
Payer: COMMERCIAL

## 2023-12-14 DIAGNOSIS — M05.742 RHEUMATOID ARTHRITIS INVOLVING BOTH HANDS WITH POSITIVE RHEUMATOID FACTOR (H): ICD-10-CM

## 2023-12-14 DIAGNOSIS — M05.741 RHEUMATOID ARTHRITIS INVOLVING BOTH HANDS WITH POSITIVE RHEUMATOID FACTOR (H): ICD-10-CM

## 2023-12-14 LAB
ERYTHROCYTE [DISTWIDTH] IN BLOOD BY AUTOMATED COUNT: 14 % (ref 10–15)
ERYTHROCYTE [SEDIMENTATION RATE] IN BLOOD BY WESTERGREN METHOD: 15 MM/HR (ref 0–30)
HCT VFR BLD AUTO: 37 % (ref 35–47)
HGB BLD-MCNC: 11.8 G/DL (ref 11.7–15.7)
MCH RBC QN AUTO: 29.1 PG (ref 26.5–33)
MCHC RBC AUTO-ENTMCNC: 31.9 G/DL (ref 31.5–36.5)
MCV RBC AUTO: 91 FL (ref 78–100)
PLATELET # BLD AUTO: 255 10E3/UL (ref 150–450)
RBC # BLD AUTO: 4.05 10E6/UL (ref 3.8–5.2)
WBC # BLD AUTO: 6.3 10E3/UL (ref 4–11)

## 2023-12-14 PROCEDURE — 82040 ASSAY OF SERUM ALBUMIN: CPT

## 2023-12-14 PROCEDURE — 85652 RBC SED RATE AUTOMATED: CPT

## 2023-12-14 PROCEDURE — 85027 COMPLETE CBC AUTOMATED: CPT

## 2023-12-14 PROCEDURE — 86803 HEPATITIS C AB TEST: CPT

## 2023-12-14 PROCEDURE — 84460 ALANINE AMINO (ALT) (SGPT): CPT

## 2023-12-14 PROCEDURE — 82565 ASSAY OF CREATININE: CPT

## 2023-12-14 PROCEDURE — 36415 COLL VENOUS BLD VENIPUNCTURE: CPT

## 2023-12-14 PROCEDURE — 87340 HEPATITIS B SURFACE AG IA: CPT

## 2023-12-14 PROCEDURE — 86140 C-REACTIVE PROTEIN: CPT

## 2023-12-14 PROCEDURE — 84450 TRANSFERASE (AST) (SGOT): CPT

## 2023-12-14 PROCEDURE — 86481 TB AG RESPONSE T-CELL SUSP: CPT

## 2023-12-15 LAB
ALBUMIN SERPL BCG-MCNC: 4.5 G/DL (ref 3.5–5.2)
ALT SERPL W P-5'-P-CCNC: 21 U/L (ref 0–50)
AST SERPL W P-5'-P-CCNC: 18 U/L (ref 0–45)
CREAT SERPL-MCNC: 0.63 MG/DL (ref 0.51–0.95)
CRP SERPL-MCNC: <3 MG/L
EGFRCR SERPLBLD CKD-EPI 2021: >90 ML/MIN/1.73M2
HBV SURFACE AG SERPL QL IA: NONREACTIVE
HCV AB SERPL QL IA: NONREACTIVE

## 2023-12-17 LAB
GAMMA INTERFERON BACKGROUND BLD IA-ACNC: 0 IU/ML
M TB IFN-G BLD-IMP: NEGATIVE
M TB IFN-G CD4+ BCKGRND COR BLD-ACNC: 5.5 IU/ML
MITOGEN IGNF BCKGRD COR BLD-ACNC: 0.01 IU/ML
MITOGEN IGNF BCKGRD COR BLD-ACNC: 0.01 IU/ML
QUANTIFERON MITOGEN: 5.5 IU/ML
QUANTIFERON NIL TUBE: 0 IU/ML
QUANTIFERON TB1 TUBE: 0.01 IU/ML
QUANTIFERON TB2 TUBE: 0.01

## 2023-12-18 NOTE — TELEPHONE ENCOUNTER
Brock Padilla!     Looks like the patient stopped in for labs and the TB Quant came back negative. Please let me know if you need anything else. Thank you!    Hector

## 2023-12-20 NOTE — TELEPHONE ENCOUNTER
PA Initiation    Medication: HUMIRA *CF* PEN 40 MG/0.4ML SC PNKT  Insurance Company: [employer] - Phone [phone] Fax [phone]  Pharmacy Filling the Rx: [facility] - [place], [place] - [address]  Filling Pharmacy Phone:    Filling Pharmacy Fax:    Start Date: 11/16/2023    [id number]

## 2023-12-22 ENCOUNTER — TELEPHONE (OUTPATIENT)
Dept: FAMILY MEDICINE | Facility: CLINIC | Age: 58
End: 2023-12-22
Payer: COMMERCIAL

## 2023-12-22 DIAGNOSIS — F98.8 ATTENTION DEFICIT DISORDER OF ADULT: ICD-10-CM

## 2023-12-22 NOTE — TELEPHONE ENCOUNTER
PRIOR AUTHORIZATION DENIED    Medication: HUMIRA *CF* PEN 40 MG/0.4ML SC PNKT  Insurance Company: [employer] - Phone [phone] Fax [phone]  Denial Date: 12/22/2023  Denial Reason(s): must have tried methotrexate & another dmard for 3 months    Appeal Information:     Patient Notified: yes

## 2023-12-22 NOTE — TELEPHONE ENCOUNTER
Pharmacy called and stated that they are unable to get the    Disp Refills Start End ANGELNIA   amphetamine-dextroamphetamine (ADDERALL) 10 MG tablet            They have been on back order for a while now.    They are asking for the 20 mg tabs, to take 1/2 tablet instead.    Routed to providers to please advise.    Sarah ADAMESN RN  Triage Nurse  [facility]

## 2023-12-22 NOTE — TELEPHONE ENCOUNTER
Dr. Odom,    Please let me know if you would like to proceed with appeal and I can draft a letter.     Thank you,  Hector

## 2023-12-26 RX ORDER — DEXTROAMPHETAMINE SACCHARATE, AMPHETAMINE ASPARTATE, DEXTROAMPHETAMINE SULFATE AND AMPHETAMINE SULFATE 5; 5; 5; 5 MG/1; MG/1; MG/1; MG/1
10 TABLET ORAL DAILY
Qty: 15 TABLET | Refills: 0 | Status: SHIPPED | OUTPATIENT
Start: 2023-12-26 | End: 2024-01-13

## 2023-12-29 ENCOUNTER — TELEPHONE (OUTPATIENT)
Dept: RHEUMATOLOGY | Facility: CLINIC | Age: 58
End: 2023-12-29
Payer: COMMERCIAL

## 2023-12-29 NOTE — TELEPHONE ENCOUNTER
Filled out [place] form and placed in folder for provider to sign. Will fax once form is signed.  Svitlana Polk, Lead [facility]  1/4/2024 9:21 AM    Reason for call:  Form   Our goal is to have forms completed within 72 hours, however some forms may require a visit or additional information.     Who is the form from? Patient  Where did the form come from? form was faxed in  What clinic location was the form placed at?   Where was the form placed?  MA Box/Folder  What number is listed as a contact on the form?     Phone call message - patient request for a letter, form or note:     Date needed: as soon as possible  Please fax to [phone]    Has the patient signed a consent form for release of information?     Additional comments:     Type of letter, form or note: disability    Phone number to reach patient:      Best Time:      Can we leave a detailed message on this number?      Travel screening:

## 2024-01-01 ENCOUNTER — PATIENT OUTREACH (OUTPATIENT)
Dept: CARE COORDINATION | Facility: CLINIC | Age: 59
End: 2024-01-01
Payer: COMMERCIAL

## 2024-01-02 NOTE — TELEPHONE ENCOUNTER
Alexandr Padilla,     Please see appeal letter with draft edited by Dr. Odom for submission. Please let me know if you need anything else.     Thank you!  Hector

## 2024-01-08 ENCOUNTER — VIRTUAL VISIT (OUTPATIENT)
Dept: CARDIOLOGY | Facility: CLINIC | Age: 59
End: 2024-01-08
Attending: INTERNAL MEDICINE
Payer: COMMERCIAL

## 2024-01-08 VITALS — HEIGHT: 64 IN | WEIGHT: 135 LBS | BODY MASS INDEX: 23.05 KG/M2

## 2024-01-08 DIAGNOSIS — M05.741 RHEUMATOID ARTHRITIS INVOLVING BOTH HANDS WITH POSITIVE RHEUMATOID FACTOR (H): Primary | ICD-10-CM

## 2024-01-08 DIAGNOSIS — M05.742 RHEUMATOID ARTHRITIS INVOLVING BOTH HANDS WITH POSITIVE RHEUMATOID FACTOR (H): Primary | ICD-10-CM

## 2024-01-08 ASSESSMENT — PAIN SCALES - GENERAL: PAINLEVEL: NO PAIN (0)

## 2024-01-08 NOTE — Clinical Note
Sending as FYI - this patient had been prescribed Humira in November, was unable to have requisite screenings until December. Humira was then denied by insurance, appeal was subsequently submitted and still pending. Patient reports no changes in symptoms.   Valerie - please let me know if you have heard back from insurance with any updates.   Thank you! Hector

## 2024-01-08 NOTE — PATIENT INSTRUCTIONS
"Recommendations from today's MTM visit:                                                     I will follow-up with you via [employer] with the status of Humira appeal.    It was great speaking with you today.  I value your experience and would be very thankful for your time in providing feedback in our clinic survey. In the next few days, you may receive an email or text message from [facility] [employer] with a link to a survey related to your  clinical pharmacist.\"     To schedule another MTM appointment, please call the clinic directly or you may call the MTM scheduling line at [phone] or toll-free at [phone].     My Clinical Pharmacist's contact information:                                                      Please feel free to contact me with any questions or concerns you have.      Luke Cm, PharmD, BCACP  Medication Therapy Management Pharmacist  [facility]    "

## 2024-01-08 NOTE — NURSING NOTE
Is the patient currently in the state of [place]? YES    Visit mode:TELEPHONE    If the visit is dropped, the patient can be reconnected by: TELEPHONE VISIT: Phone number:   Telephone Information:   Mobile [phone]       Will anyone else be joining the visit? NO  (If patient encounters technical issues they should call [phone]    How would you like to obtain your AVS? MyChart    Are changes needed to the allergy or medication list? No    Reason for visit: RECHECK    Misty TORRE

## 2024-01-08 NOTE — PROGRESS NOTES
Medication Therapy Management (MTM) Encounter    ASSESSMENT:                            Medication Adherence/Access: Ongoing appeal for Humira, patient is yet to receive medication    Rheumatoid Arthritis: Unchanged, patient is yet to receive Humira as planned.  Ongoing insurance barriers/appeal.      PLAN:                            I will follow-up with you via [employer] with the status of Humira appeal.      SUBJECTIVE/OBJECTIVE:                          Libertad Russell is a 58 year old female called for a follow-up visit from 11/27/2023.       Reason for visit: Humira follow-up.    Allergies/ADRs: Reviewed in chart  Past Medical History: Reviewed in chart  Tobacco: She reports that she quit smoking about 2 years ago. Her smoking use included cigarettes. She has a 25 pack-year smoking history. She has been exposed to tobacco smoke. She uses smokeless tobacco.      Medication Adherence/Access: Pending appeal for Humira    Rheumatoid Arthritis:   Methotrexate 2.5 mg tablets, 10 tablets once weekly  Folic acid 1 mg every day   Prednisone 5 mg every day x 1 week     Patient did stop in as requested for prebiologic lab screening.  She is yet to receive supply of Humira and is awaiting status of Humira appeal.  She notes no change in symptomatology or concerns today.      CRP Inflammation   Date Value Ref Range Status   12/14/2023 <3.00 <5.00 mg/L Final      Sed Rate   Date Value Ref Range Status   06/30/2021 19 0 - 30 mm/h Final     Erythrocyte Sedimentation Rate   Date Value Ref Range Status   12/14/2023 15 0 - 30 mm/hr Final      Creatinine   Date Value Ref Range Status   12/14/2023 0.63 0.51 - 0.95 mg/dL Final   06/30/2021 0.58 0.52 - 1.04 mg/dL Final      Liver Function Studies -   Recent Labs   Lab Test 12/14/23  1512 10/25/23  1425   PROTTOTAL  --  6.8   ALBUMIN 4.5 4.5   BILITOTAL  --  0.3   ALKPHOS  --  88   AST 18 21   ALT 21 19        CBC RESULTS:   Recent Labs   Lab Test 12/14/23  1512   WBC 6.3   RBC 4.05  "  HGB 11.8   HCT 37.0   MCV 91   MCH 29.1   MCHC 31.9   RDW 14.0         Reviewed baseline pre-biologic screening.   Hep C antibody non-reactive   Hep B surface antibody not completed  Hep B surface antigen non-reactive   Hep B core antibody non-reactive  Quantiferon TB Negative    [place]: Screen Lipids, CV history/VTE   Today's Vitals: Ht 1.626 m (5' 4\")   Wt 61.2 kg (135 lb)   LMP 05/20/2021 (Approximate)   BMI 23.17 kg/m    ----------------      I spent 15 minutes with this patient today.  No changes were made today. A copy of the visit note was provided to the patient's provider(s).    A summary of these recommendations was sent via [email or web address].    Luke Cm, PharmD, BCACP  Medication Therapy Management Pharmacist  [facility]     Telemedicine Visit Details  Type of service:  Telephone visit  Start Time:  1000am  End Time:  1015am     Medication Therapy Recommendations  No medication therapy recommendations to display       "

## 2024-01-08 NOTE — TELEPHONE ENCOUNTER
Medication Appeal Initiation    Medication: HUMIRA *CF* PEN 40 MG/0.4ML SC PNKT  Appeal Start Date:  1/8/2024  Insurance Company: [facility] specialty      [phone] [employer] [facility] PA [facility]

## 2024-01-08 NOTE — Clinical Note
1/8/2024      RE: Libertad Russell  [address] [address]       Dear Colleague,    Thank you for the opportunity to participate in the care of your patient, Libertad Russell, at the [facility]. Please see a copy of my visit note below.    Virtual Visit Details    Type of service:  Telephone Visit   Phone call duration: *** minutes     Medication Therapy Management (MTM) Encounter    ASSESSMENT:                            Medication Adherence/Access: Ongoing appeal for Humira, patient is yet to receive medication    Rheumatoid Arthritis: Unchanged, patient is yet to receive Humira as planned.  Ongoing insurance barriers/appeal.      PLAN:                            I will follow-up with you via [employer] with the status of Humira appeal.      SUBJECTIVE/OBJECTIVE:                          Libertad Russell is a 58 year old female called for a follow-up visit from 11/27/2023.       Reason for visit: Humira follow-up.    Allergies/ADRs: Reviewed in chart  Past Medical History: Reviewed in chart  Tobacco: She reports that she quit smoking about 2 years ago. Her smoking use included cigarettes. She has a 25 pack-year smoking history. She has been exposed to tobacco smoke. She uses smokeless tobacco.      Medication Adherence/Access: Pending appeal for Humira    Rheumatoid Arthritis:   Methotrexate 2.5 mg tablets, 10 tablets once weekly  Folic acid 1 mg every day   Prednisone 5 mg every day x 1 week     Patient did stop in as requested for prebiologic lab screening.  She is yet to receive supply of Humira and is awaiting status of Humira appeal.  She notes no change in symptomatology or concerns today.      CRP Inflammation   Date Value Ref Range Status   12/14/2023 <3.00 <5.00 mg/L Final      Sed Rate   Date Value Ref Range Status   06/30/2021 19 0 - 30 mm/h Final     Erythrocyte Sedimentation Rate   Date Value Ref  "Range Status   12/14/2023 15 0 - 30 mm/hr Final      Creatinine   Date Value Ref Range Status   12/14/2023 0.63 0.51 - 0.95 mg/dL Final   06/30/2021 0.58 0.52 - 1.04 mg/dL Final      Liver Function Studies -   Recent Labs   Lab Test 12/14/23  1512 10/25/23  1425   PROTTOTAL  --  6.8   ALBUMIN 4.5 4.5   BILITOTAL  --  0.3   ALKPHOS  --  88   AST 18 21   ALT 21 19        CBC RESULTS:   Recent Labs   Lab Test 12/14/23  1512   WBC 6.3   RBC 4.05   HGB 11.8   HCT 37.0   MCV 91   MCH 29.1   MCHC 31.9   RDW 14.0         Reviewed baseline pre-biologic screening.   Hep C antibody non-reactive   Hep B surface antibody not completed  Hep B surface antigen non-reactive   Hep B core antibody non-reactive  Quantiferon TB Negative    [place]: Screen Lipids, CV history/VTE   Today's Vitals: Ht 1.626 m (5' 4\")   Wt 61.2 kg (135 lb)   LMP 05/20/2021 (Approximate)   BMI 23.17 kg/m    ----------------      I spent 15 minutes with this patient today.  No changes were made today. A copy of the visit note was provided to the patient's provider(s).    A summary of these recommendations was sent via [employer].    Gill SalinasD, BCACP  Medication Therapy Management Pharmacist  [facility]     Telemedicine Visit Details  Type of service:  Telephone visit  Start Time:  1000am  End Time:  1015am     Medication Therapy Recommendations  No medication therapy recommendations to display           Please do not hesitate to contact me if you have any questions/concerns.     Sincerely,     EMELY BUTT RPH  "

## 2024-01-13 ENCOUNTER — MYC REFILL (OUTPATIENT)
Dept: FAMILY MEDICINE | Facility: CLINIC | Age: 59
End: 2024-01-13
Payer: COMMERCIAL

## 2024-01-13 DIAGNOSIS — F98.8 ATTENTION DEFICIT DISORDER OF ADULT: ICD-10-CM

## 2024-01-13 DIAGNOSIS — I65.29 STENOSIS OF CAROTID ARTERY, UNSPECIFIED LATERALITY: ICD-10-CM

## 2024-01-15 ENCOUNTER — TELEPHONE (OUTPATIENT)
Dept: NEUROSURGERY | Facility: CLINIC | Age: 59
End: 2024-01-15
Payer: COMMERCIAL

## 2024-01-15 RX ORDER — DEXTROAMPHETAMINE SACCHARATE, AMPHETAMINE ASPARTATE MONOHYDRATE, DEXTROAMPHETAMINE SULFATE AND AMPHETAMINE SULFATE 5; 5; 5; 5 MG/1; MG/1; MG/1; MG/1
20 CAPSULE, EXTENDED RELEASE ORAL DAILY
Qty: 30 CAPSULE | Refills: 0 | Status: SHIPPED | OUTPATIENT
Start: 2024-01-15 | End: 2024-02-18

## 2024-01-15 RX ORDER — DEXTROAMPHETAMINE SACCHARATE, AMPHETAMINE ASPARTATE, DEXTROAMPHETAMINE SULFATE AND AMPHETAMINE SULFATE 5; 5; 5; 5 MG/1; MG/1; MG/1; MG/1
10 TABLET ORAL DAILY
Qty: 30 TABLET | Refills: 0 | Status: SHIPPED | OUTPATIENT
Start: 2024-01-15 | End: 2024-02-18

## 2024-01-15 RX ORDER — ASPIRIN 325 MG
TABLET ORAL
Qty: 90 TABLET | Refills: 2 | Status: SHIPPED | OUTPATIENT
Start: 2024-01-15

## 2024-01-15 NOTE — TELEPHONE ENCOUNTER
I called patient and LM to get her a sooner appt. Dr. Casanova has 8am open Thursday 01/18/2024.  Wendy Cristina LPN

## 2024-01-17 NOTE — PROGRESS NOTES
[facility]/[place]  Section of General Neurology  Return Patient Visit    Libertad Russell MRN# [id number]   Age: 58 year old YOB: 1965            Assessment and Plan:   Assessment:  Libertad Russell is a very pleasant 58 year old female who presents today in follow up evaluation of left sided neurological symptoms.  She was having serial small strokes/watershed infarcts from her very stenosed R carotid previously with resultant MRI findings previously/when seen initially in 2021.  The tremaine of stenosis was proposed to be that of potentially previous dissection.   There is still considerable stenosis in her distal R carotid and this (on recent CTA shows 90% estimated) is present in the very distal portion of her stent with further stenosis higher up as well extending cranially.  The plan with our neuro endovascular team was to continue serial monitoring at the most recent visit but about a week prior were offering angioplasty.   I presume this is because only a small portion of the stent appears stenosed at it's most distal point/contiguous with other distal stenosis and perhaps anatomically/pragmatically this would not be something that could be intervened upon given how high it is is a consideration that was broached before to her.  It is also possible that they changed their mind due to their interpretation of Rozina's wishes.  She was offered a more invasive surgery to more fully correct the distal ICA stenosis intracranially by the Harvey she notes but this would certainly be a much bigger surgery and she is apprehensive about this.     Essentially she was offered angiopasty but later it appears decided collectively on continued monitoring but Libertad was and is a bit confused about it all as to why she was offered angioplasty but then later why the decision was monitoring.    We discussed why her case was so tricky given how distal her stent stenosis is and the stenosis extending  intracranially.  She is so young I would err on the side of further intervention after further risk/benefit discussion with the proceduralists.  She still is not sure what she wants to do.  We settled on repeating the MRI to see how much if any radiographic progression there has been of watershed changes to help her in her decision making.       Plan:  --MRI brain w/o  --To see me back at existing February appt to review      Bubba Casanova MD   of Neurology   [facility]      Interval history:     Whooshing sounds are improved  Knows can't over exert herself  Reviewed imaging extensively.    Her manager won't let her work from home ([employer])   Still fatigues easily, has trouble with prolonged physical activity  Worries about her work future  Discussed extensively my thoughts on her situation, it is certainly a dilemma  Will use MRI to help guide her decision making.  I would think strongly about the plasty        11/7/23  Subjective:: Libertad Russell is a 57 year old female with a past medical history of HTN, smoking and RA. 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 Angiogram demonstrated R ICA pseudo-occlusion. She was treated with a 8x40 mm Precise stent in 2021. There was post-stent R ICA collapse distal to the stent at the communicating segment. .  The patient subsequently also underwent noninvasive imaging as well as a repeat cerebral angiography at [facility], which showed that the distal right internal carotid artery, about the level of the stent continues to be small in caliber and ended in the ophthalmic artery.     Her most recent ultrasound of the neck raised concerns about in-stent stenosis, with possible in-stent stenosis of about 70%.   With respect to new symptoms, she states that occasionally she has left hand numbness, and her vision has deteriorated over the last few months.  With  respect to her vision, her history is significant for Lasix surgery about 5 years ago.  She has not had evaluation or surgeries for cataracts.        On today's visit: She has been doing well, she has whooshing sound in her head. She has been out on FMLA leave, she hasn't been doing well. The whooshing sound is intermittent, she can hear it at night while laying down at night. She is able to sleep and it doesn't bother her. It is pulsatile but she isn't sure. She notes that her vision is getting worse over the past  6 months. It is both of her eyes, it is progressively more blurry. She notices it with focusing on close up. No vision loss. She denies any weakness, numbness, speech changes. She reports numbness in her hands bilaterally that is intermittent and doesn't occur frequently, she can't recall the last time it happened.      Telephone visit: Physical is limited due to telephone visit, patient is awake, conversational and has had no additional complaints or concerns.         Imaging Reviewed:     10/24: HEAD CTA:  1. High-grade stenosis involving the distal cavernous ICA, which ultimately occludes at its supraclinoid portion. The right middle cerebral and anterior cerebral arteries are diminutive but opacified via collateral circulation from a patent anterior   communicating artery.     NECK CTA:  1. Right internal carotid artery stent with distal in-stent stenosis greater than 90%.        Assessment:  Libertad Russell  is a 57 year old female who presents for a previously placed right ICA stent in 2021 on ultrasound there is 70% in stent stenosis of the right ICA stent. The left demonstrates 50% stenosis . She has 70% in stent stenosis.   Thank you for this referral, for any questions or concerns please don't hesitate to contact us.      Plan:  -Carotid ultrasound in 1 year  -Return to clinic after repeat imaging  -Continue aspirin therapy        Aminah Waters MD  Endovascular Surgical Neuroradiology  Fellow  [facility]  [phone]     Endovascular Surgical Neuroradiology staff is Dr. Freitas.        10/31 note   Neuroendovascular surgery clinic note.     Rozina Curiel is a 57-year-old female with past medical history of right internal carotid artery stenosis status post stent placement complicated by in-stent stenosis of greater than 90%.  The patient also has high-grade distal cavernous ICA stenosis.  We attempted to call the patient for this visit however she was unreachable.  Given tandem stenosis, we recommend angioplasty of the in-stent stenosis in the proximal right internal carotid artery.  If the patient is agreeable, we will proceed with scheduling of this procedure.     Case was discussed with Dr. Freitas.      Benjamin Alas MD, MPH  Neuroendovascular Surgery Fellow  [facility]  Pager: [phone]    Previous neuro interventional note reviewed (9/22)  Imaging findings:  Carotid ultrasound 8/12/22 stent is patent, improved flow and velocities compared to 10/2021 study     Impression:  R ICA stenosis secondary to presumed dissection s/p stent     Plan:  Repeat carotid ultrasound in 1 year and follow up after  Continue  mg daily from our standpoint. If plavix is needed per other providers for PAD or CAD, it can be continued, however there is not need to continue DAPT for the stent this long since placement.     Patient seen and discussed with the attending, Dr. Freitas.     Monique Cunha MD  Endovascular Surgical Neuroradiology Fellow, PGY-6      A/P at previous visit  Libertad Russell is a very pleasant 57 year old female who presents today in follow up evaluation of left sided neurological symptoms.  She was having serial small strokes from her very stenosed R carotid previously with resultant MRI findings, reviewed with the patient.  Chronic watershed infarcts were noted in her [facility] report previously as well, discussed that these are one in the  same.   Her weakness is stable compared to previously but she continues to fatigue easily, likely of multifactorial origin.       Discussed rationale for continuing aspirin, statin (goal LDL <70) and stopping plavix as she has done.  I would continue serial imaging as instructed by Dr. Freitas.  I ordered a repeat CUS so that they can review this at their visit this fall.   Overall I can continue to follow with Rozina from a general neurology perspective long term yearly or PRN.  She would like our next visit to be in 6 months but I think we can again space out from there given her interval stability.   All questions answered.      Past Medical History:     Patient Active Problem List   Diagnosis    Tobacco use disorder    Seasonal allergic rhinitis    Attention deficit disorder of adult    Rosacea    Raynaud phenomenon    Essential hypertension with goal blood pressure less than 140/90    Lipoma of right axilla    Lipoma of neck    Paresthesia    Nicotine abuse    Irregular periods/menstrual cycles    Cerebrovascular accident (CVA) due to stenosis of right carotid artery (H)    Cerebrovascular accident (CVA), unspecified mechanism (H)    Rheumatoid arthritis involving both hands with positive rheumatoid factor (H)    Stenosis of right carotid artery    Atherosclerosis of arteries of extremities (H24)     Past Medical History:   Diagnosis Date    ADHD (attention deficit hyperactivity disorder)     Anemia 2022    Cerebrovascular accident (CVA) due to stenosis of right carotid artery (H) 09/15/2021    Depressive disorder     HTN (hypertension)     RA (rheumatoid arthritis) (H)     Raynaud disease         Past Surgical History:     Past Surgical History:   Procedure Laterality Date    BREAST BIOPSY, RT/LT  2000, 1998    breast biopsies; reported to be benign    EXCISE MASS AXILLA Right 06/08/2021    Excise right axillary lipoma;  Surgeon: Artis Domínguez MD;  Location: [facility] OR    EXCISE TUMOR, SOFT TISSUE,  NECK/THORAX, SUBCUTANEOUS N/A 2021    EXCISION of lipoma from left occiput;  Surgeon: Artis Domínguez MD;  Location: [facility] OR    EYE SURGERY      Lasik for vision correction    IR CAROTID CEREBRAL ANGIOGRAM BILATERAL  10/22/2021    LYMPH NODE BIOPSY      cervical lymph node biopsy; told to be benign    WA BREAST AUGMENTATION Bilateral     breast implants    TUBAL LIGATION      VASCULAR SURGERY      Stent        Social History:     Social History     Tobacco Use    Smoking status: Former     Packs/day: 1.00     Years: 25.00     Additional pack years: 0.00     Total pack years: 25.00     Types: Cigarettes     Quit date: 10/5/2021     Years since quittin.2     Passive exposure: Past    Smokeless tobacco: Current    Tobacco comments:     Pt vapes 1 per day without nicotine    Vaping Use    Vaping Use: Some days   Substance Use Topics    Alcohol use: Not Currently     Comment: occasional    Drug use: No        Family History:     Family History   Problem Relation Age of Onset    Hypertension Mother     Cancer Mother         Lung. Cancer    Other Cancer Mother         Lung    Thyroid Disease Mother     C.A.D. Father         52 at first MI    Prostate Cancer Father         Bladder/ lung    Cancer Father         Lung -bladder cancer    Thyroid Disease Sister     Thyroid Disease Sister     Hypertension Other     Diabetes No family hx of     Cerebrovascular Disease No family hx of     Breast Cancer No family hx of     Cancer - colorectal No family hx of     Glaucoma No family hx of     Macular Degeneration No family hx of         Medications:     Current Outpatient Medications   Medication Sig    adalimumab (HUMIRA *CF*) 40 MG/0.4ML pen kit Inject 0.4 mLs (40 mg) Subcutaneous every 14 days Hold for signs of infection, then seek medical attention.    amphetamine-dextroamphetamine (ADDERALL XR) 20 MG 24 hr capsule Take 1 capsule (20 mg) by mouth daily    amphetamine-dextroamphetamine (ADDERALL) 20  "MG tablet Take 0.5 tablets (10 mg) by mouth daily    aspirin (ASA) 325 MG tablet TAKE 1 TABLET(325 MG) BY MOUTH DAILY    atorvastatin (LIPITOR) 40 MG tablet Take 1 tablet (40 mg) by mouth daily    buPROPion (WELLBUTRIN SR) 100 MG 12 hr tablet Take 1 tablet (100 mg) by mouth 2 times daily for 60 days    clopidogrel (PLAVIX) 75 MG tablet Take 1 tablet (75 mg) by mouth daily    ferrous sulfate (FE TABS) 325 (65 Fe) MG EC tablet TAKE 1 TABLET(325 MG) BY MOUTH DAILY WITH ORANGE JUICE    folic acid (FOLVITE) 1 MG tablet Take 1 tablet (1 mg) by mouth daily    losartan (COZAAR) 100 MG tablet Take 1 tablet (100 mg) by mouth daily    methotrexate 2.5 MG tablet Take 10 tablets (25 mg) by mouth every 7 days Labs every 8 - 12 weeks for refills.    methotrexate 2.5 MG tablet Take 8 tablets (20 mg) by mouth every 7 days    olopatadine (PATADAY) 0.2 % ophthalmic solution Place 0.05 mLs (1 drop) into both eyes daily    triamcinolone (KENALOG) 0.1 % external cream Apply topically 2 times daily     No current facility-administered medications for this visit.     Facility-Administered Medications Ordered in Other Visits   Medication    sodium chloride (PF) 0.9% PF flush 72 mL        Allergies:     Allergies   Allergen Reactions    Seasonal Allergies           Physical Exam:   Vitals: BP (!) 151/85   Pulse 64   Ht 1.626 m (5' 4\")   Wt 60.8 kg (134 lb)   LMP 05/20/2021 (Approximate)   BMI 23.00 kg/m       Mental Status: Alert and oriented to person, place, and time. Speech fluent and comprehension intact. No dysarthria.      Cranial Nerves:   II: Visual fields: normal  III: Pupils: 3 mm, equal, round, reactive to light   III,IV,VI: Extraocular Movements: intact   V: Facial sensation: intact to light touch  VII: Facial strength: intact without asymmetry  XII: Tongue movement: normal     Motor Exam:   Upper Extremities  Deltoid  Bicep  Tricep  Wrist Extensors  strength Intrinsic Muscles    Right  5  5  5  5 5 5   Left  5  5  5 5 5 " 4+   L  strength equivocally worse than R/slightly  5/5 in b/l LE as well   No clear pronator drift  Fatigues easily     Reflexes: biceps, triceps, brachioradialis, patellar, and ankle jerks 2+ and symmetric.               Data: Pertinent prior to visit   Newer data:  CUS 2023  Narrative & Impression   BILATERAL CAROTID DUPLEX DOPPLER ULTRASOUND 10/11/2023 1:58 PM     CLINICAL HISTORY: Carotid stenosis, right     COMPARISON: Ultrasound carotid bilateral 8/12/2022     REFERRING PROVIDER: SARI GODWIN     TECHNIQUE: Grayscale (B-mode) and duplex and spectral Doppler  ultrasound of the common carotid, extracranial internal carotid,  external carotid, and vertebral artery origins. Velocity measurements  obtained with angle correction at or less than 60 degrees.     FINDINGS:     RIGHT SIDE:      Plaque Morphology: Greater than 70% diameter in-stent stenosis  suggested in color Doppler image.        Proximal CCA: 54/12 cm/s     Mid CCA: 65/14 cm/s     Distal CCA, proximal to stent: 50/16 cm/s        Stent, distal CCA: 68/20 cm/s     Stent, bifurcation: 56/13 cm/s     Stent, proximal ICA: 16/0 cm/s, 3.5 velocity ratio     Stent, mid ICA: 121/18 cm/s, 7.56 velocity ratio        External CA: 232/42 cm/s        Mid ICA, distal to stent: 52/10 cm/s     Distal ICA: 42.6 cm/s        Vertebral artery: Antegrade: 47/23 cm/s      Innominate artery: 116/13 cm/s     Proximal subclavian: 226/16 cm/s, 393/0 cm/s     Stent/CCA ratio: 2.42      LEFT SIDE:      Plaque Morphology: Echogenic plaque causes less than 50% diameter  stenosis.         Origin CCA: 79/21 cm/s     Proximal CCA: 94/23 cm/s     Mid CCA: 86/31 cm/s     Distal CCA: 72/34 cm/s     Carotid bifurcation: 62/30 cm/s     External CA: 122/23 cm/s        Proximal ICA: 133/58 cm/s     Mid ICA: 153/39 cm/s     Distal ICA: 94/35 cm/s        Vertebral artery: Antegrade: 53/26 cm/s      Subclavian origin: 77/7 cm/s     Proximal subclavian: 139/0 cm/s     ICA/CCA  ratio: 2.13                                                                       IMPRESSION:     1. RIGHT carotid stent: Although velocities never exceed 150 cm/s,  color Doppler image suggests greater than 70% diameter in-stent  stenosis and velocity in the stent in the proximal internal carotid  artery drops to 16 cm/s, a 3.5 times drop from the stent in the  bifurcation. 7.56 velocity ratio in the stent from the proximal to mid  internal carotid artery. Further evaluation with CTA or catheter  angiography could be performed.     2. LEFT ICA:  Less than 50% diameter narrowing by grayscale imaging  and sonographic velocity criteria.     [employer]ters[employer] Accreditation Commission Updated Recommendations for  Carotid Stenosis Interpretation Criteria - October 2021.  https://intersocietal.org/wp-content/uploads/2021/10/IAC-Vascular-Test  it-Pzgzagqzapkqt_Pibtszd-Pkmuvjnexizumws-for-Carotid-Stenosis-Interpre  ation-Criteria.pdf     Greater than 70% diameter stenosis criteria per - Journal of Vascular  Surgery Vol. 75 Issue 1 Supplement [id number] Published online: June 18, 2021           Normal            ICA PSV: < 180 cm/s            Plaque Estimate: None            ICA/CCA PSV Ratio: < 2.0            ICA EDV: < 40 cm/s          < 50%            ICA PSV: < 180 cm/s            Plaque Estimate: < 50%            ICA/CCA PSV Ratio: < 2.0            ICA EDV: < 40 cm/s          50 - 69%            ICA PSV: 180 - 230 cm/s            Plaque Estimate: > 50%            ICA/CCA PSV Ratio: 2.0 - 4.0            ICA EDV: 40 - 100 cm/s          > 70% but less than near occlusion            ICA PSV: > 230 cm/s            Plaque Estimate: > 50%            AND either            ICA EDV: > 100 cm/s            or            ICA/CCA PSV Ratio: > 4.0          Near occlusion            ICA PSV: > 230 cm/s            Plaque Estimate: Visible            ICA/CCA PSV Ratio: Variable            ICA EDV: Variable          Total occlusion             ICA PSV: Undetectable            Plaque Estimate: Visible, no detectable lumen            ICA/CCA PSV Ratio: N/A            ICA EDV: N/A                                          Additional criteria from vascular surgery     > 80%       EDV > 120 cm/s     -----------------------------------------------     Criteria: Carotid stent velocity criteria          <50%             ICA PSV < 150 cm/sec            ICA EDV N/A            ICA(stent)/CCA PSV Ratio < 2.0            No or minimal stent lumen reduction          50% - 75%             ICA PSV >/= 150 cm/sec            ICA EDV < 125 cm/sec            ICA(stent)/CCA PSV Ratio > 2.0            Turbulent flow, stent lumen reduction present          > 75%,             ICA PSV > 300 cm/sec            ICA EDV > 125 cm/sec            ICA(stent)/CCA Ratio > 4.0             High grade stent stenosis, dampening of distal waveform          Occlusion             No flow            No stent flow visualized     EXAM: CTA HEAD NECK W CONTRAST  LOCATION: [facility]  DATE: 10/24/2023     INDICATION: Carotid artery stenosis. Headache.  COMPARISON: Urinary ultrasound dated 10/11/2023.  CONTRAST: 67mL Isovue 370  TECHNIQUE: Head and neck CT angiogram with IV contrast. Axial helical CT images of the head and neck vessels obtained during the arterial phase of intravenous contrast administration. Axial 2D reconstructed images and multiplanar 3D MIP reconstructed   images of the head and neck vessels were performed by the technologist. Dose reduction techniques were used. All stenosis measurements made according to NASCET criteria unless otherwise specified.     FINDINGS:   HEAD CTA:  ANTERIOR CIRCULATION: High-grade stenosis involving the cavernous/supraclinoid ICA, which ultimately occludes at its supraclinoid portion. The hypoplastic right middle cerebral artery is opacified via collateral circulation from a patent anterior   communicating artery. The anterior cerebral  arteries are unremarkable. The left internal carotid artery, ICA terminus, and left MCA vasculature is unremarkable. The anterior communicating artery is patent. The posterior communicating arteries are   hypoplastic/absent.      POSTERIOR CIRCULATION: No stenosis/occlusion, aneurysm, or high flow vascular malformation. Balanced vertebrobasilar circulation. The basilar artery is unremarkable and gives rise to patent superior cerebellar and posterior cerebral arteries.      DURAL VENOUS SINUSES: Patent.     NECK CTA:  RIGHT CAROTID: Normal course and persist caliber of the common carotid artery. Right internal carotid artery stent with mild stenosis at the proximal portion and critical, greater than 90% stenosis of the internal carotid artery at the distal aspect of   the stent.     LEFT CAROTID: Normal course and persist caliber of the common carotid artery. Atherosclerotic plaque results in approximately 70% stenosis of the ICA origin. The remaining extracranial internal carotid artery demonstrate persistent caliber without   evidence of dissection.     VERTEBRAL ARTERIES: Balance configuration without stenosis or dissection.     AORTIC ARCH: Classic three-vessel arch. The origins of the great vessels are unremarkable without significant stenosis.     NONVASCULAR STRUCTURES: There are no masses or enlarged lymph nodes in the neck. The submandibular, parotid, and thyroid glands are unremarkable. Multilevel degenerative changes without high-grade spinal canal stenosis or neural foraminal narrowing. No   aggressive osseous lesions. Moderate to severe emphysema.                                                                      IMPRESSION:     HEAD CTA:  1. High-grade stenosis involving the distal cavernous ICA, which ultimately occludes at its supraclinoid portion. The right middle cerebral and anterior cerebral arteries are diminutive but opacified via collateral circulation from a patent anterior   communicating  artery.     NECK CTA:  1. Right internal carotid artery stent with distal in-stent stenosis greater than 90%.    Right 2023         Right carotid 2021        MRA 2022              Left                  CUS 2022                                                      IMPRESSION:     1. RIGHT ICA: Ends of the stent were not evaluated. Improved flow and  velocities in the stent from previous. Less than 50% diameter in stent  stenosis by sonographic velocity criteria and color Doppler imaging.     2. LEFT ICA:  Less than 50% diameter narrowing by grayscale imaging  and sonographic velocity criteria           MRI brain MRA head/neck 8/17/21     IMPRESSION:   1.  Abnormal right internal carotid artery flow void consistent with  diminished or absent flow in this vessel. Suspect that this is related  to subacute to chronic right internal carotid artery dissection.  Please see the separately reported neck MRA for additional details.  2.  T2/FLAIR hyperintensity within the right greater than left deep  cerebral white matter presumably reflects gliosis related to chronic  ischemic injury. Suspect that there is a component of underlying  border zone/watershed hypoperfusion injury given the abnormal right  internal carotid artery.  3.  No acute infarct, hemorrhage or mass.                    Neck:                                                       IMPRESSION:   1.  Diminutive right internal carotid artery demonstrates abnormal  signal. 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 Chronic flow-limiting atherosclerotic  stenosis is the other primary consideration.  2.  The status of the right internal carotid intracranially is not  well evaluated on this study.  3.  Consider further evaluation of these findings with head and neck  CTA.  4.  Mild atherosclerosis of the left carotid artery without  hemodynamically significant narrowing by NASCET criteria.  5.  Patent vertebral  arteries.     Procedures:     CTA neck 8/18  IMPRESSION:  1. Severe stenosis (likely greater than 80% luminal diameter stenosis)  at the proximal right internal carotid artery. The exact degree of  stenosis cannot be measured as there is swallowing motion while  scanning through the carotid bifurcations.  2. Two areas of severe stenosis of the intracranial distal right  internal carotid artery at the petrous cavernous junction and of the  entire supraclinoid segment.  3. Combination of inflow and outflow restriction of the right internal  carotid artery resulting in a diminutive right internal carotid artery  in the neck.  4. Plaque without stenosis of the proximal and distal internal carotid  artery on the left.  5. Diminutive M1 segment of the right middle cerebral artery likely  due to decreased inflow.  6. Moderate-severe atherosclerotic narrowing at the origins of the  vertebral arteries bilaterall     MRA neck 1/26/22     1. String sign configuration of the right internal carotid artery above the   right internal carotid artery stent may be related to a tandem stenosis with   narrowing immediately above the stent, and more particularly intracranial   stenosis of the supraclinoid right internal artery (which may be the principal   flow limiting factor).     2. No evidence of acute ischemia on DWI although there is extensive white matter   change throughout the right hemisphere consistent with the effects of   hypoperfusion and extensive vasodilatation throughout the right hemisphere.     3. 1.5 cm masslike/nodular configuration of the nasopharyngeal adenoidal tissue   is somewhat unusual for a patient of this age and may warrant direct endoscopic   evaluation.          CT perfusion 1/26/22  IMPRESSION:   In keeping with findings of right carotid stenosis, perfusion study   demonstrates Tmax greater than six seconds of 36 mL at the right frontal and   parietal lobes. CBF less than 30 percent is 0. These regions  are associated with   elevated Tmax and MTT.       MRI brain 1/26/22       MRI of the brain: No acute restriction of diffusion on DWI but there is   extensive confluent white matter asymmetric FLAIR signal abnormality within the   right hemisphere much of which is in a watershed type distribution suggesting   effects of chronic hypoperfusion of the right hemisphere. White matter disease   in a scattered random pattern is present with a lesser degree on the left side.   Asymmetric signal within the high cervical and right petrous carotid artery is   consistent with slow are impaired flow. Post gadolinium there is diffuse   prominence of the intraparenchymal and pial vascular structures throughout the   right hemisphere consistent with vasodilatation of collateral circulation   routes.   Midline prominence of the adenoidal nasopharyngeal tissues in a discrete nodule   or masslike configuration spanning 1.4 cm is somewhat unusual for a patient of   this age and may warrant endoscopic evaluation.      MRI brain MRA head/neck 8/17/21     IMPRESSION:   1.  Abnormal right internal carotid artery flow void consistent with  diminished or absent flow in this vessel. Suspect that this is related  to subacute to chronic right internal carotid artery dissection.  Please see the separately reported neck MRA for additional details.  2.  T2/FLAIR hyperintensity within the right greater than left deep  cerebral white matter presumably reflects gliosis related to chronic  ischemic injury. Suspect that there is a component of underlying  border zone/watershed hypoperfusion injury given the abnormal right  internal carotid artery.  3.  No acute infarct, hemorrhage or mass.                    Neck:                                                       IMPRESSION:   1.  Diminutive right internal carotid artery demonstrates abnormal  signal. 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  Chronic flow-limiting atherosclerotic  stenosis is the other primary consideration.  2.  The status of the right internal carotid intracranially is not  well evaluated on this study.  3.  Consider further evaluation of these findings with head and neck  CTA.  4.  Mild atherosclerosis of the left carotid artery without  hemodynamically significant narrowing by NASCET criteria.  5.  Patent vertebral arteries.     Procedures:     CTA neck 8/18  IMPRESSION:  1. Severe stenosis (likely greater than 80% luminal diameter stenosis)  at the proximal right internal carotid artery. The exact degree of  stenosis cannot be measured as there is swallowing motion while  scanning through the carotid bifurcations.  2. Two areas of severe stenosis of the intracranial distal right  internal carotid artery at the petrous cavernous junction and of the  entire supraclinoid segment.  3. Combination of inflow and outflow restriction of the right internal  carotid artery resulting in a diminutive right internal carotid artery  in the neck.  4. Plaque without stenosis of the proximal and distal internal carotid  artery on the left.  5. Diminutive M1 segment of the right middle cerebral artery likely  due to decreased inflow.  6. Moderate-severe atherosclerotic narrowing at the origins of the  vertebral arteries bilaterally.       Laboratory:          LDL Cholesterol Calculated   Date Value Ref Range Status   10/22/2021 49 <=100 mg/dL Final   06/30/2021 104 (H) <100 mg/dL Final       Comment:       Above desirable:  100-129 mg/dl  Borderline High:  130-159 mg/dL  High:             160-189 mg/dL  Very high:       >189 mg/dl                     The total time of this encounter today amounted to 52 minutes. This time included time spent with the patient, prep work, ordering tests, and performing post visit documentation.

## 2024-01-18 ENCOUNTER — OFFICE VISIT (OUTPATIENT)
Dept: NEUROLOGY | Facility: CLINIC | Age: 59
End: 2024-01-18
Payer: COMMERCIAL

## 2024-01-18 ENCOUNTER — MYC MEDICAL ADVICE (OUTPATIENT)
Dept: CARDIOLOGY | Facility: CLINIC | Age: 59
End: 2024-01-18

## 2024-01-18 VITALS
SYSTOLIC BLOOD PRESSURE: 151 MMHG | WEIGHT: 134 LBS | HEART RATE: 64 BPM | DIASTOLIC BLOOD PRESSURE: 85 MMHG | HEIGHT: 64 IN | BODY MASS INDEX: 22.88 KG/M2

## 2024-01-18 DIAGNOSIS — F40.240 CLAUSTROPHOBIA: ICD-10-CM

## 2024-01-18 DIAGNOSIS — I65.21 STENOSIS OF RIGHT CAROTID ARTERY: ICD-10-CM

## 2024-01-18 DIAGNOSIS — I63.231 CEREBROVASCULAR ACCIDENT (CVA) DUE TO STENOSIS OF RIGHT CAROTID ARTERY (H): Primary | ICD-10-CM

## 2024-01-18 DIAGNOSIS — R42 DIZZINESS: ICD-10-CM

## 2024-01-18 PROCEDURE — 99215 OFFICE O/P EST HI 40 MIN: CPT | Performed by: STUDENT IN AN ORGANIZED HEALTH CARE EDUCATION/TRAINING PROGRAM

## 2024-01-18 RX ORDER — LORAZEPAM 0.5 MG/1
TABLET ORAL
Qty: 2 TABLET | Refills: 0 | Status: SHIPPED | OUTPATIENT
Start: 2024-01-18 | End: 2024-07-17

## 2024-01-18 ASSESSMENT — PAIN SCALES - GENERAL: PAINLEVEL: NO PAIN (0)

## 2024-01-18 NOTE — LETTER
1/18/2024         RE: Libertad Russell  [address] [facility] [address]        Dear Colleague,    Thank you for referring your patient, Libertad Russell, to the [facility] [place]. Please see a copy of my visit note below.    [facility]  Section of General Neurology  Return Patient Visit    Libertad Russell MRN# [id number]   Age: 58 year old YOB: 1965            Assessment and Plan:   Assessment:  Libertad Russell is a very pleasant 58 year old female who presents today in follow up evaluation of left sided neurological symptoms.  She was having serial small strokes/watershed infarcts from her very stenosed R carotid previously with resultant MRI findings previously/when seen initially in 2021.  The tremaine of stenosis was proposed to be that of potentially previous dissection.   There is still considerable stenosis in her distal R carotid and this (on recent CTA shows 90% estimated) is present in the very distal portion of her stent with further stenosis higher up as well extending cranially.  The plan with our neuro endovascular team was to continue serial monitoring at the most recent visit but about a week prior were offering angioplasty.   I presume this is because only a small portion of the stent appears stenosed at it's most distal point/contiguous with other distal stenosis and perhaps anatomically/pragmatically this would not be something that could be intervened upon given how high it is is a consideration that was broached before to her.  It is also possible that they changed their mind due to their interpretation of Rozina's wishes.  She was offered a more invasive surgery to more fully correct the distal ICA stenosis intracranially by the [place] she notes but this would certainly be a much bigger surgery and she is apprehensive about this.     Essentially she was offered angiopasty but later it appears decided collectively  on continued monitoring but Libertad was and is a bit confused about it all as to why she was offered angioplasty but then later why the decision was monitoring.    We discussed why her case was so tricky given how distal her stent stenosis is and the stenosis extending intracranially.  She is so young I would err on the side of further intervention after further risk/benefit discussion with the proceduralists.  She still is not sure what she wants to do.  We settled on repeating the MRI to see how much if any radiographic progression there has been of watershed changes to help her in her decision making.       Plan:  --MRI brain w/o  --To see me back at existing February appt to review      Bubba Casanova MD   of Neurology   [facility]      Interval history:     Whooshing sounds are improved  Knows can't over exert herself  Reviewed imaging extensively.    Her manager won't let her work from home ([employer])   Still fatigues easily, has trouble with prolonged physical activity  Worries about her work future  Discussed extensively my thoughts on her situation, it is certainly a dilemma  Will use MRI to help guide her decision making.  I would think strongly about the plasty        11/7/23  Subjective:: Libertad Russell is a 57 year old female with a past medical history of HTN, smoking and RA. 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 Angiogram demonstrated R ICA pseudo-occlusion. She was treated with a 8x40 mm Precise stent in 2021. There was post-stent R ICA collapse distal to the stent at the communicating segment. .  The patient subsequently also underwent noninvasive imaging as well as a repeat cerebral angiography at [facility], which showed that the distal right internal carotid artery, about the level of the stent continues to be small in caliber and ended in the ophthalmic artery.     Her most  recent ultrasound of the neck raised concerns about in-stent stenosis, with possible in-stent stenosis of about 70%.   With respect to new symptoms, she states that occasionally she has left hand numbness, and her vision has deteriorated over the last few months.  With respect to her vision, her history is significant for Lasix surgery about 5 years ago.  She has not had evaluation or surgeries for cataracts.        On today's visit: She has been doing well, she has whooshing sound in her head. She has been out on FMLA leave, she hasn't been doing well. The whooshing sound is intermittent, she can hear it at night while laying down at night. She is able to sleep and it doesn't bother her. It is pulsatile but she isn't sure. She notes that her vision is getting worse over the past  6 months. It is both of her eyes, it is progressively more blurry. She notices it with focusing on close up. No vision loss. She denies any weakness, numbness, speech changes. She reports numbness in her hands bilaterally that is intermittent and doesn't occur frequently, she can't recall the last time it happened.      Telephone visit: Physical is limited due to telephone visit, patient is awake, conversational and has had no additional complaints or concerns.         Imaging Reviewed:     10/24: HEAD CTA:  1. High-grade stenosis involving the distal cavernous ICA, which ultimately occludes at its supraclinoid portion. The right middle cerebral and anterior cerebral arteries are diminutive but opacified via collateral circulation from a patent anterior   communicating artery.     NECK CTA:  1. Right internal carotid artery stent with distal in-stent stenosis greater than 90%.        Assessment:  Libertad Russell  is a 57 year old female who presents for a previously placed right ICA stent in 2021 on ultrasound there is 70% in stent stenosis of the right ICA stent. The left demonstrates 50% stenosis . She has 70% in stent stenosis.    Thank you for this referral, for any questions or concerns please don't hesitate to contact us.      Plan:  -Carotid ultrasound in 1 year  -Return to clinic after repeat imaging  -Continue aspirin therapy        Aminah Waters MD  Endovascular Surgical Neuroradiology Fellow  [facility]  [phone]     Endovascular Surgical Neuroradiology staff is Dr. Freitas.        10/31 note   Neuroendovascular surgery clinic note.     Rozina Curiel is a 57-year-old female with past medical history of right internal carotid artery stenosis status post stent placement complicated by in-stent stenosis of greater than 90%.  The patient also has high-grade distal cavernous ICA stenosis.  We attempted to call the patient for this visit however she was unreachable.  Given tandem stenosis, we recommend angioplasty of the in-stent stenosis in the proximal right internal carotid artery.  If the patient is agreeable, we will proceed with scheduling of this procedure.     Case was discussed with Dr. Freitas.      Benjamin Alas MD, MPH  Neuroendovascular Surgery Fellow  [facility]  Pager: [phone]    Previous neuro interventional note reviewed (9/22)  Imaging findings:  Carotid ultrasound 8/12/22 stent is patent, improved flow and velocities compared to 10/2021 study     Impression:  R ICA stenosis secondary to presumed dissection s/p stent     Plan:  Repeat carotid ultrasound in 1 year and follow up after  Continue  mg daily from our standpoint. If plavix is needed per other providers for PAD or CAD, it can be continued, however there is not need to continue DAPT for the stent this long since placement.     Patient seen and discussed with the attending, Dr. Freitas.     Monique Cunha MD  Endovascular Surgical Neuroradiology Fellow, PGY-6      A/P at previous visit  Libertad Russell is a very pleasant 57 year old female who presents today in follow up evaluation of left sided neurological  symptoms.  She was having serial small strokes from her very stenosed R carotid previously with resultant MRI findings, reviewed with the patient.  Chronic watershed infarcts were noted in her [place] clinic report previously as well, discussed that these are one in the same.   Her weakness is stable compared to previously but she continues to fatigue easily, likely of multifactorial origin.       Discussed rationale for continuing aspirin, statin (goal LDL <70) and stopping plavix as she has done.  I would continue serial imaging as instructed by Dr. Freitas.  I ordered a repeat CUS so that they can review this at their visit this fall.   Overall I can continue to follow with Rozina from a general neurology perspective long term yearly or PRN.  She would like our next visit to be in 6 months but I think we can again space out from there given her interval stability.   All questions answered.      Past Medical History:     Patient Active Problem List   Diagnosis     Tobacco use disorder     Seasonal allergic rhinitis     Attention deficit disorder of adult     Rosacea     Raynaud phenomenon     Essential hypertension with goal blood pressure less than 140/90     Lipoma of right axilla     Lipoma of neck     Paresthesia     Nicotine abuse     Irregular periods/menstrual cycles     Cerebrovascular accident (CVA) due to stenosis of right carotid artery (H)     Cerebrovascular accident (CVA), unspecified mechanism (H)     Rheumatoid arthritis involving both hands with positive rheumatoid factor (H)     Stenosis of right carotid artery     Atherosclerosis of arteries of extremities (H24)     Past Medical History:   Diagnosis Date     ADHD (attention deficit hyperactivity disorder)      Anemia 2022     Cerebrovascular accident (CVA) due to stenosis of right carotid artery (H) 09/15/2021     Depressive disorder      HTN (hypertension)      RA (rheumatoid arthritis) (H)      Raynaud disease         Past Surgical History:      Past Surgical History:   Procedure Laterality Date     BREAST BIOPSY, RT/LT  ,     breast biopsies; reported to be benign     EXCISE MASS AXILLA Right 2021    Excise right axillary lipoma;  Surgeon: Artis Domínguez MD;  Location: [facility] OR     EXCISE TUMOR, SOFT TISSUE, NECK/THORAX, SUBCUTANEOUS N/A 2021    EXCISION of lipoma from left occiput;  Surgeon: Artis Domínguez MD;  Location: [facility] OR     EYE SURGERY      Lasik for vision correction     IR CAROTID CEREBRAL ANGIOGRAM BILATERAL  10/22/2021     LYMPH NODE BIOPSY      cervical lymph node biopsy; told to be benign     MN BREAST AUGMENTATION Bilateral     breast implants     TUBAL LIGATION       VASCULAR SURGERY      Stent        Social History:     Social History     Tobacco Use     Smoking status: Former     Packs/day: 1.00     Years: 25.00     Additional pack years: 0.00     Total pack years: 25.00     Types: Cigarettes     Quit date: 10/5/2021     Years since quittin.2     Passive exposure: Past     Smokeless tobacco: Current     Tobacco comments:     Pt vapes 1 per day without nicotine    Vaping Use     Vaping Use: Some days   Substance Use Topics     Alcohol use: Not Currently     Comment: occasional     Drug use: No        Family History:     Family History   Problem Relation Age of Onset     Hypertension Mother      Cancer Mother         Lung. Cancer     Other Cancer Mother         Lung     Thyroid Disease Mother      C.A.D. Father         52 at first MI     Prostate Cancer Father         Bladder/ lung     Cancer Father         Lung -bladder cancer     Thyroid Disease Sister      Thyroid Disease Sister      Hypertension Other      Diabetes No family hx of      Cerebrovascular Disease No family hx of      Breast Cancer No family hx of      Cancer - colorectal No family hx of      Glaucoma No family hx of      Macular Degeneration No family hx of         Medications:     Current Outpatient Medications  "  Medication Sig     adalimumab (HUMIRA *CF*) 40 MG/0.4ML pen kit Inject 0.4 mLs (40 mg) Subcutaneous every 14 days Hold for signs of infection, then seek medical attention.     amphetamine-dextroamphetamine (ADDERALL XR) 20 MG 24 hr capsule Take 1 capsule (20 mg) by mouth daily     amphetamine-dextroamphetamine (ADDERALL) 20 MG tablet Take 0.5 tablets (10 mg) by mouth daily     aspirin (ASA) 325 MG tablet TAKE 1 TABLET(325 MG) BY MOUTH DAILY     atorvastatin (LIPITOR) 40 MG tablet Take 1 tablet (40 mg) by mouth daily     buPROPion (WELLBUTRIN SR) 100 MG 12 hr tablet Take 1 tablet (100 mg) by mouth 2 times daily for 60 days     clopidogrel (PLAVIX) 75 MG tablet Take 1 tablet (75 mg) by mouth daily     ferrous sulfate (FE TABS) 325 (65 Fe) MG EC tablet TAKE 1 TABLET(325 MG) BY MOUTH DAILY WITH ORANGE JUICE     folic acid (FOLVITE) 1 MG tablet Take 1 tablet (1 mg) by mouth daily     losartan (COZAAR) 100 MG tablet Take 1 tablet (100 mg) by mouth daily     methotrexate 2.5 MG tablet Take 10 tablets (25 mg) by mouth every 7 days Labs every 8 - 12 weeks for refills.     methotrexate 2.5 MG tablet Take 8 tablets (20 mg) by mouth every 7 days     olopatadine (PATADAY) 0.2 % ophthalmic solution Place 0.05 mLs (1 drop) into both eyes daily     triamcinolone (KENALOG) 0.1 % external cream Apply topically 2 times daily     No current facility-administered medications for this visit.     Facility-Administered Medications Ordered in Other Visits   Medication     sodium chloride (PF) 0.9% PF flush 72 mL        Allergies:     Allergies   Allergen Reactions     Seasonal Allergies           Physical Exam:   Vitals: BP (!) 151/85   Pulse 64   Ht 1.626 m (5' 4\")   Wt 60.8 kg (134 lb)   LMP 05/20/2021 (Approximate)   BMI 23.00 kg/m       Mental Status: Alert and oriented to person, place, and time. Speech fluent and comprehension intact. No dysarthria.      Cranial Nerves:   II: Visual fields: normal  III: Pupils: 3 mm, equal, " round, reactive to light   III,IV,VI: Extraocular Movements: intact   V: Facial sensation: intact to light touch  VII: Facial strength: intact without asymmetry  XII: Tongue movement: normal     Motor Exam:   Upper Extremities  Deltoid  Bicep  Tricep  Wrist Extensors  strength Intrinsic Muscles    Right  5  5  5  5 5 5   Left  5  5  5 5 5 4+   L  strength equivocally worse than R/slightly  5/5 in b/l LE as well   No clear pronator drift  Fatigues easily     Reflexes: biceps, triceps, brachioradialis, patellar, and ankle jerks 2+ and symmetric.               Data: Pertinent prior to visit   Newer data:  CUS 2023  Narrative & Impression   BILATERAL CAROTID DUPLEX DOPPLER ULTRASOUND 10/11/2023 1:58 PM     CLINICAL HISTORY: Carotid stenosis, right     COMPARISON: Ultrasound carotid bilateral 8/12/2022     REFERRING PROVIDER: SARI GODWIN     TECHNIQUE: Grayscale (B-mode) and duplex and spectral Doppler  ultrasound of the common carotid, extracranial internal carotid,  external carotid, and vertebral artery origins. Velocity measurements  obtained with angle correction at or less than 60 degrees.     FINDINGS:     RIGHT SIDE:      Plaque Morphology: Greater than 70% diameter in-stent stenosis  suggested in color Doppler image.        Proximal CCA: 54/12 cm/s     Mid CCA: 65/14 cm/s     Distal CCA, proximal to stent: 50/16 cm/s        Stent, distal CCA: 68/20 cm/s     Stent, bifurcation: 56/13 cm/s     Stent, proximal ICA: 16/0 cm/s, 3.5 velocity ratio     Stent, mid ICA: 121/18 cm/s, 7.56 velocity ratio        External CA: 232/42 cm/s        Mid ICA, distal to stent: 52/10 cm/s     Distal ICA: 42.6 cm/s        Vertebral artery: Antegrade: 47/23 cm/s      Innominate artery: 116/13 cm/s     Proximal subclavian: 226/16 cm/s, 393/0 cm/s     Stent/CCA ratio: 2.42      LEFT SIDE:      Plaque Morphology: Echogenic plaque causes less than 50% diameter  stenosis.         Origin CCA: 79/21 cm/s     Proximal  CCA: 94/23 cm/s     Mid CCA: 86/31 cm/s     Distal CCA: 72/34 cm/s     Carotid bifurcation: 62/30 cm/s     External CA: 122/23 cm/s        Proximal ICA: 133/58 cm/s     Mid ICA: 153/39 cm/s     Distal ICA: 94/35 cm/s        Vertebral artery: Antegrade: 53/26 cm/s      Subclavian origin: 77/7 cm/s     Proximal subclavian: 139/0 cm/s     ICA/CCA ratio: 2.13                                                                       IMPRESSION:     1. RIGHT carotid stent: Although velocities never exceed 150 cm/s,  color Doppler image suggests greater than 70% diameter in-stent  stenosis and velocity in the stent in the proximal internal carotid  artery drops to 16 cm/s, a 3.5 times drop from the stent in the  bifurcation. 7.56 velocity ratio in the stent from the proximal to mid  internal carotid artery. Further evaluation with CTA or catheter  angiography could be performed.     2. LEFT ICA:  Less than 50% diameter narrowing by grayscale imaging  and sonographic velocity criteria.     [facility] Acc[facility] Updated Recommendations for  Carotid Stenosis Interpretation Criteria - October 2021.  [email or web address]-content/[email or web address]-Test  [email or web address]-[email or web address]-[email or web address]-Stenosis-Interpre  ation-Criteria.[email or web address]     Greater than 70% diameter stenosis criteria per - Journal of Vascular  Surgery Vol. 75 Issue 1 Supplement [id number] Published online: June 18, 2021           Normal            ICA PSV: < 180 cm/s            Plaque Estimate: None            ICA/CCA PSV Ratio: < 2.0            ICA EDV: < 40 cm/s          < 50%            ICA PSV: < 180 cm/s            Plaque Estimate: < 50%            ICA/CCA PSV Ratio: < 2.0            ICA EDV: < 40 cm/s          50 - 69%            ICA PSV: 180 - 230 cm/s            Plaque Estimate: > 50%            ICA/CCA PSV Ratio: 2.0 - 4.0            ICA EDV: 40 - 100 cm/s          > 70% but less than near occlusion            ICA  PSV: > 230 cm/s            Plaque Estimate: > 50%            AND either            ICA EDV: > 100 cm/s            or            ICA/CCA PSV Ratio: > 4.0          Near occlusion            ICA PSV: > 230 cm/s            Plaque Estimate: Visible            ICA/CCA PSV Ratio: Variable            ICA EDV: Variable          Total occlusion            ICA PSV: Undetectable            Plaque Estimate: Visible, no detectable lumen            ICA/CCA PSV Ratio: N/A            ICA EDV: N/A                                          Additional criteria from vascular surgery     > 80%       EDV > 120 cm/s     -----------------------------------------------     Criteria: Carotid stent velocity criteria          <50%             ICA PSV < 150 cm/sec            ICA EDV N/A            ICA(stent)/CCA PSV Ratio < 2.0            No or minimal stent lumen reduction          50% - 75%             ICA PSV >/= 150 cm/sec            ICA EDV < 125 cm/sec            ICA(stent)/CCA PSV Ratio > 2.0            Turbulent flow, stent lumen reduction present          > 75%,             ICA PSV > 300 cm/sec            ICA EDV > 125 cm/sec            ICA(stent)/CCA Ratio > 4.0             High grade stent stenosis, dampening of distal waveform          Occlusion             No flow            No stent flow visualized     EXAM: CTA HEAD NECK W CONTRAST  LOCATION: [facility]  DATE: 10/24/2023     INDICATION: Carotid artery stenosis. Headache.  COMPARISON: Urinary ultrasound dated 10/11/2023.  CONTRAST: 67mL Isovue 370  TECHNIQUE: Head and neck CT angiogram with IV contrast. Axial helical CT images of the head and neck vessels obtained during the arterial phase of intravenous contrast administration. Axial 2D reconstructed images and multiplanar 3D MIP reconstructed   images of the head and neck vessels were performed by the technologist. Dose reduction techniques were used. All stenosis measurements made according to NASCET criteria  unless otherwise specified.     FINDINGS:   HEAD CTA:  ANTERIOR CIRCULATION: High-grade stenosis involving the cavernous/supraclinoid ICA, which ultimately occludes at its supraclinoid portion. The hypoplastic right middle cerebral artery is opacified via collateral circulation from a patent anterior   communicating artery. The anterior cerebral arteries are unremarkable. The left internal carotid artery, ICA terminus, and left MCA vasculature is unremarkable. The anterior communicating artery is patent. The posterior communicating arteries are   hypoplastic/absent.      POSTERIOR CIRCULATION: No stenosis/occlusion, aneurysm, or high flow vascular malformation. Balanced vertebrobasilar circulation. The basilar artery is unremarkable and gives rise to patent superior cerebellar and posterior cerebral arteries.      DURAL VENOUS SINUSES: Patent.     NECK CTA:  RIGHT CAROTID: Normal course and persist caliber of the common carotid artery. Right internal carotid artery stent with mild stenosis at the proximal portion and critical, greater than 90% stenosis of the internal carotid artery at the distal aspect of   the stent.     LEFT CAROTID: Normal course and persist caliber of the common carotid artery. Atherosclerotic plaque results in approximately 70% stenosis of the ICA origin. The remaining extracranial internal carotid artery demonstrate persistent caliber without   evidence of dissection.     VERTEBRAL ARTERIES: Balance configuration without stenosis or dissection.     AORTIC ARCH: Classic three-vessel arch. The origins of the great vessels are unremarkable without significant stenosis.     NONVASCULAR STRUCTURES: There are no masses or enlarged lymph nodes in the neck. The submandibular, parotid, and thyroid glands are unremarkable. Multilevel degenerative changes without high-grade spinal canal stenosis or neural foraminal narrowing. No   aggressive osseous lesions. Moderate to severe emphysema.                                                                       IMPRESSION:     HEAD CTA:  1. High-grade stenosis involving the distal cavernous ICA, which ultimately occludes at its supraclinoid portion. The right middle cerebral and anterior cerebral arteries are diminutive but opacified via collateral circulation from a patent anterior   communicating artery.     NECK CTA:  1. Right internal carotid artery stent with distal in-stent stenosis greater than 90%.    Right 2023         Right carotid 2021        MRA 2022              Left                  CUS 2022                                                      IMPRESSION:     1. RIGHT ICA: Ends of the stent were not evaluated. Improved flow and  velocities in the stent from previous. Less than 50% diameter in stent  stenosis by sonographic velocity criteria and color Doppler imaging.     2. LEFT ICA:  Less than 50% diameter narrowing by grayscale imaging  and sonographic velocity criteria           MRI brain MRA head/neck 8/17/21     IMPRESSION:   1.  Abnormal right internal carotid artery flow void consistent with  diminished or absent flow in this vessel. Suspect that this is related  to subacute to chronic right internal carotid artery dissection.  Please see the separately reported neck MRA for additional details.  2.  T2/FLAIR hyperintensity within the right greater than left deep  cerebral white matter presumably reflects gliosis related to chronic  ischemic injury. Suspect that there is a component of underlying  border zone/watershed hypoperfusion injury given the abnormal right  internal carotid artery.  3.  No acute infarct, hemorrhage or mass.                    Neck:                                                       IMPRESSION:   1.  Diminutive right internal carotid artery demonstrates abnormal  signal. 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 Chronic flow-limiting atherosclerotic  stenosis is the other  primary consideration.  2.  The status of the right internal carotid intracranially is not  well evaluated on this study.  3.  Consider further evaluation of these findings with head and neck  CTA.  4.  Mild atherosclerosis of the left carotid artery without  hemodynamically significant narrowing by NASCET criteria.  5.  Patent vertebral arteries.     Procedures:     CTA neck 8/18  IMPRESSION:  1. Severe stenosis (likely greater than 80% luminal diameter stenosis)  at the proximal right internal carotid artery. The exact degree of  stenosis cannot be measured as there is swallowing motion while  scanning through the carotid bifurcations.  2. Two areas of severe stenosis of the intracranial distal right  internal carotid artery at the petrous cavernous junction and of the  entire supraclinoid segment.  3. Combination of inflow and outflow restriction of the right internal  carotid artery resulting in a diminutive right internal carotid artery  in the neck.  4. Plaque without stenosis of the proximal and distal internal carotid  artery on the left.  5. Diminutive M1 segment of the right middle cerebral artery likely  due to decreased inflow.  6. Moderate-severe atherosclerotic narrowing at the origins of the  vertebral arteries bilaterall     MRA neck 1/26/22     1. String sign configuration of the right internal carotid artery above the   right internal carotid artery stent may be related to a tandem stenosis with   narrowing immediately above the stent, and more particularly intracranial   stenosis of the supraclinoid right internal artery (which may be the principal   flow limiting factor).     2. No evidence of acute ischemia on DWI although there is extensive white matter   change throughout the right hemisphere consistent with the effects of   hypoperfusion and extensive vasodilatation throughout the right hemisphere.     3. 1.5 cm masslike/nodular configuration of the nasopharyngeal adenoidal tissue   is  somewhat unusual for a patient of this age and may warrant direct endoscopic   evaluation.          CT perfusion 1/26/22  IMPRESSION:   In keeping with findings of right carotid stenosis, perfusion study   demonstrates Tmax greater than six seconds of 36 mL at the right frontal and   parietal lobes. CBF less than 30 percent is 0. These regions are associated with   elevated Tmax and MTT.       MRI brain 1/26/22       MRI of the brain: No acute restriction of diffusion on DWI but there is   extensive confluent white matter asymmetric FLAIR signal abnormality within the   right hemisphere much of which is in a watershed type distribution suggesting   effects of chronic hypoperfusion of the right hemisphere. White matter disease   in a scattered random pattern is present with a lesser degree on the left side.   Asymmetric signal within the high cervical and right petrous carotid artery is   consistent with slow are impaired flow. Post gadolinium there is diffuse   prominence of the intraparenchymal and pial vascular structures throughout the   right hemisphere consistent with vasodilatation of collateral circulation   routes.   Midline prominence of the adenoidal nasopharyngeal tissues in a discrete nodule   or masslike configuration spanning 1.4 cm is somewhat unusual for a patient of   this age and may warrant endoscopic evaluation.      MRI brain MRA head/neck 8/17/21     IMPRESSION:   1.  Abnormal right internal carotid artery flow void consistent with  diminished or absent flow in this vessel. Suspect that this is related  to subacute to chronic right internal carotid artery dissection.  Please see the separately reported neck MRA for additional details.  2.  T2/FLAIR hyperintensity within the right greater than left deep  cerebral white matter presumably reflects gliosis related to chronic  ischemic injury. Suspect that there is a component of underlying  border zone/watershed hypoperfusion injury given the  abnormal right  internal carotid artery.  3.  No acute infarct, hemorrhage or mass.                    Neck:                                                       IMPRESSION:   1.  Diminutive right internal carotid artery demonstrates abnormal  signal. 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 Chronic flow-limiting atherosclerotic  stenosis is the other primary consideration.  2.  The status of the right internal carotid intracranially is not  well evaluated on this study.  3.  Consider further evaluation of these findings with head and neck  CTA.  4.  Mild atherosclerosis of the left carotid artery without  hemodynamically significant narrowing by NASCET criteria.  5.  Patent vertebral arteries.     Procedures:     CTA neck 8/18  IMPRESSION:  1. Severe stenosis (likely greater than 80% luminal diameter stenosis)  at the proximal right internal carotid artery. The exact degree of  stenosis cannot be measured as there is swallowing motion while  scanning through the carotid bifurcations.  2. Two areas of severe stenosis of the intracranial distal right  internal carotid artery at the petrous cavernous junction and of the  entire supraclinoid segment.  3. Combination of inflow and outflow restriction of the right internal  carotid artery resulting in a diminutive right internal carotid artery  in the neck.  4. Plaque without stenosis of the proximal and distal internal carotid  artery on the left.  5. Diminutive M1 segment of the right middle cerebral artery likely  due to decreased inflow.  6. Moderate-severe atherosclerotic narrowing at the origins of the  vertebral arteries bilaterally.       Laboratory:          LDL Cholesterol Calculated   Date Value Ref Range Status   10/22/2021 49 <=100 mg/dL Final   06/30/2021 104 (H) <100 mg/dL Final       Comment:       Above desirable:  100-129 mg/dl  Borderline High:  130-159 mg/dL  High:             160-189 mg/dL  Very  high:       >189 mg/dl                     The total time of this encounter today amounted to 52 minutes. This time included time spent with the patient, prep work, ordering tests, and performing post visit documentation.      Again, thank you for allowing me to participate in the care of your patient.        Sincerely,        Gilberto Casanova MD

## 2024-01-18 NOTE — TELEPHONE ENCOUNTER
Patient returned call and left voicemail message with update blood pressure reading.      BP Readings from Last 3 Encounters:   01/18/24 (!) 151/85   11/10/23 (!) 151/90   10/25/23 138/84       Location: Home BP    Today's Blood Pressure: 151/85  Today's Heart Rate: 64  Location: [facility]    Patient reported blood pressure updated in Epic. Blood pressure continues to fall outside of the [place] Community Measures guidelines.  Message sent to primary cardiology team for further review.    JAMES Mireles

## 2024-01-18 NOTE — TELEPHONE ENCOUNTER
.MEDICATION APPEAL APPROVED    Medication: HUMIRA *CF* PEN 40 MG/0.4ML SC PNKT  Authorization Effective Date: 1/18/2024  Authorization Expiration Date: 1/15/2025  Approved Dose/Quantity: [id number]  Reference #: [id number]   [employer] Insurance Company: [employer]  Expected CoPay: $       CoPay Card Available:    Financial Assistance Needed: NO  Filling Pharmacy: [facility] - [address]  Patient Notified: YES  Comments:   CONFIRMED OVER THE PHONE

## 2024-01-29 ENCOUNTER — ANCILLARY PROCEDURE (OUTPATIENT)
Dept: MAMMOGRAPHY | Facility: CLINIC | Age: 59
End: 2024-01-29
Attending: NURSE PRACTITIONER
Payer: COMMERCIAL

## 2024-01-29 DIAGNOSIS — Z12.31 ENCOUNTER FOR SCREENING MAMMOGRAM FOR BREAST CANCER: ICD-10-CM

## 2024-01-29 PROCEDURE — 77067 SCR MAMMO BI INCL CAD: CPT | Mod: TC | Performed by: RADIOLOGY

## 2024-01-30 ENCOUNTER — ANCILLARY PROCEDURE (OUTPATIENT)
Dept: CT IMAGING | Facility: CLINIC | Age: 59
End: 2024-01-30
Attending: NURSE PRACTITIONER
Payer: COMMERCIAL

## 2024-01-30 ENCOUNTER — ANCILLARY PROCEDURE (OUTPATIENT)
Dept: MRI IMAGING | Facility: CLINIC | Age: 59
End: 2024-01-30
Attending: STUDENT IN AN ORGANIZED HEALTH CARE EDUCATION/TRAINING PROGRAM
Payer: COMMERCIAL

## 2024-01-30 DIAGNOSIS — Z12.2 SCREENING FOR LUNG CANCER: ICD-10-CM

## 2024-01-30 DIAGNOSIS — I63.231 CEREBROVASCULAR ACCIDENT (CVA) DUE TO STENOSIS OF RIGHT CAROTID ARTERY (H): ICD-10-CM

## 2024-01-30 DIAGNOSIS — I65.21 STENOSIS OF RIGHT CAROTID ARTERY: ICD-10-CM

## 2024-01-30 PROCEDURE — 70551 MRI BRAIN STEM W/O DYE: CPT | Mod: TC | Performed by: RADIOLOGY

## 2024-01-30 PROCEDURE — 71271 CT THORAX LUNG CANCER SCR C-: CPT | Mod: TC | Performed by: RADIOLOGY

## 2024-02-07 ENCOUNTER — OFFICE VISIT (OUTPATIENT)
Dept: NEUROLOGY | Facility: CLINIC | Age: 59
End: 2024-02-07
Payer: COMMERCIAL

## 2024-02-07 VITALS
SYSTOLIC BLOOD PRESSURE: 155 MMHG | WEIGHT: 134 LBS | DIASTOLIC BLOOD PRESSURE: 84 MMHG | BODY MASS INDEX: 22.88 KG/M2 | HEIGHT: 64 IN | HEART RATE: 76 BPM

## 2024-02-07 DIAGNOSIS — I65.21 STENOSIS OF RIGHT CAROTID ARTERY: ICD-10-CM

## 2024-02-07 DIAGNOSIS — I63.231 CEREBROVASCULAR ACCIDENT (CVA) DUE TO STENOSIS OF RIGHT CAROTID ARTERY (H): Primary | ICD-10-CM

## 2024-02-07 PROCEDURE — 99214 OFFICE O/P EST MOD 30 MIN: CPT | Performed by: STUDENT IN AN ORGANIZED HEALTH CARE EDUCATION/TRAINING PROGRAM

## 2024-02-07 PROCEDURE — G2211 COMPLEX E/M VISIT ADD ON: HCPCS | Performed by: STUDENT IN AN ORGANIZED HEALTH CARE EDUCATION/TRAINING PROGRAM

## 2024-02-07 ASSESSMENT — PAIN SCALES - GENERAL: PAINLEVEL: NO PAIN (0)

## 2024-02-07 NOTE — PATIENT INSTRUCTIONS
Message me if leg symptoms worsen  Do see your [facility] team to discuss procedure further for stenosis.

## 2024-02-07 NOTE — PROGRESS NOTES
[facility]/[place]  Section of General Neurology  Return Patient Visit    Libertad Russell MRN# [id number]   Age: 58 year old YOB: 1965              Assessment and Plan:   As noted in recent visit with me Libertad Russell is a very pleasant 58 year old female who presents today in follow up evaluation of left sided neurological symptoms.  She was having serial small strokes/watershed infarcts from her very stenosed R carotid previously with resultant MRI findings previously/when seen initially in 2021.  The tremaine of stenosis was proposed to be that of potentially previous dissection.   There is still considerable stenosis in her distal R carotid and this (on recent CTA shows 90% estimated) is present in the very distal portion of her stent with further stenosis higher up as well extending cranially.  The plan with our neuro endovascular team was to continue serial monitoring at the most recent visit but about a week prior were offering angioplasty.   I presume this is because only a small portion of the stent appears stenosed at it's most distal point/contiguous with other distal stenosis and perhaps anatomically/pragmatically this would not be something that could be intervened upon given how high it is is a consideration that was broached before to her.  It is also possible that they changed their mind due to their interpretation of Rozina's wishes.  She was offered a more invasive surgery to more fully correct the distal ICA stenosis intracranially by the [place] she notes but this would certainly be a much bigger surgery and she remains apprehensive about this.      We decided to repeat her MRI brain to see if there was evidence of further/expanded watershed strokes which there have not been since 2021, thankfully.  I still think that surgical correction to prevent further decline should be considered thoughtfully but we discussed this is certainly not an easy decision and one with  nuance.  She feels this MRI data at least takes some of the pressure off of her decision.   She will meet with her previous [facility] team in further opinion as well.  We can plan on checking in again in ~6 months, sooner with new questions or PRN if she doing great and doesn't have any further questions for me as a general neurologist.         Bubba Casanova MD   of Neurology   [facility]      Interval history:     Reviewed imaging  [facility]--She will be seeing them she notes she reached out  Work --still not going, thinks she could maybe handle a work from home role.    L sided weakness intermittent  Fatigue remains the biggest issue  Starting Humira soon    Getting worse cramping, RLS type symptoms--doesn't want to trial medications for this (gabapentin)      Past Medical History:     Patient Active Problem List   Diagnosis     Tobacco use disorder     Seasonal allergic rhinitis     Attention deficit disorder of adult     Rosacea     Raynaud phenomenon     Essential hypertension with goal blood pressure less than 140/90     Lipoma of right axilla     Lipoma of neck     Paresthesia     Nicotine abuse     Irregular periods/menstrual cycles     Cerebrovascular accident (CVA) due to stenosis of right carotid artery (H)     Cerebrovascular accident (CVA), unspecified mechanism (H)     Rheumatoid arthritis involving both hands with positive rheumatoid factor (H)     Stenosis of right carotid artery     Atherosclerosis of arteries of extremities (H24)     Past Medical History:   Diagnosis Date     ADHD (attention deficit hyperactivity disorder)      Anemia 2022     Cerebrovascular accident (CVA) due to stenosis of right carotid artery (H) 09/15/2021     Depressive disorder      HTN (hypertension)      RA (rheumatoid arthritis) (H)      Raynaud disease         Past Surgical History:     Past Surgical History:   Procedure Laterality Date     BREAST BIOPSY, RT/LT  2000, 1998    breast  biopsies; reported to be benign     EXCISE MASS AXILLA Right 2021    Excise right axillary lipoma;  Surgeon: Artis Domínguez MD;  Location: [facility] OR     EXCISE TUMOR, SOFT TISSUE, NECK/THORAX, SUBCUTANEOUS N/A 2021    EXCISION of lipoma from left occiput;  Surgeon: Artis Domínguez MD;  Location: [facility] OR     EYE SURGERY      Lasik for vision correction     IR CAROTID CEREBRAL ANGIOGRAM BILATERAL  10/22/2021     LYMPH NODE BIOPSY      cervical lymph node biopsy; told to be benign     OK BREAST AUGMENTATION Bilateral     breast implants     TUBAL LIGATION       VASCULAR SURGERY      Stent        Social History:     Social History     Tobacco Use     Smoking status: Former     Packs/day: 1.00     Years: 25.00     Additional pack years: 0.00     Total pack years: 25.00     Types: Cigarettes     Quit date: 10/5/2021     Years since quittin.3     Passive exposure: Past     Smokeless tobacco: Current     Tobacco comments:     Pt vapes 1 per day without nicotine    Vaping Use     Vaping Use: Some days   Substance Use Topics     Alcohol use: Not Currently     Comment: occasional     Drug use: No        Family History:     Family History   Problem Relation Age of Onset     Hypertension Mother      Cancer Mother         Lung. Cancer     Other Cancer Mother         Lung     Thyroid Disease Mother      C.A.D. Father         52 at first MI     Prostate Cancer Father         Bladder/ lung     Cancer Father         Lung -bladder cancer     Thyroid Disease Sister      Thyroid Disease Sister      Hypertension Other      Diabetes No family hx of      Cerebrovascular Disease No family hx of      Breast Cancer No family hx of      Cancer - colorectal No family hx of      Glaucoma No family hx of      Macular Degeneration No family hx of         Medications:     Current Outpatient Medications   Medication Sig     adalimumab (HUMIRA *CF*) 40 MG/0.4ML pen kit Inject 0.4 mLs (40 mg) Subcutaneous every  "14 days Hold for signs of infection, then seek medical attention.     amphetamine-dextroamphetamine (ADDERALL XR) 20 MG 24 hr capsule Take 1 capsule (20 mg) by mouth daily     amphetamine-dextroamphetamine (ADDERALL) 20 MG tablet Take 0.5 tablets (10 mg) by mouth daily     aspirin (ASA) 325 MG tablet TAKE 1 TABLET(325 MG) BY MOUTH DAILY     atorvastatin (LIPITOR) 40 MG tablet Take 1 tablet (40 mg) by mouth daily     clopidogrel (PLAVIX) 75 MG tablet Take 1 tablet (75 mg) by mouth daily     ferrous sulfate (FE TABS) 325 (65 Fe) MG EC tablet TAKE 1 TABLET(325 MG) BY MOUTH DAILY WITH ORANGE JUICE     folic acid (FOLVITE) 1 MG tablet Take 1 tablet (1 mg) by mouth daily     LORazepam (ATIVAN) 0.5 MG tablet Take 1/2 hour before MRI then repeat if needed     losartan (COZAAR) 100 MG tablet Take 1 tablet (100 mg) by mouth daily     methotrexate 2.5 MG tablet Take 10 tablets (25 mg) by mouth every 7 days Labs every 8 - 12 weeks for refills.     methotrexate 2.5 MG tablet Take 8 tablets (20 mg) by mouth every 7 days     olopatadine (PATADAY) 0.2 % ophthalmic solution Place 0.05 mLs (1 drop) into both eyes daily     triamcinolone (KENALOG) 0.1 % external cream Apply topically 2 times daily     buPROPion (WELLBUTRIN SR) 100 MG 12 hr tablet Take 1 tablet (100 mg) by mouth 2 times daily for 60 days     No current facility-administered medications for this visit.     Facility-Administered Medications Ordered in Other Visits   Medication     sodium chloride (PF) 0.9% PF flush 72 mL        Allergies:     Allergies   Allergen Reactions     Seasonal Allergies           Physical Exam:   Vitals: BP (!) 155/84   Pulse 76   Ht 1.626 m (5' 4\")   Wt 60.8 kg (134 lb)   LMP 05/20/2021 (Approximate)   BMI 23.00 kg/m     See recent visit for full exam focus of this visit was on counseling and reviewing data         Data: Pertinent prior to visit   Newer data:  MRI brain 2024  MRI BRAIN WITHOUT CONTRAST  1/30/2024 4:03 PM     HISTORY:  to " determine what degree of progression of changes from R  carotid stenosis since 2021; Cerebrovascular accident (CVA) due to  stenosis of right carotid artery (H); Stenosis of right carotid artery     TECHNIQUE:  Multiplanar, multisequence MRI of the brain without  gadolinium IV contrast material.       COMPARISON:  CTA head and neck 10/24/2023. MRI brain 8/17/2021.     FINDINGS: Midline developmental anatomy is unremarkable. Remote  ischemic findings centered in the right centrum semiovale compatible  with watershed distribution infarct findings on the right with  abnormal flow void involving the right cavernous segment and petrous  segment of the internal carotid artery compatible with the known  history of high-grade stenosis and subtotal occlusion in the distal  cavernous ICA noted on the CTA of the head and neck from 10/24/2023.  Mild chronic small vessel ischemic changes in a periventricular  distribution.     Susceptibility imaging demonstrates no significant hemosiderin  deposition. Compared to an MRI evaluation of the brain from 8/17/2021,  the ischemic changes in the right centrum semiovale are similar.                                                                      IMPRESSION:   Remote ischemic findings in the right centrum semiovale  related to the high-grade stenotic and subtotal occlusive findings  involving the right internal carotid artery. No restricted diffusion  to suggest acute ischemia. Mild chronic small vessel ischemic changes.         CUS 2023  Narrative & Impression   BILATERAL CAROTID DUPLEX DOPPLER ULTRASOUND 10/11/2023 1:58 PM     CLINICAL HISTORY: Carotid stenosis, right     COMPARISON: Ultrasound carotid bilateral 8/12/2022     REFERRING PROVIDER: SARI GODWIN     TECHNIQUE: Grayscale (B-mode) and duplex and spectral Doppler  ultrasound of the common carotid, extracranial internal carotid,  external carotid, and vertebral artery origins. Velocity measurements  obtained  with angle correction at or less than 60 degrees.     FINDINGS:     RIGHT SIDE:      Plaque Morphology: Greater than 70% diameter in-stent stenosis  suggested in color Doppler image.        Proximal CCA: 54/12 cm/s     Mid CCA: 65/14 cm/s     Distal CCA, proximal to stent: 50/16 cm/s        Stent, distal CCA: 68/20 cm/s     Stent, bifurcation: 56/13 cm/s     Stent, proximal ICA: 16/0 cm/s, 3.5 velocity ratio     Stent, mid ICA: 121/18 cm/s, 7.56 velocity ratio        External CA: 232/42 cm/s        Mid ICA, distal to stent: 52/10 cm/s     Distal ICA: 42.6 cm/s        Vertebral artery: Antegrade: 47/23 cm/s      Innominate artery: 116/13 cm/s     Proximal subclavian: 226/16 cm/s, 393/0 cm/s     Stent/CCA ratio: 2.42      LEFT SIDE:      Plaque Morphology: Echogenic plaque causes less than 50% diameter  stenosis.         Origin CCA: 79/21 cm/s     Proximal CCA: 94/23 cm/s     Mid CCA: 86/31 cm/s     Distal CCA: 72/34 cm/s     Carotid bifurcation: 62/30 cm/s     External CA: 122/23 cm/s        Proximal ICA: 133/58 cm/s     Mid ICA: 153/39 cm/s     Distal ICA: 94/35 cm/s        Vertebral artery: Antegrade: 53/26 cm/s      Subclavian origin: 77/7 cm/s     Proximal subclavian: 139/0 cm/s     ICA/CCA ratio: 2.13                                                                       IMPRESSION:     1. RIGHT carotid stent: Although velocities never exceed 150 cm/s,  color Doppler image suggests greater than 70% diameter in-stent  stenosis and velocity in the stent in the proximal internal carotid  artery drops to 16 cm/s, a 3.5 times drop from the stent in the  bifurcation. 7.56 velocity ratio in the stent from the proximal to mid  internal carotid artery. Further evaluation with CTA or catheter  angiography could be performed.     2. LEFT ICA:  Less than 50% diameter narrowing by grayscale imaging  and sonographic velocity criteria.     [employer] Commission Updated Recommendations for  Carotid Stenosis  Interpretation Criteria - October 2021.  https://intersocietal.org/wp-content/uploads/[email or web address]/10/IAC-Vascular-Test  ol-[email or web address]-Edozxblvsghfibm-for-Carotid-Stenosis-Interpre  ation-Criteria.pdf     Greater than 70% diameter stenosis criteria per - Journal of Vascular  Surgery Vol. 75 Issue 1 Supplement [id number] Published online: June 18, 2021           Normal            ICA PSV: < 180 cm/s            Plaque Estimate: None            ICA/CCA PSV Ratio: < 2.0            ICA EDV: < 40 cm/s          < 50%            ICA PSV: < 180 cm/s            Plaque Estimate: < 50%            ICA/CCA PSV Ratio: < 2.0            ICA EDV: < 40 cm/s          50 - 69%            ICA PSV: 180 - 230 cm/s            Plaque Estimate: > 50%            ICA/CCA PSV Ratio: 2.0 - 4.0            ICA EDV: 40 - 100 cm/s          > 70% but less than near occlusion            ICA PSV: > 230 cm/s            Plaque Estimate: > 50%            AND either            ICA EDV: > 100 cm/s            or            ICA/CCA PSV Ratio: > 4.0          Near occlusion            ICA PSV: > 230 cm/s            Plaque Estimate: Visible            ICA/CCA PSV Ratio: Variable            ICA EDV: Variable          Total occlusion            ICA PSV: Undetectable            Plaque Estimate: Visible, no detectable lumen            ICA/CCA PSV Ratio: N/A            ICA EDV: N/A                                          Additional criteria from vascular surgery     > 80%       EDV > 120 cm/s     -----------------------------------------------     Criteria: Carotid stent velocity criteria          <50%             ICA PSV < 150 cm/sec            ICA EDV N/A            ICA(stent)/CCA PSV Ratio < 2.0            No or minimal stent lumen reduction          50% - 75%             ICA PSV >/= 150 cm/sec            ICA EDV < 125 cm/sec            ICA(stent)/CCA PSV Ratio > 2.0            Turbulent flow, stent lumen reduction present          > 75%,             ICA PSV >  300 cm/sec            ICA EDV > 125 cm/sec            ICA(stent)/CCA Ratio > 4.0             High grade stent stenosis, dampening of distal waveform          Occlusion             No flow            No stent flow visualized      EXAM: CTA HEAD NECK W CONTRAST  LOCATION: [facility]  DATE: 10/24/2023     INDICATION: Carotid artery stenosis. Headache.  COMPARISON: Urinary ultrasound dated 10/11/2023.  CONTRAST: 67mL Isovue 370  TECHNIQUE: Head and neck CT angiogram with IV contrast. Axial helical CT images of the head and neck vessels obtained during the arterial phase of intravenous contrast administration. Axial 2D reconstructed images and multiplanar 3D MIP reconstructed   images of the head and neck vessels were performed by the technologist. Dose reduction techniques were used. All stenosis measurements made according to NASCET criteria unless otherwise specified.     FINDINGS:   HEAD CTA:  ANTERIOR CIRCULATION: High-grade stenosis involving the cavernous/supraclinoid ICA, which ultimately occludes at its supraclinoid portion. The hypoplastic right middle cerebral artery is opacified via collateral circulation from a patent anterior   communicating artery. The anterior cerebral arteries are unremarkable. The left internal carotid artery, ICA terminus, and left MCA vasculature is unremarkable. The anterior communicating artery is patent. The posterior communicating arteries are   hypoplastic/absent.      POSTERIOR CIRCULATION: No stenosis/occlusion, aneurysm, or high flow vascular malformation. Balanced vertebrobasilar circulation. The basilar artery is unremarkable and gives rise to patent superior cerebellar and posterior cerebral arteries.      DURAL VENOUS SINUSES: Patent.     NECK CTA:  RIGHT CAROTID: Normal course and persist caliber of the common carotid artery. Right internal carotid artery stent with mild stenosis at the proximal portion and critical, greater than 90% stenosis of the  internal carotid artery at the distal aspect of   the stent.     LEFT CAROTID: Normal course and persist caliber of the common carotid artery. Atherosclerotic plaque results in approximately 70% stenosis of the ICA origin. The remaining extracranial internal carotid artery demonstrate persistent caliber without   evidence of dissection.     VERTEBRAL ARTERIES: Balance configuration without stenosis or dissection.     AORTIC ARCH: Classic three-vessel arch. The origins of the great vessels are unremarkable without significant stenosis.     NONVASCULAR STRUCTURES: There are no masses or enlarged lymph nodes in the neck. The submandibular, parotid, and thyroid glands are unremarkable. Multilevel degenerative changes without high-grade spinal canal stenosis or neural foraminal narrowing. No   aggressive osseous lesions. Moderate to severe emphysema.                                                                      IMPRESSION:     HEAD CTA:  1. High-grade stenosis involving the distal cavernous ICA, which ultimately occludes at its supraclinoid portion. The right middle cerebral and anterior cerebral arteries are diminutive but opacified via collateral circulation from a patent anterior   communicating artery.     NECK CTA:  1. Right internal carotid artery stent with distal in-stent stenosis greater than 90%.    Right 2023           Right carotid 2021           MRA 2022                  Left                     CUS 2022                                                      IMPRESSION:     1. RIGHT ICA: Ends of the stent were not evaluated. Improved flow and  velocities in the stent from previous. Less than 50% diameter in stent  stenosis by sonographic velocity criteria and color Doppler imaging.     2. LEFT ICA:  Less than 50% diameter narrowing by grayscale imaging  and sonographic velocity criteria           MRI brain MRA head/neck 8/17/21     IMPRESSION:   1.  Abnormal right internal carotid artery flow void  consistent with  diminished or absent flow in this vessel. Suspect that this is related  to subacute to chronic right internal carotid artery dissection.  Please see the separately reported neck MRA for additional details.  2.  T2/FLAIR hyperintensity within the right greater than left deep  cerebral white matter presumably reflects gliosis related to chronic  ischemic injury. Suspect that there is a component of underlying  border zone/watershed hypoperfusion injury given the abnormal right  internal carotid artery.  3.  No acute infarct, hemorrhage or mass.                    Neck:                                                       IMPRESSION:   1.  Diminutive right internal carotid artery demonstrates abnormal  signal. 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 Chronic flow-limiting atherosclerotic  stenosis is the other primary consideration.  2.  The status of the right internal carotid intracranially is not  well evaluated on this study.  3.  Consider further evaluation of these findings with head and neck  CTA.  4.  Mild atherosclerosis of the left carotid artery without  hemodynamically significant narrowing by NASCET criteria.  5.  Patent vertebral arteries.     Procedures:     CTA neck 8/18  IMPRESSION:  1. Severe stenosis (likely greater than 80% luminal diameter stenosis)  at the proximal right internal carotid artery. The exact degree of  stenosis cannot be measured as there is swallowing motion while  scanning through the carotid bifurcations.  2. Two areas of severe stenosis of the intracranial distal right  internal carotid artery at the petrous cavernous junction and of the  entire supraclinoid segment.  3. Combination of inflow and outflow restriction of the right internal  carotid artery resulting in a diminutive right internal carotid artery  in the neck.  4. Plaque without stenosis of the proximal and distal internal carotid  artery on the  left.  5. Diminutive M1 segment of the right middle cerebral artery likely  due to decreased inflow.  6. Moderate-severe atherosclerotic narrowing at the origins of the  vertebral arteries bilaterall      MRA neck 1/26/22     1. String sign configuration of the right internal carotid artery above the   right internal carotid artery stent may be related to a tandem stenosis with   narrowing immediately above the stent, and more particularly intracranial   stenosis of the supraclinoid right internal artery (which may be the principal   flow limiting factor).     2. No evidence of acute ischemia on DWI although there is extensive white matter   change throughout the right hemisphere consistent with the effects of   hypoperfusion and extensive vasodilatation throughout the right hemisphere.     3. 1.5 cm masslike/nodular configuration of the nasopharyngeal adenoidal tissue   is somewhat unusual for a patient of this age and may warrant direct endoscopic   evaluation.          CT perfusion 1/26/22  IMPRESSION:   In keeping with findings of right carotid stenosis, perfusion study   demonstrates Tmax greater than six seconds of 36 mL at the right frontal and   parietal lobes. CBF less than 30 percent is 0. These regions are associated with   elevated Tmax and MTT.       MRI brain 1/26/22       MRI of the brain: No acute restriction of diffusion on DWI but there is   extensive confluent white matter asymmetric FLAIR signal abnormality within the   right hemisphere much of which is in a watershed type distribution suggesting   effects of chronic hypoperfusion of the right hemisphere. White matter disease   in a scattered random pattern is present with a lesser degree on the left side.   Asymmetric signal within the high cervical and right petrous carotid artery is   consistent with slow are impaired flow. Post gadolinium there is diffuse   prominence of the intraparenchymal and pial vascular structures throughout the   right  hemisphere consistent with vasodilatation of collateral circulation   routes.   Midline prominence of the adenoidal nasopharyngeal tissues in a discrete nodule   or masslike configuration spanning 1.4 cm is somewhat unusual for a patient of   this age and may warrant endoscopic evaluation.      MRI brain MRA head/neck 8/17/21     IMPRESSION:   1.  Abnormal right internal carotid artery flow void consistent with  diminished or absent flow in this vessel. Suspect that this is related  to subacute to chronic right internal carotid artery dissection.  Please see the separately reported neck MRA for additional details.  2.  T2/FLAIR hyperintensity within the right greater than left deep  cerebral white matter presumably reflects gliosis related to chronic  ischemic injury. Suspect that there is a component of underlying  border zone/watershed hypoperfusion injury given the abnormal right  internal carotid artery.  3.  No acute infarct, hemorrhage or mass.                    Neck:                                                       IMPRESSION:   1.  Diminutive right internal carotid artery demonstrates abnormal  signal. 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 Chronic flow-limiting atherosclerotic  stenosis is the other primary consideration.  2.  The status of the right internal carotid intracranially is not  well evaluated on this study.  3.  Consider further evaluation of these findings with head and neck  CTA.  4.  Mild atherosclerosis of the left carotid artery without  hemodynamically significant narrowing by NASCET criteria.  5.  Patent vertebral arteries.     Procedures:     CTA neck 8/18  IMPRESSION:  1. Severe stenosis (likely greater than 80% luminal diameter stenosis)  at the proximal right internal carotid artery. The exact degree of  stenosis cannot be measured as there is swallowing motion while  scanning through the carotid bifurcations.  2. Two areas of  severe stenosis of the intracranial distal right  internal carotid artery at the petrous cavernous junction and of the  entire supraclinoid segment.  3. Combination of inflow and outflow restriction of the right internal  carotid artery resulting in a diminutive right internal carotid artery  in the neck.  4. Plaque without stenosis of the proximal and distal internal carotid  artery on the left.  5. Diminutive M1 segment of the right middle cerebral artery likely  due to decreased inflow.  6. Moderate-severe atherosclerotic narrowing at the origins of the  vertebral arteries bilaterally.    Labs:  LDL Cholesterol Calculated   Date Value Ref Range Status   10/25/2023 55 <=100 mg/dL Final   06/30/2021 104 (H) <100 mg/dL Final     Comment:     Above desirable:  100-129 mg/dl  Borderline High:  130-159 mg/dL  High:             160-189 mg/dL  Very high:       >189 mg/dl                The total time of this encounter today amounted to 30 minutes. This time included time spent with the patient, prep work, ordering tests, and performing post visit documentation.    The longitudinal plan of care for stroke care, carotid artery stenosis was addressed during this visit. Due to the added complexity in care, I will continue to support Ms. Russell in the subsequent management of this condition(s) and with the ongoing continuity of care of this condition(s).

## 2024-02-07 NOTE — LETTER
2/7/2024         RE: Libertad Russell  [address] [facility] [address]        Dear Colleague,    Thank you for referring your patient, Libertad Russell, to the [facility] [place]. Please see a copy of my visit note below.    [facility] [place]  Section of General Neurology  Return Patient Visit    Libertad Russell MRN# [id number]   Age: 58 year old YOB: 1965              Assessment and Plan:   As noted in recent visit with me Libertad Russell is a very pleasant 58 year old female who presents today in follow up evaluation of left sided neurological symptoms.  She was having serial small strokes/watershed infarcts from her very stenosed R carotid previously with resultant MRI findings previously/when seen initially in 2021.  The tremaine of stenosis was proposed to be that of potentially previous dissection.   There is still considerable stenosis in her distal R carotid and this (on recent CTA shows 90% estimated) is present in the very distal portion of her stent with further stenosis higher up as well extending cranially.  The plan with our neuro endovascular team was to continue serial monitoring at the most recent visit but about a week prior were offering angioplasty.   I presume this is because only a small portion of the stent appears stenosed at it's most distal point/contiguous with other distal stenosis and perhaps anatomically/pragmatically this would not be something that could be intervened upon given how high it is is a consideration that was broached before to her.  It is also possible that they changed their mind due to their interpretation of Rozina's wishes.  She was offered a more invasive surgery to more fully correct the distal ICA stenosis intracranially by the [place] she notes but this would certainly be a much bigger surgery and she remains apprehensive about this.      We decided to repeat her MRI brain to see if there was  evidence of further/expanded watershed strokes which there have not been since 2021, thankfully.  I still think that surgical correction to prevent further decline should be considered thoughtfully but we discussed this is certainly not an easy decision and one with nuance.  She feels this MRI data at least takes some of the pressure off of her decision.   She will meet with her previous [facility] team in further opinion as well.  We can plan on checking in again in ~6 months, sooner with new questions or PRN if she doing great and doesn't have any further questions for me as a general neurologist.         Bubba Casanova MD   of Neurology   [facility]      Interval history:     Reviewed imaging  [facility]--She will be seeing them she notes she reached out  Work --still not going, thinks she could maybe handle a work from home role.    L sided weakness intermittent  Fatigue remains the biggest issue  Starting Humira soon    Getting worse cramping, RLS type symptoms--doesn't want to trial medications for this (gabapentin)      Past Medical History:     Patient Active Problem List   Diagnosis     Tobacco use disorder     Seasonal allergic rhinitis     Attention deficit disorder of adult     Rosacea     Raynaud phenomenon     Essential hypertension with goal blood pressure less than 140/90     Lipoma of right axilla     Lipoma of neck     Paresthesia     Nicotine abuse     Irregular periods/menstrual cycles     Cerebrovascular accident (CVA) due to stenosis of right carotid artery (H)     Cerebrovascular accident (CVA), unspecified mechanism (H)     Rheumatoid arthritis involving both hands with positive rheumatoid factor (H)     Stenosis of right carotid artery     Atherosclerosis of arteries of extremities (H24)     Past Medical History:   Diagnosis Date     ADHD (attention deficit hyperactivity disorder)      Anemia 2022     Cerebrovascular accident (CVA) due to stenosis of right  carotid artery (H) 09/15/2021     Depressive disorder      HTN (hypertension)      RA (rheumatoid arthritis) (H)      Raynaud disease         Past Surgical History:     Past Surgical History:   Procedure Laterality Date     BREAST BIOPSY, RT/LT  ,     breast biopsies; reported to be benign     EXCISE MASS AXILLA Right 2021    Excise right axillary lipoma;  Surgeon: Artis Domínguez MD;  Location: [facility] OR     EXCISE TUMOR, SOFT TISSUE, NECK/THORAX, SUBCUTANEOUS N/A 2021    EXCISION of lipoma from left occiput;  Surgeon: Artis Domínguez MD;  Location: [facility] OR     EYE SURGERY      Lasik for vision correction     IR CAROTID CEREBRAL ANGIOGRAM BILATERAL  10/22/2021     LYMPH NODE BIOPSY      cervical lymph node biopsy; told to be benign     VT BREAST AUGMENTATION Bilateral     breast implants     TUBAL LIGATION       VASCULAR SURGERY      Stent        Social History:     Social History     Tobacco Use     Smoking status: Former     Packs/day: 1.00     Years: 25.00     Additional pack years: 0.00     Total pack years: 25.00     Types: Cigarettes     Quit date: 10/5/2021     Years since quittin.3     Passive exposure: Past     Smokeless tobacco: Current     Tobacco comments:     Pt vapes 1 per day without nicotine    Vaping Use     Vaping Use: Some days   Substance Use Topics     Alcohol use: Not Currently     Comment: occasional     Drug use: No        Family History:     Family History   Problem Relation Age of Onset     Hypertension Mother      Cancer Mother         Lung. Cancer     Other Cancer Mother         Lung     Thyroid Disease Mother      C.A.D. Father         52 at first MI     Prostate Cancer Father         Bladder/ lung     Cancer Father         Lung -bladder cancer     Thyroid Disease Sister      Thyroid Disease Sister      Hypertension Other      Diabetes No family hx of      Cerebrovascular Disease No family hx of      Breast Cancer No family hx of       "Cancer - colorectal No family hx of      Glaucoma No family hx of      Macular Degeneration No family hx of         Medications:     Current Outpatient Medications   Medication Sig     adalimumab (HUMIRA *CF*) 40 MG/0.4ML pen kit Inject 0.4 mLs (40 mg) Subcutaneous every 14 days Hold for signs of infection, then seek medical attention.     amphetamine-dextroamphetamine (ADDERALL XR) 20 MG 24 hr capsule Take 1 capsule (20 mg) by mouth daily     amphetamine-dextroamphetamine (ADDERALL) 20 MG tablet Take 0.5 tablets (10 mg) by mouth daily     aspirin (ASA) 325 MG tablet TAKE 1 TABLET(325 MG) BY MOUTH DAILY     atorvastatin (LIPITOR) 40 MG tablet Take 1 tablet (40 mg) by mouth daily     clopidogrel (PLAVIX) 75 MG tablet Take 1 tablet (75 mg) by mouth daily     ferrous sulfate (FE TABS) 325 (65 Fe) MG EC tablet TAKE 1 TABLET(325 MG) BY MOUTH DAILY WITH ORANGE JUICE     folic acid (FOLVITE) 1 MG tablet Take 1 tablet (1 mg) by mouth daily     LORazepam (ATIVAN) 0.5 MG tablet Take 1/2 hour before MRI then repeat if needed     losartan (COZAAR) 100 MG tablet Take 1 tablet (100 mg) by mouth daily     methotrexate 2.5 MG tablet Take 10 tablets (25 mg) by mouth every 7 days Labs every 8 - 12 weeks for refills.     methotrexate 2.5 MG tablet Take 8 tablets (20 mg) by mouth every 7 days     olopatadine (PATADAY) 0.2 % ophthalmic solution Place 0.05 mLs (1 drop) into both eyes daily     triamcinolone (KENALOG) 0.1 % external cream Apply topically 2 times daily     buPROPion (WELLBUTRIN SR) 100 MG 12 hr tablet Take 1 tablet (100 mg) by mouth 2 times daily for 60 days     No current facility-administered medications for this visit.     Facility-Administered Medications Ordered in Other Visits   Medication     sodium chloride (PF) 0.9% PF flush 72 mL        Allergies:     Allergies   Allergen Reactions     Seasonal Allergies           Physical Exam:   Vitals: BP (!) 155/84   Pulse 76   Ht 1.626 m (5' 4\")   Wt 60.8 kg (134 lb)   " LMP 05/20/2021 (Approximate)   BMI 23.00 kg/m     See recent visit for full exam focus of this visit was on counseling and reviewing data         Data: Pertinent prior to visit   Newer data:  MRI brain 2024  MRI BRAIN WITHOUT CONTRAST  1/30/2024 4:03 PM     HISTORY:  to determine what degree of progression of changes from R  carotid stenosis since 2021; Cerebrovascular accident (CVA) due to  stenosis of right carotid artery (H); Stenosis of right carotid artery     TECHNIQUE:  Multiplanar, multisequence MRI of the brain without  gadolinium IV contrast material.       COMPARISON:  CTA head and neck 10/24/2023. MRI brain 8/17/2021.     FINDINGS: Midline developmental anatomy is unremarkable. Remote  ischemic findings centered in the right centrum semiovale compatible  with watershed distribution infarct findings on the right with  abnormal flow void involving the right cavernous segment and petrous  segment of the internal carotid artery compatible with the known  history of high-grade stenosis and subtotal occlusion in the distal  cavernous ICA noted on the CTA of the head and neck from 10/24/2023.  Mild chronic small vessel ischemic changes in a periventricular  distribution.     Susceptibility imaging demonstrates no significant hemosiderin  deposition. Compared to an MRI evaluation of the brain from 8/17/2021,  the ischemic changes in the right centrum semiovale are similar.                                                                      IMPRESSION:   Remote ischemic findings in the right centrum semiovale  related to the high-grade stenotic and subtotal occlusive findings  involving the right internal carotid artery. No restricted diffusion  to suggest acute ischemia. Mild chronic small vessel ischemic changes.         CUS 2023  Narrative & Impression   BILATERAL CAROTID DUPLEX DOPPLER ULTRASOUND 10/11/2023 1:58 PM     CLINICAL HISTORY: Carotid stenosis, right     COMPARISON: Ultrasound carotid bilateral  8/12/2022     REFERRING PROVIDER: SARI GODWIN     TECHNIQUE: Grayscale (B-mode) and duplex and spectral Doppler  ultrasound of the common carotid, extracranial internal carotid,  external carotid, and vertebral artery origins. Velocity measurements  obtained with angle correction at or less than 60 degrees.     FINDINGS:     RIGHT SIDE:      Plaque Morphology: Greater than 70% diameter in-stent stenosis  suggested in color Doppler image.        Proximal CCA: 54/12 cm/s     Mid CCA: 65/14 cm/s     Distal CCA, proximal to stent: 50/16 cm/s        Stent, distal CCA: 68/20 cm/s     Stent, bifurcation: 56/13 cm/s     Stent, proximal ICA: 16/0 cm/s, 3.5 velocity ratio     Stent, mid ICA: 121/18 cm/s, 7.56 velocity ratio        External CA: 232/42 cm/s        Mid ICA, distal to stent: 52/10 cm/s     Distal ICA: 42.6 cm/s        Vertebral artery: Antegrade: 47/23 cm/s      Innominate artery: 116/13 cm/s     Proximal subclavian: 226/16 cm/s, 393/0 cm/s     Stent/CCA ratio: 2.42      LEFT SIDE:      Plaque Morphology: Echogenic plaque causes less than 50% diameter  stenosis.         Origin CCA: 79/21 cm/s     Proximal CCA: 94/23 cm/s     Mid CCA: 86/31 cm/s     Distal CCA: 72/34 cm/s     Carotid bifurcation: 62/30 cm/s     External CA: 122/23 cm/s        Proximal ICA: 133/58 cm/s     Mid ICA: 153/39 cm/s     Distal ICA: 94/35 cm/s        Vertebral artery: Antegrade: 53/26 cm/s      Subclavian origin: 77/7 cm/s     Proximal subclavian: 139/0 cm/s     ICA/CCA ratio: 2.13                                                                       IMPRESSION:     1. RIGHT carotid stent: Although velocities never exceed 150 cm/s,  color Doppler image suggests greater than 70% diameter in-stent  stenosis and velocity in the stent in the proximal internal carotid  artery drops to 16 cm/s, a 3.5 times drop from the stent in the  bifurcation. 7.56 velocity ratio in the stent from the proximal to mid  internal carotid artery.  Further evaluation with CTA or catheter  angiography could be performed.     2. LEFT ICA:  Less than 50% diameter narrowing by grayscale imaging  and sonographic velocity criteria.     [employer]ters[employer] hospitaletal Accreditation Commission Updated Recommendations for  Carotid Stenosis Interpretation Criteria - October 2021.  https://[email or web address]/wp-content/uploads/[email or web address]/10/IAC-Vascular-Test  [email or web address]-[email or web address]-for-Carotid-Stenosis-Interpre  ation-Criteria.pdf     Greater than 70% diameter stenosis criteria per - Journal of Vascular  Surgery Vol. 75 Issue 1 Supplement [id number] Published online: June 18, 2021           Normal            ICA PSV: < 180 cm/s            Plaque Estimate: None            ICA/CCA PSV Ratio: < 2.0            ICA EDV: < 40 cm/s          < 50%            ICA PSV: < 180 cm/s            Plaque Estimate: < 50%            ICA/CCA PSV Ratio: < 2.0            ICA EDV: < 40 cm/s          50 - 69%            ICA PSV: 180 - 230 cm/s            Plaque Estimate: > 50%            ICA/CCA PSV Ratio: 2.0 - 4.0            ICA EDV: 40 - 100 cm/s          > 70% but less than near occlusion            ICA PSV: > 230 cm/s            Plaque Estimate: > 50%            AND either            ICA EDV: > 100 cm/s            or            ICA/CCA PSV Ratio: > 4.0          Near occlusion            ICA PSV: > 230 cm/s            Plaque Estimate: Visible            ICA/CCA PSV Ratio: Variable            ICA EDV: Variable          Total occlusion            ICA PSV: Undetectable            Plaque Estimate: Visible, no detectable lumen            ICA/CCA PSV Ratio: N/A            ICA EDV: N/A                                          Additional criteria from vascular surgery     > 80%       EDV > 120 cm/s     -----------------------------------------------     Criteria: Carotid stent velocity criteria          <50%             ICA PSV < 150 cm/sec            ICA EDV N/A            ICA(stent)/CCA PSV Ratio < 2.0             No or minimal stent lumen reduction          50% - 75%             ICA PSV >/= 150 cm/sec            ICA EDV < 125 cm/sec            ICA(stent)/CCA PSV Ratio > 2.0            Turbulent flow, stent lumen reduction present          > 75%,             ICA PSV > 300 cm/sec            ICA EDV > 125 cm/sec            ICA(stent)/CCA Ratio > 4.0             High grade stent stenosis, dampening of distal waveform          Occlusion             No flow            No stent flow visualized      EXAM: CTA HEAD NECK W CONTRAST  LOCATION: [facility]  DATE: 10/24/2023     INDICATION: Carotid artery stenosis. Headache.  COMPARISON: Urinary ultrasound dated 10/11/2023.  CONTRAST: 67mL Isovue 370  TECHNIQUE: Head and neck CT angiogram with IV contrast. Axial helical CT images of the head and neck vessels obtained during the arterial phase of intravenous contrast administration. Axial 2D reconstructed images and multiplanar 3D MIP reconstructed   images of the head and neck vessels were performed by the technologist. Dose reduction techniques were used. All stenosis measurements made according to NASCET criteria unless otherwise specified.     FINDINGS:   HEAD CTA:  ANTERIOR CIRCULATION: High-grade stenosis involving the cavernous/supraclinoid ICA, which ultimately occludes at its supraclinoid portion. The hypoplastic right middle cerebral artery is opacified via collateral circulation from a patent anterior   communicating artery. The anterior cerebral arteries are unremarkable. The left internal carotid artery, ICA terminus, and left MCA vasculature is unremarkable. The anterior communicating artery is patent. The posterior communicating arteries are   hypoplastic/absent.      POSTERIOR CIRCULATION: No stenosis/occlusion, aneurysm, or high flow vascular malformation. Balanced vertebrobasilar circulation. The basilar artery is unremarkable and gives rise to patent superior cerebellar and posterior  cerebral arteries.      DURAL VENOUS SINUSES: Patent.     NECK CTA:  RIGHT CAROTID: Normal course and persist caliber of the common carotid artery. Right internal carotid artery stent with mild stenosis at the proximal portion and critical, greater than 90% stenosis of the internal carotid artery at the distal aspect of   the stent.     LEFT CAROTID: Normal course and persist caliber of the common carotid artery. Atherosclerotic plaque results in approximately 70% stenosis of the ICA origin. The remaining extracranial internal carotid artery demonstrate persistent caliber without   evidence of dissection.     VERTEBRAL ARTERIES: Balance configuration without stenosis or dissection.     AORTIC ARCH: Classic three-vessel arch. The origins of the great vessels are unremarkable without significant stenosis.     NONVASCULAR STRUCTURES: There are no masses or enlarged lymph nodes in the neck. The submandibular, parotid, and thyroid glands are unremarkable. Multilevel degenerative changes without high-grade spinal canal stenosis or neural foraminal narrowing. No   aggressive osseous lesions. Moderate to severe emphysema.                                                                      IMPRESSION:     HEAD CTA:  1. High-grade stenosis involving the distal cavernous ICA, which ultimately occludes at its supraclinoid portion. The right middle cerebral and anterior cerebral arteries are diminutive but opacified via collateral circulation from a patent anterior   communicating artery.     NECK CTA:  1. Right internal carotid artery stent with distal in-stent stenosis greater than 90%.    Right 2023           Right carotid 2021           MRA 2022                  Left                     CUS 2022                                                      IMPRESSION:     1. RIGHT ICA: Ends of the stent were not evaluated. Improved flow and  velocities in the stent from previous. Less than 50% diameter in stent  stenosis by  sonographic velocity criteria and color Doppler imaging.     2. LEFT ICA:  Less than 50% diameter narrowing by grayscale imaging  and sonographic velocity criteria           MRI brain MRA head/neck 8/17/21     IMPRESSION:   1.  Abnormal right internal carotid artery flow void consistent with  diminished or absent flow in this vessel. Suspect that this is related  to subacute to chronic right internal carotid artery dissection.  Please see the separately reported neck MRA for additional details.  2.  T2/FLAIR hyperintensity within the right greater than left deep  cerebral white matter presumably reflects gliosis related to chronic  ischemic injury. Suspect that there is a component of underlying  border zone/watershed hypoperfusion injury given the abnormal right  internal carotid artery.  3.  No acute infarct, hemorrhage or mass.                    Neck:                                                       IMPRESSION:   1.  Diminutive right internal carotid artery demonstrates abnormal  signal. 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 Chronic flow-limiting atherosclerotic  stenosis is the other primary consideration.  2.  The status of the right internal carotid intracranially is not  well evaluated on this study.  3.  Consider further evaluation of these findings with head and neck  CTA.  4.  Mild atherosclerosis of the left carotid artery without  hemodynamically significant narrowing by NASCET criteria.  5.  Patent vertebral arteries.     Procedures:     CTA neck 8/18  IMPRESSION:  1. Severe stenosis (likely greater than 80% luminal diameter stenosis)  at the proximal right internal carotid artery. The exact degree of  stenosis cannot be measured as there is swallowing motion while  scanning through the carotid bifurcations.  2. Two areas of severe stenosis of the intracranial distal right  internal carotid artery at the petrous cavernous junction and of  the  entire supraclinoid segment.  3. Combination of inflow and outflow restriction of the right internal  carotid artery resulting in a diminutive right internal carotid artery  in the neck.  4. Plaque without stenosis of the proximal and distal internal carotid  artery on the left.  5. Diminutive M1 segment of the right middle cerebral artery likely  due to decreased inflow.  6. Moderate-severe atherosclerotic narrowing at the origins of the  vertebral arteries bilaterall      MRA neck 1/26/22     1. String sign configuration of the right internal carotid artery above the   right internal carotid artery stent may be related to a tandem stenosis with   narrowing immediately above the stent, and more particularly intracranial   stenosis of the supraclinoid right internal artery (which may be the principal   flow limiting factor).     2. No evidence of acute ischemia on DWI although there is extensive white matter   change throughout the right hemisphere consistent with the effects of   hypoperfusion and extensive vasodilatation throughout the right hemisphere.     3. 1.5 cm masslike/nodular configuration of the nasopharyngeal adenoidal tissue   is somewhat unusual for a patient of this age and may warrant direct endoscopic   evaluation.          CT perfusion 1/26/22  IMPRESSION:   In keeping with findings of right carotid stenosis, perfusion study   demonstrates Tmax greater than six seconds of 36 mL at the right frontal and   parietal lobes. CBF less than 30 percent is 0. These regions are associated with   elevated Tmax and MTT.       MRI brain 1/26/22       MRI of the brain: No acute restriction of diffusion on DWI but there is   extensive confluent white matter asymmetric FLAIR signal abnormality within the   right hemisphere much of which is in a watershed type distribution suggesting   effects of chronic hypoperfusion of the right hemisphere. White matter disease   in a scattered random pattern is present with  a lesser degree on the left side.   Asymmetric signal within the high cervical and right petrous carotid artery is   consistent with slow are impaired flow. Post gadolinium there is diffuse   prominence of the intraparenchymal and pial vascular structures throughout the   right hemisphere consistent with vasodilatation of collateral circulation   routes.   Midline prominence of the adenoidal nasopharyngeal tissues in a discrete nodule   or masslike configuration spanning 1.4 cm is somewhat unusual for a patient of   this age and may warrant endoscopic evaluation.      MRI brain MRA head/neck 8/17/21     IMPRESSION:   1.  Abnormal right internal carotid artery flow void consistent with  diminished or absent flow in this vessel. Suspect that this is related  to subacute to chronic right internal carotid artery dissection.  Please see the separately reported neck MRA for additional details.  2.  T2/FLAIR hyperintensity within the right greater than left deep  cerebral white matter presumably reflects gliosis related to chronic  ischemic injury. Suspect that there is a component of underlying  border zone/watershed hypoperfusion injury given the abnormal right  internal carotid artery.  3.  No acute infarct, hemorrhage or mass.                    Neck:                                                       IMPRESSION:   1.  Diminutive right internal carotid artery demonstrates abnormal  signal. 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 Chronic flow-limiting atherosclerotic  stenosis is the other primary consideration.  2.  The status of the right internal carotid intracranially is not  well evaluated on this study.  3.  Consider further evaluation of these findings with head and neck  CTA.  4.  Mild atherosclerosis of the left carotid artery without  hemodynamically significant narrowing by NASCET criteria.  5.  Patent vertebral arteries.     Procedures:     CTA neck  8/18  IMPRESSION:  1. Severe stenosis (likely greater than 80% luminal diameter stenosis)  at the proximal right internal carotid artery. The exact degree of  stenosis cannot be measured as there is swallowing motion while  scanning through the carotid bifurcations.  2. Two areas of severe stenosis of the intracranial distal right  internal carotid artery at the petrous cavernous junction and of the  entire supraclinoid segment.  3. Combination of inflow and outflow restriction of the right internal  carotid artery resulting in a diminutive right internal carotid artery  in the neck.  4. Plaque without stenosis of the proximal and distal internal carotid  artery on the left.  5. Diminutive M1 segment of the right middle cerebral artery likely  due to decreased inflow.  6. Moderate-severe atherosclerotic narrowing at the origins of the  vertebral arteries bilaterally.    Labs:  LDL Cholesterol Calculated   Date Value Ref Range Status   10/25/2023 55 <=100 mg/dL Final   06/30/2021 104 (H) <100 mg/dL Final     Comment:     Above desirable:  100-129 mg/dl  Borderline High:  130-159 mg/dL  High:             160-189 mg/dL  Very high:       >189 mg/dl                The total time of this encounter today amounted to 30 minutes. This time included time spent with the patient, prep work, ordering tests, and performing post visit documentation.    The longitudinal plan of care for stroke care, carotid artery stenosis was addressed during this visit. Due to the added complexity in care, I will continue to support Ms. Russell in the subsequent management of this condition(s) and with the ongoing continuity of care of this condition(s).      Again, thank you for allowing me to participate in the care of your patient.        Sincerely,        Gilberto Casanova MD

## 2024-02-09 DIAGNOSIS — M05.742 RHEUMATOID ARTHRITIS INVOLVING BOTH HANDS WITH POSITIVE RHEUMATOID FACTOR (H): ICD-10-CM

## 2024-02-09 DIAGNOSIS — M05.741 RHEUMATOID ARTHRITIS INVOLVING BOTH HANDS WITH POSITIVE RHEUMATOID FACTOR (H): ICD-10-CM

## 2024-02-14 NOTE — TELEPHONE ENCOUNTER
HYRIMOZ 40 MG/0.4ML [facility]   Pharmacy comment: [employer] NO LONGER COVERED ON PATIENT'S INSURANCE AS OF APRIL 1, 2024. PLEASE CONSIDER PRESCRIBING A COVERED OPTION.

## 2024-02-15 RX ORDER — ADALIMUMAB-ADAZ 40 MG/.4ML
INJECTION, SOLUTION SUBCUTANEOUS
Qty: 0.8 ML | Refills: 6 | Status: SHIPPED | OUTPATIENT
Start: 2024-02-15 | End: 2024-08-21

## 2024-02-16 ENCOUNTER — TELEPHONE (OUTPATIENT)
Dept: RHEUMATOLOGY | Facility: CLINIC | Age: 59
End: 2024-02-16
Payer: COMMERCIAL

## 2024-02-16 NOTE — TELEPHONE ENCOUNTER
PA Initiation    Medication: HYRIMOZ 40 MG/0.4ML [facility] SOAJ  Insurance Company: [employer] - Phone [phone] Fax [phone]  Pharmacy Filling the Rx: [facility] - [place], [place] - [address]  Filling Pharmacy Phone:    Filling Pharmacy Fax:    Start Date: 2/16/2024    [id number]

## 2024-02-17 NOTE — TELEPHONE ENCOUNTER
Received [facility] request form completed, faxed to company and placed into scanning.  Svitlana Polk, Lead CMA  2/17/2024 8:54 AM

## 2024-02-18 ENCOUNTER — MYC REFILL (OUTPATIENT)
Dept: FAMILY MEDICINE | Facility: CLINIC | Age: 59
End: 2024-02-18
Payer: COMMERCIAL

## 2024-02-18 DIAGNOSIS — F98.8 ATTENTION DEFICIT DISORDER OF ADULT: ICD-10-CM

## 2024-02-19 DIAGNOSIS — I10 ESSENTIAL HYPERTENSION WITH GOAL BLOOD PRESSURE LESS THAN 140/90: ICD-10-CM

## 2024-02-19 RX ORDER — DEXTROAMPHETAMINE SACCHARATE, AMPHETAMINE ASPARTATE, DEXTROAMPHETAMINE SULFATE AND AMPHETAMINE SULFATE 5; 5; 5; 5 MG/1; MG/1; MG/1; MG/1
10 TABLET ORAL DAILY
Qty: 30 TABLET | Refills: 0 | Status: SHIPPED | OUTPATIENT
Start: 2024-02-19 | End: 2024-03-03

## 2024-02-19 RX ORDER — DEXTROAMPHETAMINE SACCHARATE, AMPHETAMINE ASPARTATE MONOHYDRATE, DEXTROAMPHETAMINE SULFATE AND AMPHETAMINE SULFATE 5; 5; 5; 5 MG/1; MG/1; MG/1; MG/1
20 CAPSULE, EXTENDED RELEASE ORAL DAILY
Qty: 30 CAPSULE | Refills: 0 | Status: SHIPPED | OUTPATIENT
Start: 2024-02-19 | End: 2024-04-11

## 2024-02-19 RX ORDER — LOSARTAN POTASSIUM 100 MG/1
100 TABLET ORAL DAILY
Qty: 30 TABLET | OUTPATIENT
Start: 2024-02-19

## 2024-02-19 NOTE — TELEPHONE ENCOUNTER
Pharmacy requested refills that are already active on file. Refused request to pharmacy.   ENT DAILY PROGRESS NOTE  72 y.o F  F with  ARDS, respiratory failure, COVID+, VC paresis and granulomas.  Pt. seen and examined at bedside, intubated, awake.      REVIEW OF SYSTEMS      [ x] Due to intubation, subjective information were not able to be obtained from patient. History was obtained, to the extent possible, from review of the chart and collateral sources of information.    Allergies: NKDA       MEDICATIONS:  acetaminophen  Suppository .. 650 milliGRAM(s) Rectal every 4 hours PRN  atropine Injectable 0.5 milliGRAM(s) IV Push once PRN  chlorhexidine 0.12% Liquid 15 milliLiter(s) Oral Mucosa every 12 hours  chlorhexidine 2% Cloths 1 Application(s) Topical daily  dexMEDEtomidine Infusion 0.2 MICROgram(s)/kG/Hr IV Continuous <Continuous>  dextrose 5%. 1000 milliLiter(s) IV Continuous <Continuous>  fentaNYL   Infusion. 0.5 MICROgram(s)/kG/Hr IV Continuous <Continuous>  insulin lispro (ADMELOG) corrective regimen sliding scale   SubCutaneous every 6 hours  magnesium oxide 400 milliGRAM(s) Oral daily  meropenem  IVPB      meropenem  IVPB 1000 milliGRAM(s) IV Intermittent every 8 hours  norepinephrine Infusion 0.05 MICROgram(s)/kG/Min IV Continuous <Continuous>  pantoprazole  Injectable 40 milliGRAM(s) IV Push every 12 hours  polyethylene glycol 3350 17 Gram(s) Oral daily  senna 2 Tablet(s) Oral at bedtime  sodium bicarbonate  Injectable 100 milliEquivalent(s) IV Push once      Vital Signs Last 24 Hrs  T(C): 36.7 (10 Feb 2023 12:00), Max: 38.2 (09 Feb 2023 18:00)  T(F): 98.1 (10 Feb 2023 12:00), Max: 100.8 (09 Feb 2023 18:00)  HR: 67 (10 Feb 2023 15:00) (50 - 104)  BP: 126/70 (10 Feb 2023 15:00) (65/35 - 208/81)  BP(mean): 94 (10 Feb 2023 15:00) (45 - 128)  RR: 21 (10 Feb 2023 15:00) (15 - 83)  SpO2: 100% (10 Feb 2023 15:00) (66% - 100%)    Parameters below as of 10 Feb 2023 14:00  Patient On (Oxygen Delivery Method): ventilator    O2 Concentration (%): 40      02-09 @ 07:01  -  02-10 @ 07:00  --------------------------------------------------------  IN:    Dexmedetomidine: 27.6 mL    dextrose 5%: 3600 mL    Enteral Tube Flush: 165 mL    FentaNYL: 220 mL    IV PiggyBack: 150 mL    IV PiggyBack: 50 mL    Norepinephrine: 18.7 mL    Other (mL): 500 mL    Pantoprazole: 40 mL    Peptamen A.F.: 690 mL  Total IN: 5461.2 mL    OUT:    Indwelling Catheter - Urethral (mL): 415 mL    Rectal Tube (mL): 300 mL    Voided (mL): 2400 mL  Total OUT: 3115 mL    Total NET: 2346.2 mL      02-10 @ 07:01  -  02-10 @ 17:15  --------------------------------------------------------  IN:    dextrose 5%: 1200 mL    FentaNYL: 176 mL    IV PiggyBack: 50 mL    Norepinephrine: 10.4 mL  Total IN: 1436.4 mL    OUT:    Indwelling Catheter - Urethral (mL): 745 mL  Total OUT: 745 mL    Total NET: 691.4 mL      PHYSICAL EXAM:  GEN: NAD, intubated, opens eyes when name is called.   SKIN: non diaphoretic  Mouth:  +ETT limited exam.   NECK: Trachea midline. Neck supple, no TTP to B/L lateral neck.   RESP: mechanically ventilated   CARDIO: +S1/S2  ABDO: Soft, NT.  EXT: SEPULVEDA x 4    LABS:  CBC-                        8.9    15.42 )-----------( 199      ( 10 Feb 2023 11:19 )             29.2     BMP/CMP-  10 Feb 2023 11:19    146    |  107    |  54     ----------------------------<  153    3.7     |  32     |  1.2      Ca    10.1       10 Feb 2023 11:19  Phos  2.7       09 Feb 2023 03:49  Mg     2.1       10 Feb 2023 04:50    TPro  4.9    /  Alb  2.8    /  TBili  0.8    /  DBili  x      /  AST  15     /  ALT  32     /  AlkPhos  94     10 Feb 2023 04:50    Coagulation Studies-  PT/INR - ( 10 Feb 2023 00:35 )   PT: 13.30 sec;   INR: 1.16 ratio         PTT - ( 10 Feb 2023 00:35 )  PTT:38.0 sec  Endocrine Panel-  Calcium, Total Serum: 10.1 mg/dL (02-10 @ 11:19)  Calcium, Total Serum: 10.3 mg/dL (02-10 @ 04:50)  Calcium, Total Serum: 10.4 mg/dL (02-10 @ 00:35)  Calcium, Total Serum: 11.0 mg/dL (02-09 @ 20:17)

## 2024-02-20 NOTE — TELEPHONE ENCOUNTER
Prior Authorization Approval    Medication: HYRIMOZ 40 MG/0.4ML [facility]  Authorization Effective Date: 2/20/2024  Authorization Expiration Date: 2/19/2025  Approved Dose/Quantity: [id number]  Reference #: [id number]   Insurance Company: [employer] - Phone [phone] Fax [phone]  Expected CoPay: $    CoPay Card Available: Yes    Financial Assistance Needed: no  Which Pharmacy is filling the prescription: [facility] - [place], [place] - [address] [place] [address]  Pharmacy Notified: yes  Patient Notified: yes

## 2024-02-23 ENCOUNTER — TELEPHONE (OUTPATIENT)
Dept: RHEUMATOLOGY | Facility: CLINIC | Age: 59
End: 2024-02-23

## 2024-02-23 NOTE — TELEPHONE ENCOUNTER
MTM return appointment cancelled, we made one more attempt to reschedule.     Routing back to referring provider and MTM pharmacist.         Oriana Palacio CPhT  MT

## 2024-03-01 ENCOUNTER — TELEPHONE (OUTPATIENT)
Dept: FAMILY MEDICINE | Facility: CLINIC | Age: 59
End: 2024-03-01
Payer: COMMERCIAL

## 2024-03-01 NOTE — TELEPHONE ENCOUNTER
"Routing to PCP    Patient's pharmacy calling    Patient said for the last 3 months, she has been receiving adderall short release 20 mg tablets, but the RX is for 1/2 tablet (10 mg)  She should be on 20 mg XR and 20 mg short release    RN is unable to find reason for switching to 10 mg daily of the short release. Patient is upset that she has been \"shorted\" on her dosage for last few months.    Is patient supposed to be on 20 mg or 10 mg of adderall short release?    Pharmacy call back:  [phone]    Yanna Shahid RN    "

## 2024-03-03 DIAGNOSIS — F98.8 ATTENTION DEFICIT DISORDER OF ADULT: ICD-10-CM

## 2024-03-03 RX ORDER — DEXTROAMPHETAMINE SACCHARATE, AMPHETAMINE ASPARTATE, DEXTROAMPHETAMINE SULFATE AND AMPHETAMINE SULFATE 5; 5; 5; 5 MG/1; MG/1; MG/1; MG/1
20 TABLET ORAL DAILY
Qty: 30 TABLET | Refills: 0 | Status: SHIPPED | OUTPATIENT
Start: 2024-03-03 | End: 2024-04-11

## 2024-03-03 NOTE — TELEPHONE ENCOUNTER
Currently should be taking extended release Adderall 20 mg in a.m. and immediate release Adderall 20 mg in p.m.  Updated prescription sent for immediate release dosage.    Symone PINOC

## 2024-03-14 ENCOUNTER — TELEPHONE (OUTPATIENT)
Dept: FAMILY MEDICINE | Facility: CLINIC | Age: 59
End: 2024-03-14
Payer: COMMERCIAL

## 2024-03-14 ENCOUNTER — TELEPHONE (OUTPATIENT)
Dept: RHEUMATOLOGY | Facility: CLINIC | Age: 59
End: 2024-03-14
Payer: COMMERCIAL

## 2024-03-14 NOTE — TELEPHONE ENCOUNTER
Dr. Arce returned call to this RN.  This RN reviewed the documentation Dr. Odom placed on the most recent form.  Discussed that Dr. Odom does not complete functional assessments. That assessment needs to be ordered by the PCP, assessed by OT/PT.    All questions answered by the requesting provider.    ZACARIAS WhitmanN, RN  RN Care Coordinator Rheumatology

## 2024-03-14 NOTE — TELEPHONE ENCOUNTER
[facility] Call Center    Phone Message    May a detailed message be left on voicemail: yes     Reason for Call: Other: Caller, Dr. Terence Salcido and [facility], is requesting a call back at [phone] to discuss pt's disability request. Please advise.     Action Taken: Other: RHEUM    Travel Screening: Not Applicable

## 2024-03-14 NOTE — TELEPHONE ENCOUNTER
Returned call to requested number, voicemail.    Lucy Madden, BSN, RN  RN Care Coordinator Rheumatology

## 2024-03-14 NOTE — TELEPHONE ENCOUNTER
Reason for Call:  Other     Detailed comments: Patient is an hour ahead. Neurologist reviewed patients records and would like to discuss the findings with the provider     Phone Number Patient can be reached at: Dr Alexander Ling     Best Time:     Can we leave a detailed message on this number?     Call taken on 3/14/2024 at 11:47 AM by Jovanna Segura

## 2024-03-18 ENCOUNTER — TELEPHONE (OUTPATIENT)
Dept: FAMILY MEDICINE | Facility: CLINIC | Age: 59
End: 2024-03-18
Payer: COMMERCIAL

## 2024-03-18 NOTE — TELEPHONE ENCOUNTER
Phone call to disability neurologist return.  All questions answered to the best my ability.    Symone Acosta NP-C

## 2024-03-18 NOTE — TELEPHONE ENCOUNTER
Dr. Abdul (rheumatologist working with disability claim) calling to ask when pt was seen last with pcp and if pcp has ordered a CRP or sedimentation rate recently. RN informed Dr. Abdul that pt was last seen 10/25/23 and no CRP or sedimentation rate recently ordered by pcp. No further questions from Dr. Abdul at time of call.     Tamera Negron RN on 3/18/2024 at 11:41 AM

## 2024-04-11 ENCOUNTER — MYC REFILL (OUTPATIENT)
Dept: FAMILY MEDICINE | Facility: CLINIC | Age: 59
End: 2024-04-11
Payer: COMMERCIAL

## 2024-04-11 DIAGNOSIS — F98.8 ATTENTION DEFICIT DISORDER OF ADULT: ICD-10-CM

## 2024-04-11 RX ORDER — DEXTROAMPHETAMINE SACCHARATE, AMPHETAMINE ASPARTATE MONOHYDRATE, DEXTROAMPHETAMINE SULFATE AND AMPHETAMINE SULFATE 5; 5; 5; 5 MG/1; MG/1; MG/1; MG/1
20 CAPSULE, EXTENDED RELEASE ORAL DAILY
Qty: 30 CAPSULE | Refills: 0 | Status: SHIPPED | OUTPATIENT
Start: 2024-04-11 | End: 2024-05-12

## 2024-04-11 RX ORDER — DEXTROAMPHETAMINE SACCHARATE, AMPHETAMINE ASPARTATE, DEXTROAMPHETAMINE SULFATE AND AMPHETAMINE SULFATE 5; 5; 5; 5 MG/1; MG/1; MG/1; MG/1
20 TABLET ORAL DAILY
Qty: 30 TABLET | Refills: 0 | Status: SHIPPED | OUTPATIENT
Start: 2024-04-11 | End: 2024-05-12

## 2024-04-17 ENCOUNTER — TELEPHONE (OUTPATIENT)
Dept: RHEUMATOLOGY | Facility: CLINIC | Age: 59
End: 2024-04-17
Payer: COMMERCIAL

## 2024-04-17 NOTE — TELEPHONE ENCOUNTER
Placed [employer] forms in bin to be filled out and signed, will fax back when completed.  Svitlana Polk Lead COMPA  4/17/2024 9:23 AM

## 2024-04-19 ENCOUNTER — TELEPHONE (OUTPATIENT)
Dept: INFECTIOUS DISEASES | Facility: CLINIC | Age: 59
End: 2024-04-19
Payer: COMMERCIAL

## 2024-04-19 NOTE — TELEPHONE ENCOUNTER
Spoke with Patient, Re: Disability Forms-It seems Patient has not been seen by Dr Odom since November of 2023. Due to the fact she had a death in the family she was unable to make her appointment in March. Therefore we are technically unable to fill out the form. Patient stated that she will speak to Layne and she and I will touch base on the results on Monday.

## 2024-04-22 ENCOUNTER — E-VISIT (OUTPATIENT)
Dept: FAMILY MEDICINE | Facility: CLINIC | Age: 59
End: 2024-04-22
Payer: COMMERCIAL

## 2024-04-22 DIAGNOSIS — B02.9 HERPES ZOSTER WITHOUT COMPLICATION: Primary | ICD-10-CM

## 2024-04-22 PROCEDURE — 99422 OL DIG E/M SVC 11-20 MIN: CPT | Performed by: NURSE PRACTITIONER

## 2024-04-22 RX ORDER — VALACYCLOVIR HYDROCHLORIDE 1 G/1
1000 TABLET, FILM COATED ORAL 3 TIMES DAILY
Qty: 21 TABLET | Refills: 0 | Status: SHIPPED | OUTPATIENT
Start: 2024-04-22 | End: 2024-07-17

## 2024-04-22 NOTE — TELEPHONE ENCOUNTER
Spoke with Patient, she stated that as of right now Layne should be focusing on the Leave forms from Patient's Neurologist.

## 2024-04-26 ENCOUNTER — MYC MEDICAL ADVICE (OUTPATIENT)
Dept: NEUROLOGY | Facility: CLINIC | Age: 59
End: 2024-04-26
Payer: COMMERCIAL

## 2024-04-29 ENCOUNTER — TELEPHONE (OUTPATIENT)
Dept: FAMILY MEDICINE | Facility: CLINIC | Age: 59
End: 2024-04-29
Payer: COMMERCIAL

## 2024-04-29 NOTE — TELEPHONE ENCOUNTER
Nan from [facility] was calling to check status of disability forms that were faxed to us. She confirmed the fax number. RN forwarded call to TC team to determine if forms were received.     Lani Agudelo RN on 4/29/2024 at 2:14 PM

## 2024-05-01 NOTE — TELEPHONE ENCOUNTER
Disability paperwork completed and signed by the provider with the revised claim number of #[id number]. Faxed to [employer] at [phone]. Confirmation of successful fax delivery confirmed via [employer]. Patient updated via [employer].      ZACARIAS ChicasN RN Care Coordinator  Neurology/Neurosurgery/PM&R/Pain Management

## 2024-05-12 ENCOUNTER — MYC REFILL (OUTPATIENT)
Dept: FAMILY MEDICINE | Facility: CLINIC | Age: 59
End: 2024-05-12
Payer: COMMERCIAL

## 2024-05-12 DIAGNOSIS — F98.8 ATTENTION DEFICIT DISORDER OF ADULT: ICD-10-CM

## 2024-05-13 RX ORDER — DEXTROAMPHETAMINE SACCHARATE, AMPHETAMINE ASPARTATE MONOHYDRATE, DEXTROAMPHETAMINE SULFATE AND AMPHETAMINE SULFATE 5; 5; 5; 5 MG/1; MG/1; MG/1; MG/1
20 CAPSULE, EXTENDED RELEASE ORAL DAILY
Qty: 30 CAPSULE | Refills: 0 | Status: SHIPPED | OUTPATIENT
Start: 2024-05-13 | End: 2024-06-17

## 2024-05-13 RX ORDER — DEXTROAMPHETAMINE SACCHARATE, AMPHETAMINE ASPARTATE, DEXTROAMPHETAMINE SULFATE AND AMPHETAMINE SULFATE 5; 5; 5; 5 MG/1; MG/1; MG/1; MG/1
20 TABLET ORAL DAILY
Qty: 30 TABLET | Refills: 0 | Status: SHIPPED | OUTPATIENT
Start: 2024-05-13 | End: 2024-06-17

## 2024-06-07 ENCOUNTER — MYC MEDICAL ADVICE (OUTPATIENT)
Dept: FAMILY MEDICINE | Facility: CLINIC | Age: 59
End: 2024-06-07

## 2024-06-17 ENCOUNTER — MYC REFILL (OUTPATIENT)
Dept: FAMILY MEDICINE | Facility: CLINIC | Age: 59
End: 2024-06-17
Payer: COMMERCIAL

## 2024-06-17 ENCOUNTER — MYC MEDICAL ADVICE (OUTPATIENT)
Dept: FAMILY MEDICINE | Facility: CLINIC | Age: 59
End: 2024-06-17
Payer: COMMERCIAL

## 2024-06-17 DIAGNOSIS — F98.8 ATTENTION DEFICIT DISORDER OF ADULT: ICD-10-CM

## 2024-06-17 RX ORDER — DEXTROAMPHETAMINE SACCHARATE, AMPHETAMINE ASPARTATE, DEXTROAMPHETAMINE SULFATE AND AMPHETAMINE SULFATE 5; 5; 5; 5 MG/1; MG/1; MG/1; MG/1
20 TABLET ORAL DAILY
Qty: 30 TABLET | Refills: 0 | Status: SHIPPED | OUTPATIENT
Start: 2024-06-17 | End: 2024-07-17

## 2024-06-17 RX ORDER — DEXTROAMPHETAMINE SACCHARATE, AMPHETAMINE ASPARTATE MONOHYDRATE, DEXTROAMPHETAMINE SULFATE AND AMPHETAMINE SULFATE 5; 5; 5; 5 MG/1; MG/1; MG/1; MG/1
20 CAPSULE, EXTENDED RELEASE ORAL DAILY
Qty: 30 CAPSULE | Refills: 0 | Status: SHIPPED | OUTPATIENT
Start: 2024-06-17 | End: 2024-07-17

## 2024-06-18 NOTE — TELEPHONE ENCOUNTER
I did send her a [employer] message yesterday saying cannot [place]    Symone Acosta NP-C

## 2024-07-17 ENCOUNTER — OFFICE VISIT (OUTPATIENT)
Dept: FAMILY MEDICINE | Facility: CLINIC | Age: 59
End: 2024-07-17
Payer: COMMERCIAL

## 2024-07-17 VITALS
HEIGHT: 64 IN | OXYGEN SATURATION: 98 % | WEIGHT: 130 LBS | TEMPERATURE: 98.5 F | SYSTOLIC BLOOD PRESSURE: 121 MMHG | BODY MASS INDEX: 22.2 KG/M2 | RESPIRATION RATE: 12 BRPM | DIASTOLIC BLOOD PRESSURE: 72 MMHG | HEART RATE: 82 BPM

## 2024-07-17 DIAGNOSIS — M05.742 RHEUMATOID ARTHRITIS INVOLVING BOTH HANDS WITH POSITIVE RHEUMATOID FACTOR (H): ICD-10-CM

## 2024-07-17 DIAGNOSIS — I70.209 ATHEROSCLEROSIS OF ARTERIES OF EXTREMITIES (H): ICD-10-CM

## 2024-07-17 DIAGNOSIS — M05.741 RHEUMATOID ARTHRITIS INVOLVING BOTH HANDS WITH POSITIVE RHEUMATOID FACTOR (H): ICD-10-CM

## 2024-07-17 DIAGNOSIS — F98.8 ATTENTION DEFICIT DISORDER OF ADULT: Primary | ICD-10-CM

## 2024-07-17 DIAGNOSIS — I65.21 STENOSIS OF RIGHT CAROTID ARTERY: ICD-10-CM

## 2024-07-17 DIAGNOSIS — I63.231 CEREBROVASCULAR ACCIDENT (CVA) DUE TO STENOSIS OF RIGHT CAROTID ARTERY (H): ICD-10-CM

## 2024-07-17 DIAGNOSIS — Z87.891 HISTORY OF TOBACCO ABUSE: ICD-10-CM

## 2024-07-17 DIAGNOSIS — I10 ESSENTIAL HYPERTENSION WITH GOAL BLOOD PRESSURE LESS THAN 140/90: ICD-10-CM

## 2024-07-17 DIAGNOSIS — R73.03 PREDIABETES: ICD-10-CM

## 2024-07-17 PROBLEM — I63.9 CEREBROVASCULAR ACCIDENT (CVA), UNSPECIFIED MECHANISM (H): Status: RESOLVED | Noted: 2021-10-22 | Resolved: 2024-07-17

## 2024-07-17 PROBLEM — N92.6 IRREGULAR PERIODS/MENSTRUAL CYCLES: Status: RESOLVED | Noted: 2021-06-23 | Resolved: 2024-07-17

## 2024-07-17 PROBLEM — Z72.0 NICOTINE ABUSE: Status: RESOLVED | Noted: 2021-06-23 | Resolved: 2024-07-17

## 2024-07-17 LAB
AMPHETAMINES UR QL: DETECTED
BARBITURATES UR QL SCN: NOT DETECTED
BENZODIAZ UR QL SCN: NOT DETECTED
BUPRENORPHINE UR QL: NOT DETECTED
COCAINE UR QL SCN: NOT DETECTED
D-METHAMPHET UR QL: NOT DETECTED
METHADONE UR QL SCN: NOT DETECTED
OPIATES UR QL SCN: NOT DETECTED
OXYCODONE UR QL SCN: NOT DETECTED
PCP UR QL SCN: NOT DETECTED
TRICYCLICS UR QL SCN: NOT DETECTED

## 2024-07-17 PROCEDURE — 82043 UR ALBUMIN QUANTITATIVE: CPT | Performed by: NURSE PRACTITIONER

## 2024-07-17 PROCEDURE — 80306 DRUG TEST PRSMV INSTRMNT: CPT | Performed by: NURSE PRACTITIONER

## 2024-07-17 PROCEDURE — 99214 OFFICE O/P EST MOD 30 MIN: CPT | Performed by: NURSE PRACTITIONER

## 2024-07-17 PROCEDURE — G2211 COMPLEX E/M VISIT ADD ON: HCPCS | Performed by: NURSE PRACTITIONER

## 2024-07-17 PROCEDURE — 82570 ASSAY OF URINE CREATININE: CPT | Performed by: NURSE PRACTITIONER

## 2024-07-17 RX ORDER — DEXTROAMPHETAMINE SACCHARATE, AMPHETAMINE ASPARTATE MONOHYDRATE, DEXTROAMPHETAMINE SULFATE AND AMPHETAMINE SULFATE 5; 5; 5; 5 MG/1; MG/1; MG/1; MG/1
20 CAPSULE, EXTENDED RELEASE ORAL DAILY
Qty: 30 CAPSULE | Refills: 0 | Status: SHIPPED | OUTPATIENT
Start: 2024-07-17 | End: 2024-08-13

## 2024-07-17 RX ORDER — DEXTROAMPHETAMINE SACCHARATE, AMPHETAMINE ASPARTATE, DEXTROAMPHETAMINE SULFATE AND AMPHETAMINE SULFATE 5; 5; 5; 5 MG/1; MG/1; MG/1; MG/1
20 TABLET ORAL DAILY
Qty: 30 TABLET | Refills: 0 | Status: SHIPPED | OUTPATIENT
Start: 2024-07-17 | End: 2024-08-13

## 2024-07-17 SDOH — HEALTH STABILITY: PHYSICAL HEALTH: ON AVERAGE, HOW MANY DAYS PER WEEK DO YOU ENGAGE IN MODERATE TO STRENUOUS EXERCISE (LIKE A BRISK WALK)?: 5 DAYS

## 2024-07-17 SDOH — HEALTH STABILITY: PHYSICAL HEALTH: ON AVERAGE, HOW MANY MINUTES DO YOU ENGAGE IN EXERCISE AT THIS LEVEL?: 20 MIN

## 2024-07-17 ASSESSMENT — SOCIAL DETERMINANTS OF HEALTH (SDOH): HOW OFTEN DO YOU GET TOGETHER WITH FRIENDS OR RELATIVES?: TWICE A WEEK

## 2024-07-17 ASSESSMENT — PAIN SCALES - GENERAL: PAINLEVEL: NO PAIN (0)

## 2024-07-17 NOTE — LETTER
[facility]  -- Controlled Medication Agreement    7/17/2024   Libertad Russell   1965   [id number]       I understand that my provider is prescribing controlled medications to assist me in managing my ADHD.  The risks, benefits, and side effects of these medications have been explained to me and I agree to the following conditions for this type of treatment.    Stimulant Medication Prescribed: Adderall    1.  I will take my medications exactly as prescribed and will not change the medication dosage or schedule without my provider's approval.  Refills will not be given if I  runs out early.     2.  I will keep all regular appointments at this clinic.  If there are three or more missed appointments or appointments canceled less than 2 hours before the scheduled time, my medication may be discontinued.    3.  I understand that prescriptions may only be written for one month at a time, and a written prescription is required each month.  Prescriptions cannot be called in or faxed to the pharmacy.    4.  If the prescription is lost or stolen, replacement is at the discretion of my provider.  I understand that this may mean the prescription might not be replaced.    5.  If I am late for scheduled follow up, I understand that I must make an appointment and that another refill is at the discretion of my provider.  This may mean a prescription for only the amount required until the appointment, regardless of prescription co-pay.  For example, if an appointment is made in 1 week, a prescription might only be written for 7 pills.      I understand that if I violate any of the above conditions, my prescription medications and/or treatment may be terminated.  If the violation includes providing controlled substances to anyone other than to whom the medication is prescribed, a report may be made to my child's physician, pharmacy, and other authorities, including the police.    I have read this contract and it  has been explained to me.  I fully understand the consequences of violating this agreement.    _________________________________/______________/____________________________    Patient signature/Date/Witness

## 2024-07-17 NOTE — PROGRESS NOTES
Assessment & Plan     Atherosclerosis of arteries of extremities (H24)  Will arrange for fasting lab appointment.  Tolerating atorvastatin well.  Continue to follow closely with neurology.  - Lipid panel reflex to direct LDL Fasting; Future    Attention deficit disorder of adult  Plan to continue current dosages of Adderall.   reviewed and consistent.  Controlled substance agreement updated.  Refill sent.  Follow-up in 6 months-May be virtual  - Urine Drug Screen Clinic; Future  - amphetamine-dextroamphetamine (ADDERALL XR) 20 MG 24 hr capsule; Take 1 capsule (20 mg) by mouth daily  - amphetamine-dextroamphetamine (ADDERALL) 20 MG tablet; Take 1 tablet (20 mg) by mouth daily  - Urine Drug Screen Clinic    Essential hypertension with goal blood pressure less than 140/90  Stable-blood pressure controlled today in clinic.  Of note-Per neurosurgery recommendation needs have above normal blood pressure 130-140 systolic and stay well-hydrated to help prevent further watershed infarcts.  Chart review completed-blood pressure typically 150s over 80s.  Will plan to continue current medication and dosages.  Continue to monitor blood pressure at home.  Will arrange for lab only appointment.  Has refills available of medication.  - CBC with platelets; Future  - Comprehensive metabolic panel; Future  - Albumin Random Urine Quantitative with Creat Ratio; Future  - Albumin Random Urine Quantitative with Creat Ratio  - Comprehensive metabolic panel; Future  - CBC with platelets; Future  - Albumin Random Urine Quantitative with Creat Ratio; Future    Rheumatoid arthritis involving both hands with positive rheumatoid factor (H)  Most recent rheumatology note reviewed.  Most recent labs reviewed.  Encouraged to check expiration date on injection that has available at home or check with specialty pharmacy to see if previous injection is still stable and okay for administration.  Continue to follow closely with  rheumatology.    Prediabetes  - Hemoglobin A1c; Future  - Hemoglobin A1c; Future    Stenosis of right carotid artery  Most recent imaging reviewed.  Most recent neurology note reviewed.  Continue to follow closely with neurology.  Currently without symptoms  - Lipid panel reflex to direct LDL Fasting; Future    Cerebrovascular accident (CVA) due to stenosis of right carotid artery (H)  Doing well on current.  Continue aspirin.  Continue to follow closely with neurology.    History of tobacco abuse  Congratulated on continued remission.      The longitudinal plan of care for the diagnosis(es)/condition(s) as documented were addressed during this visit. Due to the added complexity in care, I will continue to support Libertad in the subsequent management and with ongoing continuity of care.     Counseling  Appropriate preventive services were addressed with this patient via screening, questionnaire, or discussion as appropriate for fall prevention, nutrition, physical activity, Tobacco-use cessation, weight loss and cognition.  Checklist reviewing preventive services available has been given to the patient.  Reviewed patient's diet, addressing concerns and/or questions.         Barrera Maurer is a 58 year old, presenting for the following health issues:  Follow Up        7/17/2024     3:54 PM   Additional Questions   Roomed by Yamileth MONTERO CMA   Accompanied by alone         7/17/2024     3:54 PM   Patient Reported Additional Medications   Patient reports taking the following new medications none     HPI       Visit today for medication recheck.  Continues to take extended release Adderall 20 mg in a.m. and immediate release 20 mg in afternoon.  Has noticed that memory and attention is less.  Uncertain if is related to ADHD or previous stroke's.  Does not always take p.m. dose of Adderall.  Does not really notice any changes in memory when does not take it.  No negative side effects from Adderall.  Does not feel like it  "affects appetite, no difficulty sleeping, no movements that cannot control.  No palpitations or fluttering in chest.    Seen by neurology at [facility].  Recommending surgery to stenosis of right carotid artery.  Uncertain if wants to pursue.  Has a feeling that will not come off of the surgery table.  Has known 90% right carotid stenosis.  Not currently having any symptoms.  Last visit with neurology in February.  Will follow-up again in August.    Continues to follow closely with rheumatology for rheumatoid arthritis.  Continues to take methotrexate 10 tablets weekly.  Was to start on hyrunoz but was never able to start-develop shingles.  Uncertain if can take injection that has available at home.    Remains off work.  Is on long-term disability which is good in through November.  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  Works for [employer].  Would like to be able to continue to work from home.  Was previously told this would be possible as an accommodation but now is being told needs to return to office.        Objective    /72   Pulse 82   Temp 98.5  F (36.9  C) (Temporal)   Resp 12   Ht 1.63 m (5' 4.17\")   Wt 59 kg (130 lb)   LMP 05/20/2021 (Approximate)   SpO2 98%   BMI 22.19 kg/m    Body mass index is 22.19 kg/m .  Physical Exam  Constitutional:       Appearance: Normal appearance. She is well-developed.   HENT:      Head: Normocephalic and atraumatic.      Right Ear: Tympanic membrane and external ear normal. No middle ear effusion.      Left Ear: Tympanic membrane and external ear normal.  No middle ear effusion.      Nose: No mucosal edema.   Neck:      Thyroid: No thyromegaly.      Vascular: No carotid bruit.   Cardiovascular:      Rate and Rhythm: Normal rate and regular rhythm.      Heart sounds: Normal heart sounds.   Pulmonary:      Effort: Pulmonary effort is normal.      Breath sounds: Normal breath sounds.   Abdominal:      General: Bowel sounds are normal.      Palpations: Abdomen is " soft.   Skin:     General: Skin is warm and dry.   Neurological:      Mental Status: She is alert.   Psychiatric:         Behavior: Behavior normal.              Signed Electronically by: SULY Melvin CNP

## 2024-07-18 LAB
CREAT UR-MCNC: 301 MG/DL
CREAT UR-MCNC: 307 MG/DL
MICROALBUMIN UR-MCNC: 13.3 MG/L
MICROALBUMIN UR-MCNC: <12 MG/L
MICROALBUMIN/CREAT UR: 4.33 MG/G CR (ref 0–25)
MICROALBUMIN/CREAT UR: NORMAL MG/G{CREAT}

## 2024-07-29 NOTE — TELEPHONE ENCOUNTER
Patient Quality Outreach    Patient is due for the following:   IVD  -  IVD Follow-up Visit- Smoker      Topic Date Due    Meningitis A Vaccine (1 - Risk 2-dose series) Never done    COVID-19 Vaccine (1) Never done    Zoster (Shingles) Vaccine (1 of 2) Never done    Hepatitis A Vaccine (1 of 2 - Risk 2-dose series) Never done    Hepatitis B Vaccine (1 of 3 - 19+ 3-dose series) Never done    Pneumococcal Vaccine (2 of 2 - PCV) 06/23/2022       Next Steps:   Schedule a office visit for IVD follow up to consult smokeless nicotine use.    Type of outreach:    Sent [employer] message.      Questions for provider review:    None           Keena Castro MA

## 2024-08-13 ENCOUNTER — MYC REFILL (OUTPATIENT)
Dept: FAMILY MEDICINE | Facility: CLINIC | Age: 59
End: 2024-08-13
Payer: COMMERCIAL

## 2024-08-13 DIAGNOSIS — M05.741 RHEUMATOID ARTHRITIS INVOLVING BOTH HANDS WITH POSITIVE RHEUMATOID FACTOR (H): ICD-10-CM

## 2024-08-13 DIAGNOSIS — M05.742 RHEUMATOID ARTHRITIS INVOLVING BOTH HANDS WITH POSITIVE RHEUMATOID FACTOR (H): ICD-10-CM

## 2024-08-13 DIAGNOSIS — F98.8 ATTENTION DEFICIT DISORDER OF ADULT: ICD-10-CM

## 2024-08-13 RX ORDER — DEXTROAMPHETAMINE SACCHARATE, AMPHETAMINE ASPARTATE MONOHYDRATE, DEXTROAMPHETAMINE SULFATE AND AMPHETAMINE SULFATE 5; 5; 5; 5 MG/1; MG/1; MG/1; MG/1
20 CAPSULE, EXTENDED RELEASE ORAL DAILY
Qty: 30 CAPSULE | Refills: 0 | Status: SHIPPED | OUTPATIENT
Start: 2024-08-16 | End: 2024-09-09

## 2024-08-13 RX ORDER — DEXTROAMPHETAMINE SACCHARATE, AMPHETAMINE ASPARTATE, DEXTROAMPHETAMINE SULFATE AND AMPHETAMINE SULFATE 5; 5; 5; 5 MG/1; MG/1; MG/1; MG/1
20 TABLET ORAL DAILY
Qty: 30 TABLET | Refills: 0 | Status: SHIPPED | OUTPATIENT
Start: 2024-08-16 | End: 2024-09-09

## 2024-08-14 ENCOUNTER — OFFICE VISIT (OUTPATIENT)
Dept: NEUROLOGY | Facility: CLINIC | Age: 59
End: 2024-08-14
Payer: COMMERCIAL

## 2024-08-14 VITALS
SYSTOLIC BLOOD PRESSURE: 140 MMHG | DIASTOLIC BLOOD PRESSURE: 82 MMHG | BODY MASS INDEX: 22.2 KG/M2 | HEART RATE: 73 BPM | WEIGHT: 130 LBS | HEIGHT: 64 IN

## 2024-08-14 DIAGNOSIS — I63.231 CEREBROVASCULAR ACCIDENT (CVA) DUE TO STENOSIS OF RIGHT CAROTID ARTERY (H): Primary | ICD-10-CM

## 2024-08-14 DIAGNOSIS — I65.21 STENOSIS OF RIGHT CAROTID ARTERY: ICD-10-CM

## 2024-08-14 PROCEDURE — 99214 OFFICE O/P EST MOD 30 MIN: CPT | Performed by: STUDENT IN AN ORGANIZED HEALTH CARE EDUCATION/TRAINING PROGRAM

## 2024-08-14 PROCEDURE — G2211 COMPLEX E/M VISIT ADD ON: HCPCS | Performed by: STUDENT IN AN ORGANIZED HEALTH CARE EDUCATION/TRAINING PROGRAM

## 2024-08-14 NOTE — NURSING NOTE
"Libertad Russell's goals for this visit include:   Chief Complaint   Patient presents with    RECHECK     CVA 6 month follow up         She requests these members of her care team be copied on today's visit information: yes    PCP: Symone Acosta    Referring Provider:  Gilberto Casanova MD  [address] [place] [id number]    BP (!) 140/82 (BP Location: Right arm, Patient Position: Sitting, Cuff Size: Adult Regular)   Pulse 73   Ht 1.632 m (5' 4.25\")   Wt 59 kg (130 lb)   LMP 05/20/2021 (Approximate)   BMI 22.14 kg/m      Do you need any medication refills at today's visit? No  FRANCOISE Ang., CMA ([facility])      "

## 2024-08-14 NOTE — PROGRESS NOTES
[facility]  Section of General Neurology  Return Patient Visit    Libertad Russell MRN# [id number]   Age: 58 year old YOB: 1965            Assessment and Plan:   As noted previously Libertad Russell is a very pleasant 58 year old female who presents today in follow up evaluation of left sided neurological symptoms.  She was having serial small strokes/watershed infarcts from her very stenosed R carotid previously with resultant MRI findings previously/when seen initially in 2021.  The tremaine of stenosis was proposed to be that of potentially previous dissection.   There is still considerable stenosis in her distal R carotid and this (on recent CTA shows 90% estimated) is present in the very distal portion of her stent with further stenosis higher up as well extending cranially.  Repeat MRI earlier this year did not show any progression of watershed infarcts.  She plans on meeting with her [place] neuro-endovascular surgery team later this year to further discuss repeat intervention vs surveillance.  She feels quite good today.  Her BP is in a good range (want her to be slightly permissive, 140/80 today looks good, discussed).  She is staying well hydrated though still limited by fatigue and LUE weakness at times.  We will continue to keep an eye on her over time.  All questions answered.  Follow up will be in 1 year with general neurology.  She will continue aspirin 325 mg and atorvastatin 40 mg.   She will return sooner with any issues changes or questions.        Bubba Casanova MD   of Neurology   [facility]      Interval history:     Shingles recently---got better.    Overall she feels the same.    On disability, got fired-branch of [employer] had been there for 25 years, big layoffs.   Plan is to see [place] at a year from last visit (this fall).    Making sure she is hydrated    A/P at last visit  As noted in recent visit with me  Libertad Russell is a very pleasant 58 year old female who presents today in follow up evaluation of left sided neurological symptoms.  She was having serial small strokes/watershed infarcts from her very stenosed R carotid previously with resultant MRI findings previously/when seen initially in 2021.  The tremaine of stenosis was proposed to be that of potentially previous dissection.   There is still considerable stenosis in her distal R carotid and this (on recent CTA shows 90% estimated) is present in the very distal portion of her stent with further stenosis higher up as well extending cranially.  The plan with our neuro endovascular team was to continue serial monitoring at the most recent visit but about a week prior were offering angioplasty.   I presume this is because only a small portion of the stent appears stenosed at it's most distal point/contiguous with other distal stenosis and perhaps anatomically/pragmatically this would not be something that could be intervened upon given how high it is is a consideration that was broached before to her.  It is also possible that they changed their mind due to their interpretation of Rozina's wishes.  She was offered a more invasive surgery to more fully correct the distal ICA stenosis intracranially by the [place] she notes but this would certainly be a much bigger surgery and she remains apprehensive about this.      We decided to repeat her MRI brain to see if there was evidence of further/expanded watershed strokes which there have not been since 2021, thankfully.  I still think that surgical correction to prevent further decline should be considered thoughtfully but we discussed this is certainly not an easy decision and one with nuance.  She feels this MRI data at least takes some of the pressure off of her decision.   She will meet with her previous [place] team in further opinion as well.  We can plan on checking in again in ~6 months, sooner with new questions  or PRN if she doing great and doesn't have any further questions for me as a general neurologist.      Past Medical History:     Patient Active Problem List   Diagnosis    Seasonal allergic rhinitis    Attention deficit disorder of adult    Rosacea    Raynaud phenomenon    Essential hypertension with goal blood pressure less than 140/90    Lipoma of right axilla    Lipoma of neck    Paresthesia    Cerebrovascular accident (CVA) due to stenosis of right carotid artery (H)    Rheumatoid arthritis involving both hands with positive rheumatoid factor (H)    Stenosis of right carotid artery    Atherosclerosis of arteries of extremities (H24)    History of tobacco abuse     Past Medical History:   Diagnosis Date    ADHD (attention deficit hyperactivity disorder)     Anemia 2022    Cerebrovascular accident (CVA) due to stenosis of right carotid artery (H) 09/15/2021    Depressive disorder     HTN (hypertension)     RA (rheumatoid arthritis) (H)     Raynaud disease         Past Surgical History:     Past Surgical History:   Procedure Laterality Date    BREAST BIOPSY, RT/LT  2000, 1998    breast biopsies; reported to be benign    BREAST SURGERY  2000    EXCISE MASS AXILLA Right 06/08/2021    Excise right axillary lipoma;  Surgeon: Artis Domínguez MD;  Location: [facility] OR    EXCISE TUMOR, SOFT TISSUE, NECK/THORAX, SUBCUTANEOUS N/A 06/08/2021    EXCISION of lipoma from left occiput;  Surgeon: Artis Domínguez MD;  Location: [facility] OR    EYE SURGERY  2015    Lasik for vision correction    IR CAROTID CEREBRAL ANGIOGRAM BILATERAL  10/22/2021    LYMPH NODE BIOPSY  2001    cervical lymph node biopsy; told to be benign    [place] BREAST AUGMENTATION Bilateral     breast implants    TUBAL LIGATION  2003    VASCULAR SURGERY  2021    Stent        Social History:     Social History     Tobacco Use    Smoking status: Former     Current packs/day: 0.00     Average packs/day: 1 pack/day for 25.0 years (25.0 ttl pk-yrs)     Types:  Cigarettes     Start date: 10/5/1996     Quit date: 10/5/2021     Years since quittin.8     Passive exposure: Past    Smokeless tobacco: Current    Tobacco comments:     Pt vapes 1 per day without nicotine    Vaping Use    Vaping status: Some Days   Substance Use Topics    Alcohol use: Not Currently     Comment: occasional    Drug use: No        Family History:     Family History   Problem Relation Age of Onset    Hypertension Mother     Cancer Mother         Lung. Cancer    Other Cancer Mother         Lung    Thyroid Disease Mother     C.A.D. Father         52 at first MI    Prostate Cancer Father         Bladder/ lung    Cancer Father         Lung -bladder cancer    Thyroid Disease Sister     Thyroid Disease Sister     Hypertension Other     Diabetes No family hx of     Cerebrovascular Disease No family hx of     Breast Cancer No family hx of     Cancer - colorectal No family hx of     Glaucoma No family hx of     Macular Degeneration No family hx of         Medications:     Current Outpatient Medications   Medication Sig Dispense Refill    [START ON 2024] amphetamine-dextroamphetamine (ADDERALL XR) 20 MG 24 hr capsule Take 1 capsule (20 mg) by mouth daily 30 capsule 0    [START ON 2024] amphetamine-dextroamphetamine (ADDERALL) 20 MG tablet Take 1 tablet (20 mg) by mouth daily 30 tablet 0    aspirin (ASA) 325 MG tablet TAKE 1 TABLET(325 MG) BY MOUTH DAILY 90 tablet 2    atorvastatin (LIPITOR) 40 MG tablet Take 1 tablet (40 mg) by mouth daily 90 tablet 3    folic acid (FOLVITE) 1 MG tablet Take 1 tablet (1 mg) by mouth daily 90 tablet 3    HYRIMOZ 40 MG/0.4ML SOAJ INJECT 0.4 MLS (40 MG) SUBCUTANEOUS EVERY 14 DAYS HOLD FOR SIGNS OF INfection, then seek medical attention. 0.8 mL 6    losartan (COZAAR) 100 MG tablet Take 1 tablet (100 mg) by mouth daily 90 tablet 3    methotrexate 2.5 MG tablet Take 10 tablets (25 mg) by mouth every 7 days Labs every 8 - 12 weeks for refills. 40 tablet 6    olopatadine  "(PATADAY) 0.2 % ophthalmic solution Place 0.05 mLs (1 drop) into both eyes daily 2.5 mL 1    triamcinolone (KENALOG) 0.1 % external cream Apply topically 2 times daily 45 g 1     No current facility-administered medications for this visit.     Facility-Administered Medications Ordered in Other Visits   Medication Dose Route Frequency Provider Last Rate Last Admin    sodium chloride (PF) 0.9% PF flush 72 mL  72 mL Intravenous Once Bartolo Andre MD            Allergies:     Allergies   Allergen Reactions    Seasonal Allergies           Physical Exam:   Vitals: BP (!) 140/82 (BP Location: Right arm, Patient Position: Sitting, Cuff Size: Adult Regular)   Pulse 73   Ht 1.632 m (5' 4.25\")   Wt 59 kg (130 lb)   LMP 05/20/2021 (Approximate)   BMI 22.14 kg/m       Mental Status: Alert and oriented to person, place, and time. Speech fluent and comprehension intact. No dysarthria.      Cranial Nerves:   II: Visual fields: normal  III: Pupils: 3 mm, equal, round, reactive to light   III,IV,VI: Extraocular Movements: intact   V: Facial sensation: intact to light touch  VII: Facial strength: intact without asymmetry  XII: Tongue movement: normal     Motor Exam:   Upper Extremities  Deltoid  Bicep  Tricep  Wrist Extensors  strength Intrinsic Muscles    Right  5  5  5  5 5 5   Left  5  5  5 5 5* 4+   *L  strength equivocally worse than R/slightly worse again/as noted previously  5/5 in b/l LE as well   No clear pronator drift, perhaps slight on L  Fatigues easily     Reflexes: biceps, triceps, brachioradialis, patellar, and ankle jerks 2+ and symmetric.                Data: Pertinent prior to visit   MRI brain 2024  MRI BRAIN WITHOUT CONTRAST  1/30/2024 4:03 PM     HISTORY:  to determine what degree of progression of changes from R  carotid stenosis since 2021; Cerebrovascular accident (CVA) due to  stenosis of right carotid artery (H); Stenosis of right carotid artery     TECHNIQUE:  Multiplanar, multisequence " MRI of the brain without  gadolinium IV contrast material.       COMPARISON:  CTA head and neck 10/24/2023. MRI brain 8/17/2021.     FINDINGS: Midline developmental anatomy is unremarkable. Remote  ischemic findings centered in the right centrum semiovale compatible  with watershed distribution infarct findings on the right with  abnormal flow void involving the right cavernous segment and petrous  segment of the internal carotid artery compatible with the known  history of high-grade stenosis and subtotal occlusion in the distal  cavernous ICA noted on the CTA of the head and neck from 10/24/2023.  Mild chronic small vessel ischemic changes in a periventricular  distribution.     Susceptibility imaging demonstrates no significant hemosiderin  deposition. Compared to an MRI evaluation of the brain from 8/17/2021,  the ischemic changes in the right centrum semiovale are similar.                                                                      IMPRESSION:   Remote ischemic findings in the right centrum semiovale  related to the high-grade stenotic and subtotal occlusive findings  involving the right internal carotid artery. No restricted diffusion  to suggest acute ischemia. Mild chronic small vessel ischemic changes.          CUS 2023  Narrative & Impression   BILATERAL CAROTID DUPLEX DOPPLER ULTRASOUND 10/11/2023 1:58 PM     CLINICAL HISTORY: Carotid stenosis, right     COMPARISON: Ultrasound carotid bilateral 8/12/2022     REFERRING PROVIDER: SARI GODWIN     TECHNIQUE: Grayscale (B-mode) and duplex and spectral Doppler  ultrasound of the common carotid, extracranial internal carotid,  external carotid, and vertebral artery origins. Velocity measurements  obtained with angle correction at or less than 60 degrees.     FINDINGS:     RIGHT SIDE:      Plaque Morphology: Greater than 70% diameter in-stent stenosis  suggested in color Doppler image.        Proximal CCA: 54/12 cm/s     Mid CCA: 65/14  cm/s     Distal CCA, proximal to stent: 50/16 cm/s        Stent, distal CCA: 68/20 cm/s     Stent, bifurcation: 56/13 cm/s     Stent, proximal ICA: 16/0 cm/s, 3.5 velocity ratio     Stent, mid ICA: 121/18 cm/s, 7.56 velocity ratio        External CA: 232/42 cm/s        Mid ICA, distal to stent: 52/10 cm/s     Distal ICA: 42.6 cm/s        Vertebral artery: Antegrade: 47/23 cm/s      Innominate artery: 116/13 cm/s     Proximal subclavian: 226/16 cm/s, 393/0 cm/s     Stent/CCA ratio: 2.42      LEFT SIDE:      Plaque Morphology: Echogenic plaque causes less than 50% diameter  stenosis.         Origin CCA: 79/21 cm/s     Proximal CCA: 94/23 cm/s     Mid CCA: 86/31 cm/s     Distal CCA: 72/34 cm/s     Carotid bifurcation: 62/30 cm/s     External CA: 122/23 cm/s        Proximal ICA: 133/58 cm/s     Mid ICA: 153/39 cm/s     Distal ICA: 94/35 cm/s        Vertebral artery: Antegrade: 53/26 cm/s      Subclavian origin: 77/7 cm/s     Proximal subclavian: 139/0 cm/s     ICA/CCA ratio: 2.13                                                                       IMPRESSION:     1. RIGHT carotid stent: Although velocities never exceed 150 cm/s,  color Doppler image suggests greater than 70% diameter in-stent  stenosis and velocity in the stent in the proximal internal carotid  artery drops to 16 cm/s, a 3.5 times drop from the stent in the  bifurcation. 7.56 velocity ratio in the stent from the proximal to mid  internal carotid artery. Further evaluation with CTA or catheter  angiography could be performed.     2. LEFT ICA:  Less than 50% diameter narrowing by grayscale imaging  and sonographic velocity criteria.     [employer] Commission Updated Recommendations for  Carotid Stenosis Interpretation Criteria - October 2021.  https://[email or web address].org/wp-content/uploads/[email or web address]/10/IAC-Vascular-Test  ei-[email or web address]-Ytaotdrftwhttdb-for-Carotid-Stenosis-Interpre  ation-Criteria.pdf     Greater than 70% diameter  stenosis criteria per - Journal of Vascular  Surgery Vol. 75 Issue 1 Supplement [id number] Published online: June 18, 2021           Normal            ICA PSV: < 180 cm/s            Plaque Estimate: None            ICA/CCA PSV Ratio: < 2.0            ICA EDV: < 40 cm/s          < 50%            ICA PSV: < 180 cm/s            Plaque Estimate: < 50%            ICA/CCA PSV Ratio: < 2.0            ICA EDV: < 40 cm/s          50 - 69%            ICA PSV: 180 - 230 cm/s            Plaque Estimate: > 50%            ICA/CCA PSV Ratio: 2.0 - 4.0            ICA EDV: 40 - 100 cm/s          > 70% but less than near occlusion            ICA PSV: > 230 cm/s            Plaque Estimate: > 50%            AND either            ICA EDV: > 100 cm/s            or            ICA/CCA PSV Ratio: > 4.0          Near occlusion            ICA PSV: > 230 cm/s            Plaque Estimate: Visible            ICA/CCA PSV Ratio: Variable            ICA EDV: Variable          Total occlusion            ICA PSV: Undetectable            Plaque Estimate: Visible, no detectable lumen            ICA/CCA PSV Ratio: N/A            ICA EDV: N/A                                          Additional criteria from vascular surgery     > 80%       EDV > 120 cm/s     -----------------------------------------------     Criteria: Carotid stent velocity criteria          <50%             ICA PSV < 150 cm/sec            ICA EDV N/A            ICA(stent)/CCA PSV Ratio < 2.0            No or minimal stent lumen reduction          50% - 75%             ICA PSV >/= 150 cm/sec            ICA EDV < 125 cm/sec            ICA(stent)/CCA PSV Ratio > 2.0            Turbulent flow, stent lumen reduction present          > 75%,             ICA PSV > 300 cm/sec            ICA EDV > 125 cm/sec            ICA(stent)/CCA Ratio > 4.0             High grade stent stenosis, dampening of distal waveform          Occlusion             No flow            No stent flow visualized      EXAM:  CTA HEAD NECK W CONTRAST  LOCATION: [facility]  DATE: 10/24/2023     INDICATION: Carotid artery stenosis. Headache.  COMPARISON: Urinary ultrasound dated 10/11/2023.  CONTRAST: 67mL Isovue 370  TECHNIQUE: Head and neck CT angiogram with IV contrast. Axial helical CT images of the head and neck vessels obtained during the arterial phase of intravenous contrast administration. Axial 2D reconstructed images and multiplanar 3D MIP reconstructed   images of the head and neck vessels were performed by the technologist. Dose reduction techniques were used. All stenosis measurements made according to NASCET criteria unless otherwise specified.     FINDINGS:   HEAD CTA:  ANTERIOR CIRCULATION: High-grade stenosis involving the cavernous/supraclinoid ICA, which ultimately occludes at its supraclinoid portion. The hypoplastic right middle cerebral artery is opacified via collateral circulation from a patent anterior   communicating artery. The anterior cerebral arteries are unremarkable. The left internal carotid artery, ICA terminus, and left MCA vasculature is unremarkable. The anterior communicating artery is patent. The posterior communicating arteries are   hypoplastic/absent.      POSTERIOR CIRCULATION: No stenosis/occlusion, aneurysm, or high flow vascular malformation. Balanced vertebrobasilar circulation. The basilar artery is unremarkable and gives rise to patent superior cerebellar and posterior cerebral arteries.      DURAL VENOUS SINUSES: Patent.     NECK CTA:  RIGHT CAROTID: Normal course and persist caliber of the common carotid artery. Right internal carotid artery stent with mild stenosis at the proximal portion and critical, greater than 90% stenosis of the internal carotid artery at the distal aspect of   the stent.     LEFT CAROTID: Normal course and persist caliber of the common carotid artery. Atherosclerotic plaque results in approximately 70% stenosis of the ICA origin. The remaining  extracranial internal carotid artery demonstrate persistent caliber without   evidence of dissection.     VERTEBRAL ARTERIES: Balance configuration without stenosis or dissection.     AORTIC ARCH: Classic three-vessel arch. The origins of the great vessels are unremarkable without significant stenosis.     NONVASCULAR STRUCTURES: There are no masses or enlarged lymph nodes in the neck. The submandibular, parotid, and thyroid glands are unremarkable. Multilevel degenerative changes without high-grade spinal canal stenosis or neural foraminal narrowing. No   aggressive osseous lesions. Moderate to severe emphysema.                                                                      IMPRESSION:     HEAD CTA:  1. High-grade stenosis involving the distal cavernous ICA, which ultimately occludes at its supraclinoid portion. The right middle cerebral and anterior cerebral arteries are diminutive but opacified via collateral circulation from a patent anterior   communicating artery.     NECK CTA:  1. Right internal carotid artery stent with distal in-stent stenosis greater than 90%.    Right 2023           Right carotid 2021           MRA 2022                  Left                     CUS 2022                                                      IMPRESSION:     1. RIGHT ICA: Ends of the stent were not evaluated. Improved flow and  velocities in the stent from previous. Less than 50% diameter in stent  stenosis by sonographic velocity criteria and color Doppler imaging.     2. LEFT ICA:  Less than 50% diameter narrowing by grayscale imaging  and sonographic velocity criteria           MRI brain MRA head/neck 8/17/21     IMPRESSION:   1.  Abnormal right internal carotid artery flow void consistent with  diminished or absent flow in this vessel. Suspect that this is related  to subacute to chronic right internal carotid artery dissection.  Please see the separately reported neck MRA for additional details.  2.  T2/FLAIR  hyperintensity within the right greater than left deep  cerebral white matter presumably reflects gliosis related to chronic  ischemic injury. Suspect that there is a component of underlying  border zone/watershed hypoperfusion injury given the abnormal right  internal carotid artery.  3.  No acute infarct, hemorrhage or mass.                    Neck:                                                       IMPRESSION:   1.  Diminutive right internal carotid artery demonstrates abnormal  signal. 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 Chronic flow-limiting atherosclerotic  stenosis is the other primary consideration.  2.  The status of the right internal carotid intracranially is not  well evaluated on this study.  3.  Consider further evaluation of these findings with head and neck  CTA.  4.  Mild atherosclerosis of the left carotid artery without  hemodynamically significant narrowing by NASCET criteria.  5.  Patent vertebral arteries.     Procedures:     CTA neck 8/18  IMPRESSION:  1. Severe stenosis (likely greater than 80% luminal diameter stenosis)  at the proximal right internal carotid artery. The exact degree of  stenosis cannot be measured as there is swallowing motion while  scanning through the carotid bifurcations.  2. Two areas of severe stenosis of the intracranial distal right  internal carotid artery at the petrous cavernous junction and of the  entire supraclinoid segment.  3. Combination of inflow and outflow restriction of the right internal  carotid artery resulting in a diminutive right internal carotid artery  in the neck.  4. Plaque without stenosis of the proximal and distal internal carotid  artery on the left.  5. Diminutive M1 segment of the right middle cerebral artery likely  due to decreased inflow.  6. Moderate-severe atherosclerotic narrowing at the origins of the  vertebral arteries bilaterall      MRA neck 1/26/22     1. String sign  configuration of the right internal carotid artery above the   right internal carotid artery stent may be related to a tandem stenosis with   narrowing immediately above the stent, and more particularly intracranial   stenosis of the supraclinoid right internal artery (which may be the principal   flow limiting factor).     2. No evidence of acute ischemia on DWI although there is extensive white matter   change throughout the right hemisphere consistent with the effects of   hypoperfusion and extensive vasodilatation throughout the right hemisphere.     3. 1.5 cm masslike/nodular configuration of the nasopharyngeal adenoidal tissue   is somewhat unusual for a patient of this age and may warrant direct endoscopic   evaluation.          CT perfusion 1/26/22  IMPRESSION:   In keeping with findings of right carotid stenosis, perfusion study   demonstrates Tmax greater than six seconds of 36 mL at the right frontal and   parietal lobes. CBF less than 30 percent is 0. These regions are associated with   elevated Tmax and MTT.       MRI brain 1/26/22       MRI of the brain: No acute restriction of diffusion on DWI but there is   extensive confluent white matter asymmetric FLAIR signal abnormality within the   right hemisphere much of which is in a watershed type distribution suggesting   effects of chronic hypoperfusion of the right hemisphere. White matter disease   in a scattered random pattern is present with a lesser degree on the left side.   Asymmetric signal within the high cervical and right petrous carotid artery is   consistent with slow are impaired flow. Post gadolinium there is diffuse   prominence of the intraparenchymal and pial vascular structures throughout the   right hemisphere consistent with vasodilatation of collateral circulation   routes.   Midline prominence of the adenoidal nasopharyngeal tissues in a discrete nodule   or masslike configuration spanning 1.4 cm is somewhat unusual for a patient of    this age and may warrant endoscopic evaluation.      MRI brain MRA head/neck 8/17/21     IMPRESSION:   1.  Abnormal right internal carotid artery flow void consistent with  diminished or absent flow in this vessel. Suspect that this is related  to subacute to chronic right internal carotid artery dissection.  Please see the separately reported neck MRA for additional details.  2.  T2/FLAIR hyperintensity within the right greater than left deep  cerebral white matter presumably reflects gliosis related to chronic  ischemic injury. Suspect that there is a component of underlying  border zone/watershed hypoperfusion injury given the abnormal right  internal carotid artery.  3.  No acute infarct, hemorrhage or mass.                    Neck:                                                       IMPRESSION:   1.  Diminutive right internal carotid artery demonstrates abnormal  signal. 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 Chronic flow-limiting atherosclerotic  stenosis is the other primary consideration.  2.  The status of the right internal carotid intracranially is not  well evaluated on this study.  3.  Consider further evaluation of these findings with head and neck  CTA.  4.  Mild atherosclerosis of the left carotid artery without  hemodynamically significant narrowing by NASCET criteria.  5.  Patent vertebral arteries.     Procedures:     CTA neck 8/18  IMPRESSION:  1. Severe stenosis (likely greater than 80% luminal diameter stenosis)  at the proximal right internal carotid artery. The exact degree of  stenosis cannot be measured as there is swallowing motion while  scanning through the carotid bifurcations.  2. Two areas of severe stenosis of the intracranial distal right  internal carotid artery at the petrous cavernous junction and of the  entire supraclinoid segment.  3. Combination of inflow and outflow restriction of the right internal  carotid artery  resulting in a diminutive right internal carotid artery  in the neck.  4. Plaque without stenosis of the proximal and distal internal carotid  artery on the left.  5. Diminutive M1 segment of the right middle cerebral artery likely  due to decreased inflow.  6. Moderate-severe atherosclerotic narrowing at the origins of the  vertebral arteries bilaterally.     Labs:          LDL Cholesterol Calculated   Date Value Ref Range Status   10/25/2023 55 <=100 mg/dL Final   06/30/2021 104 (H) <100 mg/dL Final       Comment:       Above desirable:  100-129 mg/dl  Borderline High:  130-159 mg/dL  High:             160-189 mg/dL  Very high:       >189 mg/dl                 The total time of this encounter today amounted to 30 minutes. This time included time spent with the patient, prep work, ordering tests, and performing post visit documentation.    The longitudinal plan of care for post stroke cares was addressed during this visit. Due to the added complexity in care, I will continue to support Ms. Russell in the subsequent management of this condition(s) and with the ongoing continuity of care of this condition(s).

## 2024-08-14 NOTE — LETTER
8/14/2024      Libertad Russell  [address] [facility] [address]      Dear Colleague,    Thank you for referring your patient, Libertad Russell, to the [facility] [place]. Please see a copy of my visit note below.    [facility]  Section of General Neurology  Return Patient Visit    Libertad Russell MRN# [id number]   Age: 58 year old YOB: 1965            Assessment and Plan:   As noted previously Libertad Russell is a very pleasant 58 year old female who presents today in follow up evaluation of left sided neurological symptoms.  She was having serial small strokes/watershed infarcts from her very stenosed R carotid previously with resultant MRI findings previously/when seen initially in 2021.  The tremaine of stenosis was proposed to be that of potentially previous dissection.   There is still considerable stenosis in her distal R carotid and this (on recent CTA shows 90% estimated) is present in the very distal portion of her stent with further stenosis higher up as well extending cranially.  Repeat MRI earlier this year did not show any progression of watershed infarcts.  She plans on meeting with her [place] neuro-endovascular surgery team later this year to further discuss repeat intervention vs surveillance.  She feels quite good today.  Her BP is in a good range (want her to be slightly permissive, 140/80 today looks good, discussed).  She is staying well hydrated though still limited by fatigue and LUE weakness at times.  We will continue to keep an eye on her over time.  All questions answered.  Follow up will be in 1 year with general neurology.  She will continue aspirin 325 mg and atorvastatin 40 mg.   She will return sooner with any issues changes or questions.        Bubba Casanova MD   of Neurology   [facility]      Interval history:     Shingles recently---got better.    Overall she  feels the same.    On disability, got fired-branch of [employer] had been there for 25 years, big layoffs.   Plan is to see [facility]o at a year from last visit (this fall).    Making sure she is hydrated    A/P at last visit  As noted in recent visit with me Libertad Russell is a very pleasant 58 year old female who presents today in follow up evaluation of left sided neurological symptoms.  She was having serial small strokes/watershed infarcts from her very stenosed R carotid previously with resultant MRI findings previously/when seen initially in 2021.  The tremaine of stenosis was proposed to be that of potentially previous dissection.   There is still considerable stenosis in her distal R carotid and this (on recent CTA shows 90% estimated) is present in the very distal portion of her stent with further stenosis higher up as well extending cranially.  The plan with our neuro endovascular team was to continue serial monitoring at the most recent visit but about a week prior were offering angioplasty.   I presume this is because only a small portion of the stent appears stenosed at it's most distal point/contiguous with other distal stenosis and perhaps anatomically/pragmatically this would not be something that could be intervened upon given how high it is is a consideration that was broached before to her.  It is also possible that they changed their mind due to their interpretation of Rozina's wishes.  She was offered a more invasive surgery to more fully correct the distal ICA stenosis intracranially by the [facility] she notes but this would certainly be a much bigger surgery and she remains apprehensive about this.      We decided to repeat her MRI brain to see if there was evidence of further/expanded watershed strokes which there have not been since 2021, thankfully.  I still think that surgical correction to prevent further decline should be considered thoughtfully but we discussed this is certainly not an easy  decision and one with nuance.  She feels this MRI data at least takes some of the pressure off of her decision.   She will meet with her previous [place] team in further opinion as well.  We can plan on checking in again in ~6 months, sooner with new questions or PRN if she doing great and doesn't have any further questions for me as a general neurologist.      Past Medical History:     Patient Active Problem List   Diagnosis     Seasonal allergic rhinitis     Attention deficit disorder of adult     Rosacea     Raynaud phenomenon     Essential hypertension with goal blood pressure less than 140/90     Lipoma of right axilla     Lipoma of neck     Paresthesia     Cerebrovascular accident (CVA) due to stenosis of right carotid artery (H)     Rheumatoid arthritis involving both hands with positive rheumatoid factor (H)     Stenosis of right carotid artery     Atherosclerosis of arteries of extremities (H24)     History of tobacco abuse     Past Medical History:   Diagnosis Date     ADHD (attention deficit hyperactivity disorder)      Anemia 2022     Cerebrovascular accident (CVA) due to stenosis of right carotid artery (H) 09/15/2021     Depressive disorder      HTN (hypertension)      RA (rheumatoid arthritis) (H)      Raynaud disease         Past Surgical History:     Past Surgical History:   Procedure Laterality Date     BREAST BIOPSY, RT/LT  2000, 1998    breast biopsies; reported to be benign     BREAST SURGERY  2000     EXCISE MASS AXILLA Right 06/08/2021    Excise right axillary lipoma;  Surgeon: Artis Domínguez MD;  Location: [facility] OR     EXCISE TUMOR, SOFT TISSUE, NECK/THORAX, SUBCUTANEOUS N/A 06/08/2021    EXCISION of lipoma from left occiput;  Surgeon: Artis Domínguez MD;  Location: [facility] OR     EYE SURGERY  2015    Lasik for vision correction     IR CAROTID CEREBRAL ANGIOGRAM BILATERAL  10/22/2021     LYMPH NODE BIOPSY  2001    cervical lymph node biopsy; told to be benign     [place] BREAST AUGMENTATION  Bilateral     breast implants     TUBAL LIGATION       VASCULAR SURGERY      Stent        Social History:     Social History     Tobacco Use     Smoking status: Former     Current packs/day: 0.00     Average packs/day: 1 pack/day for 25.0 years (25.0 ttl pk-yrs)     Types: Cigarettes     Start date: 10/5/1996     Quit date: 10/5/2021     Years since quittin.8     Passive exposure: Past     Smokeless tobacco: Current     Tobacco comments:     Pt vapes 1 per day without nicotine    Vaping Use     Vaping status: Some Days   Substance Use Topics     Alcohol use: Not Currently     Comment: occasional     Drug use: No        Family History:     Family History   Problem Relation Age of Onset     Hypertension Mother      Cancer Mother         Lung. Cancer     Other Cancer Mother         Lung     Thyroid Disease Mother      C.A.D. Father         52 at first MI     Prostate Cancer Father         Bladder/ lung     Cancer Father         Lung -bladder cancer     Thyroid Disease Sister      Thyroid Disease Sister      Hypertension Other      Diabetes No family hx of      Cerebrovascular Disease No family hx of      Breast Cancer No family hx of      Cancer - colorectal No family hx of      Glaucoma No family hx of      Macular Degeneration No family hx of         Medications:     Current Outpatient Medications   Medication Sig Dispense Refill     [START ON 2024] amphetamine-dextroamphetamine (ADDERALL XR) 20 MG 24 hr capsule Take 1 capsule (20 mg) by mouth daily 30 capsule 0     [START ON 2024] amphetamine-dextroamphetamine (ADDERALL) 20 MG tablet Take 1 tablet (20 mg) by mouth daily 30 tablet 0     aspirin (ASA) 325 MG tablet TAKE 1 TABLET(325 MG) BY MOUTH DAILY 90 tablet 2     atorvastatin (LIPITOR) 40 MG tablet Take 1 tablet (40 mg) by mouth daily 90 tablet 3     folic acid (FOLVITE) 1 MG tablet Take 1 tablet (1 mg) by mouth daily 90 tablet 3     HYRIMOZ 40 MG/0.4ML SOAJ INJECT 0.4 MLS (40 MG)  "SUBCUTANEOUS EVERY 14 DAYS HOLD FOR SIGNS OF INfection, then seek medical attention. 0.8 mL 6     losartan (COZAAR) 100 MG tablet Take 1 tablet (100 mg) by mouth daily 90 tablet 3     methotrexate 2.5 MG tablet Take 10 tablets (25 mg) by mouth every 7 days Labs every 8 - 12 weeks for refills. 40 tablet 6     olopatadine (PATADAY) 0.2 % ophthalmic solution Place 0.05 mLs (1 drop) into both eyes daily 2.5 mL 1     triamcinolone (KENALOG) 0.1 % external cream Apply topically 2 times daily 45 g 1     No current facility-administered medications for this visit.     Facility-Administered Medications Ordered in Other Visits   Medication Dose Route Frequency Provider Last Rate Last Admin     sodium chloride (PF) 0.9% PF flush 72 mL  72 mL Intravenous Once Bartolo Andre MD            Allergies:     Allergies   Allergen Reactions     Seasonal Allergies           Physical Exam:   Vitals: BP (!) 140/82 (BP Location: Right arm, Patient Position: Sitting, Cuff Size: Adult Regular)   Pulse 73   Ht 1.632 m (5' 4.25\")   Wt 59 kg (130 lb)   LMP 05/20/2021 (Approximate)   BMI 22.14 kg/m       Mental Status: Alert and oriented to person, place, and time. Speech fluent and comprehension intact. No dysarthria.      Cranial Nerves:   II: Visual fields: normal  III: Pupils: 3 mm, equal, round, reactive to light   III,IV,VI: Extraocular Movements: intact   V: Facial sensation: intact to light touch  VII: Facial strength: intact without asymmetry  XII: Tongue movement: normal     Motor Exam:   Upper Extremities  Deltoid  Bicep  Tricep  Wrist Extensors  strength Intrinsic Muscles    Right  5  5  5  5 5 5   Left  5  5  5 5 5* 4+   *L  strength equivocally worse than R/slightly worse again/as noted previously  5/5 in b/l LE as well   No clear pronator drift, perhaps slight on L  Fatigues easily     Reflexes: biceps, triceps, brachioradialis, patellar, and ankle jerks 2+ and symmetric.                Data: Pertinent prior to " visit   MRI brain 2024  MRI BRAIN WITHOUT CONTRAST  1/30/2024 4:03 PM     HISTORY:  to determine what degree of progression of changes from R  carotid stenosis since 2021; Cerebrovascular accident (CVA) due to  stenosis of right carotid artery (H); Stenosis of right carotid artery     TECHNIQUE:  Multiplanar, multisequence MRI of the brain without  gadolinium IV contrast material.       COMPARISON:  CTA head and neck 10/24/2023. MRI brain 8/17/2021.     FINDINGS: Midline developmental anatomy is unremarkable. Remote  ischemic findings centered in the right centrum semiovale compatible  with watershed distribution infarct findings on the right with  abnormal flow void involving the right cavernous segment and petrous  segment of the internal carotid artery compatible with the known  history of high-grade stenosis and subtotal occlusion in the distal  cavernous ICA noted on the CTA of the head and neck from 10/24/2023.  Mild chronic small vessel ischemic changes in a periventricular  distribution.     Susceptibility imaging demonstrates no significant hemosiderin  deposition. Compared to an MRI evaluation of the brain from 8/17/2021,  the ischemic changes in the right centrum semiovale are similar.                                                                      IMPRESSION:   Remote ischemic findings in the right centrum semiovale  related to the high-grade stenotic and subtotal occlusive findings  involving the right internal carotid artery. No restricted diffusion  to suggest acute ischemia. Mild chronic small vessel ischemic changes.          CUS 2023  Narrative & Impression   BILATERAL CAROTID DUPLEX DOPPLER ULTRASOUND 10/11/2023 1:58 PM     CLINICAL HISTORY: Carotid stenosis, right     COMPARISON: Ultrasound carotid bilateral 8/12/2022     REFERRING PROVIDER: SARI GODWIN     TECHNIQUE: Grayscale (B-mode) and duplex and spectral Doppler  ultrasound of the common carotid, extracranial internal  carotid,  external carotid, and vertebral artery origins. Velocity measurements  obtained with angle correction at or less than 60 degrees.     FINDINGS:     RIGHT SIDE:      Plaque Morphology: Greater than 70% diameter in-stent stenosis  suggested in color Doppler image.        Proximal CCA: 54/12 cm/s     Mid CCA: 65/14 cm/s     Distal CCA, proximal to stent: 50/16 cm/s        Stent, distal CCA: 68/20 cm/s     Stent, bifurcation: 56/13 cm/s     Stent, proximal ICA: 16/0 cm/s, 3.5 velocity ratio     Stent, mid ICA: 121/18 cm/s, 7.56 velocity ratio        External CA: 232/42 cm/s        Mid ICA, distal to stent: 52/10 cm/s     Distal ICA: 42.6 cm/s        Vertebral artery: Antegrade: 47/23 cm/s      Innominate artery: 116/13 cm/s     Proximal subclavian: 226/16 cm/s, 393/0 cm/s     Stent/CCA ratio: 2.42      LEFT SIDE:      Plaque Morphology: Echogenic plaque causes less than 50% diameter  stenosis.         Origin CCA: 79/21 cm/s     Proximal CCA: 94/23 cm/s     Mid CCA: 86/31 cm/s     Distal CCA: 72/34 cm/s     Carotid bifurcation: 62/30 cm/s     External CA: 122/23 cm/s        Proximal ICA: 133/58 cm/s     Mid ICA: 153/39 cm/s     Distal ICA: 94/35 cm/s        Vertebral artery: Antegrade: 53/26 cm/s      Subclavian origin: 77/7 cm/s     Proximal subclavian: 139/0 cm/s     ICA/CCA ratio: 2.13                                                                       IMPRESSION:     1. RIGHT carotid stent: Although velocities never exceed 150 cm/s,  color Doppler image suggests greater than 70% diameter in-stent  stenosis and velocity in the stent in the proximal internal carotid  artery drops to 16 cm/s, a 3.5 times drop from the stent in the  bifurcation. 7.56 velocity ratio in the stent from the proximal to mid  internal carotid artery. Further evaluation with CTA or catheter  angiography could be performed.     2. LEFT ICA:  Less than 50% diameter narrowing by grayscale imaging  and sonographic velocity criteria.      [facility] Updated Recommendations for  Carotid Stenosis Interpretation Criteria - October 2021.  [email or web address]  [email or web address]-Wtwyjtfantgorsq-for-Carotid-Stenosis-Interpre  ation-Criteria.[email or web address]     Greater than 70% diameter stenosis criteria per - Journal of Vascular  Surgery Vol. 75 Issue 1 Supplement [id number] Published online: June 18, 2021           Normal            ICA PSV: < 180 cm/s            Plaque Estimate: None            ICA/CCA PSV Ratio: < 2.0            ICA EDV: < 40 cm/s          < 50%            ICA PSV: < 180 cm/s            Plaque Estimate: < 50%            ICA/CCA PSV Ratio: < 2.0            ICA EDV: < 40 cm/s          50 - 69%            ICA PSV: 180 - 230 cm/s            Plaque Estimate: > 50%            ICA/CCA PSV Ratio: 2.0 - 4.0            ICA EDV: 40 - 100 cm/s          > 70% but less than near occlusion            ICA PSV: > 230 cm/s            Plaque Estimate: > 50%            AND either            ICA EDV: > 100 cm/s            or            ICA/CCA PSV Ratio: > 4.0          Near occlusion            ICA PSV: > 230 cm/s            Plaque Estimate: Visible            ICA/CCA PSV Ratio: Variable            ICA EDV: Variable          Total occlusion            ICA PSV: Undetectable            Plaque Estimate: Visible, no detectable lumen            ICA/CCA PSV Ratio: N/A            ICA EDV: N/A                                          Additional criteria from vascular surgery     > 80%       EDV > 120 cm/s     -----------------------------------------------     Criteria: Carotid stent velocity criteria          <50%             ICA PSV < 150 cm/sec            ICA EDV N/A            ICA(stent)/CCA PSV Ratio < 2.0            No or minimal stent lumen reduction          50% - 75%             ICA PSV >/= 150 cm/sec            ICA EDV < 125 cm/sec            ICA(stent)/CCA PSV Ratio > 2.0             Turbulent flow, stent lumen reduction present          > 75%,             ICA PSV > 300 cm/sec            ICA EDV > 125 cm/sec            ICA(stent)/CCA Ratio > 4.0             High grade stent stenosis, dampening of distal waveform          Occlusion             No flow            No stent flow visualized      EXAM: CTA HEAD NECK W CONTRAST  LOCATION: [facility]  DATE: 10/24/2023     INDICATION: Carotid artery stenosis. Headache.  COMPARISON: Urinary ultrasound dated 10/11/2023.  CONTRAST: 67mL Isovue 370  TECHNIQUE: Head and neck CT angiogram with IV contrast. Axial helical CT images of the head and neck vessels obtained during the arterial phase of intravenous contrast administration. Axial 2D reconstructed images and multiplanar 3D MIP reconstructed   images of the head and neck vessels were performed by the technologist. Dose reduction techniques were used. All stenosis measurements made according to NASCET criteria unless otherwise specified.     FINDINGS:   HEAD CTA:  ANTERIOR CIRCULATION: High-grade stenosis involving the cavernous/supraclinoid ICA, which ultimately occludes at its supraclinoid portion. The hypoplastic right middle cerebral artery is opacified via collateral circulation from a patent anterior   communicating artery. The anterior cerebral arteries are unremarkable. The left internal carotid artery, ICA terminus, and left MCA vasculature is unremarkable. The anterior communicating artery is patent. The posterior communicating arteries are   hypoplastic/absent.      POSTERIOR CIRCULATION: No stenosis/occlusion, aneurysm, or high flow vascular malformation. Balanced vertebrobasilar circulation. The basilar artery is unremarkable and gives rise to patent superior cerebellar and posterior cerebral arteries.      DURAL VENOUS SINUSES: Patent.     NECK CTA:  RIGHT CAROTID: Normal course and persist caliber of the common carotid artery. Right internal carotid artery stent with  mild stenosis at the proximal portion and critical, greater than 90% stenosis of the internal carotid artery at the distal aspect of   the stent.     LEFT CAROTID: Normal course and persist caliber of the common carotid artery. Atherosclerotic plaque results in approximately 70% stenosis of the ICA origin. The remaining extracranial internal carotid artery demonstrate persistent caliber without   evidence of dissection.     VERTEBRAL ARTERIES: Balance configuration without stenosis or dissection.     AORTIC ARCH: Classic three-vessel arch. The origins of the great vessels are unremarkable without significant stenosis.     NONVASCULAR STRUCTURES: There are no masses or enlarged lymph nodes in the neck. The submandibular, parotid, and thyroid glands are unremarkable. Multilevel degenerative changes without high-grade spinal canal stenosis or neural foraminal narrowing. No   aggressive osseous lesions. Moderate to severe emphysema.                                                                      IMPRESSION:     HEAD CTA:  1. High-grade stenosis involving the distal cavernous ICA, which ultimately occludes at its supraclinoid portion. The right middle cerebral and anterior cerebral arteries are diminutive but opacified via collateral circulation from a patent anterior   communicating artery.     NECK CTA:  1. Right internal carotid artery stent with distal in-stent stenosis greater than 90%.    Right 2023           Right carotid 2021           MRA 2022                  Left                     CUS 2022                                                      IMPRESSION:     1. RIGHT ICA: Ends of the stent were not evaluated. Improved flow and  velocities in the stent from previous. Less than 50% diameter in stent  stenosis by sonographic velocity criteria and color Doppler imaging.     2. LEFT ICA:  Less than 50% diameter narrowing by grayscale imaging  and sonographic velocity criteria           MRI brain MRA head/neck  8/17/21     IMPRESSION:   1.  Abnormal right internal carotid artery flow void consistent with  diminished or absent flow in this vessel. Suspect that this is related  to subacute to chronic right internal carotid artery dissection.  Please see the separately reported neck MRA for additional details.  2.  T2/FLAIR hyperintensity within the right greater than left deep  cerebral white matter presumably reflects gliosis related to chronic  ischemic injury. Suspect that there is a component of underlying  border zone/watershed hypoperfusion injury given the abnormal right  internal carotid artery.  3.  No acute infarct, hemorrhage or mass.                    Neck:                                                       IMPRESSION:   1.  Diminutive right internal carotid artery demonstrates abnormal  signal. 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 Chronic flow-limiting atherosclerotic  stenosis is the other primary consideration.  2.  The status of the right internal carotid intracranially is not  well evaluated on this study.  3.  Consider further evaluation of these findings with head and neck  CTA.  4.  Mild atherosclerosis of the left carotid artery without  hemodynamically significant narrowing by NASCET criteria.  5.  Patent vertebral arteries.     Procedures:     CTA neck 8/18  IMPRESSION:  1. Severe stenosis (likely greater than 80% luminal diameter stenosis)  at the proximal right internal carotid artery. The exact degree of  stenosis cannot be measured as there is swallowing motion while  scanning through the carotid bifurcations.  2. Two areas of severe stenosis of the intracranial distal right  internal carotid artery at the petrous cavernous junction and of the  entire supraclinoid segment.  3. Combination of inflow and outflow restriction of the right internal  carotid artery resulting in a diminutive right internal carotid artery  in the neck.  4. Plaque  without stenosis of the proximal and distal internal carotid  artery on the left.  5. Diminutive M1 segment of the right middle cerebral artery likely  due to decreased inflow.  6. Moderate-severe atherosclerotic narrowing at the origins of the  vertebral arteries bilaterall      MRA neck 1/26/22     1. String sign configuration of the right internal carotid artery above the   right internal carotid artery stent may be related to a tandem stenosis with   narrowing immediately above the stent, and more particularly intracranial   stenosis of the supraclinoid right internal artery (which may be the principal   flow limiting factor).     2. No evidence of acute ischemia on DWI although there is extensive white matter   change throughout the right hemisphere consistent with the effects of   hypoperfusion and extensive vasodilatation throughout the right hemisphere.     3. 1.5 cm masslike/nodular configuration of the nasopharyngeal adenoidal tissue   is somewhat unusual for a patient of this age and may warrant direct endoscopic   evaluation.          CT perfusion 1/26/22  IMPRESSION:   In keeping with findings of right carotid stenosis, perfusion study   demonstrates Tmax greater than six seconds of 36 mL at the right frontal and   parietal lobes. CBF less than 30 percent is 0. These regions are associated with   elevated Tmax and MTT.       MRI brain 1/26/22       MRI of the brain: No acute restriction of diffusion on DWI but there is   extensive confluent white matter asymmetric FLAIR signal abnormality within the   right hemisphere much of which is in a watershed type distribution suggesting   effects of chronic hypoperfusion of the right hemisphere. White matter disease   in a scattered random pattern is present with a lesser degree on the left side.   Asymmetric signal within the high cervical and right petrous carotid artery is   consistent with slow are impaired flow. Post gadolinium there is diffuse   prominence  of the intraparenchymal and pial vascular structures throughout the   right hemisphere consistent with vasodilatation of collateral circulation   routes.   Midline prominence of the adenoidal nasopharyngeal tissues in a discrete nodule   or masslike configuration spanning 1.4 cm is somewhat unusual for a patient of   this age and may warrant endoscopic evaluation.      MRI brain MRA head/neck 8/17/21     IMPRESSION:   1.  Abnormal right internal carotid artery flow void consistent with  diminished or absent flow in this vessel. Suspect that this is related  to subacute to chronic right internal carotid artery dissection.  Please see the separately reported neck MRA for additional details.  2.  T2/FLAIR hyperintensity within the right greater than left deep  cerebral white matter presumably reflects gliosis related to chronic  ischemic injury. Suspect that there is a component of underlying  border zone/watershed hypoperfusion injury given the abnormal right  internal carotid artery.  3.  No acute infarct, hemorrhage or mass.                    Neck:                                                       IMPRESSION:   1.  Diminutive right internal carotid artery demonstrates abnormal  signal. 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 Chronic flow-limiting atherosclerotic  stenosis is the other primary consideration.  2.  The status of the right internal carotid intracranially is not  well evaluated on this study.  3.  Consider further evaluation of these findings with head and neck  CTA.  4.  Mild atherosclerosis of the left carotid artery without  hemodynamically significant narrowing by NASCET criteria.  5.  Patent vertebral arteries.     Procedures:     CTA neck 8/18  IMPRESSION:  1. Severe stenosis (likely greater than 80% luminal diameter stenosis)  at the proximal right internal carotid artery. The exact degree of  stenosis cannot be measured as there is  swallowing motion while  scanning through the carotid bifurcations.  2. Two areas of severe stenosis of the intracranial distal right  internal carotid artery at the petrous cavernous junction and of the  entire supraclinoid segment.  3. Combination of inflow and outflow restriction of the right internal  carotid artery resulting in a diminutive right internal carotid artery  in the neck.  4. Plaque without stenosis of the proximal and distal internal carotid  artery on the left.  5. Diminutive M1 segment of the right middle cerebral artery likely  due to decreased inflow.  6. Moderate-severe atherosclerotic narrowing at the origins of the  vertebral arteries bilaterally.     Labs:          LDL Cholesterol Calculated   Date Value Ref Range Status   10/25/2023 55 <=100 mg/dL Final   06/30/2021 104 (H) <100 mg/dL Final       Comment:       Above desirable:  100-129 mg/dl  Borderline High:  130-159 mg/dL  High:             160-189 mg/dL  Very high:       >189 mg/dl                 The total time of this encounter today amounted to 30 minutes. This time included time spent with the patient, prep work, ordering tests, and performing post visit documentation.    The longitudinal plan of care for post stroke cares was addressed during this visit. Due to the added complexity in care, I will continue to support Ms. Russell in the subsequent management of this condition(s) and with the ongoing continuity of care of this condition(s).      Again, thank you for allowing me to participate in the care of your patient.        Sincerely,        Gilberto Casanova MD

## 2024-08-14 NOTE — PATIENT INSTRUCTIONS
Agree with slightly permissive blood pressure (like today, run just a little high)  Stay hydrated  Continue aspirin and lipitor   See what your [facility] surgeon thinks, no right answer.

## 2024-08-16 NOTE — TELEPHONE ENCOUNTER
methotrexate 2.5 MG tablet   40 tablet 6 11/10/2023     Last Office Visit : 11-  Future Office visit:  12-6-2024    CBC RESULTS:   Recent Labs   Lab Test 12/14/23  1512   WBC 6.3   RBC 4.05   HGB 11.8   HCT 37.0   MCV 91   MCH 29.1   MCHC 31.9   RDW 14.0          Creatinine   Date Value Ref Range Status   12/14/2023 0.63 0.51 - 0.95 mg/dL Final   06/30/2021 0.58 0.52 - 1.04 mg/dL Final   ]    Liver Function Studies -   Recent Labs   Lab Test 12/14/23  1512 10/25/23  1425   PROTTOTAL  --  6.8   ALBUMIN 4.5 4.5   BILITOTAL  --  0.3   ALKPHOS  --  88   AST 18 21   ALT 21 19

## 2024-08-18 RX ORDER — METHOTREXATE 2.5 MG/1
TABLET ORAL
Qty: 40 TABLET | OUTPATIENT
Start: 2024-08-18

## 2024-08-21 ENCOUNTER — MYC REFILL (OUTPATIENT)
Dept: RHEUMATOLOGY | Facility: CLINIC | Age: 59
End: 2024-08-21
Payer: COMMERCIAL

## 2024-08-21 DIAGNOSIS — M05.742 RHEUMATOID ARTHRITIS INVOLVING BOTH HANDS WITH POSITIVE RHEUMATOID FACTOR (H): ICD-10-CM

## 2024-08-21 DIAGNOSIS — M05.741 RHEUMATOID ARTHRITIS INVOLVING BOTH HANDS WITH POSITIVE RHEUMATOID FACTOR (H): ICD-10-CM

## 2024-08-22 ENCOUNTER — MYC MEDICAL ADVICE (OUTPATIENT)
Dept: RHEUMATOLOGY | Facility: CLINIC | Age: 59
End: 2024-08-22

## 2024-08-22 DIAGNOSIS — M05.742 RHEUMATOID ARTHRITIS INVOLVING BOTH HANDS WITH POSITIVE RHEUMATOID FACTOR (H): Primary | ICD-10-CM

## 2024-08-22 DIAGNOSIS — M05.741 RHEUMATOID ARTHRITIS INVOLVING BOTH HANDS WITH POSITIVE RHEUMATOID FACTOR (H): Primary | ICD-10-CM

## 2024-08-25 NOTE — TELEPHONE ENCOUNTER
methotrexate 2.5 MG tablet       Last Written Prescription Date:  11-10-23  Last Fill Quantity: 40,   # refills: 6  Last Office Visit: 11-10-23  Future Office visit:  12-6-24    adalimumab-adaz (HYRIMOZ) 40 MG/0.4ML auto-injector kit       Last Written Prescription Date:  2-15-24  Last Fill Quantity: 0.8ml,   # refills: 6    CBC RESULTS:   Recent Labs   Lab Test 12/14/23  1512   WBC 6.3   RBC 4.05   HGB 11.8   HCT 37.0   MCV 91   MCH 29.1   MCHC 31.9   RDW 14.0          Creatinine   Date Value Ref Range Status   12/14/2023 0.63 0.51 - 0.95 mg/dL Final   06/30/2021 0.58 0.52 - 1.04 mg/dL Final   ]    Liver Function Studies -   Recent Labs   Lab Test 12/14/23  1512 10/25/23  1425   PROTTOTAL  --  6.8   ALBUMIN 4.5 4.5   BILITOTAL  --  0.3   ALKPHOS  --  88   AST 18 21   ALT 21 19       Routing refill request to provider for review/approval because:  Per protocol  To Clinic: Labs past due,  see 8-22-24 MyC note-- communication to pt regarding labs

## 2024-08-28 ENCOUNTER — LAB (OUTPATIENT)
Dept: LAB | Facility: CLINIC | Age: 59
End: 2024-08-28
Payer: COMMERCIAL

## 2024-08-28 DIAGNOSIS — R73.03 PREDIABETES: Primary | ICD-10-CM

## 2024-08-28 DIAGNOSIS — I10 ESSENTIAL HYPERTENSION WITH GOAL BLOOD PRESSURE LESS THAN 140/90: ICD-10-CM

## 2024-08-28 DIAGNOSIS — M05.741 RHEUMATOID ARTHRITIS INVOLVING BOTH HANDS WITH POSITIVE RHEUMATOID FACTOR (H): ICD-10-CM

## 2024-08-28 DIAGNOSIS — M05.742 RHEUMATOID ARTHRITIS INVOLVING BOTH HANDS WITH POSITIVE RHEUMATOID FACTOR (H): ICD-10-CM

## 2024-08-28 DIAGNOSIS — R73.03 PREDIABETES: ICD-10-CM

## 2024-08-28 DIAGNOSIS — I70.209 ATHEROSCLEROSIS OF ARTERIES OF EXTREMITIES (H): ICD-10-CM

## 2024-08-28 LAB
BASOPHILS # BLD AUTO: 0.1 10E3/UL (ref 0–0.2)
BASOPHILS NFR BLD AUTO: 1 %
EOSINOPHIL # BLD AUTO: 0.3 10E3/UL (ref 0–0.7)
EOSINOPHIL NFR BLD AUTO: 4 %
ERYTHROCYTE [DISTWIDTH] IN BLOOD BY AUTOMATED COUNT: 15.4 % (ref 10–15)
HBA1C MFR BLD: 5.8 % (ref 0–5.6)
HCT VFR BLD AUTO: 32.4 % (ref 35–47)
HGB BLD-MCNC: 10.4 G/DL (ref 11.7–15.7)
IMM GRANULOCYTES # BLD: 0 10E3/UL
IMM GRANULOCYTES NFR BLD: 0 %
LYMPHOCYTES # BLD AUTO: 1.9 10E3/UL (ref 0.8–5.3)
LYMPHOCYTES NFR BLD AUTO: 24 %
MCH RBC QN AUTO: 28.6 PG (ref 26.5–33)
MCHC RBC AUTO-ENTMCNC: 32.1 G/DL (ref 31.5–36.5)
MCV RBC AUTO: 89 FL (ref 78–100)
MONOCYTES # BLD AUTO: 0.5 10E3/UL (ref 0–1.3)
MONOCYTES NFR BLD AUTO: 7 %
NEUTROPHILS # BLD AUTO: 5.1 10E3/UL (ref 1.6–8.3)
NEUTROPHILS NFR BLD AUTO: 65 %
PLATELET # BLD AUTO: 287 10E3/UL (ref 150–450)
RBC # BLD AUTO: 3.64 10E6/UL (ref 3.8–5.2)
WBC # BLD AUTO: 7.9 10E3/UL (ref 4–11)

## 2024-08-28 PROCEDURE — 80061 LIPID PANEL: CPT

## 2024-08-28 PROCEDURE — 36415 COLL VENOUS BLD VENIPUNCTURE: CPT

## 2024-08-28 PROCEDURE — 83036 HEMOGLOBIN GLYCOSYLATED A1C: CPT

## 2024-08-28 PROCEDURE — 85025 COMPLETE CBC W/AUTO DIFF WBC: CPT

## 2024-08-28 PROCEDURE — 80053 COMPREHEN METABOLIC PANEL: CPT

## 2024-08-29 LAB
ALBUMIN SERPL BCG-MCNC: 4 G/DL (ref 3.5–5.2)
ALP SERPL-CCNC: 75 U/L (ref 40–150)
ALT SERPL W P-5'-P-CCNC: 17 U/L (ref 0–50)
ANION GAP SERPL CALCULATED.3IONS-SCNC: 10 MMOL/L (ref 7–15)
AST SERPL W P-5'-P-CCNC: 19 U/L (ref 0–45)
BILIRUB SERPL-MCNC: 0.3 MG/DL
BUN SERPL-MCNC: 21.5 MG/DL (ref 6–20)
CALCIUM SERPL-MCNC: 8.9 MG/DL (ref 8.8–10.4)
CHLORIDE SERPL-SCNC: 103 MMOL/L (ref 98–107)
CHOLEST SERPL-MCNC: 151 MG/DL
CREAT SERPL-MCNC: 0.6 MG/DL (ref 0.51–0.95)
EGFRCR SERPLBLD CKD-EPI 2021: >90 ML/MIN/1.73M2
FASTING STATUS PATIENT QL REPORTED: NO
FASTING STATUS PATIENT QL REPORTED: NO
GLUCOSE SERPL-MCNC: 93 MG/DL (ref 70–99)
HCO3 SERPL-SCNC: 26 MMOL/L (ref 22–29)
HDLC SERPL-MCNC: 66 MG/DL
LDLC SERPL CALC-MCNC: 67 MG/DL
NONHDLC SERPL-MCNC: 85 MG/DL
POTASSIUM SERPL-SCNC: 3.9 MMOL/L (ref 3.4–5.3)
PROT SERPL-MCNC: 6.3 G/DL (ref 6.4–8.3)
SODIUM SERPL-SCNC: 139 MMOL/L (ref 135–145)
TRIGL SERPL-MCNC: 92 MG/DL

## 2024-08-29 RX ORDER — ADALIMUMAB-ADAZ 40 MG/.4ML
40 INJECTION, SOLUTION SUBCUTANEOUS
Qty: 0.8 ML | Refills: 6 | Status: SHIPPED | OUTPATIENT
Start: 2024-08-29

## 2024-08-29 RX ORDER — METHOTREXATE 2.5 MG/1
25 TABLET ORAL
Qty: 40 TABLET | Refills: 6 | Status: SHIPPED | OUTPATIENT
Start: 2024-08-29

## 2024-09-09 ENCOUNTER — MYC REFILL (OUTPATIENT)
Dept: FAMILY MEDICINE | Facility: CLINIC | Age: 59
End: 2024-09-09
Payer: COMMERCIAL

## 2024-09-09 DIAGNOSIS — F98.8 ATTENTION DEFICIT DISORDER OF ADULT: ICD-10-CM

## 2024-09-10 RX ORDER — DEXTROAMPHETAMINE SACCHARATE, AMPHETAMINE ASPARTATE MONOHYDRATE, DEXTROAMPHETAMINE SULFATE AND AMPHETAMINE SULFATE 5; 5; 5; 5 MG/1; MG/1; MG/1; MG/1
20 CAPSULE, EXTENDED RELEASE ORAL DAILY
Qty: 30 CAPSULE | Refills: 0 | Status: SHIPPED | OUTPATIENT
Start: 2024-09-10 | End: 2024-10-07

## 2024-09-10 RX ORDER — DEXTROAMPHETAMINE SACCHARATE, AMPHETAMINE ASPARTATE, DEXTROAMPHETAMINE SULFATE AND AMPHETAMINE SULFATE 5; 5; 5; 5 MG/1; MG/1; MG/1; MG/1
20 TABLET ORAL DAILY
Qty: 30 TABLET | Refills: 0 | Status: SHIPPED | OUTPATIENT
Start: 2024-09-10 | End: 2024-10-07

## 2024-10-03 ENCOUNTER — MYC MEDICAL ADVICE (OUTPATIENT)
Dept: FAMILY MEDICINE | Facility: CLINIC | Age: 59
End: 2024-10-03

## 2024-10-04 ENCOUNTER — MYC MEDICAL ADVICE (OUTPATIENT)
Dept: FAMILY MEDICINE | Facility: CLINIC | Age: 59
End: 2024-10-04

## 2024-10-07 ENCOUNTER — PATIENT OUTREACH (OUTPATIENT)
Dept: CARE COORDINATION | Facility: CLINIC | Age: 59
End: 2024-10-07
Payer: COMMERCIAL

## 2024-10-07 ENCOUNTER — MYC REFILL (OUTPATIENT)
Dept: FAMILY MEDICINE | Facility: CLINIC | Age: 59
End: 2024-10-07
Payer: COMMERCIAL

## 2024-10-07 DIAGNOSIS — F98.8 ATTENTION DEFICIT DISORDER OF ADULT: ICD-10-CM

## 2024-10-07 RX ORDER — DEXTROAMPHETAMINE SACCHARATE, AMPHETAMINE ASPARTATE, DEXTROAMPHETAMINE SULFATE AND AMPHETAMINE SULFATE 5; 5; 5; 5 MG/1; MG/1; MG/1; MG/1
20 TABLET ORAL DAILY
Qty: 30 TABLET | Refills: 0 | Status: SHIPPED | OUTPATIENT
Start: 2024-10-07 | End: 2024-11-08

## 2024-10-07 RX ORDER — DEXTROAMPHETAMINE SACCHARATE, AMPHETAMINE ASPARTATE MONOHYDRATE, DEXTROAMPHETAMINE SULFATE AND AMPHETAMINE SULFATE 5; 5; 5; 5 MG/1; MG/1; MG/1; MG/1
20 CAPSULE, EXTENDED RELEASE ORAL DAILY
Qty: 30 CAPSULE | Refills: 0 | Status: SHIPPED | OUTPATIENT
Start: 2024-10-07 | End: 2024-11-08

## 2024-10-11 ENCOUNTER — TELEPHONE (OUTPATIENT)
Dept: RADIOLOGY | Facility: CLINIC | Age: 59
End: 2024-10-11
Payer: COMMERCIAL

## 2024-10-11 NOTE — TELEPHONE ENCOUNTER
Left Voicemail (1st Attempt) and Sent Mychart (1st Attempt) for the patient to call back and schedule the following:    Location[facility]  Provider: Zena  Appointment type: return  Appointment mode: any  Return date: ~11/7/24    Specialty phone number: [phone]    Is Imaging Needed: CUS  Imaging Phone Number to provide to patient: [phone]    Additional Notes: 1 yr follow up, CUS prior

## 2024-10-17 NOTE — TELEPHONE ENCOUNTER
Left Voicemail (2nd Attempt) mailbox full for the patient to call back and schedule the following:    [facility]  Provider: Zena  Appointment type: return  Appointment mode: any  Return date: ~11/7/24     Specialty phone number: [phone]     Is Imaging Needed: CUS  Imaging Phone Number to provide to patient: [phone]     Additional Notes: 1 yr follow up, CUS prior

## 2024-10-21 ENCOUNTER — PATIENT OUTREACH (OUTPATIENT)
Dept: CARE COORDINATION | Facility: CLINIC | Age: 59
End: 2024-10-21
Payer: COMMERCIAL

## 2024-10-29 DIAGNOSIS — I65.29 STENOSIS OF CAROTID ARTERY, UNSPECIFIED LATERALITY: ICD-10-CM

## 2024-10-29 RX ORDER — ASPIRIN 325 MG
TABLET, DELAYED RELEASE (ENTERIC COATED) ORAL
Qty: 90 TABLET | Refills: 0 | Status: SHIPPED | OUTPATIENT
Start: 2024-10-29

## 2024-11-06 NOTE — PROGRESS NOTES
Faxed over OT referral to [facility]   Preventive Care Visit  [facility] SULY Tapia CNP, Family Medicine  Jul 17, 2024  {Provider  Link to [facility] :788678}    {PROVIDER CHARTING PREFERENCE:642476}    Barrera Maurer is a 58 year old, presenting for the following:  Physical        7/17/2024     3:54 PM   Additional Questions   Roomed by Yamileth MONTERO CMA   Accompanied by alone         7/17/2024     3:54 PM   Patient Reported Additional Medications   Patient reports taking the following new medications none        Health Care Directive  Patient does not have a Health Care Directive or Living Will: {ADVANCE_DIRECTIVE_STATUS:081007}    HPI  ***  {MA/LPN/RN Pre-Provider Visit Orders- hCG/UA/Strep (Optional):100862}  {SUPERLIST (Optional):575513}  {additonal problems for provider to add (Optional):426231}      7/17/2024   General Health   How would you rate your overall physical health? (!) FAIR   Feel stress (tense, anxious, or unable to sleep) To some extent      (!) STRESS CONCERN      7/17/2024   Nutrition   Three or more servings of calcium each day? Yes   Diet: Low fat/cholesterol   How many servings of fruit and vegetables per day? (!) 0-1   How many sweetened beverages each day? (!) 3            7/17/2024   Exercise   Days per week of moderate/strenous exercise 5 days   Average minutes spent exercising at this level 20 min            7/17/2024   Social Factors   Frequency of gathering with friends or relatives Twice a week   Worry food won't last until get money to buy more No   Food not last or not have enough money for food? No   Do you have housing? (Housing is defined as stable permanent housing and does not include staying ouside in a car, in a tent, in an abandoned building, in an overnight shelter, or couch-surfing.) Yes   Are you worried about losing your housing? No   Lack of transportation? No   Unable to get utilities (heat,electricity)? No            7/17/2024   Fall Risk   Fallen 2 or more times in the  past year? No   Trouble with walking or balance? No             2024   Dental   Dentist two times every year? Yes             {Rooming Staff Patient needs a PHQ as part of the AWV.  Use this link to complete and then refresh the note to pull results Link to PHQ2 Assessment :612506}  {USE TO PULL IN PHQ RESULTS FOR TODAY:719901}      2024   Substance Use   Alcohol more than 3/day or more than 7/wk No   Do you use any other substances recreationally? No        Social History     Tobacco Use    Smoking status: Former     Current packs/day: 0.00     Average packs/day: 1 pack/day for 25.0 years (25.0 ttl pk-yrs)     Types: Cigarettes     Start date: 10/5/1996     Quit date: 10/5/2021     Years since quittin.7     Passive exposure: Past    Smokeless tobacco: Current    Tobacco comments:     Pt vapes 1 per day without nicotine    Vaping Use    Vaping status: Some Days   Substance Use Topics    Alcohol use: Not Currently     Comment: occasional    Drug use: No     {Provider  If there are gaps in the social history shown above, please follow the link to update and then refresh the note Link to Social and Substance History :923235}      2024   LAST FHS-7 RESULTS   1st degree relative breast or ovarian cancer No   Any relative bilateral breast cancer No   Any male have breast cancer No   Any ONE woman have BOTH breast AND ovarian cancer No   Any woman with breast cancer before 50yrs No   2 or more relatives with breast AND/OR ovarian cancer No   2 or more relatives with breast AND/OR bowel cancer No        {If any of the questions to the FHS7 are answered yes, consider referral for genetic counseling.    Additional indications for genetic referral include personal history of breast or ovarian cancer, genetic mutation in 1st degree relative which increases risk of breast cancer including BRCA1, BRCA2, CARLOTA, PALB 2, TP53, CHEK2, PTEN, CDH1, STK11 (per ACS) and/or 1st degree relative with history of pancreatic  "or high-risk prostate cancer (per NCCN):374165}   {Mammogram Decision Support (Optional):562503}        7/17/2024   STI Screening   New sexual partner(s) since last STI/HIV test? No        History of abnormal Pap smear: { :879844}        Latest Ref Rng & Units 6/23/2021    11:38 AM 6/23/2021    11:15 AM 6/2/2009    12:00 AM   PAP / HPV   PAP (Historical)  NIL   WARNING! - Addended    HPV 16 DNA NEG^Negative  Negative     HPV 18 DNA NEG^Negative  Negative     Other HR HPV NEG^Negative  Negative       ASCVD Risk   The ASCVD Risk score (Zeynep RANGEL, et al., 2019) failed to calculate for the following reasons:    The patient has a prior MI or stroke diagnosis    {Link to Fracture Risk Assessment Tool (Optional):727921}    {Provider  REQUIRED FOR AWV Use the storyboard to review patient history, after sections have been marked as reviewed, refresh note to capture documentation:425427}   Reviewed and updated as needed this visit by Provider                    {HISTORY OPTIONS (Optional):630204}    At this time does not want to have brain surgery.     Aug 31st needs to go back to work. Work will no longer allow to work from home. Was told that had a year until would talk about taking job away    Works at [employer]. Has been there for 25 years. Feels like adderall is working. Has been very absent minded about everything. Will procrasatnate until the last second.     {ROS Picklists (Optional):988535}     Objective    Exam  /72   Pulse 82   Temp 98.5  F (36.9  C) (Temporal)   Resp 12   Ht 1.63 m (5' 4.17\")   Wt 59 kg (130 lb)   LMP 05/20/2021 (Approximate)   SpO2 98%   BMI 22.19 kg/m     Estimated body mass index is 22.19 kg/m  as calculated from the following:    Height as of this encounter: 1.63 m (5' 4.17\").    Weight as of this encounter: 59 kg (130 lb).    Physical Exam  {Exam Choices (Optional):155496}        Signed Electronically by: SULY Melvin CNP  {Email feedback regarding this note to " [email or web address]   :353653}

## 2024-11-08 ENCOUNTER — MYC REFILL (OUTPATIENT)
Dept: FAMILY MEDICINE | Facility: CLINIC | Age: 59
End: 2024-11-08
Payer: COMMERCIAL

## 2024-11-08 DIAGNOSIS — F98.8 ATTENTION DEFICIT DISORDER OF ADULT: ICD-10-CM

## 2024-11-08 RX ORDER — DEXTROAMPHETAMINE SACCHARATE, AMPHETAMINE ASPARTATE, DEXTROAMPHETAMINE SULFATE AND AMPHETAMINE SULFATE 5; 5; 5; 5 MG/1; MG/1; MG/1; MG/1
20 TABLET ORAL DAILY
Qty: 30 TABLET | Refills: 0 | Status: SHIPPED | OUTPATIENT
Start: 2024-11-15

## 2024-11-08 RX ORDER — DEXTROAMPHETAMINE SACCHARATE, AMPHETAMINE ASPARTATE MONOHYDRATE, DEXTROAMPHETAMINE SULFATE AND AMPHETAMINE SULFATE 5; 5; 5; 5 MG/1; MG/1; MG/1; MG/1
20 CAPSULE, EXTENDED RELEASE ORAL DAILY
Qty: 30 CAPSULE | Refills: 0 | Status: SHIPPED | OUTPATIENT
Start: 2024-11-16

## 2024-11-20 DIAGNOSIS — I10 ESSENTIAL HYPERTENSION WITH GOAL BLOOD PRESSURE LESS THAN 140/90: ICD-10-CM

## 2024-11-20 RX ORDER — LOSARTAN POTASSIUM 100 MG/1
100 TABLET ORAL DAILY
Qty: 90 TABLET | Refills: 0 | Status: SHIPPED | OUTPATIENT
Start: 2024-11-20

## 2024-11-21 ENCOUNTER — TELEPHONE (OUTPATIENT)
Dept: NEUROLOGY | Facility: CLINIC | Age: 59
End: 2024-11-21
Payer: COMMERCIAL

## 2024-11-21 ENCOUNTER — DOCUMENTATION ONLY (OUTPATIENT)
Dept: RADIOLOGY | Facility: CLINIC | Age: 59
End: 2024-11-21
Payer: COMMERCIAL

## 2024-11-21 NOTE — CONFIDENTIAL NOTE
Faxed signed [employer] Disability form to [employer] at fax# [phone] per DARIANA MAJANO

## 2024-11-21 NOTE — TELEPHONE ENCOUNTER
RN called patient to discuss condition. Received new paperwork from the [facility] and wanted to see if things have improved or if she will require the same restrictions. Patient states she was laid off due to her company no longer accepting her work from home restrictions. She is currently without insurance and is looking into private insurance. She is still requesting that we fill out her paperwork as it may buy her a few more days of getting things straightened out. Patient states this is also why she has not scheduled follow-up with Dr. Freitas (she was due 11/2024) or carotid ultrasound. She is aware that she is due but needs to figure out insurance piece before proceeding. She is applying also for social security disability at this time. RN to fill out paperwork and fax back to [employer]. RN sent message to financial navigation team to reach out to patient per her request. She has our contact information for further questions and concerns.   Marlin Leon RN 11/21/2024 12:47 PM    Paperwork filled out and requested the navigation team to fax back to the [facility].  Marlin Leon RN 11/21/2024 1:06 PM

## 2024-11-25 ENCOUNTER — TELEPHONE (OUTPATIENT)
Dept: FAMILY MEDICINE | Facility: CLINIC | Age: 59
End: 2024-11-25

## 2024-11-25 NOTE — TELEPHONE ENCOUNTER
Forms received from: [employer] [facility]   Phone number listed:    Fax listed: [phone]  Date received: 11/25/2024  Form description: attending physician's statement progress report  Once forms are completed, please return to the [employer] [facility] via fax.  Form placed: in provider in joie Segura

## 2024-11-26 ENCOUNTER — TELEPHONE (OUTPATIENT)
Dept: RHEUMATOLOGY | Facility: CLINIC | Age: 59
End: 2024-11-26

## 2024-11-26 NOTE — TELEPHONE ENCOUNTER
Received [place] form via right fax. Placed into forms bin to be completed and then signed by provider.    Svitlana Ornelas, CMA

## 2024-12-04 DIAGNOSIS — I70.209 ATHEROSCLEROSIS OF ARTERIES OF EXTREMITIES (H): ICD-10-CM

## 2024-12-04 DIAGNOSIS — I63.231 CEREBROVASCULAR ACCIDENT (CVA) DUE TO STENOSIS OF RIGHT CAROTID ARTERY (H): ICD-10-CM

## 2024-12-05 RX ORDER — ATORVASTATIN CALCIUM 40 MG/1
40 TABLET, FILM COATED ORAL DAILY
Qty: 90 TABLET | Refills: 1 | Status: SHIPPED | OUTPATIENT
Start: 2024-12-05

## 2024-12-13 ENCOUNTER — MYC MEDICAL ADVICE (OUTPATIENT)
Dept: RHEUMATOLOGY | Facility: CLINIC | Age: 59
End: 2024-12-13

## 2024-12-31 ENCOUNTER — TELEPHONE (OUTPATIENT)
Dept: NEUROSURGERY | Facility: CLINIC | Age: 59
End: 2024-12-31

## 2025-01-03 NOTE — PROGRESS NOTES
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    Outside records have been reviewed from the [facility].  She was being followed for history of RIGHT carotid steonsis and history of CVA.  On a recent MRI done on 1/26/22 an incidental finding of a mass in the nasopharynx was found, as described:  3. 1.5 cm masslike/nodular configuration of the midline nasopharyngeal adenoidal tissue   is somewhat unusual for a patient of this age and may warrant direct endoscopic   evaluation.     She denies any nasal symptoms.  No recent changes in nasal airway, sinus health, or sense of smell.  No new symptoms of headache or visual disturbances.    Past Medical History -   Patient Active Problem List   Diagnosis     Tobacco use disorder     Seasonal allergic rhinitis     Attention deficit disorder of adult     Rosacea     Raynaud phenomenon     Essential hypertension with goal blood pressure less than 140/90     Lipoma of right axilla     Lipoma of neck     Paresthesia     Nicotine abuse     Irregular periods/menstrual cycles     Cerebrovascular accident (CVA) due to stenosis of right carotid artery (H)     Cerebrovascular accident (CVA), unspecified mechanism (H)     Rheumatoid arthritis involving both hands with positive rheumatoid factor (H)     Stenosis of right carotid artery       Current Medications -   Current Outpatient Medications:      amLODIPine (NORVASC) 5 MG tablet, TAKE 1 TABLET(5 MG) BY MOUTH DAILY, Disp: 90 tablet, Rfl: 1     amphetamine-dextroamphetamine (ADDERALL XR) 20 MG 24 hr capsule, Take 1 capsule (20 mg) by mouth daily, Disp: 30 capsule, Rfl: 0     amphetamine-dextroamphetamine (ADDERALL) 20 MG tablet, Take 1 tablet (20 mg) by mouth daily Take in the early afternoon. Needs appointment for further refills., Disp: 30 tablet, Rfl: 0      aspirin (ASA) 325 MG tablet, Take 1 tablet (325 mg) by mouth daily, Disp: 90 tablet, Rfl: 3     atorvastatin (LIPITOR) 20 MG tablet, Take 1 tablet (20 mg) by mouth daily, Disp: 90 tablet, Rfl: 1     clopidogrel (PLAVIX) 75 MG tablet, Begin taking 1 tab daily 7 days prior to procedure, Disp: 30 tablet, Rfl: 3     losartan (COZAAR) 100 MG tablet, Take 1 tablet (100 mg) by mouth daily, Disp: 90 tablet, Rfl: 3     methotrexate 2.5 MG tablet, Take 6 tablets (15 mg) by mouth every 7 days, Disp: 24 tablet, Rfl: 3     metroNIDAZOLE (METROGEL) 0.75 % external gel, Apply 1 g topically 2 times daily (Patient taking differently: Apply 1 applicator topically once as needed ), Disp: 45 g, Rfl: 0     nicotine (NICORETTE) 2 MG gum, Place 1 each (2 mg) inside cheek every hour as needed for smoking cessation (Patient not taking: Reported on 2021), Disp: 50 each, Rfl: 2    Allergies - No Known Allergies    Social History -   Social History     Socioeconomic History     Marital status: Single     Spouse name: Not on file     Number of children: Not on file     Years of education: Not on file     Highest education level: Not on file   Occupational History     Not on file   Tobacco Use     Smoking status: Former Smoker     Packs/day: 1.00     Years: 10.00     Pack years: 10.00     Types: Cigarettes     Quit date: 10/5/2021     Years since quittin.3     Smokeless tobacco: Never Used   Vaping Use     Vaping Use: Some days   Substance and Sexual Activity     Alcohol use: Not Currently     Comment: occasional     Drug use: No     Sexual activity: Yes     Partners: Male     Birth control/protection: Female Surgical     Comment: tubal   Other Topics Concern     Parent/sibling w/ CABG, MI or angioplasty before 65F 55M? No   Social History Narrative     Not on file     Social Determinants of Health     Financial Resource Strain: Not on file   Food Insecurity: Not on file   Transportation Needs: Not on file   Physical Activity: Not on  file   Stress: Not on file   Social Connections: Not on file   Intimate Partner Violence: Not on file   Housing Stability: Not on file       Family History -   Family History   Problem Relation Age of Onset     Hypertension Mother      Cancer Mother         lung cancer     Other Cancer Mother         Lung     Thyroid Disease Mother      C.A.D. Father         52 at first MI     Prostate Cancer Father         Bladder     Hypertension Other      Thyroid Disease Sister      Thyroid Disease Sister      Diabetes No family hx of      Cerebrovascular Disease No family hx of      Breast Cancer No family hx of      Cancer - colorectal No family hx of        Review of Systems - As per HPI and PMHx, otherwise 10+ system review of the head and neck, and general constitution is negative.    Physical Exam  /80 (BP Location: Right arm, Patient Position: Sitting, Cuff Size: Adult Large)   Pulse 79   Wt 66.2 kg (146 lb)   LMP 05/20/2021 (Approximate)   SpO2 100%   BMI 25.06 kg/m      General - The patient is well nourished and well developed, and appears to have good nutritional status.  Alert and oriented to person and place, answers questions and cooperates with examination appropriately.   Head and Face - Normocephalic and atraumatic, with no gross asymmetry noted of the contour of the facial features.  The facial nerve is intact, with strong symmetric movements.  Voice and Breathing - The patient was breathing comfortably without the use of accessory muscles. There was no wheezing, stridor, or stertor.  The patients voice was clear and strong, and had appropriate pitch and quality.  Ears - The tympanic membranes are normal in appearance, bony landmarks are intact.  No retraction, perforation, or masses.  No fluid or purulence was seen in the external canal or the middle ear. No evidence of infection of the middle ear or external canal, cerumen was normal in appearance.  Eyes - Extraocular movements intact, and the  pupils were reactive to light.  Sclera were not icteric or injected, conjunctiva were pink and moist.  Mouth - Examination of the oral cavity showed pink, healthy oral mucosa. No lesions or ulcerations noted.  The tongue was mobile and midline, and the dentition were in good condition.    Throat - The walls of the oropharynx were smooth, pink, moist, symmetric, and had no lesions or ulcerations.  The tonsillar pillars and soft palate were symmetric.  The uvula was midline on elevation.    Neck - Normal midline excursion of the laryngotracheal complex during swallowing.  Full range of motion on passive movement.  Palpation of the occipital, submental, submandibular, internal jugular chain, and supraclavicular nodes did not demonstrate any abnormal lymph nodes or masses.  The carotid pulse was palpable bilaterally.  Palpation of the thyroid was soft and smooth, with no nodules or goiter appreciated.  The trachea was mobile and midline.      To further evaluate the nasal cavity, I performed rigid nasal endoscopy, and color photographs were taken for the permanent medical record.  I first sprayed the nasal cavity bilaterally with a mix of lidocaine and neosynephrine.  I then began on the left side using a 2.7mm, 30 degree rigid nasal endoscope.  The septum was fairly straight and the nasal airway was open.  No abnormal secretions, purulence, or polyps were noted. The left middle turbinate and middle meatus were clearly visualized and normal in appearance.  Looking up, the olfactory cleft was unobstructed.  Going further back, the sphenoethmoid recess was normal in appearance, with healthy appearing mucosa on the face of the sphenoid.  The nasopharynx was unremarkable, and the eustachian tube opening on this side was unobstructed.  I had a clear view of the mass in question, and it appears to be an adenoid pad.  No ulceration, normal mucosa.    I then turned my attention to the right side.  Once again, the septum was  fairly straight, and the airway was open.  No abnormal secretions, purulence, polyps were noted.  The right middle turbinate and middle meatus were clearly visualized and normal in appearance.  Looking up, the olfactory cleft was unobstructed.  Going further back the right sphenoethmoid recess was normal in appearance, and eustachian tube opening was unobstructed.      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  To be sure, I have asked her to follow up with me in 1-2 months for another look with the scope.   Contraindicated

## 2025-01-04 ENCOUNTER — HEALTH MAINTENANCE LETTER (OUTPATIENT)
Age: 60
End: 2025-01-04

## 2025-01-18 ENCOUNTER — MYC REFILL (OUTPATIENT)
Dept: FAMILY MEDICINE | Facility: CLINIC | Age: 60
End: 2025-01-18

## 2025-01-18 DIAGNOSIS — F98.8 ATTENTION DEFICIT DISORDER OF ADULT: ICD-10-CM

## 2025-01-19 RX ORDER — DEXTROAMPHETAMINE SACCHARATE, AMPHETAMINE ASPARTATE, DEXTROAMPHETAMINE SULFATE AND AMPHETAMINE SULFATE 5; 5; 5; 5 MG/1; MG/1; MG/1; MG/1
20 TABLET ORAL DAILY
Qty: 30 TABLET | Refills: 0 | Status: SHIPPED | OUTPATIENT
Start: 2025-01-19

## 2025-01-19 RX ORDER — DEXTROAMPHETAMINE SACCHARATE, AMPHETAMINE ASPARTATE MONOHYDRATE, DEXTROAMPHETAMINE SULFATE AND AMPHETAMINE SULFATE 5; 5; 5; 5 MG/1; MG/1; MG/1; MG/1
20 CAPSULE, EXTENDED RELEASE ORAL DAILY
Qty: 30 CAPSULE | Refills: 0 | Status: SHIPPED | OUTPATIENT
Start: 2025-01-19

## 2025-01-20 ENCOUNTER — TELEPHONE (OUTPATIENT)
Dept: NEUROSURGERY | Facility: CLINIC | Age: 60
End: 2025-01-20

## 2025-01-20 NOTE — TELEPHONE ENCOUNTER
Left Voicemail (1st Attempt) for the patient to call back and schedule the following:    Appointment type: Rtn vascular neurosurg - in person or virtual  Provider: Dr. Freitas  Return date: After Feb 1st 2025  Specialty phone number: [phone]  Additional appointment(s) needed: U/S prior  Additonal Notes: Carotid stenosis, right/ U/S prior.

## 2025-01-25 DIAGNOSIS — Z12.2 SCREENING FOR LUNG CANCER: Primary | ICD-10-CM

## 2025-02-11 ENCOUNTER — ANCILLARY PROCEDURE (OUTPATIENT)
Dept: CT IMAGING | Facility: CLINIC | Age: 60
End: 2025-02-11
Attending: RADIOLOGY
Payer: COMMERCIAL

## 2025-02-11 ENCOUNTER — OFFICE VISIT (OUTPATIENT)
Dept: NEUROSURGERY | Facility: CLINIC | Age: 60
End: 2025-02-11
Payer: COMMERCIAL

## 2025-02-11 VITALS — HEART RATE: 73 BPM | DIASTOLIC BLOOD PRESSURE: 87 MMHG | SYSTOLIC BLOOD PRESSURE: 154 MMHG | OXYGEN SATURATION: 100 %

## 2025-02-11 DIAGNOSIS — I65.21 CAROTID STENOSIS, RIGHT: Primary | ICD-10-CM

## 2025-02-11 DIAGNOSIS — I65.21 CAROTID STENOSIS, RIGHT: ICD-10-CM

## 2025-02-11 PROCEDURE — 99213 OFFICE O/P EST LOW 20 MIN: CPT | Mod: GC | Performed by: RADIOLOGY

## 2025-02-11 PROCEDURE — 70496 CT ANGIOGRAPHY HEAD: CPT | Mod: GC | Performed by: RADIOLOGY

## 2025-02-11 PROCEDURE — 70498 CT ANGIOGRAPHY NECK: CPT | Mod: GC | Performed by: RADIOLOGY

## 2025-02-11 RX ORDER — IOPAMIDOL 755 MG/ML
70 INJECTION, SOLUTION INTRAVASCULAR ONCE
Status: COMPLETED | OUTPATIENT
Start: 2025-02-11 | End: 2025-02-11

## 2025-02-11 RX ADMIN — IOPAMIDOL 70 ML: 755 INJECTION, SOLUTION INTRAVASCULAR at 10:24

## 2025-02-11 NOTE — PATIENT INSTRUCTIONS
Follow-up with Dr. Freitas in 1 year with a CTA prior to your appointment.    Stroke & Endovascular RN Care Coordinators:    Marlin Leon, RN, BSN  Mindy Pal, RN, CNRN, SCRN    If you have any questions please contact the RN Care Coordinators at [phone], option 1.    After business hours call the  at [phone] and have the Neuro-Interventional Fellow paged.    Thank you for choosing [facility] for your health care needs.

## 2025-02-11 NOTE — LETTER
2/11/2025       RE: Libertad Russell  [address] [facility] [address]     Dear Colleague,    Thank you for referring your patient, Libertad Russell, to the [facility] NEUROSURGERY CLINIC [place] at [facility]. Please see a copy of my visit note below.    Neuro-IR clinic visit    Subjective:: Libertad Russell is a 59 year old female with a past medical history of HTN, smoking and RA. 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 Angiogram demonstrated R ICA pseudo-occlusion. She was treated with a 8x40 mm Precise stent in 2021. There was post-stent R ICA collapse distal to the stent at the communicating segment. The patient subsequently also underwent noninvasive imaging as well as a repeat cerebral angiography at [facility], which showed that the distal right internal carotid artery, about the level of the stent continues to be small in caliber and ended in the ophthalmic artery.     She was last seen in clinic on 11/7/23. She had an U/S on 10/11/23 showing concerns for in stent stenosis of about 70%. When seen last time, she had left hand numbness, and her vision has deteriorated over the last few months.  With respect to her vision, her history is significant for Lasix surgery about 5 years ago.  She has not had evaluation or surgeries for cataracts.     When seen last time, she was doing well but had whooshing sound in her head, intermittent, she can hear it at night while laying down at night. She is able to sleep and it doesn't bother her. It is pulsatile but she isn't sure. She notes that her vision is getting worse over the past  6 months. It is both of her eyes, it is progressively more blurry. She notices it with focusing on close up. No vision loss. She denies any weakness, numbness, speech changes. She reports numbness in her hands bilaterally that is intermittent and  Spoke with pt and he is aware refill sent.   doesn't occur frequently, she can't recall the last time it happened. She reports increased anxiety and notes having nosebleeds recently.    She had a CTA h&n done today showing right ICA in stent stenosis distally ending in the ophthalmic artery again. She also has L V1 stenosis at the origin.       Current Outpatient Medications:      adalimumab-adaz (HYRIMOZ) 40 MG/0.4ML auto-injector kit, Inject 0.4 mLs (40 mg) subcutaneously every 14 days., Disp: 0.8 mL, Rfl: 6     amphetamine-dextroamphetamine (ADDERALL XR) 20 MG 24 hr capsule, Take 1 capsule (20 mg) by mouth daily., Disp: 30 capsule, Rfl: 0     amphetamine-dextroamphetamine (ADDERALL) 20 MG tablet, Take 1 tablet (20 mg) by mouth daily., Disp: 30 tablet, Rfl: 0     aspirin (ASA) 325 MG EC tablet, TAKE 1 TABLET(325 MG) BY MOUTH DAILY, Disp: 90 tablet, Rfl: 0     atorvastatin (LIPITOR) 40 MG tablet, TAKE 1 TABLET(40 MG) BY MOUTH DAILY, Disp: 90 tablet, Rfl: 1     folic acid (FOLVITE) 1 MG tablet, TAKE 1 TABLET(1 MG) BY MOUTH DAILY, Disp: 90 tablet, Rfl: 0     losartan (COZAAR) 100 MG tablet, TAKE 1 TABLET(100 MG) BY MOUTH DAILY, Disp: 90 tablet, Rfl: 0     methotrexate 2.5 MG tablet, Take 10 tablets (25 mg) by mouth every 7 days. Labs every 8 - 12 weeks for refills., Disp: 40 tablet, Rfl: 6     olopatadine (PATADAY) 0.2 % ophthalmic solution, Place 0.05 mLs (1 drop) into both eyes daily, Disp: 2.5 mL, Rfl: 1     triamcinolone (KENALOG) 0.1 % external cream, Apply topically 2 times daily, Disp: 45 g, Rfl: 1  No current facility-administered medications for this visit.    Facility-Administered Medications Ordered in Other Visits:      sodium chloride (PF) 0.9% PF flush 72 mL, 72 mL, Intravenous, Once, Bartolo Garcia MD     BP (!) 179/75 (BP Location: Right arm, Patient Position: Sitting, Cuff Size: Adult Regular)   Pulse 73   LMP 05/20/2021 (Approximate)   SpO2 100%      A&o x4  Speech without aphasia or dysarthria  CN- EOMI, VF intact, face symmetric,  hearing intact to conversation, tongue midline  Motor- antigravity throughout  Sensation- intact to LT  Coordination- finger to nose without ataxia  Gait- normal    Assessment/ Plan-  Libertad Russell is a 59 year old female with a past medical history of HTN, smoking and RA, who was found to have R centrum semiovale chronic stroke and angiogram revealed R ICA pseudo-occlusion for which she was treated with 8x40mm Precise stent in 2021, followed by post-stent R ICA collapse distal to the stent. Most recently she had a CTA h&n done today showing right ICA in stent stenosis distally ending in the ophthalmic artery again. She also has L V1 stenosis at the origin. She continues to be asymptomatic and is doing well overall.  - cont aspirin and statin as before  - recommended follow up with PCP regarding recent nosebleeds and anxiety  - repeat CTA H&n in 1 year followed by clinic visit    Pt seen and discussed with Dr. Freitas.  Regina Enriquez MD  Fellow, Endovascular Surgical Neuroradiology    Attestation signed by Juan Carlos Freitas MD at 2/15/2025 10:03 AM:  I have personally seen and evaluated the patient on 2/11/25  and I agree with the note by Dr. Enriquez                On February 15, 2025      Again, thank you for allowing me to participate in the care of your patient.      Sincerely,    Juan Carlos Freitas MD

## 2025-02-11 NOTE — DISCHARGE INSTRUCTIONS

## 2025-02-11 NOTE — NURSING NOTE
Chief Complaint   Patient presents with    RECHECK     Return vascular neurosurg     Suhail Torres

## 2025-02-11 NOTE — PROGRESS NOTES
Neuro-IR clinic visit    Subjective:: Libertad Russell is a 59 year old female with a past medical history of HTN, smoking and RA. 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 Angiogram demonstrated R ICA pseudo-occlusion. She was treated with a 8x40 mm Precise stent in 2021. There was post-stent R ICA collapse distal to the stent at the communicating segment. The patient subsequently also underwent noninvasive imaging as well as a repeat cerebral angiography at [facility], which showed that the distal right internal carotid artery, about the level of the stent continues to be small in caliber and ended in the ophthalmic artery.     She was last seen in clinic on 11/7/23. She had an U/S on 10/11/23 showing concerns for in stent stenosis of about 70%. When seen last time, she had left hand numbness, and her vision has deteriorated over the last few months.  With respect to her vision, her history is significant for Lasix surgery about 5 years ago.  She has not had evaluation or surgeries for cataracts.     When seen last time, she was doing well but had whooshing sound in her head, intermittent, she can hear it at night while laying down at night. She is able to sleep and it doesn't bother her. It is pulsatile but she isn't sure. She notes that her vision is getting worse over the past  6 months. It is both of her eyes, it is progressively more blurry. She notices it with focusing on close up. No vision loss. She denies any weakness, numbness, speech changes. She reports numbness in her hands bilaterally that is intermittent and doesn't occur frequently, she can't recall the last time it happened. She reports increased anxiety and notes having nosebleeds recently.    She had a CTA h&n done today showing right ICA in stent stenosis distally ending in the ophthalmic artery again. She also has L V1 stenosis at the origin.       Current Outpatient  Medications:     adalimumab-adaz (HYRIMOZ) 40 MG/0.4ML auto-injector kit, Inject 0.4 mLs (40 mg) subcutaneously every 14 days., Disp: 0.8 mL, Rfl: 6    amphetamine-dextroamphetamine (ADDERALL XR) 20 MG 24 hr capsule, Take 1 capsule (20 mg) by mouth daily., Disp: 30 capsule, Rfl: 0    amphetamine-dextroamphetamine (ADDERALL) 20 MG tablet, Take 1 tablet (20 mg) by mouth daily., Disp: 30 tablet, Rfl: 0    aspirin (ASA) 325 MG EC tablet, TAKE 1 TABLET(325 MG) BY MOUTH DAILY, Disp: 90 tablet, Rfl: 0    atorvastatin (LIPITOR) 40 MG tablet, TAKE 1 TABLET(40 MG) BY MOUTH DAILY, Disp: 90 tablet, Rfl: 1    folic acid (FOLVITE) 1 MG tablet, TAKE 1 TABLET(1 MG) BY MOUTH DAILY, Disp: 90 tablet, Rfl: 0    losartan (COZAAR) 100 MG tablet, TAKE 1 TABLET(100 MG) BY MOUTH DAILY, Disp: 90 tablet, Rfl: 0    methotrexate 2.5 MG tablet, Take 10 tablets (25 mg) by mouth every 7 days. Labs every 8 - 12 weeks for refills., Disp: 40 tablet, Rfl: 6    olopatadine (PATADAY) 0.2 % ophthalmic solution, Place 0.05 mLs (1 drop) into both eyes daily, Disp: 2.5 mL, Rfl: 1    triamcinolone (KENALOG) 0.1 % external cream, Apply topically 2 times daily, Disp: 45 g, Rfl: 1  No current facility-administered medications for this visit.    Facility-Administered Medications Ordered in Other Visits:     sodium chloride (PF) 0.9% PF flush 72 mL, 72 mL, Intravenous, Once, Bartolo Garcia MD     BP (!) 179/75 (BP Location: Right arm, Patient Position: Sitting, Cuff Size: Adult Regular)   Pulse 73   LMP 05/20/2021 (Approximate)   SpO2 100%      A&o x4  Speech without aphasia or dysarthria  CN- EOMI, VF intact, face symmetric, hearing intact to conversation, tongue midline  Motor- antigravity throughout  Sensation- intact to LT  Coordination- finger to nose without ataxia  Gait- normal    Assessment/ Plan-  Libertad MONICA Russell is a 59 year old female with a past medical history of HTN, smoking and RA, who was found to have R centrum semiovale chronic stroke  and angiogram revealed R ICA pseudo-occlusion for which she was treated with 8x40mm Precise stent in 2021, followed by post-stent R ICA collapse distal to the stent. Most recently she had a CTA h&n done today showing right ICA in stent stenosis distally ending in the ophthalmic artery again. She also has L V1 stenosis at the origin. She continues to be asymptomatic and is doing well overall.  - cont aspirin and statin as before  - recommended follow up with PCP regarding recent nosebleeds and anxiety  - repeat CTA H&n in 1 year followed by clinic visit    Pt seen and discussed with Dr. Freitas.  Regina Enriquez MD  Fellow, Endovascular Surgical Neuroradiology

## 2025-02-15 ENCOUNTER — MYC REFILL (OUTPATIENT)
Dept: FAMILY MEDICINE | Facility: CLINIC | Age: 60
End: 2025-02-15
Payer: COMMERCIAL

## 2025-02-15 DIAGNOSIS — F98.8 ATTENTION DEFICIT DISORDER OF ADULT: ICD-10-CM

## 2025-02-17 RX ORDER — DEXTROAMPHETAMINE SACCHARATE, AMPHETAMINE ASPARTATE, DEXTROAMPHETAMINE SULFATE AND AMPHETAMINE SULFATE 5; 5; 5; 5 MG/1; MG/1; MG/1; MG/1
20 TABLET ORAL DAILY
Qty: 30 TABLET | Refills: 0 | Status: SHIPPED | OUTPATIENT
Start: 2025-02-17

## 2025-02-17 RX ORDER — DEXTROAMPHETAMINE SACCHARATE, AMPHETAMINE ASPARTATE MONOHYDRATE, DEXTROAMPHETAMINE SULFATE AND AMPHETAMINE SULFATE 5; 5; 5; 5 MG/1; MG/1; MG/1; MG/1
20 CAPSULE, EXTENDED RELEASE ORAL DAILY
Qty: 30 CAPSULE | Refills: 0 | Status: SHIPPED | OUTPATIENT
Start: 2025-02-17

## 2025-02-19 ENCOUNTER — OFFICE VISIT (OUTPATIENT)
Dept: FAMILY MEDICINE | Facility: CLINIC | Age: 60
End: 2025-02-19
Payer: COMMERCIAL

## 2025-02-19 ENCOUNTER — ORDERS ONLY (AUTO-RELEASED) (OUTPATIENT)
Dept: FAMILY MEDICINE | Facility: CLINIC | Age: 60
End: 2025-02-19

## 2025-02-19 VITALS
OXYGEN SATURATION: 100 % | BODY MASS INDEX: 23.46 KG/M2 | HEART RATE: 74 BPM | TEMPERATURE: 97.8 F | SYSTOLIC BLOOD PRESSURE: 132 MMHG | DIASTOLIC BLOOD PRESSURE: 88 MMHG | WEIGHT: 137.4 LBS | RESPIRATION RATE: 16 BRPM | HEIGHT: 64 IN

## 2025-02-19 DIAGNOSIS — Z12.11 SCREEN FOR COLON CANCER: ICD-10-CM

## 2025-02-19 DIAGNOSIS — R73.03 PREDIABETES: ICD-10-CM

## 2025-02-19 DIAGNOSIS — I63.231 CEREBROVASCULAR ACCIDENT (CVA) DUE TO STENOSIS OF RIGHT CAROTID ARTERY (H): ICD-10-CM

## 2025-02-19 DIAGNOSIS — F98.8 ATTENTION DEFICIT DISORDER OF ADULT: ICD-10-CM

## 2025-02-19 DIAGNOSIS — I10 ESSENTIAL HYPERTENSION WITH GOAL BLOOD PRESSURE LESS THAN 140/90: ICD-10-CM

## 2025-02-19 DIAGNOSIS — M05.741 RHEUMATOID ARTHRITIS INVOLVING BOTH HANDS WITH POSITIVE RHEUMATOID FACTOR (H): ICD-10-CM

## 2025-02-19 DIAGNOSIS — Z01.84 IMMUNITY STATUS TESTING: ICD-10-CM

## 2025-02-19 DIAGNOSIS — I70.209 ATHEROSCLEROSIS OF ARTERIES OF EXTREMITIES: ICD-10-CM

## 2025-02-19 DIAGNOSIS — M05.742 RHEUMATOID ARTHRITIS INVOLVING BOTH HANDS WITH POSITIVE RHEUMATOID FACTOR (H): ICD-10-CM

## 2025-02-19 DIAGNOSIS — Z00.00 ROUTINE GENERAL MEDICAL EXAMINATION AT A HEALTH CARE FACILITY: Primary | ICD-10-CM

## 2025-02-19 DIAGNOSIS — I65.29 STENOSIS OF CAROTID ARTERY, UNSPECIFIED LATERALITY: ICD-10-CM

## 2025-02-19 DIAGNOSIS — F41.9 ANXIETY: ICD-10-CM

## 2025-02-19 LAB
CHOLEST SERPL-MCNC: 128 MG/DL
EST. AVERAGE GLUCOSE BLD GHB EST-MCNC: 126 MG/DL
FASTING STATUS PATIENT QL REPORTED: ABNORMAL
FASTING STATUS PATIENT QL REPORTED: NORMAL
GLUCOSE SERPL-MCNC: 102 MG/DL (ref 70–99)
HBA1C MFR BLD: 6 % (ref 0–5.6)
HDLC SERPL-MCNC: 56 MG/DL
LDLC SERPL CALC-MCNC: 57 MG/DL
NONHDLC SERPL-MCNC: 72 MG/DL
TRIGL SERPL-MCNC: 74 MG/DL

## 2025-02-19 RX ORDER — LOSARTAN POTASSIUM 100 MG/1
100 TABLET ORAL DAILY
Qty: 90 TABLET | Refills: 3 | Status: SHIPPED | OUTPATIENT
Start: 2025-02-19

## 2025-02-19 RX ORDER — ASPIRIN 325 MG
325 TABLET, DELAYED RELEASE (ENTERIC COATED) ORAL DAILY
Qty: 90 TABLET | Refills: 3 | Status: SHIPPED | OUTPATIENT
Start: 2025-02-19

## 2025-02-19 RX ORDER — ATORVASTATIN CALCIUM 40 MG/1
40 TABLET, FILM COATED ORAL DAILY
Qty: 90 TABLET | Refills: 3 | Status: SHIPPED | OUTPATIENT
Start: 2025-02-19

## 2025-02-19 RX ORDER — BUPROPION HYDROCHLORIDE 150 MG/1
150 TABLET ORAL EVERY MORNING
Qty: 90 TABLET | Refills: 0 | Status: SHIPPED | OUTPATIENT
Start: 2025-02-19

## 2025-02-19 SDOH — HEALTH STABILITY: PHYSICAL HEALTH: ON AVERAGE, HOW MANY DAYS PER WEEK DO YOU ENGAGE IN MODERATE TO STRENUOUS EXERCISE (LIKE A BRISK WALK)?: 3 DAYS

## 2025-02-19 SDOH — HEALTH STABILITY: PHYSICAL HEALTH: ON AVERAGE, HOW MANY MINUTES DO YOU ENGAGE IN EXERCISE AT THIS LEVEL?: 20 MIN

## 2025-02-19 ASSESSMENT — SOCIAL DETERMINANTS OF HEALTH (SDOH): HOW OFTEN DO YOU GET TOGETHER WITH FRIENDS OR RELATIVES?: TWICE A WEEK

## 2025-02-19 ASSESSMENT — PAIN SCALES - GENERAL: PAINLEVEL_OUTOF10: MODERATE PAIN (4)

## 2025-02-19 NOTE — PROGRESS NOTES
Preventive Care Visit  [facility] SULY Tapia CNP, Family Medicine  Feb 19, 2025      Assessment & Plan     Stenosis of carotid artery, unspecified laterality  Previous imaging and neurology notes reviewed.  Plan to continue aspirin indefinitely.  Continue to follow routinely with neurology.  - aspirin (ASA) 325 MG EC tablet; Take 1 tablet (325 mg) by mouth daily.    Atherosclerosis of arteries of extremities  Will recheck lipids here today.  Tolerating atorvastatin well.  Congratulated on continued abstinence from smoking.  - atorvastatin (LIPITOR) 40 MG tablet; Take 1 tablet (40 mg) by mouth daily.  - Lipid panel reflex to direct LDL Fasting; Future  - Lipid panel reflex to direct LDL Fasting    Cerebrovascular accident (CVA) due to stenosis of right carotid artery (H)  Fully recovered-currently without symptoms.  Continue aspirin indefinitely.  Be active.  Remain tobacco free.  Continue atorvastatin.  Continue to follow with neurology.  - atorvastatin (LIPITOR) 40 MG tablet; Take 1 tablet (40 mg) by mouth daily.    Essential hypertension with goal blood pressure less than 140/90  Stable-blood pressure well-controlled today in clinic.  Per neurosurgery needs to continue to have above normal blood pressure 1 30-1 40 systolic and stay well-hydrated to support collateral blood  flow to right internal carotid artery.  Higher blood pressure will help to prevent further watershed infarcts.  Refill sent.  Previous labs reviewed.  Follow-up in 1 year  - losartan (COZAAR) 100 MG tablet; Take 1 tablet (100 mg) by mouth daily.    Prediabetes  Will recheck lab here today.  - Hemoglobin A1c; Future  - Glucose; Future  - Hemoglobin A1c  - Glucose    Immunity status testing  Check immunity status here today.  If is not immune would recommend receiving vaccine series.  - Hepatitis A Antibody Total; Future  - Hepatitis B Surface Antibody; Future  - Hepatitis A Antibody Total  - Hepatitis B Surface  Antibody    Rheumatoid arthritis involving both hands with positive rheumatoid factor (H)  Most recent rheumatology note reviewed.  Continue to follow closely with rheumatologist.  - Adult Rheumatology  Referral; Future    Screen for colon cancer  - K[employer]HIK[employer]); Future    Anxiety  Treatment plan and medications reviewed and understood by the patient   Risks, benefits and potential side effects (including sexual dysfunction and suicidal thoughs) of antidepressant/antianxiety medication reviewed with patient  Reviewed the importance of medication compliance  Instructed to call or return with:  Worsening symptoms  Suicidal/homicidial ideation  Hallucinations or Delusions  Medication side effects   Plan to follow up in 2 months  - buPROPion (WELLBUTRIN XL) 150 MG 24 hr tablet; Take 1 tablet (150 mg) by mouth every morning.    Routine general medical examination at a health care facility  Screening guidelines reviewed.   - Adult Eye  Referral; Future    Attention deficit disorder of adult  Controlled substance agreement up-to-date.   reviewed and consistent.  Doing well on current dose of Adderall.  Will send refill when needed.  Plan to follow-up in 6 months.    The longitudinal plan of care for the diagnosis(es)/condition(s) as documented were addressed during this visit. Due to the added complexity in care, I will continue to support Libertad in the subsequent management and with ongoing continuity of care.     Counseling  Appropriate preventive services were addressed with this patient via screening, questionnaire, or discussion as appropriate for fall prevention, nutrition, physical activity, Tobacco-use cessation, social engagement, weight loss and cognition.  Checklist reviewing preventive services available has been given to the patient.  Reviewed patient's diet, addressing concerns and/or questions.   She is at risk for lack of exercise and has been provided with information to  increase physical activity for the benefit of her well-being.   She is at risk for psychosocial distress and has been provided with information to reduce risk.         Barrera Maurer is a 59 year old, presenting for the following:  Forms (Disabiltiy - patient will mychart forms to provider. ) and Physical        2/19/2025     2:16 PM   Additional Questions   Roomed by JOEL Lu   Accompanied by n/a         2/19/2025     2:16 PM   Patient Reported Additional Medications   Patient reports taking the following new medications n/a          History of Present Illness       Reason for visit:  Annual visit   She is taking medications regularly.      Health Care Directive  Patient does not have a Health Care Directive: Discussed advance care planning with patient; information given to patient to review.      2/19/2025   General Health   How would you rate your overall physical health? (!) POOR   Feel stress (tense, anxious, or unable to sleep) To some extent   (!) STRESS CONCERN      2/19/2025   Nutrition   Three or more servings of calcium each day? Yes   Diet: Regular (no restrictions)   How many servings of fruit and vegetables per day? (!) 0-1   How many sweetened beverages each day? 0-1         2/19/2025   Exercise   Days per week of moderate/strenous exercise 3 days   Average minutes spent exercising at this level 20 min         2/19/2025   Social Factors   Frequency of gathering with friends or relatives Twice a week   Worry food won't last until get money to buy more No   Food not last or not have enough money for food? No   Do you have housing? (Housing is defined as stable permanent housing and does not include staying ouside in a car, in a tent, in an abandoned building, in an overnight shelter, or couch-surfing.) Yes   Are you worried about losing your housing? No   Lack of transportation? No   Unable to get utilities (heat,electricity)? No         2/19/2025   Fall Risk   Fallen 2 or more times in the past  year? No    Trouble with walking or balance? No        Proxy-reported          2/19/2025   Dental   Dentist two times every year? Yes            Today's PHQ-2 Score:       2/19/2025    12:49 PM   PHQ-2 ( 1999 [employer])   Q1: Little interest or pleasure in doing things 1   Q2: Feeling down, depressed or hopeless 1   PHQ-2 Score 2    Q1: Little interest or pleasure in doing things Several days   Q2: Feeling down, depressed or hopeless Several days   PHQ-2 Score 2       Patient-reported           2/19/2025   Substance Use   Alcohol more than 3/day or more than 7/wk No   Do you use any other substances recreationally? No     Social History     Tobacco Use    Smoking status: Former     Current packs/day: 0.00     Average packs/day: 1 pack/day for 25.0 years (25.0 ttl pk-yrs)     Types: Cigarettes     Start date: 10/5/1996     Quit date: 10/5/2021     Years since quitting: 3.3     Passive exposure: Past    Smokeless tobacco: Current    Tobacco comments:     Pt vapes 1 per day without nicotine    Vaping Use    Vaping status: Some Days   Substance Use Topics    Alcohol use: Not Currently     Comment: occasional    Drug use: No           1/29/2024   LAST FHS-7 RESULTS   1st degree relative breast or ovarian cancer No   Any relative bilateral breast cancer No   Any male have breast cancer No   Any ONE woman have BOTH breast AND ovarian cancer No   Any woman with breast cancer before 50yrs No   2 or more relatives with breast AND/OR ovarian cancer No   2 or more relatives with breast AND/OR bowel cancer No                2/19/2025   STI Screening   New sexual partner(s) since last STI/HIV test? No     History of abnormal Pap smear:         Latest Ref Rng & Units 6/23/2021    11:38 AM 6/23/2021    11:15 AM 6/2/2009    12:00 AM   PAP / HPV   PAP (Historical)  NIL   WARNING! - Addended    HPV 16 DNA NEG^Negative  Negative     HPV 18 DNA NEG^Negative  Negative     Other HR HPV NEG^Negative  Negative       ASCVD Risk   The  ASCVD Risk score (Zeynep RANGEL, et al., 2019) failed to calculate for the following reasons:    Risk score cannot be calculated because patient has a medical history suggesting prior/existing ASCVD         Reviewed and updated as needed this visit by Provider                    Here today for annual wellness visit and medication check.  Is not fasting for labs.    Continues to follow closely with neurosurgery and neurologist for carotid stenosis.  Continues to take aspirin.  Is not smoking.  Following new blockage on left side, 40%.  Is very concerned regarding this.  Near complete occlusion of right internal carotid artery, previous stent placed with restent stenosis.  Is not currently having any symptoms.  Is going to be applying for Social Security.  Was terminated from long-term disability due to not being able to provide routine updates from healthcare providers because was without insurance.    Anxiety has been getting worse.  Dreads medical appointments.  Often considers canceling appointments but knows needs to go for health so does not cancel.  The last time had CT scan felt very nauseated and like was going to panic.  Has never taken medication for anxiety in the past but would like to start Wellbutrin.  Feels very tired and fatigued.  Is always procrastinating did not get things done.  Continues to take Adderall.  Does not want to increase dose.  No palpitations or fluttering in chest.  No dry mouth.  No thoughts of harming self or others.  Is sleeping okay at night.    Would like to see a different rheumatologist.  Current rheumatologist is at [facility], would like to see someone closer.  Feels like her arthritis is gradually getting worse    Last Pap: 6/21-normal. Never an abnormal.   Last mammogram: 1/24- No family history of breast cancer. Had one at work 11/20-showed dense tissue.   Last BMD: N/A  Last Colonoscopy:  4/22-cologuard. Normal. Never, no family history of colon cancer.   Last  "eye exam: 2 years ago  Last dental exam: Every 6 mo  Last tetanus vaccine: 2018  Last influenza vaccine: Declines   [facility]   Last pneumonia vaccine: 2021  Last COVID vaccine: Declines wanting.   Last COVID Booster: N/A  Hep C screen: 2021  HIV screen: June 2021-reactive however, confirmatory test negative  AAA screen (age 65-78 with smoking hx): Not applicable  IVD (HTN, Hyperlipid, Smoking): Aspirin 325 daily  Lung CA screening (55-80, 30 pk smoking hx): Smoked 1 ppd for 25 years, Quit smoking 3 year ago.  Occasional vaping       Objective    Exam  /88   Pulse 74   Temp 97.8  F (36.6  C) (Temporal)   Resp 16   Ht 1.626 m (5' 4\")   Wt 62.3 kg (137 lb 6.4 oz)   LMP 05/20/2021 (Approximate)   SpO2 100%   Breastfeeding No   BMI 23.58 kg/m     Estimated body mass index is 23.58 kg/m  as calculated from the following:    Height as of this encounter: 1.626 m (5' 4\").    Weight as of this encounter: 62.3 kg (137 lb 6.4 oz).    Physical Exam  Constitutional:       Appearance: Normal appearance. She is well-developed.   HENT:      Head: Normocephalic and atraumatic.      Right Ear: Tympanic membrane and external ear normal. No middle ear effusion.      Left Ear: Tympanic membrane and external ear normal.  No middle ear effusion.      Nose: No mucosal edema.   Neck:      Thyroid: No thyromegaly.      Vascular: No carotid bruit.   Cardiovascular:      Rate and Rhythm: Normal rate and regular rhythm.      Heart sounds: Normal heart sounds.   Pulmonary:      Effort: Pulmonary effort is normal.      Breath sounds: Normal breath sounds.   Abdominal:      General: Bowel sounds are normal.      Palpations: Abdomen is soft.   Skin:     General: Skin is warm and dry.   Neurological:      Mental Status: She is alert.   Psychiatric:         Behavior: Behavior normal.         Signed Electronically by: SULY Melvin CNP    "

## 2025-02-19 NOTE — PATIENT INSTRUCTIONS
Please go to the pharmacy to receive your shingles vaccine, Shingrix.  It is a series of 2.  You should receive the first vaccine and then get the second vaccine in at least 2 months.  If more time lapses that is okay.  Do see a reaction similar to tetanus where your arm gets red, stiff sometimes warm to touch.  After the second dose it is very common to feel tired and rundown for 24 hours or so. I RECOMMEND GETTING THIS VACCINE ALL BY ITSELF.      Patient Education   Preventive Care Advice   This is general advice given by our system to help you stay healthy. However, your care team may have specific advice just for you. Please talk to your care team about your preventive care needs.  Nutrition  Eat 5 or more servings of fruits and vegetables each day.  Try wheat bread, brown rice and whole grain pasta (instead of white bread, rice, and pasta).  Get enough calcium and vitamin D. Check the label on foods and aim for 100% of the RDA (recommended daily allowance).  Lifestyle  Exercise at least 150 minutes each week  (30 minutes a day, 5 days a week).  Do muscle strengthening activities 2 days a week. These help control your weight and prevent disease.  No smoking.  Wear sunscreen to prevent skin cancer.  Have a dental exam and cleaning every 6 months.  Yearly exams  See your health care team every year to talk about:  Any changes in your health.  Any medicines your care team has prescribed.  Preventive care, family planning, and ways to prevent chronic diseases.  Shots (vaccines)   HPV shots (up to age 26), if you've never had them before.  Hepatitis B shots (up to age 59), if you've never had them before.  COVID-19 shot: Get this shot when it's due.  Flu shot: Get a flu shot every year.  Tetanus shot: Get a tetanus shot every 10 years.  Pneumococcal, hepatitis A, and RSV shots: Ask your care team if you need these based on your risk.  Shingles shot (for age 50 and up)  General health tests  Diabetes  screening:  Starting at age 35, Get screened for diabetes at least every 3 years.  If you are younger than age 35, ask your care team if you should be screened for diabetes.  Cholesterol test: At age 39, start having a cholesterol test every 5 years, or more often if advised.  Bone density scan (DEXA): At age 50, ask your care team if you should have this scan for osteoporosis (brittle bones).  Hepatitis C: Get tested at least once in your life.  STIs (sexually transmitted infections)  Before age 24: Ask your care team if you should be screened for STIs.  After age 24: Get screened for STIs if you're at risk. You are at risk for STIs (including HIV) if:  You are sexually active with more than one person.  You don't use condoms every time.  You or a partner was diagnosed with a sexually transmitted infection.  If you are at risk for HIV, ask about PrEP medicine to prevent HIV.  Get tested for HIV at least once in your life, whether you are at risk for HIV or not.  Cancer screening tests  Cervical cancer screening: If you have a cervix, begin getting regular cervical cancer screening tests starting at age 21.  Breast cancer scan (mammogram): If you've ever had breasts, begin having regular mammograms starting at age 40. This is a scan to check for breast cancer.  Colon cancer screening: It is important to start screening for colon cancer at age 45.  Have a colonoscopy test every 10 years (or more often if you're at risk) Or, ask your provider about stool tests like a FIT test every year or Cologuard test every 3 years.  To learn more about your testing options, visit:   .  For help making a decision, visit:   https://bit.ly/eh39140.  Prostate cancer screening test: If you have a prostate, ask your care team if a prostate cancer screening test (PSA) at age 55 is right for you.  Lung cancer screening: If you are a current or former smoker ages 50 to 80, ask your care team if ongoing lung cancer screenings are right for  you.  For informational purposes only. Not to replace the advice of your health care provider. Copyright   2023 [facility]. All rights reserved. Clinically reviewed by the [facility] Transitions Program. ugichem [id number] - REV 01/24.  Learning About Stress  What is stress?     Stress is your body's response to a hard situation. Your body can have a physical, emotional, or mental response. Stress is a fact of life for most people, and it affects everyone differently. What causes stress for you may not be stressful for someone else.  A lot of things can cause stress. You may feel stress when you go on a job interview, take a test, or run a race. This kind of short-term stress is normal and even useful. It can help you if you need to work hard or react quickly. For example, stress can help you finish an important job on time.  Long-term stress is caused by ongoing stressful situations or events. Examples of long-term stress include long-term health problems, ongoing problems at work, or conflicts in your family. Long-term stress can harm your health.  How does stress affect your health?  When you are stressed, your body responds as though you are in danger. It makes hormones that speed up your heart, make you breathe faster, and give you a burst of energy. This is called the fight-or-flight stress response. If the stress is over quickly, your body goes back to normal and no harm is done.  But if stress happens too often or lasts too long, it can have bad effects. Long-term stress can make you more likely to get sick, and it can make symptoms of some diseases worse. If you tense up when you are stressed, you may develop neck, shoulder, or low back pain. Stress is linked to high blood pressure and heart disease.  Stress also harms your emotional health. It can make you berg, tense, or depressed. Your relationships may suffer, and you may not do well at work or school.  What can you do to manage  stress?  You can try these things to help manage stress:   Do something active. Exercise or activity can help reduce stress. Walking is a great way to get started. Even everyday activities such as housecleaning or yard work can help.  Try yoga or luis manuel chi. These techniques combine exercise and meditation. You may need some training at first to learn them.  Do something you enjoy. For example, listen to music or go to a movie. Practice your hobby or do volunteer work.  Meditate. This can help you relax, because you are not worrying about what happened before or what may happen in the future.  Do guided imagery. Imagine yourself in any setting that helps you feel calm. You can use online videos, books, or a teacher to guide you.  Do breathing exercises. For example:  From a standing position, bend forward from the waist with your knees slightly bent. Let your arms dangle close to the floor.  Breathe in slowly and deeply as you return to a standing position. Roll up slowly and lift your head last.  Hold your breath for just a few seconds in the standing position.  Breathe out slowly and bend forward from the waist.  Let your feelings out. Talk, laugh, cry, and express anger when you need to. Talking with supportive friends or family, a counselor, or a levar leader about your feelings is a healthy way to relieve stress. Avoid discussing your feelings with people who make you feel worse.  Write. It may help to write about things that are bothering you. This helps you find out how much stress you feel and what is causing it. When you know this, you can find better ways to cope.  What can you do to prevent stress?  You might try some of these things to help prevent stress:  Manage your time. This helps you find time to do the things you want and need to do.  Get enough sleep. Your body recovers from the stresses of the day while you are sleeping.  Get support. Your family, friends, and community can make a difference in how  "you experience stress.  Limit your news feed. Avoid or limit time on social media or news that may make you feel stressed.  Do something active. Exercise or activity can help reduce stress. Walking is a great way to get started.  Where can you learn more?  Go to https://www.[facility].net/patiented  Enter N032 in the search box to learn more about \"Learning About Stress.\"  Current as of: October 24, 2023  Content Version: 14.3    2024 [employer].   Care instructions adapted under license by your healthcare professional. If you have questions about a medical condition or this instruction, always ask your healthcare professional. [employer] disclaims any warranty or liability for your use of this information.       "

## 2025-02-20 DIAGNOSIS — R73.03 PREDIABETES: Primary | ICD-10-CM

## 2025-02-20 LAB
HAV AB SER QL IA: NONREACTIVE
HBV SURFACE AB SERPL IA-ACNC: <3.5 M[IU]/ML
HBV SURFACE AB SERPL IA-ACNC: NONREACTIVE M[IU]/ML

## 2025-03-05 ENCOUNTER — MYC MEDICAL ADVICE (OUTPATIENT)
Dept: FAMILY MEDICINE | Facility: CLINIC | Age: 60
End: 2025-03-05

## 2025-03-05 DIAGNOSIS — M05.741 RHEUMATOID ARTHRITIS INVOLVING BOTH HANDS WITH POSITIVE RHEUMATOID FACTOR (H): Primary | ICD-10-CM

## 2025-03-05 DIAGNOSIS — M05.742 RHEUMATOID ARTHRITIS INVOLVING BOTH HANDS WITH POSITIVE RHEUMATOID FACTOR (H): Primary | ICD-10-CM

## 2025-03-06 ENCOUNTER — MYC MEDICAL ADVICE (OUTPATIENT)
Dept: NEUROSURGERY | Facility: CLINIC | Age: 60
End: 2025-03-06
Payer: COMMERCIAL

## 2025-03-06 NOTE — TELEPHONE ENCOUNTER
RN completed and faxed paperwork to [facility] at [phone] and faxed to HIM to be scanned into chart.   Marlin Leon RN 3/6/2025 3:34 PM

## 2025-03-11 ENCOUNTER — TELEPHONE (OUTPATIENT)
Dept: RHEUMATOLOGY | Facility: CLINIC | Age: 60
End: 2025-03-11
Payer: COMMERCIAL

## 2025-03-11 NOTE — TELEPHONE ENCOUNTER
Patient called back.  She does not want to change providers.  She called as she recently got new insurance and wanted to know if we were still in network.  Patient advised to call and discuss with billing.  She states she already did and is waiting a return call.     Jenniffer Costello RN

## 2025-03-11 NOTE — TELEPHONE ENCOUNTER
[facility] Call Center    Phone Message    May a detailed message be left on voicemail: yes     Reason for Call: Other: .     Patient is wanting to switch to a different provider that is closer to her home. Patient was being seen by Dr. Odom. Patient states she is also wanting to be seen by another provider with more availability too. Patient is wanting to get a call back when the nurses are done reviewing. Please advise.       Action Taken: Message routed to:  [facility] Rheum    Travel Screening: Not Applicable     Date of Service:

## 2025-03-12 PROBLEM — I65.21 STENOSIS OF RIGHT CAROTID ARTERY: Chronic | Status: ACTIVE | Noted: 2021-12-01

## 2025-03-21 ENCOUNTER — MYC REFILL (OUTPATIENT)
Dept: FAMILY MEDICINE | Facility: CLINIC | Age: 60
End: 2025-03-21
Payer: COMMERCIAL

## 2025-03-21 DIAGNOSIS — F98.8 ATTENTION DEFICIT DISORDER OF ADULT: ICD-10-CM

## 2025-03-24 RX ORDER — DEXTROAMPHETAMINE SACCHARATE, AMPHETAMINE ASPARTATE MONOHYDRATE, DEXTROAMPHETAMINE SULFATE AND AMPHETAMINE SULFATE 5; 5; 5; 5 MG/1; MG/1; MG/1; MG/1
20 CAPSULE, EXTENDED RELEASE ORAL DAILY
Qty: 30 CAPSULE | Refills: 0 | Status: SHIPPED | OUTPATIENT
Start: 2025-03-24

## 2025-03-24 RX ORDER — DEXTROAMPHETAMINE SACCHARATE, AMPHETAMINE ASPARTATE, DEXTROAMPHETAMINE SULFATE AND AMPHETAMINE SULFATE 5; 5; 5; 5 MG/1; MG/1; MG/1; MG/1
20 TABLET ORAL DAILY
Qty: 30 TABLET | Refills: 0 | Status: SHIPPED | OUTPATIENT
Start: 2025-03-24

## 2025-04-23 ENCOUNTER — MYC MEDICAL ADVICE (OUTPATIENT)
Dept: FAMILY MEDICINE | Facility: CLINIC | Age: 60
End: 2025-04-23
Payer: COMMERCIAL

## 2025-04-23 ENCOUNTER — MYC REFILL (OUTPATIENT)
Dept: FAMILY MEDICINE | Facility: CLINIC | Age: 60
End: 2025-04-23
Payer: COMMERCIAL

## 2025-04-23 ENCOUNTER — MYC REFILL (OUTPATIENT)
Dept: RHEUMATOLOGY | Facility: CLINIC | Age: 60
End: 2025-04-23
Payer: COMMERCIAL

## 2025-04-23 DIAGNOSIS — M05.741 RHEUMATOID ARTHRITIS INVOLVING BOTH HANDS WITH POSITIVE RHEUMATOID FACTOR (H): ICD-10-CM

## 2025-04-23 DIAGNOSIS — F98.8 ATTENTION DEFICIT DISORDER OF ADULT: ICD-10-CM

## 2025-04-23 DIAGNOSIS — M05.742 RHEUMATOID ARTHRITIS INVOLVING BOTH HANDS WITH POSITIVE RHEUMATOID FACTOR (H): ICD-10-CM

## 2025-04-23 RX ORDER — DEXTROAMPHETAMINE SACCHARATE, AMPHETAMINE ASPARTATE, DEXTROAMPHETAMINE SULFATE AND AMPHETAMINE SULFATE 5; 5; 5; 5 MG/1; MG/1; MG/1; MG/1
20 TABLET ORAL DAILY
Qty: 30 TABLET | Refills: 0 | OUTPATIENT
Start: 2025-04-23

## 2025-04-23 RX ORDER — DEXTROAMPHETAMINE SACCHARATE, AMPHETAMINE ASPARTATE MONOHYDRATE, DEXTROAMPHETAMINE SULFATE AND AMPHETAMINE SULFATE 5; 5; 5; 5 MG/1; MG/1; MG/1; MG/1
20 CAPSULE, EXTENDED RELEASE ORAL DAILY
Qty: 30 CAPSULE | Refills: 0 | OUTPATIENT
Start: 2025-04-23

## 2025-04-23 RX ORDER — FOLIC ACID 1 MG/1
1 TABLET ORAL DAILY
Qty: 90 TABLET | Refills: 3 | Status: SHIPPED | OUTPATIENT
Start: 2025-04-23

## 2025-04-23 NOTE — TELEPHONE ENCOUNTER
Last Written Prescription:  folic acid (FOLVITE) 1 MG tablet   90 tablet 0 11/29/2024     ----------------------  Last Visit Date: 11- - [facility] visit                              11-  AYAH Odom visit  Future Visit Date: 8-7-2025  ----------------------    Refill decision: Medication unable to be refilled by RN due to: Pt not seen within past 12 months, No FOV or FOV exceeds timeframe per protocol    > 15 months since last seen and future visit > 30 days out.    Request from pharmacy:  Requested Prescriptions   Pending Prescriptions Disp Refills    folic acid (FOLVITE) 1 MG tablet 90 tablet 0     Sig: Take 1 tablet (1 mg) by mouth daily.       Vitamin Supplements Protocol Failed - 4/23/2025  1:43 PM        Failed - Recent (12 mo) or future (90 days) visit within the authorizing provider's specialty     The patient must have completed an in-person or virtual visit within the past 12 months or has a future visit scheduled within the next 90 days with the authorizing provider s specialty.  Urgent care and e-visits do not qualify as an office visit for this protocol.          Passed - The patient does not have an order for high-dose vitamin D        Passed - Medication is active on med list and the sig matches. RN to manually verify dose and sig if red X/fail.             Passed - Medication indicated for associated diagnosis             Passed - The patient is 2 years of age or older

## 2025-04-24 RX ORDER — DEXTROAMPHETAMINE SACCHARATE, AMPHETAMINE ASPARTATE MONOHYDRATE, DEXTROAMPHETAMINE SULFATE AND AMPHETAMINE SULFATE 5; 5; 5; 5 MG/1; MG/1; MG/1; MG/1
20 CAPSULE, EXTENDED RELEASE ORAL DAILY
Qty: 30 CAPSULE | Refills: 0 | Status: SHIPPED | OUTPATIENT
Start: 2025-04-24

## 2025-04-24 RX ORDER — DEXTROAMPHETAMINE SACCHARATE, AMPHETAMINE ASPARTATE, DEXTROAMPHETAMINE SULFATE AND AMPHETAMINE SULFATE 5; 5; 5; 5 MG/1; MG/1; MG/1; MG/1
20 TABLET ORAL DAILY
Qty: 30 TABLET | Refills: 0 | Status: SHIPPED | OUTPATIENT
Start: 2025-04-24

## 2025-04-24 NOTE — TELEPHONE ENCOUNTER
Last refill was sent on 3/24/25    Routed to PCP.    Sarah BSN RN  Triage Nurse  [facility]

## 2025-05-22 ENCOUNTER — OFFICE VISIT (OUTPATIENT)
Dept: NEUROLOGY | Facility: CLINIC | Age: 60
End: 2025-05-22
Payer: COMMERCIAL

## 2025-05-22 VITALS
HEIGHT: 64 IN | WEIGHT: 138 LBS | BODY MASS INDEX: 23.56 KG/M2 | HEART RATE: 74 BPM | DIASTOLIC BLOOD PRESSURE: 83 MMHG | SYSTOLIC BLOOD PRESSURE: 144 MMHG

## 2025-05-22 DIAGNOSIS — I63.231 CEREBROVASCULAR ACCIDENT (CVA) DUE TO STENOSIS OF RIGHT CAROTID ARTERY (H): Primary | ICD-10-CM

## 2025-05-22 DIAGNOSIS — I65.21 STENOSIS OF RIGHT CAROTID ARTERY: ICD-10-CM

## 2025-05-22 NOTE — PATIENT INSTRUCTIONS
I agree your R carotid is still narrow  I agree with watching it because you are stable and intervening pending radiographic or clinical progression    I agree with CTA next year

## 2025-05-22 NOTE — LETTER
5/22/2025      Libertad Russell  [address] [facility] [address]      Dear Colleague,    Thank you for referring your patient, Libertad Russell, to the [facility] [place]. Please see a copy of my visit note below.    [facility]  Section of General Neurology  Return Patient Visit    Libertad Russell MRN# [id number]   Age: 58 year old YOB: 1965            Assessment and Plan:   As noted previously Libertad Russell is a very pleasant 59 year old female who presents today in follow up evaluation of left sided neurological symptoms.  She was having serial small strokes/watershed infarcts from her very stenosed R carotid previously with resultant MRI findings previously/when seen initially in 2021.  The tremaine of stenosis was proposed to be that of potentially previous dissection.   There is still considerable stenosis in her distal R carotid and this (on recent CTA shows 90% estimated) is present in the very distal portion of her stent with further stenosis higher up as well extending cranially.  Repeat MRI in 2024 showed no new watershed infarcts, good news.  Repeat CTA H/N shows persistent stenosis but is not clearly actionable.  I agree with the current plan for monitoring.  We reviewed this imaging together.  She is of course anxious about watchful waiting but I think the risk/benefit does not align with further intervention of yet.   She still notes she fatigues easily mentally and on L side from previous watershed strokes which makes sense.  Can fill out any forms she may need in this regard.      Continue maximum medical therapy aspirin 325 mg and lipitor 40 mg    She will continue to check in with  issues questions or changes, will plan on next year if she has questions lingering after her neuro endovascular visit or otherwise      Bubba Casanova MD   of Neurology   [facility]      Interval  history:   Remains on aspirin 325 mg and lipitor 40 mg    Still fatigues easily  Anxiety a component  Just got back from [employer]  Working towards [employer]   Reviewed imaging at length    2025 Neuro IR clinic visit reviewed  Neuro-IR clinic visit     Subjective:: Libertad Russell is a 59 year old female with a past medical history of HTN, smoking and RA. 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 Angiogram demonstrated R ICA pseudo-occlusion. She was treated with a 8x40 mm Precise stent in 2021. There was post-stent R ICA collapse distal to the stent at the communicating segment. The patient subsequently also underwent noninvasive imaging as well as a repeat cerebral angiography at [facility], which showed that the distal right internal carotid artery, about the level of the stent continues to be small in caliber and ended in the ophthalmic artery.     She was last seen in clinic on 11/7/23. She had an U/S on 10/11/23 showing concerns for in stent stenosis of about 70%. When seen last time, she had left hand numbness, and her vision has deteriorated over the last few months.  With respect to her vision, her history is significant for Lasix surgery about 5 years ago.  She has not had evaluation or surgeries for cataracts.     When seen last time, she was doing well but had whooshing sound in her head, intermittent, she can hear it at night while laying down at night. She is able to sleep and it doesn't bother her. It is pulsatile but she isn't sure. She notes that her vision is getting worse over the past  6 months. It is both of her eyes, it is progressively more blurry. She notices it with focusing on close up. No vision loss. She denies any weakness, numbness, speech changes. She reports numbness in her hands bilaterally that is intermittent and doesn't occur frequently, she can't recall the last time it happened. She reports increased anxiety  and notes having nosebleeds recently.     She had a CTA h&n done today showing right ICA in stent stenosis distally ending in the ophthalmic artery again. She also has L V1 stenosis at the origin        Assessment/ Plan-  Libertad Russell is a 59 year old female with a past medical history of HTN, smoking and RA, who was found to have R centrum semiovale chronic stroke and angiogram revealed R ICA pseudo-occlusion for which she was treated with 8x40mm Precise stent in 2021, followed by post-stent R ICA collapse distal to the stent. Most recently she had a CTA h&n done today showing right ICA in stent stenosis distally ending in the ophthalmic artery again. She also has L V1 stenosis at the origin. She continues to be asymptomatic and is doing well overall.  - cont aspirin and statin as before  - recommended follow up with PCP regarding recent nosebleeds and anxiety  - repeat CTA H&n in 1 year followed by clinic visit     Pt seen and discussed with Dr. Freitas.  Regina Enriquez MD  Fellow, Endovascular Surgical Neuroradiology        Instructions    Follow-up with Dr. Freitas in 1 year with a CTA prior to your appointment.            A/P at last visit  As noted previously Libertad Russell is a very pleasant 58 year old female who presents today in follow up evaluation of left sided neurological symptoms.  She was having serial small strokes/watershed infarcts from her very stenosed R carotid previously with resultant MRI findings previously/when seen initially in 2021.  The tremaine of stenosis was proposed to be that of potentially previous dissection.   There is still considerable stenosis in her distal R carotid and this (on recent CTA shows 90% estimated) is present in the very distal portion of her stent with further stenosis higher up as well extending cranially.  Repeat MRI earlier this year did not show any progression of watershed infarcts.  She plans on meeting with her [place] neuro-endovascular surgery  team later this year to further discuss repeat intervention vs surveillance.  She feels quite good today.  Her BP is in a good range (want her to be slightly permissive, 140/80 today looks good, discussed).  She is staying well hydrated though still limited by fatigue and LUE weakness at times.  We will continue to keep an eye on her over time.  All questions answered.  Follow up will be in 1 year with general neurology.  She will continue aspirin 325 mg and atorvastatin 40 mg.   She will return sooner with any issues changes or questions.      AP at a previous visit  As noted in recent visit with me Libertad Russell is a very pleasant 58 year old female who presents today in follow up evaluation of left sided neurological symptoms.  She was having serial small strokes/watershed infarcts from her very stenosed R carotid previously with resultant MRI findings previously/when seen initially in 2021.  The tremaine of stenosis was proposed to be that of potentially previous dissection.   There is still considerable stenosis in her distal R carotid and this (on recent CTA shows 90% estimated) is present in the very distal portion of her stent with further stenosis higher up as well extending cranially.  The plan with our neuro endovascular team was to continue serial monitoring at the most recent visit but about a week prior were offering angioplasty.   I presume this is because only a small portion of the stent appears stenosed at it's most distal point/contiguous with other distal stenosis and perhaps anatomically/pragmatically this would not be something that could be intervened upon given how high it is is a consideration that was broached before to her.  It is also possible that they changed their mind due to their interpretation of Rozina's wishes.  She was offered a more invasive surgery to more fully correct the distal ICA stenosis intracranially by the [place] she notes but this would certainly be a much bigger  surgery and she remains apprehensive about this.      We decided to repeat her MRI brain to see if there was evidence of further/expanded watershed strokes which there have not been since 2021, thankfully.  I still think that surgical correction to prevent further decline should be considered thoughtfully but we discussed this is certainly not an easy decision and one with nuance.  She feels this MRI data at least takes some of the pressure off of her decision.   She will meet with her previous [place] team in further opinion as well.  We can plan on checking in again in ~6 months, sooner with new questions or PRN if she doing great and doesn't have any further questions for me as a general neurologist.      Past Medical History:     Patient Active Problem List   Diagnosis     Seasonal allergic rhinitis     Attention deficit disorder of adult     Rosacea     Raynaud phenomenon     Essential hypertension with goal blood pressure less than 140/90     Lipoma of right axilla     Lipoma of neck     Paresthesia     Cerebrovascular accident (CVA) due to stenosis of right carotid artery (H)     Rheumatoid arthritis involving both hands with positive rheumatoid factor (H)     Stenosis of right carotid artery     Atherosclerosis of arteries of extremities (H24)     History of tobacco abuse     Past Medical History:   Diagnosis Date     ADHD (attention deficit hyperactivity disorder)      Anemia 2022     Cerebrovascular accident (CVA) due to stenosis of right carotid artery (H) 09/15/2021     Depressive disorder      HTN (hypertension)      RA (rheumatoid arthritis) (H)      Raynaud disease         Past Surgical History:     Past Surgical History:   Procedure Laterality Date     BREAST BIOPSY, RT/LT  2000, 1998    breast biopsies; reported to be benign     BREAST SURGERY  2000     EXCISE MASS AXILLA Right 06/08/2021    Excise right axillary lipoma;  Surgeon: Artis Domínguez MD;  Location: [facility] OR     EXCISE TUMOR, SOFT  TISSUE, NECK/THORAX, SUBCUTANEOUS N/A 2021    EXCISION of lipoma from left occiput;  Surgeon: Artis Domínguez MD;  Location: [facility] OR     EYE SURGERY      Lasik for vision correction     IR CAROTID CEREBRAL ANGIOGRAM BILATERAL  10/22/2021     LYMPH NODE BIOPSY      cervical lymph node biopsy; told to be benign     VT BREAST AUGMENTATION Bilateral     breast implants     TUBAL LIGATION       VASCULAR SURGERY      Stent        Social History:     Social History     Tobacco Use     Smoking status: Former     Current packs/day: 0.00     Average packs/day: 1 pack/day for 25.0 years (25.0 ttl pk-yrs)     Types: Cigarettes     Start date: 10/5/1996     Quit date: 10/5/2021     Years since quittin.8     Passive exposure: Past     Smokeless tobacco: Current     Tobacco comments:     Pt vapes 1 per day without nicotine    Vaping Use     Vaping status: Some Days   Substance Use Topics     Alcohol use: Not Currently     Comment: occasional     Drug use: No        Family History:     Family History   Problem Relation Age of Onset     Hypertension Mother      Cancer Mother         Lung. Cancer     Other Cancer Mother         Lung     Thyroid Disease Mother      C.A.D. Father         52 at first MI     Prostate Cancer Father         Bladder/ lung     Cancer Father         Lung -bladder cancer     Thyroid Disease Sister      Thyroid Disease Sister      Hypertension Other      Diabetes No family hx of      Cerebrovascular Disease No family hx of      Breast Cancer No family hx of      Cancer - colorectal No family hx of      Glaucoma No family hx of      Macular Degeneration No family hx of         Medications:     Current Outpatient Medications   Medication Sig Dispense Refill     [START ON 2024] amphetamine-dextroamphetamine (ADDERALL XR) 20 MG 24 hr capsule Take 1 capsule (20 mg) by mouth daily 30 capsule 0     [START ON 2024] amphetamine-dextroamphetamine (ADDERALL) 20 MG tablet Take 1  "tablet (20 mg) by mouth daily 30 tablet 0     aspirin (ASA) 325 MG tablet TAKE 1 TABLET(325 MG) BY MOUTH DAILY 90 tablet 2     atorvastatin (LIPITOR) 40 MG tablet Take 1 tablet (40 mg) by mouth daily 90 tablet 3     folic acid (FOLVITE) 1 MG tablet Take 1 tablet (1 mg) by mouth daily 90 tablet 3     HYRIMOZ 40 MG/0.4ML SOAJ INJECT 0.4 MLS (40 MG) SUBCUTANEOUS EVERY 14 DAYS HOLD FOR SIGNS OF INfection, then seek medical attention. 0.8 mL 6     losartan (COZAAR) 100 MG tablet Take 1 tablet (100 mg) by mouth daily 90 tablet 3     methotrexate 2.5 MG tablet Take 10 tablets (25 mg) by mouth every 7 days Labs every 8 - 12 weeks for refills. 40 tablet 6     olopatadine (PATADAY) 0.2 % ophthalmic solution Place 0.05 mLs (1 drop) into both eyes daily 2.5 mL 1     triamcinolone (KENALOG) 0.1 % external cream Apply topically 2 times daily 45 g 1     No current facility-administered medications for this visit.     Facility-Administered Medications Ordered in Other Visits   Medication Dose Route Frequency Provider Last Rate Last Admin     sodium chloride (PF) 0.9% PF flush 72 mL  72 mL Intravenous Once Bartolo Andre MD            Allergies:     Allergies   Allergen Reactions     Seasonal Allergies           Physical Exam:   Vitals: BP (!) 140/82 (BP Location: Right arm, Patient Position: Sitting, Cuff Size: Adult Regular)   Pulse 73   Ht 1.632 m (5' 4.25\")   Wt 59 kg (130 lb)   LMP 05/20/2021 (Approximate)   BMI 22.14 kg/m       Mental Status: Alert and oriented to person, place, and time. Speech fluent and comprehension intact. No dysarthria.      Cranial Nerves:   II: Visual fields: normal  III: Pupils: 3 mm, equal, round, reactive to light   III,IV,VI: Extraocular Movements: intact   V: Facial sensation: intact to light touch  VII: Facial strength: intact without asymmetry  XII: Tongue movement: normal     Motor Exam:   Upper Extremities  Deltoid  Bicep  Tricep  Wrist Extensors  strength Intrinsic Muscles  "   Right  5  5  5  5 5 5   Left  5  5  5 5 5* 4+   *L  strength equivocally worse than R/slightly worse again/as noted previously  5/5 in b/l LE as well   Fatigues easily     Reflexes: biceps, triceps, brachioradialis, patellar, and ankle jerks 2+ and symmetric.                Data: Pertinent prior to visit   MRI brain 2024  MRI BRAIN WITHOUT CONTRAST  1/30/2024 4:03 PM     HISTORY:  to determine what degree of progression of changes from R  carotid stenosis since 2021; Cerebrovascular accident (CVA) due to  stenosis of right carotid artery (H); Stenosis of right carotid artery     TECHNIQUE:  Multiplanar, multisequence MRI of the brain without  gadolinium IV contrast material.       COMPARISON:  CTA head and neck 10/24/2023. MRI brain 8/17/2021.     FINDINGS: Midline developmental anatomy is unremarkable. Remote  ischemic findings centered in the right centrum semiovale compatible  with watershed distribution infarct findings on the right with  abnormal flow void involving the right cavernous segment and petrous  segment of the internal carotid artery compatible with the known  history of high-grade stenosis and subtotal occlusion in the distal  cavernous ICA noted on the CTA of the head and neck from 10/24/2023.  Mild chronic small vessel ischemic changes in a periventricular  distribution.     Susceptibility imaging demonstrates no significant hemosiderin  deposition. Compared to an MRI evaluation of the brain from 8/17/2021,  the ischemic changes in the right centrum semiovale are similar.                                                                      IMPRESSION:   Remote ischemic findings in the right centrum semiovale  related to the high-grade stenotic and subtotal occlusive findings  involving the right internal carotid artery. No restricted diffusion  to suggest acute ischemia. Mild chronic small vessel ischemic changes.          [facility] 2023  Narrative & Impression   BILATERAL CAROTID DUPLEX DOPPLER  ULTRASOUND 10/11/2023 1:58 PM     CLINICAL HISTORY: Carotid stenosis, right     COMPARISON: Ultrasound carotid bilateral 8/12/2022     REFERRING PROVIDER: SARI GODWIN     TECHNIQUE: Grayscale (B-mode) and duplex and spectral Doppler  ultrasound of the common carotid, extracranial internal carotid,  external carotid, and vertebral artery origins. Velocity measurements  obtained with angle correction at or less than 60 degrees.     FINDINGS:     RIGHT SIDE:      Plaque Morphology: Greater than 70% diameter in-stent stenosis  suggested in color Doppler image.        Proximal CCA: 54/12 cm/s     Mid CCA: 65/14 cm/s     Distal CCA, proximal to stent: 50/16 cm/s        Stent, distal CCA: 68/20 cm/s     Stent, bifurcation: 56/13 cm/s     Stent, proximal ICA: 16/0 cm/s, 3.5 velocity ratio     Stent, mid ICA: 121/18 cm/s, 7.56 velocity ratio        External CA: 232/42 cm/s        Mid ICA, distal to stent: 52/10 cm/s     Distal ICA: 42.6 cm/s        Vertebral artery: Antegrade: 47/23 cm/s      Innominate artery: 116/13 cm/s     Proximal subclavian: 226/16 cm/s, 393/0 cm/s     Stent/CCA ratio: 2.42      LEFT SIDE:      Plaque Morphology: Echogenic plaque causes less than 50% diameter  stenosis.         Origin CCA: 79/21 cm/s     Proximal CCA: 94/23 cm/s     Mid CCA: 86/31 cm/s     Distal CCA: 72/34 cm/s     Carotid bifurcation: 62/30 cm/s     External CA: 122/23 cm/s        Proximal ICA: 133/58 cm/s     Mid ICA: 153/39 cm/s     Distal ICA: 94/35 cm/s        Vertebral artery: Antegrade: 53/26 cm/s      Subclavian origin: 77/7 cm/s     Proximal subclavian: 139/0 cm/s     ICA/CCA ratio: 2.13                                                                       IMPRESSION:     1. RIGHT carotid stent: Although velocities never exceed 150 cm/s,  color Doppler image suggests greater than 70% diameter in-stent  stenosis and velocity in the stent in the proximal internal carotid  artery drops to 16 cm/s, a 3.5 times drop  from the stent in the  bifurcation. 7.56 velocity ratio in the stent from the proximal to mid  internal carotid artery. Further evaluation with CTA or catheter  angiography could be performed.     2. LEFT ICA:  Less than 50% diameter narrowing by grayscale imaging  and sonographic velocity criteria.     [facility] Updated Recommendations for  Carotid Stenosis Interpretation Criteria - October 2021.  [email or web address]-[email or web address]-[email or web address]-Stenosis-Interpre  ation-Criteria.[email or web address]     Greater than 70% diameter stenosis criteria per - Journal of Vascular  Surgery Vol. 75 Issue 1 Supplement [id number] Published online: June 18, 2021           Normal            ICA PSV: < 180 cm/s            Plaque Estimate: None            ICA/CCA PSV Ratio: < 2.0            ICA EDV: < 40 cm/s          < 50%            ICA PSV: < 180 cm/s            Plaque Estimate: < 50%            ICA/CCA PSV Ratio: < 2.0            ICA EDV: < 40 cm/s          50 - 69%            ICA PSV: 180 - 230 cm/s            Plaque Estimate: > 50%            ICA/CCA PSV Ratio: 2.0 - 4.0            ICA EDV: 40 - 100 cm/s          > 70% but less than near occlusion            ICA PSV: > 230 cm/s            Plaque Estimate: > 50%            AND either            ICA EDV: > 100 cm/s            or            ICA/CCA PSV Ratio: > 4.0          Near occlusion            ICA PSV: > 230 cm/s            Plaque Estimate: Visible            ICA/CCA PSV Ratio: Variable            ICA EDV: Variable          Total occlusion            ICA PSV: Undetectable            Plaque Estimate: Visible, no detectable lumen            ICA/CCA PSV Ratio: N/A            ICA EDV: N/A                                          Additional criteria from vascular surgery     > 80%       EDV > 120 cm/s     -----------------------------------------------     Criteria: Carotid stent velocity  criteria          <50%             ICA PSV < 150 cm/sec            ICA EDV N/A            ICA(stent)/CCA PSV Ratio < 2.0            No or minimal stent lumen reduction          50% - 75%             ICA PSV >/= 150 cm/sec            ICA EDV < 125 cm/sec            ICA(stent)/CCA PSV Ratio > 2.0            Turbulent flow, stent lumen reduction present          > 75%,             ICA PSV > 300 cm/sec            ICA EDV > 125 cm/sec            ICA(stent)/CCA Ratio > 4.0             High grade stent stenosis, dampening of distal waveform          Occlusion             No flow            No stent flow visualized      EXAM: CTA HEAD NECK W CONTRAST  LOCATION[facility]  DATE: 10/24/2023     INDICATION: Carotid artery stenosis. Headache.  COMPARISON: Urinary ultrasound dated 10/11/2023.  CONTRAST: 67mL Isovue 370  TECHNIQUE: Head and neck CT angiogram with IV contrast. Axial helical CT images of the head and neck vessels obtained during the arterial phase of intravenous contrast administration. Axial 2D reconstructed images and multiplanar 3D MIP reconstructed   images of the head and neck vessels were performed by the technologist. Dose reduction techniques were used. All stenosis measurements made according to NASCET criteria unless otherwise specified.     FINDINGS:   HEAD CTA:  ANTERIOR CIRCULATION: High-grade stenosis involving the cavernous/supraclinoid ICA, which ultimately occludes at its supraclinoid portion. The hypoplastic right middle cerebral artery is opacified via collateral circulation from a patent anterior   communicating artery. The anterior cerebral arteries are unremarkable. The left internal carotid artery, ICA terminus, and left MCA vasculature is unremarkable. The anterior communicating artery is patent. The posterior communicating arteries are   hypoplastic/absent.      POSTERIOR CIRCULATION: No stenosis/occlusion, aneurysm, or high flow vascular malformation. Balanced  vertebrobasilar circulation. The basilar artery is unremarkable and gives rise to patent superior cerebellar and posterior cerebral arteries.      DURAL VENOUS SINUSES: Patent.     NECK CTA:  RIGHT CAROTID: Normal course and persist caliber of the common carotid artery. Right internal carotid artery stent with mild stenosis at the proximal portion and critical, greater than 90% stenosis of the internal carotid artery at the distal aspect of   the stent.     LEFT CAROTID: Normal course and persist caliber of the common carotid artery. Atherosclerotic plaque results in approximately 70% stenosis of the ICA origin. The remaining extracranial internal carotid artery demonstrate persistent caliber without   evidence of dissection.     VERTEBRAL ARTERIES: Balance configuration without stenosis or dissection.     AORTIC ARCH: Classic three-vessel arch. The origins of the great vessels are unremarkable without significant stenosis.     NONVASCULAR STRUCTURES: There are no masses or enlarged lymph nodes in the neck. The submandibular, parotid, and thyroid glands are unremarkable. Multilevel degenerative changes without high-grade spinal canal stenosis or neural foraminal narrowing. No   aggressive osseous lesions. Moderate to severe emphysema.                                                                      IMPRESSION:     HEAD CTA:  1. High-grade stenosis involving the distal cavernous ICA, which ultimately occludes at its supraclinoid portion. The right middle cerebral and anterior cerebral arteries are diminutive but opacified via collateral circulation from a patent anterior   communicating artery.     NECK CTA:  1. Right internal carotid artery stent with distal in-stent stenosis greater than 90%.    Right 2023           Right carotid 2021           MRA 2022                  Left                     CUS 2022                                                      IMPRESSION:     1. RIGHT ICA: Ends of the stent were not  evaluated. Improved flow and  velocities in the stent from previous. Less than 50% diameter in stent  stenosis by sonographic velocity criteria and color Doppler imaging.     2. LEFT ICA:  Less than 50% diameter narrowing by grayscale imaging  and sonographic velocity criteria           MRI brain MRA head/neck 8/17/21     IMPRESSION:   1.  Abnormal right internal carotid artery flow void consistent with  diminished or absent flow in this vessel. Suspect that this is related  to subacute to chronic right internal carotid artery dissection.  Please see the separately reported neck MRA for additional details.  2.  T2/FLAIR hyperintensity within the right greater than left deep  cerebral white matter presumably reflects gliosis related to chronic  ischemic injury. Suspect that there is a component of underlying  border zone/watershed hypoperfusion injury given the abnormal right  internal carotid artery.  3.  No acute infarct, hemorrhage or mass.                    Neck:                                                       IMPRESSION:   1.  Diminutive right internal carotid artery demonstrates abnormal  signal. 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 Chronic flow-limiting atherosclerotic  stenosis is the other primary consideration.  2.  The status of the right internal carotid intracranially is not  well evaluated on this study.  3.  Consider further evaluation of these findings with head and neck  CTA.  4.  Mild atherosclerosis of the left carotid artery without  hemodynamically significant narrowing by NASCET criteria.  5.  Patent vertebral arteries.     Procedures:     CTA neck 8/18  IMPRESSION:  1. Severe stenosis (likely greater than 80% luminal diameter stenosis)  at the proximal right internal carotid artery. The exact degree of  stenosis cannot be measured as there is swallowing motion while  scanning through the carotid bifurcations.  2. Two areas of severe  stenosis of the intracranial distal right  internal carotid artery at the petrous cavernous junction and of the  entire supraclinoid segment.  3. Combination of inflow and outflow restriction of the right internal  carotid artery resulting in a diminutive right internal carotid artery  in the neck.  4. Plaque without stenosis of the proximal and distal internal carotid  artery on the left.  5. Diminutive M1 segment of the right middle cerebral artery likely  due to decreased inflow.  6. Moderate-severe atherosclerotic narrowing at the origins of the  vertebral arteries bilaterall      MRA neck 1/26/22     1. String sign configuration of the right internal carotid artery above the   right internal carotid artery stent may be related to a tandem stenosis with   narrowing immediately above the stent, and more particularly intracranial   stenosis of the supraclinoid right internal artery (which may be the principal   flow limiting factor).     2. No evidence of acute ischemia on DWI although there is extensive white matter   change throughout the right hemisphere consistent with the effects of   hypoperfusion and extensive vasodilatation throughout the right hemisphere.     3. 1.5 cm masslike/nodular configuration of the nasopharyngeal adenoidal tissue   is somewhat unusual for a patient of this age and may warrant direct endoscopic   evaluation.          CT perfusion 1/26/22  IMPRESSION:   In keeping with findings of right carotid stenosis, perfusion study   demonstrates Tmax greater than six seconds of 36 mL at the right frontal and   parietal lobes. CBF less than 30 percent is 0. These regions are associated with   elevated Tmax and MTT.       MRI brain 1/26/22       MRI of the brain: No acute restriction of diffusion on DWI but there is   extensive confluent white matter asymmetric FLAIR signal abnormality within the   right hemisphere much of which is in a watershed type distribution suggesting   effects of chronic  hypoperfusion of the right hemisphere. White matter disease   in a scattered random pattern is present with a lesser degree on the left side.   Asymmetric signal within the high cervical and right petrous carotid artery is   consistent with slow are impaired flow. Post gadolinium there is diffuse   prominence of the intraparenchymal and pial vascular structures throughout the   right hemisphere consistent with vasodilatation of collateral circulation   routes.   Midline prominence of the adenoidal nasopharyngeal tissues in a discrete nodule   or masslike configuration spanning 1.4 cm is somewhat unusual for a patient of   this age and may warrant endoscopic evaluation.      MRI brain MRA head/neck 8/17/21     IMPRESSION:   1.  Abnormal right internal carotid artery flow void consistent with  diminished or absent flow in this vessel. Suspect that this is related  to subacute to chronic right internal carotid artery dissection.  Please see the separately reported neck MRA for additional details.  2.  T2/FLAIR hyperintensity within the right greater than left deep  cerebral white matter presumably reflects gliosis related to chronic  ischemic injury. Suspect that there is a component of underlying  border zone/watershed hypoperfusion injury given the abnormal right  internal carotid artery.  3.  No acute infarct, hemorrhage or mass.                    Neck:                                                       IMPRESSION:   1.  Diminutive right internal carotid artery demonstrates abnormal  signal. 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 Chronic flow-limiting atherosclerotic  stenosis is the other primary consideration.  2.  The status of the right internal carotid intracranially is not  well evaluated on this study.  3.  Consider further evaluation of these findings with head and neck  CTA.  4.  Mild atherosclerosis of the left carotid artery without  hemodynamically  significant narrowing by NASCET criteria.  5.  Patent vertebral arteries.     Procedures:     CTA neck 8/18  IMPRESSION:  1. Severe stenosis (likely greater than 80% luminal diameter stenosis)  at the proximal right internal carotid artery. The exact degree of  stenosis cannot be measured as there is swallowing motion while  scanning through the carotid bifurcations.  2. Two areas of severe stenosis of the intracranial distal right  internal carotid artery at the petrous cavernous junction and of the  entire supraclinoid segment.  3. Combination of inflow and outflow restriction of the right internal  carotid artery resulting in a diminutive right internal carotid artery  in the neck.  4. Plaque without stenosis of the proximal and distal internal carotid  artery on the left.  5. Diminutive M1 segment of the right middle cerebral artery likely  due to decreased inflow.  6. Moderate-severe atherosclerotic narrowing at the origins of the  vertebral arteries bilaterally.     Labs:          LDL Cholesterol Calculated   Date Value Ref Range Status   10/25/2023 55 <=100 mg/dL Final   06/30/2021 104 (H) <100 mg/dL Final       Comment:       Above desirable:  100-129 mg/dl  Borderline High:  130-159 mg/dL  High:             160-189 mg/dL  Very high:       >189 mg/dl             2025 CTA    EXAM: CTA HEAD NECK W CONTRAST  2/11/2025 10:31 AM      HISTORY: Carotid stenosis, right        COMPARISON: Brain MRI 1/30/2024, head CT 10/24/2023. Carotid  ultrasound 10/11/2023.     TECHNIQUE:  HEAD and NECK CTA: During rapid bolus intravenous injection of  nonionic contrast material, axial images were obtained using thin  collimation multidetector helical technique from the base of the upper  aortic arch through the [place]. This CT angiogram data was  reconstructed at thin intervals with mild overlap. Images were sent to  the 3D workstation, and 3D reconstructions were obtained. The axial  source images, multiplanar  reformations, 3D reconstructions in both  maximum intensity projection display and volume rendered models were  reviewed, with reconstructions performed by the technologist.     CONTRAST: ISOVUE 370 70cc     FINDINGS:  Head CTA demonstrates no large vessel arterial occlusion or stenosis  of the major intracranial arteries. No aneurysm.      Neck CTA demonstrates: Calcification of the left carotid bulb, left  distal internal carotid artery measures 5 mm in diameter, with  approximately 40% stenosis of the carotid bulb. Vascular  calcifications in the carotid siphons. Vascular calcification and soft  plaque at the proximal external carotid artery.     Stent extending from the distal right common carotid artery extending  to the proximal right internal carotid artery. There is near complete  occlusion with soft plaque at the distal end of the stent (series 6,  image 497), with narrowing of the distal internal carotid artery up to  the terminus, with occlusion in the supraclinoid segment.     Vascular calcification of the intradural vertebral arteries without  significant stenosis. Diminutive right posterior communicating artery.     No acute finding in the visualized intracranial, orbital, and skull  base structures. Mucosal thickening of the posterior left ethmoid air  cells, the paranasal sinuses and mastoid air cells are otherwise  clear.     Degenerative changes of the cervical spine with multilevel  intervertebral disc space narrowing. No high-grade spinal canal  stenosis. No acute osseous abnormality.     The visualized superior mediastinal structures are within normal  limits. Presumed 7 x 8 mm hypodense posterior right thyroid nodule in  the limitation of artifact. Mild emphysematous changes.                                                                      IMPRESSION:  1.  Near-complete occlusion at the distal end of the right  common/internal carotid artery stent with diffusely decreased caliber  of the  distal internal carotid artery through to the intracranial  vessels, and unchanged occlusion at the supraclinoid segment. Presumed  reconstitution of the right-sided intracranial vessels by the left  internal carotid artery.  2. Atherosclerosis of the left carotid bulb with approximately 40%  stenosis.  3. Head CTA demonstrates no intracranial artery occlusion or aneurysm.     I have personally reviewed the examination and initial interpretation  and I agree with the findings.     TRIXIE ARREOLA MD         The total time of this encounter today amounted to 40 minutes. This time included time spent with the patient, prep work, reviewing new imaging,  and performing post visit documentation.        Again, thank you for allowing me to participate in the care of your patient.        Sincerely,        Gilberto Casanova MD    Electronically signed

## 2025-05-22 NOTE — PROGRESS NOTES
[facility]  Section of General Neurology  Return Patient Visit    Libertad Russell MRN# [id number]   Age: 58 year old YOB: 1965            Assessment and Plan:   As noted previously Libertad Russell is a very pleasant 59 year old female who presents today in follow up evaluation of left sided neurological symptoms.  She was having serial small strokes/watershed infarcts from her very stenosed R carotid previously with resultant MRI findings previously/when seen initially in 2021.  The tremaine of stenosis was proposed to be that of potentially previous dissection.   There is still considerable stenosis in her distal R carotid and this (on recent CTA shows 90% estimated) is present in the very distal portion of her stent with further stenosis higher up as well extending cranially.  Repeat MRI in 2024 showed no new watershed infarcts, good news.  Repeat CTA H/N shows persistent stenosis but is not clearly actionable.  I agree with the current plan for monitoring.  We reviewed this imaging together.  She is of course anxious about watchful waiting but I think the risk/benefit does not align with further intervention of yet.   She still notes she fatigues easily mentally and on L side from previous watershed strokes which makes sense.  Can fill out any forms she may need in this regard.      Continue maximum medical therapy aspirin 325 mg and lipitor 40 mg    She will continue to check in with  issues questions or changes, will plan on next year if she has questions lingering after her neuro endovascular visit or otherwise      Bubba Casanova MD   of Neurology   [facility]      Interval history:   Remains on aspirin 325 mg and lipitor 40 mg    Still fatigues easily  Anxiety a component  Just got back from [place]  Working towards SSDI   Reviewed imaging at length    2025 Neuro IR clinic visit reviewed  Neuro-IR clinic visit      Subjective:: Libertad Russell is a 59 year old female with a past medical history of HTN, smoking and RA. 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 Angiogram demonstrated R ICA pseudo-occlusion. She was treated with a 8x40 mm Precise stent in 2021. There was post-stent R ICA collapse distal to the stent at the communicating segment. The patient subsequently also underwent noninvasive imaging as well as a repeat cerebral angiography at [facility], which showed that the distal right internal carotid artery, about the level of the stent continues to be small in caliber and ended in the ophthalmic artery.     She was last seen in clinic on 11/7/23. She had an U/S on 10/11/23 showing concerns for in stent stenosis of about 70%. When seen last time, she had left hand numbness, and her vision has deteriorated over the last few months.  With respect to her vision, her history is significant for Lasix surgery about 5 years ago.  She has not had evaluation or surgeries for cataracts.     When seen last time, she was doing well but had whooshing sound in her head, intermittent, she can hear it at night while laying down at night. She is able to sleep and it doesn't bother her. It is pulsatile but she isn't sure. She notes that her vision is getting worse over the past  6 months. It is both of her eyes, it is progressively more blurry. She notices it with focusing on close up. No vision loss. She denies any weakness, numbness, speech changes. She reports numbness in her hands bilaterally that is intermittent and doesn't occur frequently, she can't recall the last time it happened. She reports increased anxiety and notes having nosebleeds recently.     She had a CTA h&n done today showing right ICA in stent stenosis distally ending in the ophthalmic artery again. She also has L V1 stenosis at the origin        Assessment/ Plan-  Libertad Russell is a 59  year old female with a past medical history of HTN, smoking and RA, who was found to have R centrum semiovale chronic stroke and angiogram revealed R ICA pseudo-occlusion for which she was treated with 8x40mm Precise stent in 2021, followed by post-stent R ICA collapse distal to the stent. Most recently she had a CTA h&n done today showing right ICA in stent stenosis distally ending in the ophthalmic artery again. She also has L V1 stenosis at the origin. She continues to be asymptomatic and is doing well overall.  - cont aspirin and statin as before  - recommended follow up with PCP regarding recent nosebleeds and anxiety  - repeat CTA H&n in 1 year followed by clinic visit     Pt seen and discussed with Dr. Freitas.  Regina Enriquez MD  Fellow, Endovascular Surgical Neuroradiology        Instructions    Follow-up with Dr. Freitas in 1 year with a CTA prior to your appointment.            A/P at last visit  As noted previously Libertad Russell is a very pleasant 58 year old female who presents today in follow up evaluation of left sided neurological symptoms.  She was having serial small strokes/watershed infarcts from her very stenosed R carotid previously with resultant MRI findings previously/when seen initially in 2021.  The tremaine of stenosis was proposed to be that of potentially previous dissection.   There is still considerable stenosis in her distal R carotid and this (on recent CTA shows 90% estimated) is present in the very distal portion of her stent with further stenosis higher up as well extending cranially.  Repeat MRI earlier this year did not show any progression of watershed infarcts.  She plans on meeting with her [place] neuro-endovascular surgery team later this year to further discuss repeat intervention vs surveillance.  She feels quite good today.  Her BP is in a good range (want her to be slightly permissive, 140/80 today looks good, discussed).  She is staying well hydrated though  still limited by fatigue and LUE weakness at times.  We will continue to keep an eye on her over time.  All questions answered.  Follow up will be in 1 year with general neurology.  She will continue aspirin 325 mg and atorvastatin 40 mg.   She will return sooner with any issues changes or questions.      AP at a previous visit  As noted in recent visit with me Libertad Russell is a very pleasant 58 year old female who presents today in follow up evaluation of left sided neurological symptoms.  She was having serial small strokes/watershed infarcts from her very stenosed R carotid previously with resultant MRI findings previously/when seen initially in 2021.  The tremaine of stenosis was proposed to be that of potentially previous dissection.   There is still considerable stenosis in her distal R carotid and this (on recent CTA shows 90% estimated) is present in the very distal portion of her stent with further stenosis higher up as well extending cranially.  The plan with our neuro endovascular team was to continue serial monitoring at the most recent visit but about a week prior were offering angioplasty.   I presume this is because only a small portion of the stent appears stenosed at it's most distal point/contiguous with other distal stenosis and perhaps anatomically/pragmatically this would not be something that could be intervened upon given how high it is is a consideration that was broached before to her.  It is also possible that they changed their mind due to their interpretation of Rozina's wishes.  She was offered a more invasive surgery to more fully correct the distal ICA stenosis intracranially by the [place] she notes but this would certainly be a much bigger surgery and she remains apprehensive about this.      We decided to repeat her MRI brain to see if there was evidence of further/expanded watershed strokes which there have not been since 2021, thankfully.  I still think that surgical correction to  prevent further decline should be considered thoughtfully but we discussed this is certainly not an easy decision and one with nuance.  She feels this MRI data at least takes some of the pressure off of her decision.   She will meet with her previous [place] team in further opinion as well.  We can plan on checking in again in ~6 months, sooner with new questions or PRN if she doing great and doesn't have any further questions for me as a general neurologist.      Past Medical History:     Patient Active Problem List   Diagnosis    Seasonal allergic rhinitis    Attention deficit disorder of adult    Rosacea    Raynaud phenomenon    Essential hypertension with goal blood pressure less than 140/90    Lipoma of right axilla    Lipoma of neck    Paresthesia    Cerebrovascular accident (CVA) due to stenosis of right carotid artery (H)    Rheumatoid arthritis involving both hands with positive rheumatoid factor (H)    Stenosis of right carotid artery    Atherosclerosis of arteries of extremities (H24)    History of tobacco abuse     Past Medical History:   Diagnosis Date    ADHD (attention deficit hyperactivity disorder)     Anemia 2022    Cerebrovascular accident (CVA) due to stenosis of right carotid artery (H) 09/15/2021    Depressive disorder     HTN (hypertension)     RA (rheumatoid arthritis) (H)     Raynaud disease         Past Surgical History:     Past Surgical History:   Procedure Laterality Date    BREAST BIOPSY, RT/LT  2000, 1998    breast biopsies; reported to be benign    BREAST SURGERY  2000    EXCISE MASS AXILLA Right 06/08/2021    Excise right axillary lipoma;  Surgeon: Artis Domínguez MD;  Location:  OR    EXCISE TUMOR, SOFT TISSUE, NECK/THORAX, SUBCUTANEOUS N/A 06/08/2021    EXCISION of lipoma from left occiput;  Surgeon: Artis Domínguez MD;  Location:  OR    EYE SURGERY  2015    Lasik for vision correction    IR CAROTID CEREBRAL ANGIOGRAM BILATERAL  10/22/2021    LYMPH NODE BIOPSY  2001     cervical lymph node biopsy; told to be benign    ID BREAST AUGMENTATION Bilateral     breast implants    TUBAL LIGATION      VASCULAR SURGERY      Stent        Social History:     Social History     Tobacco Use    Smoking status: Former     Current packs/day: 0.00     Average packs/day: 1 pack/day for 25.0 years (25.0 ttl pk-yrs)     Types: Cigarettes     Start date: 10/5/1996     Quit date: 10/5/2021     Years since quittin.8     Passive exposure: Past    Smokeless tobacco: Current    Tobacco comments:     Pt vapes 1 per day without nicotine    Vaping Use    Vaping status: Some Days   Substance Use Topics    Alcohol use: Not Currently     Comment: occasional    Drug use: No        Family History:     Family History   Problem Relation Age of Onset    Hypertension Mother     Cancer Mother         Lung. Cancer    Other Cancer Mother         Lung    Thyroid Disease Mother     C.A.D. Father         52 at first MI    Prostate Cancer Father         Bladder/ lung    Cancer Father         Lung -bladder cancer    Thyroid Disease Sister     Thyroid Disease Sister     Hypertension Other     Diabetes No family hx of     Cerebrovascular Disease No family hx of     Breast Cancer No family hx of     Cancer - colorectal No family hx of     Glaucoma No family hx of     Macular Degeneration No family hx of         Medications:     Current Outpatient Medications   Medication Sig Dispense Refill    [START ON 2024] amphetamine-dextroamphetamine (ADDERALL XR) 20 MG 24 hr capsule Take 1 capsule (20 mg) by mouth daily 30 capsule 0    [START ON 2024] amphetamine-dextroamphetamine (ADDERALL) 20 MG tablet Take 1 tablet (20 mg) by mouth daily 30 tablet 0    aspirin (ASA) 325 MG tablet TAKE 1 TABLET(325 MG) BY MOUTH DAILY 90 tablet 2    atorvastatin (LIPITOR) 40 MG tablet Take 1 tablet (40 mg) by mouth daily 90 tablet 3    folic acid (FOLVITE) 1 MG tablet Take 1 tablet (1 mg) by mouth daily 90 tablet 3    HYRIMOZ 40  "MG/0.4ML SOAJ INJECT 0.4 MLS (40 MG) SUBCUTANEOUS EVERY 14 DAYS HOLD FOR SIGNS OF INfection, then seek medical attention. 0.8 mL 6    losartan (COZAAR) 100 MG tablet Take 1 tablet (100 mg) by mouth daily 90 tablet 3    methotrexate 2.5 MG tablet Take 10 tablets (25 mg) by mouth every 7 days Labs every 8 - 12 weeks for refills. 40 tablet 6    olopatadine (PATADAY) 0.2 % ophthalmic solution Place 0.05 mLs (1 drop) into both eyes daily 2.5 mL 1    triamcinolone (KENALOG) 0.1 % external cream Apply topically 2 times daily 45 g 1     No current facility-administered medications for this visit.     Facility-Administered Medications Ordered in Other Visits   Medication Dose Route Frequency Provider Last Rate Last Admin    sodium chloride (PF) 0.9% PF flush 72 mL  72 mL Intravenous Once Bartolo Andre MD            Allergies:     Allergies   Allergen Reactions    Seasonal Allergies           Physical Exam:   Vitals: BP (!) 140/82 (BP Location: Right arm, Patient Position: Sitting, Cuff Size: Adult Regular)   Pulse 73   Ht 1.632 m (5' 4.25\")   Wt 59 kg (130 lb)   LMP 05/20/2021 (Approximate)   BMI 22.14 kg/m       Mental Status: Alert and oriented to person, place, and time. Speech fluent and comprehension intact. No dysarthria.      Cranial Nerves:   II: Visual fields: normal  III: Pupils: 3 mm, equal, round, reactive to light   III,IV,VI: Extraocular Movements: intact   V: Facial sensation: intact to light touch  VII: Facial strength: intact without asymmetry  XII: Tongue movement: normal     Motor Exam:   Upper Extremities  Deltoid  Bicep  Tricep  Wrist Extensors  strength Intrinsic Muscles    Right  5  5  5  5 5 5   Left  5  5  5 5 5* 4+   *L  strength equivocally worse than R/slightly worse again/as noted previously  5/5 in b/l LE as well   Fatigues easily     Reflexes: biceps, triceps, brachioradialis, patellar, and ankle jerks 2+ and symmetric.                Data: Pertinent prior to visit   MRI " brain 2024  MRI BRAIN WITHOUT CONTRAST  1/30/2024 4:03 PM     HISTORY:  to determine what degree of progression of changes from R  carotid stenosis since 2021; Cerebrovascular accident (CVA) due to  stenosis of right carotid artery (H); Stenosis of right carotid artery     TECHNIQUE:  Multiplanar, multisequence MRI of the brain without  gadolinium IV contrast material.       COMPARISON:  CTA head and neck 10/24/2023. MRI brain 8/17/2021.     FINDINGS: Midline developmental anatomy is unremarkable. Remote  ischemic findings centered in the right centrum semiovale compatible  with watershed distribution infarct findings on the right with  abnormal flow void involving the right cavernous segment and petrous  segment of the internal carotid artery compatible with the known  history of high-grade stenosis and subtotal occlusion in the distal  cavernous ICA noted on the CTA of the head and neck from 10/24/2023.  Mild chronic small vessel ischemic changes in a periventricular  distribution.     Susceptibility imaging demonstrates no significant hemosiderin  deposition. Compared to an MRI evaluation of the brain from 8/17/2021,  the ischemic changes in the right centrum semiovale are similar.                                                                      IMPRESSION:   Remote ischemic findings in the right centrum semiovale  related to the high-grade stenotic and subtotal occlusive findings  involving the right internal carotid artery. No restricted diffusion  to suggest acute ischemia. Mild chronic small vessel ischemic changes.          CUS 2023  Narrative & Impression   BILATERAL CAROTID DUPLEX DOPPLER ULTRASOUND 10/11/2023 1:58 PM     CLINICAL HISTORY: Carotid stenosis, right     COMPARISON: Ultrasound carotid bilateral 8/12/2022     REFERRING PROVIDER: SARI GODWIN     TECHNIQUE: Grayscale (B-mode) and duplex and spectral Doppler  ultrasound of the common carotid, extracranial internal  carotid,  external carotid, and vertebral artery origins. Velocity measurements  obtained with angle correction at or less than 60 degrees.     FINDINGS:     RIGHT SIDE:      Plaque Morphology: Greater than 70% diameter in-stent stenosis  suggested in color Doppler image.        Proximal CCA: 54/12 cm/s     Mid CCA: 65/14 cm/s     Distal CCA, proximal to stent: 50/16 cm/s        Stent, distal CCA: 68/20 cm/s     Stent, bifurcation: 56/13 cm/s     Stent, proximal ICA: 16/0 cm/s, 3.5 velocity ratio     Stent, mid ICA: 121/18 cm/s, 7.56 velocity ratio        External CA: 232/42 cm/s        Mid ICA, distal to stent: 52/10 cm/s     Distal ICA: 42.6 cm/s        Vertebral artery: Antegrade: 47/23 cm/s      Innominate artery: 116/13 cm/s     Proximal subclavian: 226/16 cm/s, 393/0 cm/s     Stent/CCA ratio: 2.42      LEFT SIDE:      Plaque Morphology: Echogenic plaque causes less than 50% diameter  stenosis.         Origin CCA: 79/21 cm/s     Proximal CCA: 94/23 cm/s     Mid CCA: 86/31 cm/s     Distal CCA: 72/34 cm/s     Carotid bifurcation: 62/30 cm/s     External CA: 122/23 cm/s        Proximal ICA: 133/58 cm/s     Mid ICA: 153/39 cm/s     Distal ICA: 94/35 cm/s        Vertebral artery: Antegrade: 53/26 cm/s      Subclavian origin: 77/7 cm/s     Proximal subclavian: 139/0 cm/s     ICA/CCA ratio: 2.13                                                                       IMPRESSION:     1. RIGHT carotid stent: Although velocities never exceed 150 cm/s,  color Doppler image suggests greater than 70% diameter in-stent  stenosis and velocity in the stent in the proximal internal carotid  artery drops to 16 cm/s, a 3.5 times drop from the stent in the  bifurcation. 7.56 velocity ratio in the stent from the proximal to mid  internal carotid artery. Further evaluation with CTA or catheter  angiography could be performed.     2. LEFT ICA:  Less than 50% diameter narrowing by grayscale imaging  and sonographic velocity criteria.      [facility] Updated Recommendations for  Carotid Stenosis Interpretation Criteria - October 2021.  [email or web address]-Stenosis-Interpre  ation-[email or web address]     Greater than 70% diameter stenosis criteria per - Journal of Vascular  Surgery Vol. 75 Issue 1 Supplement [id number] Published online: June 18, 2021           Normal            ICA PSV: < 180 cm/s            Plaque Estimate: None            ICA/CCA PSV Ratio: < 2.0            ICA EDV: < 40 cm/s          < 50%            ICA PSV: < 180 cm/s            Plaque Estimate: < 50%            ICA/CCA PSV Ratio: < 2.0            ICA EDV: < 40 cm/s          50 - 69%            ICA PSV: 180 - 230 cm/s            Plaque Estimate: > 50%            ICA/CCA PSV Ratio: 2.0 - 4.0            ICA EDV: 40 - 100 cm/s          > 70% but less than near occlusion            ICA PSV: > 230 cm/s            Plaque Estimate: > 50%            AND either            ICA EDV: > 100 cm/s            or            ICA/CCA PSV Ratio: > 4.0          Near occlusion            ICA PSV: > 230 cm/s            Plaque Estimate: Visible            ICA/CCA PSV Ratio: Variable            ICA EDV: Variable          Total occlusion            ICA PSV: Undetectable            Plaque Estimate: Visible, no detectable lumen            ICA/CCA PSV Ratio: N/A            ICA EDV: N/A                                          Additional criteria from vascular surgery     > 80%       EDV > 120 cm/s     -----------------------------------------------     Criteria: Carotid stent velocity criteria          <50%             ICA PSV < 150 cm/sec            ICA EDV N/A            ICA(stent)/CCA PSV Ratio < 2.0            No or minimal stent lumen reduction          50% - 75%             ICA PSV >/= 150 cm/sec            ICA EDV < 125 cm/sec            ICA(stent)/CCA PSV Ratio > 2.0             Turbulent flow, stent lumen reduction present          > 75%,             ICA PSV > 300 cm/sec            ICA EDV > 125 cm/sec            ICA(stent)/CCA Ratio > 4.0             High grade stent stenosis, dampening of distal waveform          Occlusion             No flow            No stent flow visualized      EXAM: CTA HEAD NECK W CONTRAST  LOCATION: [facility]  DATE: 10/24/2023     INDICATION: Carotid artery stenosis. Headache.  COMPARISON: Urinary ultrasound dated 10/11/2023.  CONTRAST: 67mL Isovue 370  TECHNIQUE: Head and neck CT angiogram with IV contrast. Axial helical CT images of the head and neck vessels obtained during the arterial phase of intravenous contrast administration. Axial 2D reconstructed images and multiplanar 3D MIP reconstructed   images of the head and neck vessels were performed by the technologist. Dose reduction techniques were used. All stenosis measurements made according to NASCET criteria unless otherwise specified.     FINDINGS:   HEAD CTA:  ANTERIOR CIRCULATION: High-grade stenosis involving the cavernous/supraclinoid ICA, which ultimately occludes at its supraclinoid portion. The hypoplastic right middle cerebral artery is opacified via collateral circulation from a patent anterior   communicating artery. The anterior cerebral arteries are unremarkable. The left internal carotid artery, ICA terminus, and left MCA vasculature is unremarkable. The anterior communicating artery is patent. The posterior communicating arteries are   hypoplastic/absent.      POSTERIOR CIRCULATION: No stenosis/occlusion, aneurysm, or high flow vascular malformation. Balanced vertebrobasilar circulation. The basilar artery is unremarkable and gives rise to patent superior cerebellar and posterior cerebral arteries.      DURAL VENOUS SINUSES: Patent.     NECK CTA:  RIGHT CAROTID: Normal course and persist caliber of the common carotid artery. Right internal carotid artery stent with  mild stenosis at the proximal portion and critical, greater than 90% stenosis of the internal carotid artery at the distal aspect of   the stent.     LEFT CAROTID: Normal course and persist caliber of the common carotid artery. Atherosclerotic plaque results in approximately 70% stenosis of the ICA origin. The remaining extracranial internal carotid artery demonstrate persistent caliber without   evidence of dissection.     VERTEBRAL ARTERIES: Balance configuration without stenosis or dissection.     AORTIC ARCH: Classic three-vessel arch. The origins of the great vessels are unremarkable without significant stenosis.     NONVASCULAR STRUCTURES: There are no masses or enlarged lymph nodes in the neck. The submandibular, parotid, and thyroid glands are unremarkable. Multilevel degenerative changes without high-grade spinal canal stenosis or neural foraminal narrowing. No   aggressive osseous lesions. Moderate to severe emphysema.                                                                      IMPRESSION:     HEAD CTA:  1. High-grade stenosis involving the distal cavernous ICA, which ultimately occludes at its supraclinoid portion. The right middle cerebral and anterior cerebral arteries are diminutive but opacified via collateral circulation from a patent anterior   communicating artery.     NECK CTA:  1. Right internal carotid artery stent with distal in-stent stenosis greater than 90%.    Right 2023           Right carotid 2021           MRA 2022                  Left                     CUS 2022                                                      IMPRESSION:     1. RIGHT ICA: Ends of the stent were not evaluated. Improved flow and  velocities in the stent from previous. Less than 50% diameter in stent  stenosis by sonographic velocity criteria and color Doppler imaging.     2. LEFT ICA:  Less than 50% diameter narrowing by grayscale imaging  and sonographic velocity criteria           MRI brain MRA head/neck  8/17/21     IMPRESSION:   1.  Abnormal right internal carotid artery flow void consistent with  diminished or absent flow in this vessel. Suspect that this is related  to subacute to chronic right internal carotid artery dissection.  Please see the separately reported neck MRA for additional details.  2.  T2/FLAIR hyperintensity within the right greater than left deep  cerebral white matter presumably reflects gliosis related to chronic  ischemic injury. Suspect that there is a component of underlying  border zone/watershed hypoperfusion injury given the abnormal right  internal carotid artery.  3.  No acute infarct, hemorrhage or mass.                    Neck:                                                       IMPRESSION:   1.  Diminutive right internal carotid artery demonstrates abnormal  signal. 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 Chronic flow-limiting atherosclerotic  stenosis is the other primary consideration.  2.  The status of the right internal carotid intracranially is not  well evaluated on this study.  3.  Consider further evaluation of these findings with head and neck  CTA.  4.  Mild atherosclerosis of the left carotid artery without  hemodynamically significant narrowing by NASCET criteria.  5.  Patent vertebral arteries.     Procedures:     CTA neck 8/18  IMPRESSION:  1. Severe stenosis (likely greater than 80% luminal diameter stenosis)  at the proximal right internal carotid artery. The exact degree of  stenosis cannot be measured as there is swallowing motion while  scanning through the carotid bifurcations.  2. Two areas of severe stenosis of the intracranial distal right  internal carotid artery at the petrous cavernous junction and of the  entire supraclinoid segment.  3. Combination of inflow and outflow restriction of the right internal  carotid artery resulting in a diminutive right internal carotid artery  in the neck.  4. Plaque  without stenosis of the proximal and distal internal carotid  artery on the left.  5. Diminutive M1 segment of the right middle cerebral artery likely  due to decreased inflow.  6. Moderate-severe atherosclerotic narrowing at the origins of the  vertebral arteries bilaterall      MRA neck 1/26/22     1. String sign configuration of the right internal carotid artery above the   right internal carotid artery stent may be related to a tandem stenosis with   narrowing immediately above the stent, and more particularly intracranial   stenosis of the supraclinoid right internal artery (which may be the principal   flow limiting factor).     2. No evidence of acute ischemia on DWI although there is extensive white matter   change throughout the right hemisphere consistent with the effects of   hypoperfusion and extensive vasodilatation throughout the right hemisphere.     3. 1.5 cm masslike/nodular configuration of the nasopharyngeal adenoidal tissue   is somewhat unusual for a patient of this age and may warrant direct endoscopic   evaluation.          CT perfusion 1/26/22  IMPRESSION:   In keeping with findings of right carotid stenosis, perfusion study   demonstrates Tmax greater than six seconds of 36 mL at the right frontal and   parietal lobes. CBF less than 30 percent is 0. These regions are associated with   elevated Tmax and MTT.       MRI brain 1/26/22       MRI of the brain: No acute restriction of diffusion on DWI but there is   extensive confluent white matter asymmetric FLAIR signal abnormality within the   right hemisphere much of which is in a watershed type distribution suggesting   effects of chronic hypoperfusion of the right hemisphere. White matter disease   in a scattered random pattern is present with a lesser degree on the left side.   Asymmetric signal within the high cervical and right petrous carotid artery is   consistent with slow are impaired flow. Post gadolinium there is diffuse   prominence  of the intraparenchymal and pial vascular structures throughout the   right hemisphere consistent with vasodilatation of collateral circulation   routes.   Midline prominence of the adenoidal nasopharyngeal tissues in a discrete nodule   or masslike configuration spanning 1.4 cm is somewhat unusual for a patient of   this age and may warrant endoscopic evaluation.      MRI brain MRA head/neck 8/17/21     IMPRESSION:   1.  Abnormal right internal carotid artery flow void consistent with  diminished or absent flow in this vessel. Suspect that this is related  to subacute to chronic right internal carotid artery dissection.  Please see the separately reported neck MRA for additional details.  2.  T2/FLAIR hyperintensity within the right greater than left deep  cerebral white matter presumably reflects gliosis related to chronic  ischemic injury. Suspect that there is a component of underlying  border zone/watershed hypoperfusion injury given the abnormal right  internal carotid artery.  3.  No acute infarct, hemorrhage or mass.                    Neck:                                                       IMPRESSION:   1.  Diminutive right internal carotid artery demonstrates abnormal  signal. 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 Chronic flow-limiting atherosclerotic  stenosis is the other primary consideration.  2.  The status of the right internal carotid intracranially is not  well evaluated on this study.  3.  Consider further evaluation of these findings with head and neck  CTA.  4.  Mild atherosclerosis of the left carotid artery without  hemodynamically significant narrowing by NASCET criteria.  5.  Patent vertebral arteries.     Procedures:     CTA neck 8/18  IMPRESSION:  1. Severe stenosis (likely greater than 80% luminal diameter stenosis)  at the proximal right internal carotid artery. The exact degree of  stenosis cannot be measured as there is  swallowing motion while  scanning through the carotid bifurcations.  2. Two areas of severe stenosis of the intracranial distal right  internal carotid artery at the petrous cavernous junction and of the  entire supraclinoid segment.  3. Combination of inflow and outflow restriction of the right internal  carotid artery resulting in a diminutive right internal carotid artery  in the neck.  4. Plaque without stenosis of the proximal and distal internal carotid  artery on the left.  5. Diminutive M1 segment of the right middle cerebral artery likely  due to decreased inflow.  6. Moderate-severe atherosclerotic narrowing at the origins of the  vertebral arteries bilaterally.     Labs:          LDL Cholesterol Calculated   Date Value Ref Range Status   10/25/2023 55 <=100 mg/dL Final   06/30/2021 104 (H) <100 mg/dL Final       Comment:       Above desirable:  100-129 mg/dl  Borderline High:  130-159 mg/dL  High:             160-189 mg/dL  Very high:       >189 mg/dl             2025 CTA    EXAM: CTA HEAD NECK W CONTRAST  2/11/2025 10:31 AM      HISTORY: Carotid stenosis, right        COMPARISON: Brain MRI 1/30/2024, head CT 10/24/2023. Carotid  ultrasound 10/11/2023.     TECHNIQUE:  HEAD and NECK CTA: During rapid bolus intravenous injection of  nonionic contrast material, axial images were obtained using thin  collimation multidetector helical technique from the base of the upper  aortic arch through the [place]. This CT angiogram data was  reconstructed at thin intervals with mild overlap. Images were sent to  the 3D workstation, and 3D reconstructions were obtained. The axial  source images, multiplanar reformations, 3D reconstructions in both  maximum intensity projection display and volume rendered models were  reviewed, with reconstructions performed by the technologist.     CONTRAST: ISOVUE 370 70cc     FINDINGS:  Head CTA demonstrates no large vessel arterial occlusion or stenosis  of the major  intracranial arteries. No aneurysm.      Neck CTA demonstrates: Calcification of the left carotid bulb, left  distal internal carotid artery measures 5 mm in diameter, with  approximately 40% stenosis of the carotid bulb. Vascular  calcifications in the carotid siphons. Vascular calcification and soft  plaque at the proximal external carotid artery.     Stent extending from the distal right common carotid artery extending  to the proximal right internal carotid artery. There is near complete  occlusion with soft plaque at the distal end of the stent (series 6,  image [id number]), with narrowing of the distal internal carotid artery up to  the terminus, with occlusion in the supraclinoid segment.     Vascular calcification of the intradural vertebral arteries without  significant stenosis. Diminutive right posterior communicating artery.     No acute finding in the visualized intracranial, orbital, and skull  base structures. Mucosal thickening of the posterior left ethmoid air  cells, the paranasal sinuses and mastoid air cells are otherwise  clear.     Degenerative changes of the cervical spine with multilevel  intervertebral disc space narrowing. No high-grade spinal canal  stenosis. No acute osseous abnormality.     The visualized superior mediastinal structures are within normal  limits. Presumed 7 x 8 mm hypodense posterior right thyroid nodule in  the limitation of artifact. Mild emphysematous changes.                                                                      IMPRESSION:  1.  Near-complete occlusion at the distal end of the right  common/internal carotid artery stent with diffusely decreased caliber  of the distal internal carotid artery through to the intracranial  vessels, and unchanged occlusion at the supraclinoid segment. Presumed  reconstitution of the right-sided intracranial vessels by the left  internal carotid artery.  2. Atherosclerosis of the left carotid bulb with approximately  40%  stenosis.  3. Head CTA demonstrates no intracranial artery occlusion or aneurysm.     I have personally reviewed the examination and initial interpretation  and I agree with the findings.     TRIXIE ARREOLA MD         The total time of this encounter today amounted to 40 minutes. This time included time spent with the patient, prep work, reviewing new imaging,  and performing post visit documentation.

## 2025-05-26 ENCOUNTER — MYC REFILL (OUTPATIENT)
Dept: RHEUMATOLOGY | Facility: CLINIC | Age: 60
End: 2025-05-26
Payer: COMMERCIAL

## 2025-05-26 ENCOUNTER — MYC REFILL (OUTPATIENT)
Dept: FAMILY MEDICINE | Facility: CLINIC | Age: 60
End: 2025-05-26
Payer: COMMERCIAL

## 2025-05-26 DIAGNOSIS — M05.741 RHEUMATOID ARTHRITIS INVOLVING BOTH HANDS WITH POSITIVE RHEUMATOID FACTOR (H): ICD-10-CM

## 2025-05-26 DIAGNOSIS — F98.8 ATTENTION DEFICIT DISORDER OF ADULT: ICD-10-CM

## 2025-05-26 DIAGNOSIS — M05.742 RHEUMATOID ARTHRITIS INVOLVING BOTH HANDS WITH POSITIVE RHEUMATOID FACTOR (H): ICD-10-CM

## 2025-05-27 RX ORDER — DEXTROAMPHETAMINE SACCHARATE, AMPHETAMINE ASPARTATE MONOHYDRATE, DEXTROAMPHETAMINE SULFATE AND AMPHETAMINE SULFATE 5; 5; 5; 5 MG/1; MG/1; MG/1; MG/1
20 CAPSULE, EXTENDED RELEASE ORAL DAILY
Qty: 30 CAPSULE | Refills: 0 | Status: SHIPPED | OUTPATIENT
Start: 2025-05-27

## 2025-05-27 RX ORDER — DEXTROAMPHETAMINE SACCHARATE, AMPHETAMINE ASPARTATE, DEXTROAMPHETAMINE SULFATE AND AMPHETAMINE SULFATE 5; 5; 5; 5 MG/1; MG/1; MG/1; MG/1
20 TABLET ORAL DAILY
Qty: 30 TABLET | Refills: 0 | Status: SHIPPED | OUTPATIENT
Start: 2025-05-27

## 2025-05-27 RX ORDER — FOLIC ACID 1 MG/1
1 TABLET ORAL DAILY
Qty: 90 TABLET | Refills: 3 | Status: SHIPPED | OUTPATIENT
Start: 2025-05-27

## 2025-05-27 NOTE — TELEPHONE ENCOUNTER
Previous RX sent to [facility].  Requesting new pharmacy.     Jenniffer Costello RN

## 2025-06-25 ENCOUNTER — MYC REFILL (OUTPATIENT)
Dept: FAMILY MEDICINE | Facility: CLINIC | Age: 60
End: 2025-06-25
Payer: COMMERCIAL

## 2025-06-25 DIAGNOSIS — F98.8 ATTENTION DEFICIT DISORDER OF ADULT: ICD-10-CM

## 2025-06-26 RX ORDER — DEXTROAMPHETAMINE SACCHARATE, AMPHETAMINE ASPARTATE MONOHYDRATE, DEXTROAMPHETAMINE SULFATE AND AMPHETAMINE SULFATE 5; 5; 5; 5 MG/1; MG/1; MG/1; MG/1
20 CAPSULE, EXTENDED RELEASE ORAL DAILY
Qty: 30 CAPSULE | Refills: 0 | Status: SHIPPED | OUTPATIENT
Start: 2025-06-26

## 2025-06-26 RX ORDER — DEXTROAMPHETAMINE SACCHARATE, AMPHETAMINE ASPARTATE, DEXTROAMPHETAMINE SULFATE AND AMPHETAMINE SULFATE 5; 5; 5; 5 MG/1; MG/1; MG/1; MG/1
20 TABLET ORAL DAILY
Qty: 30 TABLET | Refills: 0 | Status: SHIPPED | OUTPATIENT
Start: 2025-06-26

## 2025-07-23 ENCOUNTER — MYC REFILL (OUTPATIENT)
Dept: FAMILY MEDICINE | Facility: CLINIC | Age: 60
End: 2025-07-23
Payer: COMMERCIAL

## 2025-07-23 DIAGNOSIS — F98.8 ATTENTION DEFICIT DISORDER OF ADULT: ICD-10-CM

## 2025-07-24 RX ORDER — DEXTROAMPHETAMINE SACCHARATE, AMPHETAMINE ASPARTATE MONOHYDRATE, DEXTROAMPHETAMINE SULFATE AND AMPHETAMINE SULFATE 5; 5; 5; 5 MG/1; MG/1; MG/1; MG/1
20 CAPSULE, EXTENDED RELEASE ORAL DAILY
Qty: 30 CAPSULE | Refills: 0 | Status: SHIPPED | OUTPATIENT
Start: 2025-07-24

## 2025-07-24 RX ORDER — DEXTROAMPHETAMINE SACCHARATE, AMPHETAMINE ASPARTATE, DEXTROAMPHETAMINE SULFATE AND AMPHETAMINE SULFATE 5; 5; 5; 5 MG/1; MG/1; MG/1; MG/1
20 TABLET ORAL DAILY
Qty: 30 TABLET | Refills: 0 | Status: SHIPPED | OUTPATIENT
Start: 2025-07-24

## 2025-08-20 ENCOUNTER — TELEPHONE (OUTPATIENT)
Dept: FAMILY MEDICINE | Facility: CLINIC | Age: 60
End: 2025-08-20
Payer: COMMERCIAL

## 2025-08-25 ENCOUNTER — E-VISIT (OUTPATIENT)
Dept: FAMILY MEDICINE | Facility: CLINIC | Age: 60
End: 2025-08-25
Payer: COMMERCIAL

## 2025-08-25 ENCOUNTER — MYC REFILL (OUTPATIENT)
Dept: FAMILY MEDICINE | Facility: CLINIC | Age: 60
End: 2025-08-25

## 2025-08-25 DIAGNOSIS — F98.8 ATTENTION DEFICIT DISORDER OF ADULT: ICD-10-CM

## 2025-08-25 DIAGNOSIS — F98.8 ATTENTION DEFICIT DISORDER OF ADULT: Primary | ICD-10-CM

## 2025-08-25 RX ORDER — DEXTROAMPHETAMINE SACCHARATE, AMPHETAMINE ASPARTATE, DEXTROAMPHETAMINE SULFATE AND AMPHETAMINE SULFATE 5; 5; 5; 5 MG/1; MG/1; MG/1; MG/1
20 TABLET ORAL DAILY
Qty: 30 TABLET | Refills: 0 | Status: SHIPPED | OUTPATIENT
Start: 2025-08-25

## 2025-08-25 RX ORDER — DEXTROAMPHETAMINE SACCHARATE, AMPHETAMINE ASPARTATE MONOHYDRATE, DEXTROAMPHETAMINE SULFATE AND AMPHETAMINE SULFATE 5; 5; 5; 5 MG/1; MG/1; MG/1; MG/1
20 CAPSULE, EXTENDED RELEASE ORAL DAILY
Qty: 30 CAPSULE | Refills: 0 | Status: SHIPPED | OUTPATIENT
Start: 2025-08-25

## (undated) DEVICE — GLOVE PROTEXIS W/NEU-THERA 7.5  2D73TE75

## (undated) DEVICE — SOL WATER IRRIG 1000ML BOTTLE 07139-09

## (undated) DEVICE — SUCTION TIP YANKAUER W/O VENT K86

## (undated) DEVICE — ADH SKIN CLOSURE PREMIERPRO EXOFIN MICOR HV 0.5ML 3471

## (undated) DEVICE — SU MONOCRYL 4-0 PS-2 18" UND Y496G

## (undated) DEVICE — GLOVE PROTEXIS BLUE W/NEU-THERA 7.5  2D73EB75

## (undated) DEVICE — DRAPE LAP W/ARMBOARD 29410

## (undated) DEVICE — PACK MINOR SBA15MIFSE

## (undated) DEVICE — PREP CHLORAPREP 26ML TINTED ORANGE  260815

## (undated) DEVICE — SU VICRYL 3-0 SH 27" UND J416H

## (undated) DEVICE — SYR EAR BULB 3OZ 0035830

## (undated) RX ORDER — LIDOCAINE HYDROCHLORIDE 10 MG/ML
INJECTION, SOLUTION EPIDURAL; INFILTRATION; INTRACAUDAL; PERINEURAL
Status: DISPENSED
Start: 2021-10-22

## (undated) RX ORDER — FENTANYL CITRATE 50 UG/ML
INJECTION, SOLUTION INTRAMUSCULAR; INTRAVENOUS
Status: DISPENSED
Start: 2021-10-22

## (undated) RX ORDER — ACETAMINOPHEN 325 MG/1
TABLET ORAL
Status: DISPENSED
Start: 2021-06-08

## (undated) RX ORDER — BUPIVACAINE HYDROCHLORIDE AND EPINEPHRINE 5; 5 MG/ML; UG/ML
INJECTION, SOLUTION EPIDURAL; INTRACAUDAL; PERINEURAL
Status: DISPENSED
Start: 2021-06-08

## (undated) RX ORDER — ONDANSETRON 2 MG/ML
INJECTION INTRAMUSCULAR; INTRAVENOUS
Status: DISPENSED
Start: 2021-06-08

## (undated) RX ORDER — HEPARIN SODIUM 1000 [USP'U]/ML
INJECTION, SOLUTION INTRAVENOUS; SUBCUTANEOUS
Status: DISPENSED
Start: 2021-10-22

## (undated) RX ORDER — HYDRALAZINE HYDROCHLORIDE 20 MG/ML
INJECTION INTRAMUSCULAR; INTRAVENOUS
Status: DISPENSED
Start: 2021-10-22

## (undated) RX ORDER — DEXAMETHASONE SODIUM PHOSPHATE 4 MG/ML
INJECTION, SOLUTION INTRA-ARTICULAR; INTRALESIONAL; INTRAMUSCULAR; INTRAVENOUS; SOFT TISSUE
Status: DISPENSED
Start: 2021-06-08

## (undated) RX ORDER — SODIUM CHLORIDE 9 MG/ML
INJECTION, SOLUTION INTRAVENOUS
Status: DISPENSED
Start: 2021-10-22

## (undated) RX ORDER — CEFAZOLIN SODIUM 2 G/100ML
INJECTION, SOLUTION INTRAVENOUS
Status: DISPENSED
Start: 2021-06-08

## (undated) RX ORDER — NOREPINEPHRINE BITARTRATE 0.06 MG/ML
INJECTION, SOLUTION INTRAVENOUS
Status: DISPENSED
Start: 2021-10-22

## (undated) RX ORDER — FENTANYL CITRATE 50 UG/ML
INJECTION, SOLUTION INTRAMUSCULAR; INTRAVENOUS
Status: DISPENSED
Start: 2021-06-08

## (undated) RX ORDER — GLYCOPYRROLATE 0.2 MG/ML
INJECTION, SOLUTION INTRAMUSCULAR; INTRAVENOUS
Status: DISPENSED
Start: 2021-10-22

## (undated) RX ORDER — ATROPINE SULFATE 0.1 MG/ML
INJECTION INTRAVENOUS
Status: DISPENSED
Start: 2021-10-22

## (undated) RX ORDER — KETOROLAC TROMETHAMINE 30 MG/ML
INJECTION, SOLUTION INTRAMUSCULAR; INTRAVENOUS
Status: DISPENSED
Start: 2021-06-08